# Patient Record
Sex: FEMALE | Race: WHITE | NOT HISPANIC OR LATINO | Employment: OTHER | URBAN - METROPOLITAN AREA
[De-identification: names, ages, dates, MRNs, and addresses within clinical notes are randomized per-mention and may not be internally consistent; named-entity substitution may affect disease eponyms.]

---

## 2023-07-28 ENCOUNTER — HOSPITAL ENCOUNTER (OUTPATIENT)
Dept: DATA CONVERSION | Facility: HOSPITAL | Age: 48
Discharge: LEFT BEFORE DISCHARGE | End: 2023-07-28
Attending: EMERGENCY MEDICINE

## 2023-07-28 DIAGNOSIS — Z79.899 OTHER LONG TERM (CURRENT) DRUG THERAPY: ICD-10-CM

## 2023-07-28 DIAGNOSIS — R51.9 HEADACHE, UNSPECIFIED: Primary | ICD-10-CM

## 2023-07-28 DIAGNOSIS — F17.210 NICOTINE DEPENDENCE, CIGARETTES, UNCOMPLICATED: ICD-10-CM

## 2023-07-28 DIAGNOSIS — J32.9 CHRONIC SINUSITIS, UNSPECIFIED: ICD-10-CM

## 2023-07-28 DIAGNOSIS — F19.10 OTHER PSYCHOACTIVE SUBSTANCE ABUSE, UNCOMPLICATED (MULTI): ICD-10-CM

## 2023-07-28 LAB
ALBUMIN SERPL-MCNC: 3.6 GM/DL (ref 3.5–5)
ALBUMIN/GLOB SERPL: 1 RATIO (ref 1.5–3)
ALP BLD-CCNC: 99 U/L (ref 35–125)
ALT SERPL-CCNC: 18 U/L (ref 5–40)
AMPHETAMINES UR QL SCN>1000 NG/ML: NEGATIVE
ANION GAP SERPL CALCULATED.3IONS-SCNC: 14 MMOL/L (ref 0–19)
AST SERPL-CCNC: 15 U/L (ref 5–40)
BACTERIA UR QL AUTO: POSITIVE
BARBITURATES UR QL SCN>300 NG/ML: NEGATIVE
BASOPHILS # BLD AUTO: 0.03 K/UL (ref 0–0.22)
BASOPHILS NFR BLD AUTO: 0.3 % (ref 0–1)
BENZODIAZ UR QL SCN>300 NG/ML: NEGATIVE
BILIRUB SERPL-MCNC: 0.4 MG/DL (ref 0.1–1.2)
BILIRUB UR QL STRIP.AUTO: NEGATIVE
BUN SERPL-MCNC: 5 MG/DL (ref 8–25)
BUN/CREAT SERPL: 10 RATIO (ref 8–21)
BZE UR QL SCN>300 NG/ML: NORMAL
CALCIUM SERPL-MCNC: 9.2 MG/DL (ref 8.5–10.4)
CANNABINOIDS UR QL SCN>50 NG/ML: NEGATIVE
CHLORIDE SERPL-SCNC: 99 MMOL/L (ref 97–107)
CLARITY UR: CLEAR
CO2 SERPL-SCNC: 22 MMOL/L (ref 24–31)
COLOR UR: ABNORMAL
CREAT SERPL-MCNC: 0.5 MG/DL (ref 0.4–1.6)
DEPRECATED RDW RBC AUTO: 39 FL (ref 37–54)
DIFFERENTIAL METHOD BLD: ABNORMAL
DRUG SCREEN COMMENT UR-IMP: NORMAL
EOSINOPHIL # BLD AUTO: 0.13 K/UL (ref 0–0.45)
EOSINOPHIL NFR BLD: 1.1 % (ref 0–3)
ERYTHROCYTE [DISTWIDTH] IN BLOOD BY AUTOMATED COUNT: 12.3 % (ref 11.7–15)
FENTANYL+NORFENTANYL UR QL SCN: NORMAL
GFR SERPL CREATININE-BSD FRML MDRD: 115 ML/MIN/1.73 M2
GLOBULIN SER-MCNC: 3.7 G/DL (ref 1.9–3.7)
GLUCOSE SERPL-MCNC: 353 MG/DL (ref 65–99)
GLUCOSE UR STRIP.AUTO-MCNC: >=1000 MG/DL
HCG UR QL: NEGATIVE
HCT VFR BLD AUTO: 42.4 % (ref 36–44)
HGB BLD-MCNC: 15 GM/DL (ref 12–15)
HGB UR QL STRIP.AUTO: 1 /HPF (ref 0–3)
HGB UR QL: NEGATIVE
HS TROPONIN T DELTA: 2 (ref 0–4)
HS TROPONIN T DELTA: NORMAL (ref 0–4)
HYALINE CASTS UR QL AUTO: ABNORMAL /LPF
IMM GRANULOCYTES # BLD AUTO: 0.06 K/UL (ref 0–0.1)
KETONES UR QL STRIP.AUTO: ABNORMAL
LEUKOCYTE ESTERASE UR QL STRIP.AUTO: NEGATIVE
LYMPHOCYTES # BLD AUTO: 2.38 K/UL (ref 1.2–3.2)
LYMPHOCYTES NFR BLD MANUAL: 20 % (ref 20–40)
MCH RBC QN AUTO: 30.9 PG (ref 26–34)
MCHC RBC AUTO-ENTMCNC: 35.4 % (ref 31–37)
MCV RBC AUTO: 87.4 FL (ref 80–100)
METHADONE UR QL SCN>300 NG/ML: NEGATIVE
MICRO URNS: ABNORMAL
MICROSCOPIC (UA): ABNORMAL
MONOCYTES # BLD AUTO: 0.95 K/UL (ref 0–0.8)
MONOCYTES NFR BLD MANUAL: 8 % (ref 0–8)
NEUTROPHILS # BLD AUTO: 8.37 K/UL
NEUTROPHILS # BLD AUTO: 8.37 K/UL (ref 1.8–7.7)
NEUTROPHILS.IMMATURE NFR BLD: 0.5 % (ref 0–1)
NEUTS SEG NFR BLD: 70.1 % (ref 50–70)
NITRITE UR QL STRIP.AUTO: NEGATIVE
NRBC BLD-RTO: 0 /100 WBC
OPIATES UR QL SCN>300 NG/ML: NEGATIVE
OXYCODONE UR QL: NEGATIVE
PCP UR QL SCN>25 NG/ML: NEGATIVE
PH UR STRIP.AUTO: 6.5 [PH] (ref 4.6–8)
PLATELET # BLD AUTO: 284 K/UL (ref 150–450)
PMV BLD AUTO: 10.3 CU (ref 7–12.6)
POTASSIUM SERPL-SCNC: 3.7 MMOL/L (ref 3.4–5.1)
PROT SERPL-MCNC: 7.3 G/DL (ref 5.9–7.9)
PROT UR STRIP.AUTO-MCNC: NEGATIVE MG/DL
RBC # BLD AUTO: 4.85 M/UL (ref 4–4.9)
SODIUM SERPL-SCNC: 135 MMOL/L (ref 133–145)
SP GR UR STRIP.AUTO: >1.04 (ref 1–1.03)
SQUAMOUS UR QL AUTO: ABNORMAL /HPF
TROPONIN T SERPL-MCNC: 10 NG/L
TROPONIN T SERPL-MCNC: 8 NG/L
URINE CULTURE: ABNORMAL
UROBILINOGEN UR QL STRIP.AUTO: NORMAL MG/DL (ref 0–1)
WBC # BLD AUTO: 11.9 K/UL (ref 4.5–11)
WBC #/AREA URNS AUTO: 3 /HPF (ref 0–3)

## 2023-09-15 PROBLEM — F32.9 MAJOR DEPRESSIVE DISORDER: Status: ACTIVE | Noted: 2023-09-15

## 2023-09-15 PROBLEM — E87.20 LACTIC ACIDOSIS: Status: ACTIVE | Noted: 2023-09-15

## 2023-09-15 PROBLEM — M47.816 OSTEOARTHRITIS OF LUMBAR SPINE: Status: ACTIVE | Noted: 2023-09-15

## 2023-09-15 PROBLEM — M17.11 PRIMARY OSTEOARTHRITIS OF RIGHT KNEE: Status: ACTIVE | Noted: 2023-09-15

## 2023-09-15 PROBLEM — K42.9 UMBILICAL HERNIA: Status: ACTIVE | Noted: 2023-09-15

## 2023-09-15 PROBLEM — F10.20 ALCOHOL DEPENDENCE (MULTI): Status: ACTIVE | Noted: 2023-09-15

## 2023-09-15 PROBLEM — K80.20 GALLSTONES: Status: ACTIVE | Noted: 2023-09-15

## 2023-09-15 PROBLEM — R26.81 GAIT INSTABILITY: Status: ACTIVE | Noted: 2023-09-15

## 2023-09-15 PROBLEM — K80.10 CALCULUS OF GALLBLADDER WITH CHOLECYSTITIS: Status: ACTIVE | Noted: 2023-09-15

## 2023-09-15 PROBLEM — M70.71 BURSITIS OF RIGHT HIP: Status: RESOLVED | Noted: 2023-09-15 | Resolved: 2023-09-15

## 2023-09-15 PROBLEM — E11.9 TYPE 2 DIABETES MELLITUS WITHOUT COMPLICATION, WITHOUT LONG-TERM CURRENT USE OF INSULIN (MULTI): Status: ACTIVE | Noted: 2023-09-15

## 2023-09-15 PROBLEM — N94.6 DYSMENORRHEA: Status: ACTIVE | Noted: 2023-09-15

## 2023-09-15 PROBLEM — F10.929 ALCOHOL INTOXICATION (CMS-HCC): Status: ACTIVE | Noted: 2023-09-15

## 2023-09-15 PROBLEM — F31.9 DEPRESSED BIPOLAR I DISORDER (MULTI): Status: ACTIVE | Noted: 2023-09-15

## 2023-09-15 PROBLEM — E78.2 MIXED HYPERLIPIDEMIA: Status: ACTIVE | Noted: 2023-09-15

## 2023-09-15 PROBLEM — M70.60 GREATER TROCHANTERIC BURSITIS: Status: ACTIVE | Noted: 2023-09-15

## 2023-09-15 PROBLEM — F41.9 ANXIETY: Status: ACTIVE | Noted: 2023-09-15

## 2023-09-15 RX ORDER — GLIMEPIRIDE 4 MG/1
4 TABLET ORAL
COMMUNITY

## 2023-09-15 RX ORDER — PEN NEEDLE, DIABETIC 30 GX3/16"
NEEDLE, DISPOSABLE MISCELLANEOUS
COMMUNITY
Start: 2022-07-22

## 2023-09-15 RX ORDER — INSULIN GLARGINE 100 [IU]/ML
30 INJECTION, SOLUTION SUBCUTANEOUS NIGHTLY
COMMUNITY
End: 2024-01-03

## 2023-09-15 RX ORDER — VENLAFAXINE HYDROCHLORIDE 150 MG/1
150 CAPSULE, EXTENDED RELEASE ORAL DAILY
COMMUNITY
End: 2024-02-21 | Stop reason: WASHOUT

## 2023-09-15 RX ORDER — DOXEPIN HYDROCHLORIDE 150 MG/1
150 CAPSULE ORAL NIGHTLY
COMMUNITY
End: 2024-02-21 | Stop reason: WASHOUT

## 2023-09-15 RX ORDER — SIMVASTATIN 20 MG/1
20 TABLET, FILM COATED ORAL EVERY EVENING
COMMUNITY
Start: 2022-09-02 | End: 2024-03-20

## 2023-09-15 RX ORDER — HYDROXYZINE HYDROCHLORIDE 25 MG/1
1 TABLET, FILM COATED ORAL 2 TIMES DAILY PRN
COMMUNITY

## 2023-10-31 DIAGNOSIS — E11.9 TYPE 2 DIABETES MELLITUS WITHOUT COMPLICATION, WITHOUT LONG-TERM CURRENT USE OF INSULIN (MULTI): Primary | ICD-10-CM

## 2023-10-31 RX ORDER — FLASH GLUCOSE SENSOR
KIT MISCELLANEOUS
Qty: 2 EACH | Refills: 11 | Status: SHIPPED | OUTPATIENT
Start: 2023-10-31

## 2023-11-11 PROBLEM — N95.1 PERIMENOPAUSE: Status: ACTIVE | Noted: 2023-11-11

## 2023-11-11 PROBLEM — Z12.31 ENCOUNTER FOR SCREENING MAMMOGRAM FOR MALIGNANT NEOPLASM OF BREAST: Status: ACTIVE | Noted: 2023-11-11

## 2023-11-11 PROBLEM — N92.0 MENORRHAGIA WITH REGULAR CYCLE: Status: ACTIVE | Noted: 2023-11-11

## 2023-11-11 PROBLEM — Z12.4 SCREENING FOR CERVICAL CANCER: Status: ACTIVE | Noted: 2023-11-11

## 2023-11-11 PROBLEM — Z11.51 SCREENING FOR HPV (HUMAN PAPILLOMAVIRUS): Status: ACTIVE | Noted: 2023-11-11

## 2023-11-13 ENCOUNTER — APPOINTMENT (OUTPATIENT)
Dept: OBSTETRICS AND GYNECOLOGY | Facility: CLINIC | Age: 48
End: 2023-11-13
Payer: COMMERCIAL

## 2023-11-22 ENCOUNTER — OFFICE VISIT (OUTPATIENT)
Dept: OBSTETRICS AND GYNECOLOGY | Facility: CLINIC | Age: 48
End: 2023-11-22
Payer: COMMERCIAL

## 2023-11-22 VITALS
WEIGHT: 217 LBS | DIASTOLIC BLOOD PRESSURE: 60 MMHG | SYSTOLIC BLOOD PRESSURE: 100 MMHG | BODY MASS INDEX: 34.87 KG/M2 | HEIGHT: 66 IN

## 2023-11-22 DIAGNOSIS — N92.0 MENORRHAGIA WITH REGULAR CYCLE: Primary | ICD-10-CM

## 2023-11-22 PROCEDURE — 3078F DIAST BP <80 MM HG: CPT | Performed by: OBSTETRICS & GYNECOLOGY

## 2023-11-22 PROCEDURE — 1036F TOBACCO NON-USER: CPT | Performed by: OBSTETRICS & GYNECOLOGY

## 2023-11-22 PROCEDURE — 2500000004 HC RX 250 GENERAL PHARMACY W/ HCPCS (ALT 636 FOR OP/ED): Performed by: OBSTETRICS & GYNECOLOGY

## 2023-11-22 PROCEDURE — 3074F SYST BP LT 130 MM HG: CPT | Performed by: OBSTETRICS & GYNECOLOGY

## 2023-11-22 RX ORDER — MEDROXYPROGESTERONE ACETATE 150 MG/ML
150 INJECTION, SUSPENSION INTRAMUSCULAR ONCE
Status: COMPLETED | OUTPATIENT
Start: 2023-11-22 | End: 2023-11-22

## 2023-11-22 RX ADMIN — MEDROXYPROGESTERONE ACETATE 150 MG: 150 INJECTION, SUSPENSION INTRAMUSCULAR at 15:22

## 2023-11-22 ASSESSMENT — PATIENT HEALTH QUESTIONNAIRE - PHQ9
1. LITTLE INTEREST OR PLEASURE IN DOING THINGS: NOT AT ALL
2. FEELING DOWN, DEPRESSED OR HOPELESS: NOT AT ALL
SUM OF ALL RESPONSES TO PHQ9 QUESTIONS 1 & 2: 0

## 2023-11-22 ASSESSMENT — LIFESTYLE VARIABLES
HOW MANY STANDARD DRINKS CONTAINING ALCOHOL DO YOU HAVE ON A TYPICAL DAY: 1 OR 2
HOW OFTEN DO YOU HAVE A DRINK CONTAINING ALCOHOL: MONTHLY OR LESS
HOW OFTEN DO YOU HAVE SIX OR MORE DRINKS ON ONE OCCASION: NEVER
AUDIT-C TOTAL SCORE: 1
SKIP TO QUESTIONS 9-10: 1

## 2023-12-26 DIAGNOSIS — E11.9 TYPE 2 DIABETES MELLITUS WITHOUT COMPLICATION, WITHOUT LONG-TERM CURRENT USE OF INSULIN (MULTI): Primary | ICD-10-CM

## 2023-12-26 RX ORDER — METFORMIN HYDROCHLORIDE 500 MG/1
2000 TABLET, EXTENDED RELEASE ORAL DAILY
Qty: 120 TABLET | Refills: 3 | Status: SHIPPED | OUTPATIENT
Start: 2023-12-26 | End: 2024-02-21 | Stop reason: ALTCHOICE

## 2024-01-03 DIAGNOSIS — E11.9 TYPE 2 DIABETES MELLITUS WITHOUT COMPLICATION, WITHOUT LONG-TERM CURRENT USE OF INSULIN (MULTI): Primary | ICD-10-CM

## 2024-01-03 RX ORDER — INSULIN GLARGINE 100 [IU]/ML
INJECTION, SOLUTION SUBCUTANEOUS
Qty: 80 ML | Refills: 3 | Status: ON HOLD | OUTPATIENT
Start: 2024-01-03 | End: 2024-03-14 | Stop reason: SDUPTHER

## 2024-01-25 DIAGNOSIS — E11.9 TYPE 2 DIABETES MELLITUS WITHOUT COMPLICATION, WITHOUT LONG-TERM CURRENT USE OF INSULIN (MULTI): Primary | ICD-10-CM

## 2024-01-25 RX ORDER — BLOOD SUGAR DIAGNOSTIC
STRIP MISCELLANEOUS
Qty: 100 EACH | Refills: 11 | Status: SHIPPED | OUTPATIENT
Start: 2024-01-25

## 2024-02-08 ENCOUNTER — OFFICE VISIT (OUTPATIENT)
Dept: OBSTETRICS AND GYNECOLOGY | Facility: CLINIC | Age: 49
End: 2024-02-08
Payer: MEDICARE

## 2024-02-08 VITALS
SYSTOLIC BLOOD PRESSURE: 112 MMHG | DIASTOLIC BLOOD PRESSURE: 68 MMHG | BODY MASS INDEX: 34.29 KG/M2 | HEIGHT: 65 IN | WEIGHT: 205.8 LBS

## 2024-02-08 DIAGNOSIS — M81.0 PERIMENOPAUSAL BONE LOSS: ICD-10-CM

## 2024-02-08 DIAGNOSIS — E11.9 TYPE 2 DIABETES MELLITUS WITHOUT COMPLICATION, WITHOUT LONG-TERM CURRENT USE OF INSULIN (MULTI): ICD-10-CM

## 2024-02-08 DIAGNOSIS — M85.80 OSTEOPENIA DETERMINED BY X-RAY: ICD-10-CM

## 2024-02-08 DIAGNOSIS — N92.1 MENORRHAGIA WITH IRREGULAR CYCLE: Primary | ICD-10-CM

## 2024-02-08 DIAGNOSIS — Z12.4 SCREENING FOR CERVICAL CANCER: ICD-10-CM

## 2024-02-08 DIAGNOSIS — Z12.31 SCREENING MAMMOGRAM FOR BREAST CANCER: ICD-10-CM

## 2024-02-08 DIAGNOSIS — F17.200 TOBACCO DEPENDENCE: ICD-10-CM

## 2024-02-08 DIAGNOSIS — Z98.891 HISTORY OF 3 CESAREAN SECTIONS: ICD-10-CM

## 2024-02-08 PROCEDURE — 99214 OFFICE O/P EST MOD 30 MIN: CPT | Performed by: OBSTETRICS & GYNECOLOGY

## 2024-02-08 PROCEDURE — 3008F BODY MASS INDEX DOCD: CPT | Performed by: OBSTETRICS & GYNECOLOGY

## 2024-02-08 PROCEDURE — 2500000004 HC RX 250 GENERAL PHARMACY W/ HCPCS (ALT 636 FOR OP/ED): Performed by: OBSTETRICS & GYNECOLOGY

## 2024-02-08 PROCEDURE — 3074F SYST BP LT 130 MM HG: CPT | Performed by: OBSTETRICS & GYNECOLOGY

## 2024-02-08 PROCEDURE — 88175 CYTOPATH C/V AUTO FLUID REDO: CPT | Mod: TC,GCY,WESLAB | Performed by: OBSTETRICS & GYNECOLOGY

## 2024-02-08 PROCEDURE — 3078F DIAST BP <80 MM HG: CPT | Performed by: OBSTETRICS & GYNECOLOGY

## 2024-02-08 RX ORDER — MEDROXYPROGESTERONE ACETATE 150 MG/ML
150 INJECTION, SUSPENSION INTRAMUSCULAR ONCE
Status: COMPLETED | OUTPATIENT
Start: 2024-02-08 | End: 2024-02-08

## 2024-02-08 RX ORDER — ARIPIPRAZOLE 20 MG/1
20 TABLET ORAL DAILY
COMMUNITY
End: 2024-02-21 | Stop reason: WASHOUT

## 2024-02-08 RX ORDER — MEDROXYPROGESTERONE ACETATE 150 MG/ML
150 INJECTION, SUSPENSION INTRAMUSCULAR
COMMUNITY
End: 2024-02-08 | Stop reason: SDUPTHER

## 2024-02-08 RX ORDER — MEDROXYPROGESTERONE ACETATE 150 MG/ML
150 INJECTION, SUSPENSION INTRAMUSCULAR
Qty: 1 ML | Refills: 3 | Status: SHIPPED | OUTPATIENT
Start: 2024-02-08

## 2024-02-08 RX ADMIN — MEDROXYPROGESTERONE ACETATE 150 MG: 150 INJECTION, SUSPENSION INTRAMUSCULAR at 14:30

## 2024-02-08 ASSESSMENT — PATIENT HEALTH QUESTIONNAIRE - PHQ9
2. FEELING DOWN, DEPRESSED OR HOPELESS: NOT AT ALL
1. LITTLE INTEREST OR PLEASURE IN DOING THINGS: NOT AT ALL
1. LITTLE INTEREST OR PLEASURE IN DOING THINGS: NOT AT ALL
2. FEELING DOWN, DEPRESSED OR HOPELESS: NOT AT ALL
SUM OF ALL RESPONSES TO PHQ9 QUESTIONS 1 & 2: 0
SUM OF ALL RESPONSES TO PHQ9 QUESTIONS 1 & 2: 0

## 2024-02-08 ASSESSMENT — ENCOUNTER SYMPTOMS
DYSURIA: 0
DEPRESSION: 0
DIFFICULTY URINATING: 0
ADENOPATHY: 0
SHORTNESS OF BREATH: 0
DIZZINESS: 0
CHEST TIGHTNESS: 0
HEADACHES: 0
COLOR CHANGE: 0
UNEXPECTED WEIGHT CHANGE: 0
WEAKNESS: 0
OCCASIONAL FEELINGS OF UNSTEADINESS: 0
ABDOMINAL DISTENTION: 0
LOSS OF SENSATION IN FEET: 0
ABDOMINAL PAIN: 0
JOINT SWELLING: 0
FATIGUE: 0
ACTIVITY CHANGE: 0

## 2024-02-08 ASSESSMENT — PAIN SCALES - GENERAL: PAINLEVEL: 0-NO PAIN

## 2024-02-08 ASSESSMENT — LIFESTYLE VARIABLES
AUDIT-C TOTAL SCORE: 3
HOW OFTEN DO YOU HAVE SIX OR MORE DRINKS ON ONE OCCASION: MONTHLY
HOW OFTEN DO YOU HAVE A DRINK CONTAINING ALCOHOL: MONTHLY OR LESS
HOW MANY STANDARD DRINKS CONTAINING ALCOHOL DO YOU HAVE ON A TYPICAL DAY: 1 OR 2
SKIP TO QUESTIONS 9-10: 0

## 2024-02-08 NOTE — PROGRESS NOTES
Annual  Subjective   Mary Pedroza is a 48 y.o. female who is here for a gyn exam.   Complaints:   Due for follow-up on menorrhagia; has been using Depo-Provera injections to resolve this to good effect; due to prolonged use we have monitored bone density and baseline measurements were excellent; patient will be due for follow-up evaluation; ongoing tobacco use.  PMHx: Bmi 34.       diabetes;  Type 1/has antibodies; last Hgb A1c 10.       heavy menses resolved on depot provera; depo provera tx starting 2017 ([facility]); nl BMD .       Gallstones.       anxiety/depression; (Cross[place]cedes [place]l NP), Therapist: Gregory, : [place].       Tobacco use. Tox pos cocaine        both pap/hpv neg ; endo bx  neg, [facility] 2022.       Possible isthmocele at c/s scar.  SurgHx: c section x 3 ,  &         LEEP 2006        Cholecystectomy   Father:  61 yrs, HTN, diabetes, cholesterol issues  Mother:  49 yrs,  Last pap 12/10/2020-NEG,HPV-NEG;Abnormal pap smear  LEEP .   Mamm 2023 NL   DEXA  2021: spine +0.3/ hip-0.3.   Pelvic US: 2021= 6.6cm uterus/1mm endo/small fluid collection near c/s scar/both ovaries seen and nl, 2017 7cm uterus with 8mm intramural fibroid; normal ovaries; endometrium 13mm.   Birth control Depo. Periods : depo.   Menarche 13. STDs None.  not currently sexually active; 2024   Total pregnancies  3.   Pregnancy # 1:  Primary C/S-male-.   Pregnancy # 2:  Repeat C/S-female-.   Pregnancy # 3:  Repeat C/S-male-.   Currently living in shelter/not able to afford rent  Review of Systems   Constitutional:  Negative for activity change, fatigue and unexpected weight change.   Respiratory:  Negative for chest tightness and shortness of breath.    Cardiovascular:  Negative for chest pain and leg swelling.   Gastrointestinal:  Negative for abdominal distention and abdominal pain.   Genitourinary:  Negative for  "difficulty urinating, dysuria, genital sores, pelvic pain, vaginal bleeding, vaginal discharge and vaginal pain.   Musculoskeletal:  Negative for gait problem and joint swelling.   Skin:  Negative for color change and rash.   Neurological:  Negative for dizziness, weakness and headaches.   Hematological:  Negative for adenopathy.   Objective Visit Vitals  /68   Ht 1.651 m (5' 5\")   Wt 93.4 kg (205 lb 12.8 oz)   BMI 34.25 kg/m²   OB Status Injection   Smoking Status Every Day   BSA 2.07 m²       General:   Alert and oriented, in no acute distress   Neck: Supple. No visible thyromegaly.    Breast/Axilla: Normal to palpation bilaterally without masses, skin changes, or nipple discharge.    Abdomen: Soft, non-tender, without masses or organomegaly   Vulva: Normal architecture without erythema, masses, or lesions.    Vagina: Normal mucosa without lesions, masses, or atrophy. No abnormal vaginal discharge.    Cervix: Normal without masses, lesions, or signs of cervicitis   Uterus: Normal, mobile, non-enlarged uterus; exam limited by bmi   Adnexa: - grossly normal without palpable  masses or tenderness; exam limited by bmi   Pelvic Floor normal   Psych Normal affect. Normal mood.    Assessment/Plan   Encounter Diagnoses   Name Primary?    Menorrhagia with irregular cycle; excellent resolution on Depo-Provera; due for FSH level to determine menopausal status Yes    Screening for cervical cancer; due for screening     Screening mammogram for breast cancer; due for screening     Perimenopausal bone loss; due for repeat DEXA evaluation due to duration of Depo-Provera use     Osteopenia determined by x-ray; as above     Type 2 diabetes mellitus without complication, without long-term current use of insulin (CMS/[facility]); increases risk of any surgical options to treat menorrhagia     Tobacco dependence; increases risk of surgical options to treat menorrhagia     History of 3  sections; increases risk of surgical " options to treat menorrhagia     BMI 34.0-34.9,adult; increases risk of surgical options to treat menorrhagia     7. Depo-Provera contraceptive status - Z30.42  Diandra Gandhi MD

## 2024-02-09 PROBLEM — M81.0 PERIMENOPAUSAL BONE LOSS: Status: ACTIVE | Noted: 2024-02-09

## 2024-02-09 PROBLEM — F17.200 TOBACCO DEPENDENCE: Status: ACTIVE | Noted: 2024-02-09

## 2024-02-09 PROBLEM — N92.1 MENORRHAGIA WITH IRREGULAR CYCLE: Status: ACTIVE | Noted: 2023-11-11

## 2024-02-09 PROBLEM — M85.80 OSTEOPENIA DETERMINED BY X-RAY: Status: ACTIVE | Noted: 2024-02-09

## 2024-02-09 PROBLEM — Z98.891 HISTORY OF 3 CESAREAN SECTIONS: Status: ACTIVE | Noted: 2024-02-09

## 2024-02-21 ENCOUNTER — HOSPITAL ENCOUNTER (OUTPATIENT)
Dept: RADIOLOGY | Facility: CLINIC | Age: 49
Discharge: HOME | End: 2024-02-21
Payer: MEDICARE

## 2024-02-21 ENCOUNTER — OFFICE VISIT (OUTPATIENT)
Dept: PRIMARY CARE | Facility: CLINIC | Age: 49
End: 2024-02-21
Payer: MEDICARE

## 2024-02-21 VITALS
BODY MASS INDEX: 33.99 KG/M2 | WEIGHT: 204 LBS | TEMPERATURE: 97.8 F | HEIGHT: 65 IN | DIASTOLIC BLOOD PRESSURE: 69 MMHG | HEART RATE: 101 BPM | OXYGEN SATURATION: 100 % | SYSTOLIC BLOOD PRESSURE: 103 MMHG

## 2024-02-21 DIAGNOSIS — E78.6 LOW HDL (UNDER 40): ICD-10-CM

## 2024-02-21 DIAGNOSIS — E78.2 MIXED HYPERLIPIDEMIA: ICD-10-CM

## 2024-02-21 DIAGNOSIS — M79.605 PAIN AND SWELLING OF LEFT LOWER EXTREMITY: ICD-10-CM

## 2024-02-21 DIAGNOSIS — E11.65 TYPE 2 DIABETES MELLITUS WITH HYPERGLYCEMIA, WITH LONG-TERM CURRENT USE OF INSULIN (MULTI): ICD-10-CM

## 2024-02-21 DIAGNOSIS — Z01.89 ENCOUNTER FOR ROUTINE LABORATORY TESTING: ICD-10-CM

## 2024-02-21 DIAGNOSIS — E55.9 VITAMIN D DEFICIENCY: ICD-10-CM

## 2024-02-21 DIAGNOSIS — M79.89 PAIN AND SWELLING OF LEFT LOWER EXTREMITY: ICD-10-CM

## 2024-02-21 DIAGNOSIS — Z11.59 NEED FOR HEPATITIS C SCREENING TEST: ICD-10-CM

## 2024-02-21 DIAGNOSIS — M25.50 ARTHRALGIA, UNSPECIFIED JOINT: ICD-10-CM

## 2024-02-21 DIAGNOSIS — Z79.4 TYPE 2 DIABETES MELLITUS WITH HYPERGLYCEMIA, WITH LONG-TERM CURRENT USE OF INSULIN (MULTI): ICD-10-CM

## 2024-02-21 DIAGNOSIS — F31.9 BIPOLAR I DISORDER WITH DEPRESSION (MULTI): ICD-10-CM

## 2024-02-21 DIAGNOSIS — R79.9 ABNORMAL FINDING OF BLOOD CHEMISTRY, UNSPECIFIED: ICD-10-CM

## 2024-02-21 DIAGNOSIS — Z76.89 ENCOUNTER TO ESTABLISH CARE WITH NEW DOCTOR: Primary | ICD-10-CM

## 2024-02-21 PROBLEM — N89.8 VAGINAL DISCHARGE: Status: ACTIVE | Noted: 2024-02-21

## 2024-02-21 PROBLEM — N92.0 HEAVY MENSTRUAL BLEEDING: Status: ACTIVE | Noted: 2024-02-21

## 2024-02-21 PROBLEM — M17.9 OSTEOARTHRITIS OF KNEE: Status: ACTIVE | Noted: 2023-09-15

## 2024-02-21 PROBLEM — M79.606 LEG PAIN: Status: ACTIVE | Noted: 2024-02-21

## 2024-02-21 PROBLEM — S83.90XA SPRAIN OF KNEE: Status: ACTIVE | Noted: 2024-02-21

## 2024-02-21 PROBLEM — R60.9 EDEMA: Status: ACTIVE | Noted: 2024-02-21

## 2024-02-21 PROBLEM — F32.A DEPRESSION: Status: ACTIVE | Noted: 2024-02-21

## 2024-02-21 PROBLEM — E78.5 HYPERLIPIDEMIA: Status: ACTIVE | Noted: 2023-09-15

## 2024-02-21 PROBLEM — F10.20 ALCOHOL DEPENDENCE (MULTI): Status: RESOLVED | Noted: 2023-09-15 | Resolved: 2024-02-21

## 2024-02-21 PROBLEM — E11.9 DIABETES MELLITUS (MULTI): Status: ACTIVE | Noted: 2024-02-21

## 2024-02-21 PROBLEM — F19.10 PSYCHOACTIVE SUBSTANCE ABUSE (MULTI): Status: ACTIVE | Noted: 2023-07-28

## 2024-02-21 PROBLEM — K62.5 RECTAL HEMORRHAGE: Status: ACTIVE | Noted: 2024-02-21

## 2024-02-21 PROBLEM — R05.9 COUGH: Status: ACTIVE | Noted: 2024-02-21

## 2024-02-21 PROBLEM — R12 HEARTBURN: Status: ACTIVE | Noted: 2024-02-21

## 2024-02-21 PROBLEM — R40.1 CLOUDED CONSCIOUSNESS: Status: ACTIVE | Noted: 2024-02-21

## 2024-02-21 PROBLEM — M70.70 BURSITIS OF HIP: Status: ACTIVE | Noted: 2024-02-21

## 2024-02-21 PROBLEM — E66.9 OBESITY WITH BODY MASS INDEX 30 OR GREATER: Status: ACTIVE | Noted: 2024-02-09

## 2024-02-21 PROBLEM — F10.929 ALCOHOLIC INTOXICATION (CMS-HCC): Status: RESOLVED | Noted: 2023-09-15 | Resolved: 2024-02-21

## 2024-02-21 PROBLEM — R51.9 HEADACHE: Status: ACTIVE | Noted: 2024-02-21

## 2024-02-21 PROBLEM — M25.551 PAIN OF RIGHT HIP JOINT: Status: ACTIVE | Noted: 2024-02-21

## 2024-02-21 PROBLEM — J32.9 CHRONIC SINUSITIS: Status: ACTIVE | Noted: 2023-07-28

## 2024-02-21 PROCEDURE — 99214 OFFICE O/P EST MOD 30 MIN: CPT | Performed by: INTERNAL MEDICINE

## 2024-02-21 PROCEDURE — 3078F DIAST BP <80 MM HG: CPT | Performed by: INTERNAL MEDICINE

## 2024-02-21 PROCEDURE — 3074F SYST BP LT 130 MM HG: CPT | Performed by: INTERNAL MEDICINE

## 2024-02-21 PROCEDURE — 3008F BODY MASS INDEX DOCD: CPT | Performed by: INTERNAL MEDICINE

## 2024-02-21 PROCEDURE — 93971 EXTREMITY STUDY: CPT

## 2024-02-21 RX ORDER — PROPRANOLOL HYDROCHLORIDE 20 MG/1
20 TABLET ORAL 2 TIMES DAILY
COMMUNITY
Start: 2024-02-08

## 2024-02-21 RX ORDER — CLONAZEPAM 1 MG/1
1 TABLET ORAL NIGHTLY
COMMUNITY
Start: 2014-10-20 | End: 2024-02-21 | Stop reason: ALTCHOICE

## 2024-02-21 RX ORDER — DOXEPIN HYDROCHLORIDE 100 MG/1
100 CAPSULE ORAL NIGHTLY
COMMUNITY
Start: 2024-02-08

## 2024-02-21 RX ORDER — FENOFIBRATE 145 MG/1
1 TABLET, FILM COATED ORAL DAILY
COMMUNITY
Start: 2014-10-20

## 2024-02-21 RX ORDER — VENLAFAXINE HYDROCHLORIDE 75 MG/1
75 CAPSULE, EXTENDED RELEASE ORAL DAILY
COMMUNITY
Start: 2024-02-20

## 2024-02-21 RX ORDER — SITAGLIPTIN 25 MG/1
25 TABLET, FILM COATED ORAL DAILY
COMMUNITY
Start: 2024-02-08

## 2024-02-21 RX ORDER — ARIPIPRAZOLE 5 MG/1
5 TABLET ORAL DAILY
COMMUNITY
Start: 2024-02-06

## 2024-02-21 RX ORDER — SUMATRIPTAN SUCCINATE 100 MG/1
100 TABLET ORAL ONCE AS NEEDED
COMMUNITY
Start: 2015-03-27

## 2024-02-21 ASSESSMENT — PAIN SCALES - GENERAL: PAINLEVEL: 8

## 2024-02-21 ASSESSMENT — ENCOUNTER SYMPTOMS
PAIN WHILE RESTING: 1
LOSS OF SENSATION IN FEET: 1
DEPRESSION: 0
PAIN AT NIGHT: 1
JOINT SWELLING: 1
OCCASIONAL FEELINGS OF UNSTEADINESS: 1
OVERUSE: 0
STIFFNESS: 1

## 2024-02-21 ASSESSMENT — PATIENT HEALTH QUESTIONNAIRE - PHQ9
2. FEELING DOWN, DEPRESSED OR HOPELESS: NOT AT ALL
SUM OF ALL RESPONSES TO PHQ9 QUESTIONS 1 AND 2: 0
10. IF YOU CHECKED OFF ANY PROBLEMS, HOW DIFFICULT HAVE THESE PROBLEMS MADE IT FOR YOU TO DO YOUR WORK, TAKE CARE OF THINGS AT HOME, OR GET ALONG WITH OTHER PEOPLE: SOMEWHAT DIFFICULT
1. LITTLE INTEREST OR PLEASURE IN DOING THINGS: NOT AT ALL
SUM OF ALL RESPONSES TO PHQ9 QUESTIONS 1 AND 2: 1
2. FEELING DOWN, DEPRESSED OR HOPELESS: SEVERAL DAYS
1. LITTLE INTEREST OR PLEASURE IN DOING THINGS: NOT AT ALL

## 2024-02-21 NOTE — PROGRESS NOTES
[facility]: [facility]  PROGRESS NOTE      Mary Pedroza is a 48 y.o. female that is presenting today for NP to est.    Assessment/Plan   Diagnoses and all orders for this visit:  Encounter to establish care with new doctor   - Reviewed patient's available records, discussed PMH, Current active problems Meds and allergies.   Arthralgia, unspecified joint      Bilateral feet/ severe / Lt>>Rt  -     Sedimentation Rate; Future  -     LEANNA; Future  -     Rheumatoid factor; Future  -     Uric acid; Future  -     Citrulline Antibody, IgG; Future  Pain and swelling of left lower extremity   Calf pain w + Evelyne only risk factor - on hormonal Tx  -     Lower extremity venous duplex left  Bipolar I disorder with depression (CMS/[facility])         Mild, Under control with current treatment      Under Psych care  Type 2 diabetes mellitus with hyperglycemia, with long-term current use of insulin (CMS/[facility] )      Per most recent BW ( 7/2023) uncontrolled A1C 9.8  Continue current medication for now and will place orders fro [facility] for next   Mixed hyperlipidemia       Per most recent BW ( 7/2023)  uncontrolled    Continue current med for now and will place orders fro [facility] for next visit  Low HDL (under 40)       Per most recent BW uncontrolled / Extremely low _ 23  Continue current meds for now and will place orders fro [facility] for next visit  Rx Escripted 90 days x 1     I think that NON Compliance w Meds/ Diet is a major issue  Subjective   - Patient is here today to establish her care ( ruba Loya), also been having problems with hands and feet arthralgia with stiffness x few months and Lt. Leg swelling with pain  x few days         - Patient denies any other symptoms or concerns at this time.    - patient denies any adverse reactions to or concerns with his/her meds.      Arthritis  Presents for initial visit. The disease course has been worsening. The condition has lasted for 10 months. She complains of pain,  stiffness (Half hour) and joint swelling. Affected locations include the left foot, right foot, left ankle, right ankle, right MCP, left MCP, right PIP and left PIP. Her pain is at a severity of 8/10. Associated symptoms include pain at night and pain while resting. There is no history of chronic back pain, lupus, osteoarthritis, psoriasis or rheumatoid arthritis.   Risk factors do not include overuse or scoliosis. She shows no family history of chronic back pain, family history of lupus, family history of osteoarthritis, family history of psoriasis, family history of rheumatoid arthritis or family history of unspecified arthritis. Past treatments include NSAIDs. The treatment provided moderate relief. Factors aggravating her arthritis include activity, gripping, lifting and climbing stairs. Compliance with prior treatments has been good.       Review of Systems   Musculoskeletal:  Positive for arthritis, joint swelling and stiffness (Half hour).      Objective   Vitals:    02/21/24 0823   BP: 103/69   Pulse: 101   Temp: 36.6 °C (97.8 °F)   SpO2: 100%      Body mass index is 33.95 kg/m².  Physical Exam  Vitals and nursing note reviewed.   Constitutional:       Appearance: Normal appearance.   HENT:      Head: Normocephalic and atraumatic.   Neck:      Vascular: No carotid bruit.   Cardiovascular:      Rate and Rhythm: Normal rate and regular rhythm.      Pulses: Normal pulses.      Heart sounds: Normal heart sounds.   Pulmonary:      Effort: Pulmonary effort is normal.      Breath sounds: Normal breath sounds.   Abdominal:      General: Abdomen is flat. Bowel sounds are normal.      Palpations: Abdomen is soft.   Musculoskeletal:         General: No swelling. Normal range of motion.      Cervical back: Neck supple.      Right lower leg: Normal.      Left lower leg: Swelling and tenderness (Calf) present. No deformity, lacerations or bony tenderness. 2+ Edema present.      Right ankle: Normal.      Right Achilles  Tendon: Normal.      Left ankle: Swelling present. No deformity, ecchymosis or lacerations. Tenderness present. Normal range of motion. Normal pulse.      Left Achilles Tendon: Normal.      Right foot: Normal range of motion and normal capillary refill. Tenderness and crepitus present. No swelling, deformity, bunion, foot drop, prominent metatarsal heads, laceration or bony tenderness. Normal pulse.      Left foot: Normal range of motion and normal capillary refill. Swelling, bunion (minimal), prominent metatarsal heads, tenderness, bony tenderness and crepitus present. No deformity, foot drop or laceration. Normal pulse.   Lymphadenopathy:      Cervical: No cervical adenopathy.   Skin:     General: Skin is warm and dry.   Neurological:      Mental Status: She is alert.   Psychiatric:         Mood and Affect: Mood normal.     Diagnostic Results   Lab Results   Component Value Date    GLUCOSE 353 (H) 07/28/2023    CALCIUM 9.2 07/28/2023     07/28/2023    K 3.7 07/28/2023    CO2 22 (L) 07/28/2023    CL 99 07/28/2023    BUN 5 (L) 07/28/2023    CREATININE 0.5 07/28/2023     Lab Results   Component Value Date    ALT 18 07/28/2023    AST 15 07/28/2023    ALKPHOS 99 07/28/2023    BILITOT 0.4 07/28/2023     Lab Results   Component Value Date    WBC 11.9 (H) 07/28/2023    HGB 15.0 07/28/2023    HCT 42.4 07/28/2023    MCV 87.4 07/28/2023     07/28/2023     Lab Results   Component Value Date    CHOL 314 (H) 08/04/2022    CHOL 289 (H) 05/24/2021    CHOL 529 (H) 10/24/2020     Lab Results   Component Value Date    HDL 23 (L) 08/04/2022    HDL 24 (L) 05/24/2021    HDL 17 (L) 10/24/2020     Lab Results   Component Value Date    LDLCALC  08/04/2022     Unable to report calculated LDL due to increased triglycerides. Direct    LDLCALC  05/24/2021     Unable to report calculated LDL due to increased triglycerides. Direct    LDLCALC  10/24/2020     Unable to report calculated LDL due to increased triglycerides. Direct  "    Lab Results   Component Value Date    TRIG 1,208 (H) 2022    TRIG 1,784 (H) 2021    TRIG 2,528 (H) 10/24/2020     No components found for: \"CHOLHDL\"  Lab Results   Component Value Date    HGBA1C 9.8 (H) 2022     Other labs not included in the list above were reviewed either before or during this encounter.    History    Past Medical History:   Diagnosis Date    Bursitis of right hip 09/15/2023     Past Surgical History:   Procedure Laterality Date     SECTION, CLASSIC  2014     Section    GALLBLADDER SURGERY  2014    Gallbladder Surgery     Family History   Problem Relation Name Age of Onset    No Known Problems Mother      Hypertension Father      Diabetes Father      Other (cholesterol issues) Father      No Known Problems Sister      No Known Problems Daughter      No Known Problems Son      No Known Problems Son      Breast cancer Other       Social History     Socioeconomic History    Marital status: Legally      Spouse name: Not on file    Number of children: Not on file    Years of education: Not on file    Highest education level: Not on file   Occupational History    Not on file   Tobacco Use    Smoking status: Every Day     Packs/day: 1.00     Years: 15.00     Additional pack years: 0.00     Total pack years: 15.00     Types: Cigarettes     Passive exposure: Current    Smokeless tobacco: Never   Vaping Use    Vaping Use: Never used   Substance and Sexual Activity    Alcohol use: Not Currently    Drug use: Never    Sexual activity: Not Currently     Partners: Male     Birth control/protection: Injection   Other Topics Concern    Not on file   Social History Narrative    ** Merged History Encounter **          Social Determinants of Health     Financial Resource Strain: Not on file   Food Insecurity: Not on file   Transportation Needs: Not on file   Physical Activity: Not on file   Stress: Not on file   Social Connections: Not on file   Intimate " "Partner Violence: Not on file   Housing Stability: Not on file     No Known Allergies  Current Outpatient Medications on File Prior to Visit   Medication Sig Dispense Refill    ARIPiprazole (Abilify) 5 mg tablet       blood sugar diagnostic (OneTouch Ultra Test) strip USE AS DIRECTED TO CHECK BLOOD SUGAR TWICE A  each 11    doxepin (SINEquan) 100 mg capsule       fenofibrate (Tricor) 145 mg tablet Take 1 tablet (145 mg) by mouth once daily.      FreeStyle Bridget sensor system (FreeStyle Bridget 2 Sensor) kit USE SENSOR AS DIRECTED EVERY 2 WEEKS 2 each 11    glimepiride (Amaryl) 4 mg tablet Take 1 tablet (4 mg) by mouth once daily in the morning. Take before meals.      hydrOXYzine HCL (Atarax) 25 mg tablet Take 1 tablet (25 mg) by mouth 2 times a day as needed.      insulin glargine (Lantus Solostar U-100 Insulin) 100 unit/mL (3 mL) pen INJECT UNDER THE SKIN AS DIRECTED  UP TO 80 UNITS DAILY 80 mL 3    Januvia 25 mg tablet       medroxyPROGESTERone (Depo-Provera) 150 mg/mL injection Inject 1 mL (150 mg) into the muscle every 12 weeks. 1 mL 3    pen needle, diabetic (BD Ultra-Fine Short Pen Needle) 31 gauge x 5/16\" needle as directed . as directed for 90 days      propranolol (Inderal) 20 mg tablet       simvastatin (Zocor) 20 mg tablet Take 1 tablet (20 mg) by mouth once daily in the evening.      SUMAtriptan (Imitrex) 100 mg tablet Take by mouth.      venlafaxine XR (Effexor-XR) 75 mg 24 hr capsule       [DISCONTINUED] clonazePAM (KlonoPIN) 1 mg tablet Take 1 tablet (1 mg) by mouth once daily at bedtime.      [DISCONTINUED] ARIPiprazole (Abilify) 20 mg tablet Take 1 tablet (20 mg) by mouth once daily.      [DISCONTINUED] doxepin (SINEquan) 150 mg capsule Take 1 capsule (150 mg) by mouth once daily at bedtime.      [DISCONTINUED] metFORMIN  mg 24 hr tablet TAKE 4 TABLETS BY MOUTH ONCE DAILY 120 tablet 3    [DISCONTINUED] venlafaxine XR (Effexor XR) 150 mg 24 hr capsule Take 1 capsule (150 mg) by mouth once " daily. With food       No current facility-administered medications on file prior to visit.     Immunization History   Administered Date(s) Administered    Influenza, seasonal, injectable 11/03/2012, 10/13/2014     Patient's medical history was reviewed and updated either before or during this encounter.       Luba Griffin MD  Patient was identified as a fall risk. Risk prevention instructions provided.

## 2024-02-21 NOTE — H&P (VIEW-ONLY)
[facility]: [facility]  PROGRESS NOTE      Mary Pedroza is a 48 y.o. female that is presenting today for NP to est.    Assessment/Plan   Diagnoses and all orders for this visit:  Encounter to establish care with new doctor   - Reviewed patient's available records, discussed PMH, Current active problems Meds and allergies.   Arthralgia, unspecified joint      Bilateral feet/ severe / Lt>>Rt  -     Sedimentation Rate; Future  -     LEANNA; Future  -     Rheumatoid factor; Future  -     Uric acid; Future  -     Citrulline Antibody, IgG; Future  Pain and swelling of left lower extremity   Calf pain w + Evelyne only risk factor - on hormonal Tx  -     Lower extremity venous duplex left  Bipolar I disorder with depression (CMS/Formerly [facility])         Mild, Under control with current treatment      Under Psych care  Type 2 diabetes mellitus with hyperglycemia, with long-term current use of insulin (CMS/[facility] )      Per most recent BW ( 7/2023) uncontrolled A1C 9.8  Continue current medication for now and will place orders fro [facility] for next   Mixed hyperlipidemia       Per most recent BW ( 7/2023)  uncontrolled    Continue current med for now and will place orders fro [facility] for next visit  Low HDL (under 40)       Per most recent BW uncontrolled / Extremely low _ 23  Continue current meds for now and will place orders fro [facility] for next visit  Rx Escripted 90 days x 1     I think that NON Compliance w Meds/ Diet is a major issue  Subjective   - Patient is here today to establish her care ( ruba Loya), also been having problems with hands and feet arthralgia with stiffness x few months and Lt. Leg swelling with pain  x few days         - Patient denies any other symptoms or concerns at this time.    - patient denies any adverse reactions to or concerns with his/her meds.      Arthritis  Presents for initial visit. The disease course has been worsening. The condition has lasted for 10 months. She complains of pain,  stiffness (Half hour) and joint swelling. Affected locations include the left foot, right foot, left ankle, right ankle, right MCP, left MCP, right PIP and left PIP. Her pain is at a severity of 8/10. Associated symptoms include pain at night and pain while resting. There is no history of chronic back pain, lupus, osteoarthritis, psoriasis or rheumatoid arthritis.   Risk factors do not include overuse or scoliosis. She shows no family history of chronic back pain, family history of lupus, family history of osteoarthritis, family history of psoriasis, family history of rheumatoid arthritis or family history of unspecified arthritis. Past treatments include NSAIDs. The treatment provided moderate relief. Factors aggravating her arthritis include activity, gripping, lifting and climbing stairs. Compliance with prior treatments has been good.       Review of Systems   Musculoskeletal:  Positive for arthritis, joint swelling and stiffness (Half hour).      Objective   Vitals:    02/21/24 0823   BP: 103/69   Pulse: 101   Temp: 36.6 °C (97.8 °F)   SpO2: 100%      Body mass index is 33.95 kg/m².  Physical Exam  Vitals and nursing note reviewed.   Constitutional:       Appearance: Normal appearance.   HENT:      Head: Normocephalic and atraumatic.   Neck:      Vascular: No carotid bruit.   Cardiovascular:      Rate and Rhythm: Normal rate and regular rhythm.      Pulses: Normal pulses.      Heart sounds: Normal heart sounds.   Pulmonary:      Effort: Pulmonary effort is normal.      Breath sounds: Normal breath sounds.   Abdominal:      General: Abdomen is flat. Bowel sounds are normal.      Palpations: Abdomen is soft.   Musculoskeletal:         General: No swelling. Normal range of motion.      Cervical back: Neck supple.      Right lower leg: Normal.      Left lower leg: Swelling and tenderness (Calf) present. No deformity, lacerations or bony tenderness. 2+ Edema present.      Right ankle: Normal.      Right Achilles  Tendon: Normal.      Left ankle: Swelling present. No deformity, ecchymosis or lacerations. Tenderness present. Normal range of motion. Normal pulse.      Left Achilles Tendon: Normal.      Right foot: Normal range of motion and normal capillary refill. Tenderness and crepitus present. No swelling, deformity, bunion, foot drop, prominent metatarsal heads, laceration or bony tenderness. Normal pulse.      Left foot: Normal range of motion and normal capillary refill. Swelling, bunion (minimal), prominent metatarsal heads, tenderness, bony tenderness and crepitus present. No deformity, foot drop or laceration. Normal pulse.   Lymphadenopathy:      Cervical: No cervical adenopathy.   Skin:     General: Skin is warm and dry.   Neurological:      Mental Status: She is alert.   Psychiatric:         Mood and Affect: Mood normal.     Diagnostic Results   Lab Results   Component Value Date    GLUCOSE 353 (H) 07/28/2023    CALCIUM 9.2 07/28/2023     07/28/2023    K 3.7 07/28/2023    CO2 22 (L) 07/28/2023    CL 99 07/28/2023    BUN 5 (L) 07/28/2023    CREATININE 0.5 07/28/2023     Lab Results   Component Value Date    ALT 18 07/28/2023    AST 15 07/28/2023    ALKPHOS 99 07/28/2023    BILITOT 0.4 07/28/2023     Lab Results   Component Value Date    WBC 11.9 (H) 07/28/2023    HGB 15.0 07/28/2023    HCT 42.4 07/28/2023    MCV 87.4 07/28/2023     07/28/2023     Lab Results   Component Value Date    CHOL 314 (H) 08/04/2022    CHOL 289 (H) 05/24/2021    CHOL 529 (H) 10/24/2020     Lab Results   Component Value Date    HDL 23 (L) 08/04/2022    HDL 24 (L) 05/24/2021    HDL 17 (L) 10/24/2020     Lab Results   Component Value Date    LDLCALC  08/04/2022     Unable to report calculated LDL due to increased triglycerides. Direct    LDLCALC  05/24/2021     Unable to report calculated LDL due to increased triglycerides. Direct    LDLCALC  10/24/2020     Unable to report calculated LDL due to increased triglycerides. Direct  "    Lab Results   Component Value Date    TRIG 1,208 (H) 2022    TRIG 1,784 (H) 2021    TRIG 2,528 (H) 10/24/2020     No components found for: \"CHOLHDL\"  Lab Results   Component Value Date    HGBA1C 9.8 (H) 2022     Other labs not included in the list above were reviewed either before or during this encounter.    History    Past Medical History:   Diagnosis Date    Bursitis of right hip 09/15/2023     Past Surgical History:   Procedure Laterality Date     SECTION, CLASSIC  2014     Section    GALLBLADDER SURGERY  2014    Gallbladder Surgery     Family History   Problem Relation Name Age of Onset    No Known Problems Mother      Hypertension Father      Diabetes Father      Other (cholesterol issues) Father      No Known Problems Sister      No Known Problems Daughter      No Known Problems Son      No Known Problems Son      Breast cancer Other       Social History     Socioeconomic History    Marital status: Legally      Spouse name: Not on file    Number of children: Not on file    Years of education: Not on file    Highest education level: Not on file   Occupational History    Not on file   Tobacco Use    Smoking status: Every Day     Packs/day: 1.00     Years: 15.00     Additional pack years: 0.00     Total pack years: 15.00     Types: Cigarettes     Passive exposure: Current    Smokeless tobacco: Never   Vaping Use    Vaping Use: Never used   Substance and Sexual Activity    Alcohol use: Not Currently    Drug use: Never    Sexual activity: Not Currently     Partners: Male     Birth control/protection: Injection   Other Topics Concern    Not on file   Social History Narrative    ** Merged History Encounter **          Social Determinants of Health     Financial Resource Strain: Not on file   Food Insecurity: Not on file   Transportation Needs: Not on file   Physical Activity: Not on file   Stress: Not on file   Social Connections: Not on file   Intimate " "Partner Violence: Not on file   Housing Stability: Not on file     No Known Allergies  Current Outpatient Medications on File Prior to Visit   Medication Sig Dispense Refill    ARIPiprazole (Abilify) 5 mg tablet       blood sugar diagnostic (OneTouch Ultra Test) strip USE AS DIRECTED TO CHECK BLOOD SUGAR TWICE A  each 11    doxepin (SINEquan) 100 mg capsule       fenofibrate (Tricor) 145 mg tablet Take 1 tablet (145 mg) by mouth once daily.      FreeStyle Bridget sensor system (FreeStyle Bridget 2 Sensor) kit USE SENSOR AS DIRECTED EVERY 2 WEEKS 2 each 11    glimepiride (Amaryl) 4 mg tablet Take 1 tablet (4 mg) by mouth once daily in the morning. Take before meals.      hydrOXYzine HCL (Atarax) 25 mg tablet Take 1 tablet (25 mg) by mouth 2 times a day as needed.      insulin glargine (Lantus Solostar U-100 Insulin) 100 unit/mL (3 mL) pen INJECT UNDER THE SKIN AS DIRECTED  UP TO 80 UNITS DAILY 80 mL 3    Januvia 25 mg tablet       medroxyPROGESTERone (Depo-Provera) 150 mg/mL injection Inject 1 mL (150 mg) into the muscle every 12 weeks. 1 mL 3    pen needle, diabetic (BD Ultra-Fine Short Pen Needle) 31 gauge x 5/16\" needle as directed . as directed for 90 days      propranolol (Inderal) 20 mg tablet       simvastatin (Zocor) 20 mg tablet Take 1 tablet (20 mg) by mouth once daily in the evening.      SUMAtriptan (Imitrex) 100 mg tablet Take by mouth.      venlafaxine XR (Effexor-XR) 75 mg 24 hr capsule       [DISCONTINUED] clonazePAM (KlonoPIN) 1 mg tablet Take 1 tablet (1 mg) by mouth once daily at bedtime.      [DISCONTINUED] ARIPiprazole (Abilify) 20 mg tablet Take 1 tablet (20 mg) by mouth once daily.      [DISCONTINUED] doxepin (SINEquan) 150 mg capsule Take 1 capsule (150 mg) by mouth once daily at bedtime.      [DISCONTINUED] metFORMIN  mg 24 hr tablet TAKE 4 TABLETS BY MOUTH ONCE DAILY 120 tablet 3    [DISCONTINUED] venlafaxine XR (Effexor XR) 150 mg 24 hr capsule Take 1 capsule (150 mg) by mouth once " daily. With food       No current facility-administered medications on file prior to visit.     Immunization History   Administered Date(s) Administered    Influenza, seasonal, injectable 11/03/2012, 10/13/2014     Patient's medical history was reviewed and updated either before or during this encounter.       Luba Griffin MD  Patient was identified as a fall risk. Risk prevention instructions provided.

## 2024-02-22 ENCOUNTER — TELEPHONE (OUTPATIENT)
Dept: PRIMARY CARE | Facility: CLINIC | Age: 49
End: 2024-02-22
Payer: MEDICARE

## 2024-02-22 DIAGNOSIS — R52 PAIN: ICD-10-CM

## 2024-02-22 DIAGNOSIS — M79.672 FOOT PAIN, LEFT: Primary | ICD-10-CM

## 2024-02-22 DIAGNOSIS — M25.476 SWELLING OF FOOT JOINT: ICD-10-CM

## 2024-02-22 LAB
CYTOLOGY CMNT CVX/VAG CYTO-IMP: NORMAL
LAB AP CONTRACEPTIVE HISTORY: NORMAL
LAB AP HPV GENOTYPE QUESTION: YES
LAB AP HPV HR: NORMAL
LABORATORY COMMENT REPORT: NORMAL
MENSTRUAL HX REPORTED: NORMAL
PATH REPORT.TOTAL CANCER: NORMAL

## 2024-02-22 NOTE — TELEPHONE ENCOUNTER
Pt aware, she would like an order for left foot xray, states still swollen, she will be getting labs done 2/23 also she can barley put any pressure on the left foot. Order pended.

## 2024-02-22 NOTE — TELEPHONE ENCOUNTER
Pt states her US to rule out DVT came back negative.  Asking if want to order any additional imaging as she is still experiencing foot swelling and numbness.  Please advise.  Ph: [phone]

## 2024-02-23 PROBLEM — E11.9 TYPE 2 DIABETES MELLITUS WITHOUT COMPLICATION, WITHOUT LONG-TERM CURRENT USE OF INSULIN (MULTI): Status: RESOLVED | Noted: 2023-09-15 | Resolved: 2024-02-23

## 2024-02-23 PROBLEM — Z79.4 TYPE 2 DIABETES MELLITUS WITH HYPERGLYCEMIA, WITH LONG-TERM CURRENT USE OF INSULIN (MULTI): Status: ACTIVE | Noted: 2024-02-21

## 2024-02-23 PROBLEM — E78.6 LOW HDL (UNDER 40): Status: ACTIVE | Noted: 2024-02-23

## 2024-03-07 ENCOUNTER — APPOINTMENT (OUTPATIENT)
Dept: RADIOLOGY | Facility: HOSPITAL | Age: 49
DRG: 617 | End: 2024-03-07
Payer: MEDICARE

## 2024-03-07 ENCOUNTER — ANESTHESIA (OUTPATIENT)
Dept: OPERATING ROOM | Facility: HOSPITAL | Age: 49
DRG: 617 | End: 2024-03-07
Payer: MEDICARE

## 2024-03-07 ENCOUNTER — HOSPITAL ENCOUNTER (INPATIENT)
Facility: HOSPITAL | Age: 49
LOS: 7 days | Discharge: HOME | DRG: 617 | End: 2024-03-14
Attending: EMERGENCY MEDICINE | Admitting: INTERNAL MEDICINE
Payer: MEDICARE

## 2024-03-07 ENCOUNTER — ANESTHESIA EVENT (OUTPATIENT)
Dept: OPERATING ROOM | Facility: HOSPITAL | Age: 49
DRG: 617 | End: 2024-03-07
Payer: MEDICARE

## 2024-03-07 DIAGNOSIS — L97.509 DIABETES MELLITUS DUE TO UNDERLYING CONDITION WITH FOOT ULCER, UNSPECIFIED WHETHER LONG TERM INSULIN USE (MULTI): ICD-10-CM

## 2024-03-07 DIAGNOSIS — I96 NECROTIC TOES (MULTI): ICD-10-CM

## 2024-03-07 DIAGNOSIS — L97.524: ICD-10-CM

## 2024-03-07 DIAGNOSIS — D64.9 ANEMIA, UNSPECIFIED TYPE: ICD-10-CM

## 2024-03-07 DIAGNOSIS — E08.621 DIABETES MELLITUS DUE TO UNDERLYING CONDITION WITH FOOT ULCER, UNSPECIFIED WHETHER LONG TERM INSULIN USE (MULTI): ICD-10-CM

## 2024-03-07 DIAGNOSIS — R73.9 HYPERGLYCEMIA: ICD-10-CM

## 2024-03-07 DIAGNOSIS — L03.119 CELLULITIS AND ABSCESS OF FOOT: ICD-10-CM

## 2024-03-07 DIAGNOSIS — E08.621: ICD-10-CM

## 2024-03-07 DIAGNOSIS — G89.18 OTHER ACUTE POSTPROCEDURAL PAIN: ICD-10-CM

## 2024-03-07 DIAGNOSIS — Z89.432 S/P TRANSMETATARSAL AMPUTATION OF FOOT, LEFT (MULTI): ICD-10-CM

## 2024-03-07 DIAGNOSIS — L08.9 LEFT FOOT INFECTION: ICD-10-CM

## 2024-03-07 DIAGNOSIS — D72.829 LEUKOCYTOSIS, UNSPECIFIED TYPE: ICD-10-CM

## 2024-03-07 DIAGNOSIS — L02.619 CELLULITIS AND ABSCESS OF FOOT: ICD-10-CM

## 2024-03-07 DIAGNOSIS — M86.272 SUBACUTE OSTEOMYELITIS OF LEFT FOOT (MULTI): Primary | ICD-10-CM

## 2024-03-07 DIAGNOSIS — E11.65 TYPE 2 DIABETES MELLITUS WITH HYPERGLYCEMIA, WITH LONG-TERM CURRENT USE OF INSULIN (MULTI): ICD-10-CM

## 2024-03-07 DIAGNOSIS — R09.89 OTHER SPECIFIED SYMPTOMS AND SIGNS INVOLVING THE CIRCULATORY AND RESPIRATORY SYSTEMS: ICD-10-CM

## 2024-03-07 DIAGNOSIS — Z79.4 TYPE 2 DIABETES MELLITUS WITH HYPERGLYCEMIA, WITH LONG-TERM CURRENT USE OF INSULIN (MULTI): ICD-10-CM

## 2024-03-07 DIAGNOSIS — E11.9 TYPE 2 DIABETES MELLITUS WITHOUT COMPLICATION, WITHOUT LONG-TERM CURRENT USE OF INSULIN (MULTI): ICD-10-CM

## 2024-03-07 DIAGNOSIS — L03.116 CELLULITIS OF LEFT LOWER LIMB: ICD-10-CM

## 2024-03-07 LAB
ALBUMIN SERPL-MCNC: 2.9 G/DL (ref 3.5–5)
ALP BLD-CCNC: 168 U/L (ref 35–125)
ALT SERPL-CCNC: 36 U/L (ref 5–40)
ANION GAP SERPL CALC-SCNC: 9 MMOL/L
AST SERPL-CCNC: 24 U/L (ref 5–40)
BASOPHILS # BLD AUTO: 0.05 X10*3/UL (ref 0–0.1)
BASOPHILS NFR BLD AUTO: 0.3 %
BILIRUB SERPL-MCNC: 0.2 MG/DL (ref 0.1–1.2)
BUN SERPL-MCNC: 7 MG/DL (ref 8–25)
CALCIUM SERPL-MCNC: 9.1 MG/DL (ref 8.5–10.4)
CHLORIDE SERPL-SCNC: 98 MMOL/L (ref 97–107)
CO2 SERPL-SCNC: 26 MMOL/L (ref 24–31)
CREAT SERPL-MCNC: 0.5 MG/DL (ref 0.4–1.6)
CRP SERPL-MCNC: 13.6 MG/DL (ref 0–2)
EGFRCR SERPLBLD CKD-EPI 2021: >90 ML/MIN/1.73M*2
EOSINOPHIL # BLD AUTO: 0.31 X10*3/UL (ref 0–0.7)
EOSINOPHIL NFR BLD AUTO: 1.9 %
ERYTHROCYTE [DISTWIDTH] IN BLOOD BY AUTOMATED COUNT: 13.2 % (ref 11.5–14.5)
ERYTHROCYTE [SEDIMENTATION RATE] IN BLOOD BY WESTERGREN METHOD: 76 MM/H (ref 0–20)
GLUCOSE BLD MANUAL STRIP-MCNC: 137 MG/DL (ref 74–99)
GLUCOSE BLD MANUAL STRIP-MCNC: 143 MG/DL (ref 74–99)
GLUCOSE BLD MANUAL STRIP-MCNC: 145 MG/DL (ref 74–99)
GLUCOSE BLD MANUAL STRIP-MCNC: 211 MG/DL (ref 74–99)
GLUCOSE SERPL-MCNC: 273 MG/DL (ref 65–99)
HCT VFR BLD AUTO: 33.3 % (ref 36–46)
HCT VFR BLD AUTO: 35.9 % (ref 36–46)
HGB BLD-MCNC: 10.6 G/DL (ref 12–16)
HGB BLD-MCNC: 11.8 G/DL (ref 12–16)
IMM GRANULOCYTES # BLD AUTO: 0.19 X10*3/UL (ref 0–0.7)
IMM GRANULOCYTES NFR BLD AUTO: 1.2 % (ref 0–0.9)
LACTATE BLDV-SCNC: 1.2 MMOL/L (ref 0.4–2)
LYMPHOCYTES # BLD AUTO: 2.87 X10*3/UL (ref 1.2–4.8)
LYMPHOCYTES NFR BLD AUTO: 17.6 %
MCH RBC QN AUTO: 28.6 PG (ref 26–34)
MCHC RBC AUTO-ENTMCNC: 32.9 G/DL (ref 32–36)
MCV RBC AUTO: 87 FL (ref 80–100)
MONOCYTES # BLD AUTO: 0.89 X10*3/UL (ref 0.1–1)
MONOCYTES NFR BLD AUTO: 5.5 %
NEUTROPHILS # BLD AUTO: 12.02 X10*3/UL (ref 1.2–7.7)
NEUTROPHILS NFR BLD AUTO: 73.5 %
NRBC BLD-RTO: 0 /100 WBCS (ref 0–0)
PLATELET # BLD AUTO: 475 X10*3/UL (ref 150–450)
POTASSIUM SERPL-SCNC: 3.9 MMOL/L (ref 3.4–5.1)
PROT SERPL-MCNC: 7.2 G/DL (ref 5.9–7.9)
RBC # BLD AUTO: 4.12 X10*6/UL (ref 4–5.2)
SARS-COV-2 RNA RESP QL NAA+PROBE: NOT DETECTED
SODIUM SERPL-SCNC: 133 MMOL/L (ref 133–145)
WBC # BLD AUTO: 16.3 X10*3/UL (ref 4.4–11.3)

## 2024-03-07 PROCEDURE — 0Y6N0ZD DETACHMENT AT LEFT FOOT, PARTIAL 4TH RAY, OPEN APPROACH: ICD-10-PCS | Performed by: PODIATRIST

## 2024-03-07 PROCEDURE — 2500000001 HC RX 250 WO HCPCS SELF ADMINISTERED DRUGS (ALT 637 FOR MEDICARE OP)

## 2024-03-07 PROCEDURE — 88305 TISSUE EXAM BY PATHOLOGIST: CPT | Mod: TC | Performed by: PODIATRIST

## 2024-03-07 PROCEDURE — 0Y6N0ZF DETACHMENT AT LEFT FOOT, PARTIAL 5TH RAY, OPEN APPROACH: ICD-10-PCS | Performed by: PODIATRIST

## 2024-03-07 PROCEDURE — 96361 HYDRATE IV INFUSION ADD-ON: CPT

## 2024-03-07 PROCEDURE — 82947 ASSAY GLUCOSE BLOOD QUANT: CPT

## 2024-03-07 PROCEDURE — 7100000002 HC RECOVERY ROOM TIME - EACH INCREMENTAL 1 MINUTE: Performed by: PODIATRIST

## 2024-03-07 PROCEDURE — 36415 COLL VENOUS BLD VENIPUNCTURE: CPT | Performed by: EMERGENCY MEDICINE

## 2024-03-07 PROCEDURE — 2720000007 HC OR 272 NO HCPCS: Performed by: PODIATRIST

## 2024-03-07 PROCEDURE — A28805 PR AMPUTATION FOOT,TRANSMETATARSAL

## 2024-03-07 PROCEDURE — 73630 X-RAY EXAM OF FOOT: CPT | Mod: 50

## 2024-03-07 PROCEDURE — 2500000004 HC RX 250 GENERAL PHARMACY W/ HCPCS (ALT 636 FOR OP/ED): Performed by: EMERGENCY MEDICINE

## 2024-03-07 PROCEDURE — 2500000002 HC RX 250 W HCPCS SELF ADMINISTERED DRUGS (ALT 637 FOR MEDICARE OP, ALT 636 FOR OP/ED): Performed by: INTERNAL MEDICINE

## 2024-03-07 PROCEDURE — 88305 TISSUE EXAM BY PATHOLOGIST: CPT | Performed by: PATHOLOGY

## 2024-03-07 PROCEDURE — 2500000004 HC RX 250 GENERAL PHARMACY W/ HCPCS (ALT 636 FOR OP/ED)

## 2024-03-07 PROCEDURE — 2500000004 HC RX 250 GENERAL PHARMACY W/ HCPCS (ALT 636 FOR OP/ED): Performed by: INTERNAL MEDICINE

## 2024-03-07 PROCEDURE — 73630 X-RAY EXAM OF FOOT: CPT | Mod: LT

## 2024-03-07 PROCEDURE — A4217 STERILE WATER/SALINE, 500 ML: HCPCS | Performed by: PODIATRIST

## 2024-03-07 PROCEDURE — 3700000001 HC GENERAL ANESTHESIA TIME - INITIAL BASE CHARGE: Performed by: PODIATRIST

## 2024-03-07 PROCEDURE — 73610 X-RAY EXAM OF ANKLE: CPT | Mod: LEFT SIDE | Performed by: RADIOLOGY

## 2024-03-07 PROCEDURE — 87070 CULTURE OTHR SPECIMN AEROBIC: CPT | Mod: WESLAB | Performed by: PODIATRIST

## 2024-03-07 PROCEDURE — 85652 RBC SED RATE AUTOMATED: CPT | Performed by: EMERGENCY MEDICINE

## 2024-03-07 PROCEDURE — 87040 BLOOD CULTURE FOR BACTERIA: CPT | Mod: WESLAB | Performed by: EMERGENCY MEDICINE

## 2024-03-07 PROCEDURE — 88311 DECALCIFY TISSUE: CPT | Performed by: PATHOLOGY

## 2024-03-07 PROCEDURE — 83605 ASSAY OF LACTIC ACID: CPT | Performed by: EMERGENCY MEDICINE

## 2024-03-07 PROCEDURE — 7100000001 HC RECOVERY ROOM TIME - INITIAL BASE CHARGE: Performed by: PODIATRIST

## 2024-03-07 PROCEDURE — 96375 TX/PRO/DX INJ NEW DRUG ADDON: CPT

## 2024-03-07 PROCEDURE — 0Y6N0ZB DETACHMENT AT LEFT FOOT, PARTIAL 2ND RAY, OPEN APPROACH: ICD-10-PCS | Performed by: PODIATRIST

## 2024-03-07 PROCEDURE — 86140 C-REACTIVE PROTEIN: CPT | Performed by: EMERGENCY MEDICINE

## 2024-03-07 PROCEDURE — 0Y6N0ZC DETACHMENT AT LEFT FOOT, PARTIAL 3RD RAY, OPEN APPROACH: ICD-10-PCS | Performed by: PODIATRIST

## 2024-03-07 PROCEDURE — 96365 THER/PROPH/DIAG IV INF INIT: CPT

## 2024-03-07 PROCEDURE — 1210000001 HC SEMI-PRIVATE ROOM DAILY

## 2024-03-07 PROCEDURE — 85025 COMPLETE CBC W/AUTO DIFF WBC: CPT | Performed by: EMERGENCY MEDICINE

## 2024-03-07 PROCEDURE — 2500000004 HC RX 250 GENERAL PHARMACY W/ HCPCS (ALT 636 FOR OP/ED): Performed by: PHYSICIAN ASSISTANT

## 2024-03-07 PROCEDURE — 0Y6N0Z9 DETACHMENT AT LEFT FOOT, PARTIAL 1ST RAY, OPEN APPROACH: ICD-10-PCS | Performed by: PODIATRIST

## 2024-03-07 PROCEDURE — A28805 PR AMPUTATION FOOT,TRANSMETATARSAL: Performed by: ANESTHESIOLOGY

## 2024-03-07 PROCEDURE — 99291 CRITICAL CARE FIRST HOUR: CPT | Mod: 25

## 2024-03-07 PROCEDURE — 85018 HEMOGLOBIN: CPT

## 2024-03-07 PROCEDURE — 3600000003 HC OR TIME - INITIAL BASE CHARGE - PROCEDURE LEVEL THREE: Performed by: PODIATRIST

## 2024-03-07 PROCEDURE — 2500000005 HC RX 250 GENERAL PHARMACY W/O HCPCS

## 2024-03-07 PROCEDURE — 80053 COMPREHEN METABOLIC PANEL: CPT | Performed by: EMERGENCY MEDICINE

## 2024-03-07 PROCEDURE — 2500000001 HC RX 250 WO HCPCS SELF ADMINISTERED DRUGS (ALT 637 FOR MEDICARE OP): Performed by: INTERNAL MEDICINE

## 2024-03-07 PROCEDURE — 73610 X-RAY EXAM OF ANKLE: CPT | Mod: LT

## 2024-03-07 PROCEDURE — 87635 SARS-COV-2 COVID-19 AMP PRB: CPT | Performed by: EMERGENCY MEDICINE

## 2024-03-07 PROCEDURE — 3600000008 HC OR TIME - EACH INCREMENTAL 1 MINUTE - PROCEDURE LEVEL THREE: Performed by: PODIATRIST

## 2024-03-07 PROCEDURE — 2500000004 HC RX 250 GENERAL PHARMACY W/ HCPCS (ALT 636 FOR OP/ED): Performed by: PODIATRIST

## 2024-03-07 PROCEDURE — 3700000002 HC GENERAL ANESTHESIA TIME - EACH INCREMENTAL 1 MINUTE: Performed by: PODIATRIST

## 2024-03-07 RX ORDER — POLYETHYLENE GLYCOL 3350 17 G/17G
17 POWDER, FOR SOLUTION ORAL DAILY PRN
Status: DISCONTINUED | OUTPATIENT
Start: 2024-03-07 | End: 2024-03-14 | Stop reason: HOSPADM

## 2024-03-07 RX ORDER — OXYCODONE HYDROCHLORIDE 5 MG/1
5 TABLET ORAL EVERY 4 HOURS PRN
Status: DISCONTINUED | OUTPATIENT
Start: 2024-03-07 | End: 2024-03-14 | Stop reason: HOSPADM

## 2024-03-07 RX ORDER — LIDOCAINE HYDROCHLORIDE 10 MG/ML
0.1 INJECTION INFILTRATION; PERINEURAL ONCE
Status: DISCONTINUED | OUTPATIENT
Start: 2024-03-07 | End: 2024-03-07 | Stop reason: HOSPADM

## 2024-03-07 RX ORDER — MORPHINE SULFATE 4 MG/ML
4 INJECTION, SOLUTION INTRAMUSCULAR; INTRAVENOUS ONCE
Status: COMPLETED | OUTPATIENT
Start: 2024-03-07 | End: 2024-03-07

## 2024-03-07 RX ORDER — SUMATRIPTAN SUCCINATE 25 MG/1
100 TABLET ORAL DAILY PRN
Status: DISCONTINUED | OUTPATIENT
Start: 2024-03-07 | End: 2024-03-14 | Stop reason: HOSPADM

## 2024-03-07 RX ORDER — FENTANYL CITRATE 50 UG/ML
25 INJECTION, SOLUTION INTRAMUSCULAR; INTRAVENOUS EVERY 5 MIN PRN
Status: DISCONTINUED | OUTPATIENT
Start: 2024-03-07 | End: 2024-03-07 | Stop reason: HOSPADM

## 2024-03-07 RX ORDER — HYDROXYZINE HYDROCHLORIDE 25 MG/1
25 TABLET, FILM COATED ORAL 2 TIMES DAILY PRN
Status: DISCONTINUED | OUTPATIENT
Start: 2024-03-07 | End: 2024-03-14 | Stop reason: HOSPADM

## 2024-03-07 RX ORDER — MIDAZOLAM HYDROCHLORIDE 1 MG/ML
INJECTION, SOLUTION INTRAMUSCULAR; INTRAVENOUS AS NEEDED
Status: DISCONTINUED | OUTPATIENT
Start: 2024-03-07 | End: 2024-03-07

## 2024-03-07 RX ORDER — ACETAMINOPHEN 325 MG/1
650 TABLET ORAL ONCE
Status: COMPLETED | OUTPATIENT
Start: 2024-03-07 | End: 2024-03-07

## 2024-03-07 RX ORDER — DEXTROSE MONOHYDRATE 100 MG/ML
0.3 INJECTION, SOLUTION INTRAVENOUS ONCE AS NEEDED
Status: DISCONTINUED | OUTPATIENT
Start: 2024-03-07 | End: 2024-03-14 | Stop reason: HOSPADM

## 2024-03-07 RX ORDER — VANCOMYCIN 1.5 G/300ML
1500 INJECTION, SOLUTION INTRAVENOUS ONCE
Status: COMPLETED | OUTPATIENT
Start: 2024-03-07 | End: 2024-03-07

## 2024-03-07 RX ORDER — SODIUM CHLORIDE, SODIUM LACTATE, POTASSIUM CHLORIDE, CALCIUM CHLORIDE 600; 310; 30; 20 MG/100ML; MG/100ML; MG/100ML; MG/100ML
100 INJECTION, SOLUTION INTRAVENOUS CONTINUOUS
Status: DISCONTINUED | OUTPATIENT
Start: 2024-03-07 | End: 2024-03-07 | Stop reason: HOSPADM

## 2024-03-07 RX ORDER — DEXTROSE 50 % IN WATER (D50W) INTRAVENOUS SYRINGE
25
Status: DISCONTINUED | OUTPATIENT
Start: 2024-03-07 | End: 2024-03-14 | Stop reason: HOSPADM

## 2024-03-07 RX ORDER — SIMVASTATIN 20 MG/1
20 TABLET, FILM COATED ORAL EVERY EVENING
Status: DISCONTINUED | OUTPATIENT
Start: 2024-03-07 | End: 2024-03-14 | Stop reason: HOSPADM

## 2024-03-07 RX ORDER — OXYCODONE HYDROCHLORIDE 5 MG/1
5 TABLET ORAL EVERY 4 HOURS PRN
Status: DISCONTINUED | OUTPATIENT
Start: 2024-03-07 | End: 2024-03-07 | Stop reason: HOSPADM

## 2024-03-07 RX ORDER — ONDANSETRON HYDROCHLORIDE 2 MG/ML
INJECTION, SOLUTION INTRAVENOUS AS NEEDED
Status: DISCONTINUED | OUTPATIENT
Start: 2024-03-07 | End: 2024-03-07

## 2024-03-07 RX ORDER — PHENYLEPHRINE HCL IN 0.9% NACL 0.4MG/10ML
SYRINGE (ML) INTRAVENOUS AS NEEDED
Status: DISCONTINUED | OUTPATIENT
Start: 2024-03-07 | End: 2024-03-07

## 2024-03-07 RX ORDER — KETOROLAC TROMETHAMINE 30 MG/ML
15 INJECTION, SOLUTION INTRAMUSCULAR; INTRAVENOUS ONCE
Status: COMPLETED | OUTPATIENT
Start: 2024-03-07 | End: 2024-03-07

## 2024-03-07 RX ORDER — ARIPIPRAZOLE 5 MG/1
5 TABLET ORAL DAILY
Status: DISCONTINUED | OUTPATIENT
Start: 2024-03-07 | End: 2024-03-14 | Stop reason: HOSPADM

## 2024-03-07 RX ORDER — FENTANYL CITRATE 50 UG/ML
50 INJECTION, SOLUTION INTRAMUSCULAR; INTRAVENOUS EVERY 5 MIN PRN
Status: DISCONTINUED | OUTPATIENT
Start: 2024-03-07 | End: 2024-03-07 | Stop reason: HOSPADM

## 2024-03-07 RX ORDER — VANCOMYCIN 1.75 G/350ML
1250 INJECTION, SOLUTION INTRAVENOUS EVERY 12 HOURS
Status: DISCONTINUED | OUTPATIENT
Start: 2024-03-07 | End: 2024-03-11

## 2024-03-07 RX ORDER — FENTANYL CITRATE 50 UG/ML
INJECTION, SOLUTION INTRAMUSCULAR; INTRAVENOUS AS NEEDED
Status: DISCONTINUED | OUTPATIENT
Start: 2024-03-07 | End: 2024-03-07

## 2024-03-07 RX ORDER — NORETHINDRONE AND ETHINYL ESTRADIOL 0.5-0.035
KIT ORAL AS NEEDED
Status: DISCONTINUED | OUTPATIENT
Start: 2024-03-07 | End: 2024-03-07

## 2024-03-07 RX ORDER — INSULIN LISPRO 100 [IU]/ML
0-10 INJECTION, SOLUTION INTRAVENOUS; SUBCUTANEOUS
Status: DISCONTINUED | OUTPATIENT
Start: 2024-03-07 | End: 2024-03-14 | Stop reason: HOSPADM

## 2024-03-07 RX ORDER — INSULIN LISPRO 100 [IU]/ML
0-10 INJECTION, SOLUTION INTRAVENOUS; SUBCUTANEOUS EVERY 4 HOURS
Status: DISCONTINUED | OUTPATIENT
Start: 2024-03-07 | End: 2024-03-07

## 2024-03-07 RX ORDER — LIDOCAINE HYDROCHLORIDE 10 MG/ML
INJECTION INFILTRATION; PERINEURAL AS NEEDED
Status: DISCONTINUED | OUTPATIENT
Start: 2024-03-07 | End: 2024-03-07

## 2024-03-07 RX ORDER — PROPRANOLOL HYDROCHLORIDE 20 MG/1
20 TABLET ORAL 2 TIMES DAILY
Status: DISCONTINUED | OUTPATIENT
Start: 2024-03-07 | End: 2024-03-14 | Stop reason: HOSPADM

## 2024-03-07 RX ORDER — ACETAMINOPHEN 500 MG
500 TABLET ORAL EVERY 4 HOURS PRN
Status: DISCONTINUED | OUTPATIENT
Start: 2024-03-07 | End: 2024-03-14 | Stop reason: HOSPADM

## 2024-03-07 RX ORDER — DOXEPIN HYDROCHLORIDE 50 MG/1
100 CAPSULE ORAL NIGHTLY
Status: DISCONTINUED | OUTPATIENT
Start: 2024-03-07 | End: 2024-03-14 | Stop reason: HOSPADM

## 2024-03-07 RX ORDER — VANCOMYCIN HYDROCHLORIDE 1 G/20ML
INJECTION, POWDER, LYOPHILIZED, FOR SOLUTION INTRAVENOUS DAILY PRN
Status: DISCONTINUED | OUTPATIENT
Start: 2024-03-07 | End: 2024-03-11

## 2024-03-07 RX ORDER — NAPROXEN 250 MG/1
250 TABLET ORAL
COMMUNITY
End: 2024-04-24 | Stop reason: HOSPADM

## 2024-03-07 RX ORDER — LABETALOL HYDROCHLORIDE 5 MG/ML
5 INJECTION, SOLUTION INTRAVENOUS ONCE AS NEEDED
Status: DISCONTINUED | OUTPATIENT
Start: 2024-03-07 | End: 2024-03-07 | Stop reason: HOSPADM

## 2024-03-07 RX ORDER — FENOFIBRATE 160 MG/1
160 TABLET ORAL DAILY
Status: DISCONTINUED | OUTPATIENT
Start: 2024-03-07 | End: 2024-03-14 | Stop reason: HOSPADM

## 2024-03-07 RX ORDER — INSULIN GLARGINE 100 [IU]/ML
80 INJECTION, SOLUTION SUBCUTANEOUS NIGHTLY
Status: DISCONTINUED | OUTPATIENT
Start: 2024-03-07 | End: 2024-03-12

## 2024-03-07 RX ORDER — SODIUM CHLORIDE 0.9 G/100ML
IRRIGANT IRRIGATION AS NEEDED
Status: DISCONTINUED | OUTPATIENT
Start: 2024-03-07 | End: 2024-03-07 | Stop reason: HOSPADM

## 2024-03-07 RX ORDER — PROPOFOL 10 MG/ML
INJECTION, EMULSION INTRAVENOUS AS NEEDED
Status: DISCONTINUED | OUTPATIENT
Start: 2024-03-07 | End: 2024-03-07

## 2024-03-07 RX ORDER — ONDANSETRON HYDROCHLORIDE 2 MG/ML
4 INJECTION, SOLUTION INTRAVENOUS ONCE AS NEEDED
Status: DISCONTINUED | OUTPATIENT
Start: 2024-03-07 | End: 2024-03-07 | Stop reason: HOSPADM

## 2024-03-07 RX ORDER — GLIMEPIRIDE 2 MG/1
4 TABLET ORAL
Status: DISCONTINUED | OUTPATIENT
Start: 2024-03-08 | End: 2024-03-14 | Stop reason: HOSPADM

## 2024-03-07 RX ORDER — SODIUM CHLORIDE, SODIUM LACTATE, POTASSIUM CHLORIDE, CALCIUM CHLORIDE 600; 310; 30; 20 MG/100ML; MG/100ML; MG/100ML; MG/100ML
INJECTION, SOLUTION INTRAVENOUS CONTINUOUS PRN
Status: DISCONTINUED | OUTPATIENT
Start: 2024-03-07 | End: 2024-03-07

## 2024-03-07 RX ORDER — VENLAFAXINE HYDROCHLORIDE 75 MG/1
75 CAPSULE, EXTENDED RELEASE ORAL DAILY
Status: DISCONTINUED | OUTPATIENT
Start: 2024-03-07 | End: 2024-03-14 | Stop reason: HOSPADM

## 2024-03-07 RX ADMIN — LIDOCAINE HYDROCHLORIDE 5 ML: 10 INJECTION, SOLUTION INFILTRATION; PERINEURAL at 15:13

## 2024-03-07 RX ADMIN — OXYCODONE 5 MG: 5 TABLET ORAL at 18:25

## 2024-03-07 RX ADMIN — Medication 100 MCG: at 15:18

## 2024-03-07 RX ADMIN — Medication 100 MCG: at 15:23

## 2024-03-07 RX ADMIN — Medication 200 MCG: at 15:43

## 2024-03-07 RX ADMIN — FENTANYL CITRATE 50 MCG: 50 INJECTION, SOLUTION INTRAMUSCULAR; INTRAVENOUS at 15:13

## 2024-03-07 RX ADMIN — EPHEDRINE SULFATE 10 MG: 50 INJECTION, SOLUTION INTRAVENOUS at 15:42

## 2024-03-07 RX ADMIN — ARIPIPRAZOLE 5 MG: 5 TABLET ORAL at 17:39

## 2024-03-07 RX ADMIN — MIDAZOLAM 2 MG: 1 INJECTION INTRAMUSCULAR; INTRAVENOUS at 15:06

## 2024-03-07 RX ADMIN — PIPERACILLIN SODIUM AND TAZOBACTAM SODIUM 3.38 G: 3; .375 INJECTION, SOLUTION INTRAVENOUS at 22:52

## 2024-03-07 RX ADMIN — ONDANSETRON HYDROCHLORIDE 4 MG: 2 INJECTION INTRAMUSCULAR; INTRAVENOUS at 15:42

## 2024-03-07 RX ADMIN — FENOFIBRATE 160 MG: 160 TABLET ORAL at 17:39

## 2024-03-07 RX ADMIN — VENLAFAXINE HYDROCHLORIDE 75 MG: 75 CAPSULE, EXTENDED RELEASE ORAL at 17:39

## 2024-03-07 RX ADMIN — ACETAMINOPHEN 500 MG: 500 TABLET ORAL at 18:25

## 2024-03-07 RX ADMIN — Medication 300 MCG: at 15:36

## 2024-03-07 RX ADMIN — SIMVASTATIN 20 MG: 20 TABLET, FILM COATED ORAL at 22:51

## 2024-03-07 RX ADMIN — ACETAMINOPHEN 500 MG: 500 TABLET ORAL at 22:51

## 2024-03-07 RX ADMIN — VANCOMYCIN 1250 MG: 1.75 INJECTION, SOLUTION INTRAVENOUS at 22:53

## 2024-03-07 RX ADMIN — MORPHINE SULFATE 4 MG: 4 INJECTION, SOLUTION INTRAMUSCULAR; INTRAVENOUS at 12:24

## 2024-03-07 RX ADMIN — PROPOFOL 200 MG: 10 INJECTION, EMULSION INTRAVENOUS at 15:13

## 2024-03-07 RX ADMIN — SODIUM CHLORIDE, POTASSIUM CHLORIDE, SODIUM LACTATE AND CALCIUM CHLORIDE: 600; 310; 30; 20 INJECTION, SOLUTION INTRAVENOUS at 14:56

## 2024-03-07 RX ADMIN — KETOROLAC TROMETHAMINE 15 MG: 30 INJECTION INTRAMUSCULAR; INTRAVENOUS at 09:48

## 2024-03-07 RX ADMIN — SODIUM CHLORIDE 2790 ML: 900 INJECTION, SOLUTION INTRAVENOUS at 09:56

## 2024-03-07 RX ADMIN — PIPERACILLIN SODIUM AND TAZOBACTAM SODIUM 3.38 G: 3; .375 INJECTION, SOLUTION INTRAVENOUS at 09:47

## 2024-03-07 RX ADMIN — Medication 200 MCG: at 15:28

## 2024-03-07 RX ADMIN — ACETAMINOPHEN 650 MG: 325 TABLET ORAL at 09:48

## 2024-03-07 RX ADMIN — MORPHINE SULFATE 4 MG: 4 INJECTION, SOLUTION INTRAMUSCULAR; INTRAVENOUS at 09:48

## 2024-03-07 RX ADMIN — Medication 100 MCG: at 15:31

## 2024-03-07 RX ADMIN — DOXEPIN HYDROCHLORIDE 100 MG: 50 CAPSULE ORAL at 22:51

## 2024-03-07 RX ADMIN — Medication 200 MCG: at 15:48

## 2024-03-07 RX ADMIN — INSULIN GLARGINE 80 UNITS: 100 INJECTION, SOLUTION SUBCUTANEOUS at 22:55

## 2024-03-07 RX ADMIN — PIPERACILLIN SODIUM AND TAZOBACTAM SODIUM 3.38 G: 3; .375 INJECTION, SOLUTION INTRAVENOUS at 15:17

## 2024-03-07 RX ADMIN — FENTANYL CITRATE 50 MCG: 50 INJECTION, SOLUTION INTRAMUSCULAR; INTRAVENOUS at 15:22

## 2024-03-07 RX ADMIN — PROPRANOLOL HYDROCHLORIDE 20 MG: 20 TABLET ORAL at 22:51

## 2024-03-07 RX ADMIN — OXYCODONE 5 MG: 5 TABLET ORAL at 22:50

## 2024-03-07 RX ADMIN — VANCOMYCIN 1.5 G: 1.5 INJECTION, SOLUTION INTRAVENOUS at 09:56

## 2024-03-07 RX ADMIN — EPHEDRINE SULFATE 10 MG: 50 INJECTION, SOLUTION INTRAVENOUS at 15:46

## 2024-03-07 SDOH — ECONOMIC STABILITY: TRANSPORTATION INSECURITY
IN THE PAST 12 MONTHS, HAS THE LACK OF TRANSPORTATION KEPT YOU FROM MEDICAL APPOINTMENTS OR FROM GETTING MEDICATIONS?: NO

## 2024-03-07 SDOH — SOCIAL STABILITY: SOCIAL INSECURITY: DOES ANYONE TRY TO KEEP YOU FROM HAVING/CONTACTING OTHER FRIENDS OR DOING THINGS OUTSIDE YOUR HOME?: NO

## 2024-03-07 SDOH — SOCIAL STABILITY: SOCIAL NETWORK: ARE YOU MARRIED, WIDOWED, DIVORCED, SEPARATED, NEVER MARRIED, OR LIVING WITH A PARTNER?: DIVORCED

## 2024-03-07 SDOH — SOCIAL STABILITY: SOCIAL INSECURITY
WITHIN THE LAST YEAR, HAVE TO BEEN RAPED OR FORCED TO HAVE ANY KIND OF SEXUAL ACTIVITY BY YOUR PARTNER OR EX-PARTNER?: NO

## 2024-03-07 SDOH — ECONOMIC STABILITY: INCOME INSECURITY: IN THE PAST 12 MONTHS, HAS THE ELECTRIC, GAS, OIL, OR WATER COMPANY THREATENED TO SHUT OFF SERVICE IN YOUR HOME?: YES

## 2024-03-07 SDOH — SOCIAL STABILITY: SOCIAL NETWORK: HOW OFTEN DO YOU ATTEND CHURCH OR RELIGIOUS SERVICES?: NEVER

## 2024-03-07 SDOH — HEALTH STABILITY: MENTAL HEALTH
STRESS IS WHEN SOMEONE FEELS TENSE, NERVOUS, ANXIOUS, OR CAN'T SLEEP AT NIGHT BECAUSE THEIR MIND IS TROUBLED. HOW STRESSED ARE YOU?: TO SOME EXTENT

## 2024-03-07 SDOH — SOCIAL STABILITY: SOCIAL INSECURITY: WITHIN THE LAST YEAR, HAVE YOU BEEN HUMILIATED OR EMOTIONALLY ABUSED IN OTHER WAYS BY YOUR PARTNER OR EX-PARTNER?: NO

## 2024-03-07 SDOH — SOCIAL STABILITY: SOCIAL NETWORK: HOW OFTEN DO YOU GET TOGETHER WITH FRIENDS OR RELATIVES?: ONCE A WEEK

## 2024-03-07 SDOH — HEALTH STABILITY: MENTAL HEALTH: HOW OFTEN DO YOU HAVE 6 OR MORE DRINKS ON ONE OCCASION?: NEVER

## 2024-03-07 SDOH — SOCIAL STABILITY: SOCIAL NETWORK
IN A TYPICAL WEEK, HOW MANY TIMES DO YOU TALK ON THE PHONE WITH FAMILY, FRIENDS, OR NEIGHBORS?: MORE THAN THREE TIMES A WEEK

## 2024-03-07 SDOH — ECONOMIC STABILITY: HOUSING INSECURITY: IN THE LAST 12 MONTHS, HOW MANY PLACES HAVE YOU LIVED?: 2

## 2024-03-07 SDOH — ECONOMIC STABILITY: INCOME INSECURITY: HOW HARD IS IT FOR YOU TO PAY FOR THE VERY BASICS LIKE FOOD, HOUSING, MEDICAL CARE, AND HEATING?: SOMEWHAT HARD

## 2024-03-07 SDOH — SOCIAL STABILITY: SOCIAL INSECURITY: ARE THERE ANY APPARENT SIGNS OF INJURIES/BEHAVIORS THAT COULD BE RELATED TO ABUSE/NEGLECT?: NO

## 2024-03-07 SDOH — SOCIAL STABILITY: SOCIAL INSECURITY
WITHIN THE LAST YEAR, HAVE YOU BEEN KICKED, HIT, SLAPPED, OR OTHERWISE PHYSICALLY HURT BY YOUR PARTNER OR EX-PARTNER?: NO

## 2024-03-07 SDOH — SOCIAL STABILITY: SOCIAL INSECURITY: WITHIN THE LAST YEAR, HAVE YOU BEEN AFRAID OF YOUR PARTNER OR EX-PARTNER?: NO

## 2024-03-07 SDOH — SOCIAL STABILITY: SOCIAL INSECURITY: DO YOU FEEL UNSAFE GOING BACK TO THE PLACE WHERE YOU ARE LIVING?: NO

## 2024-03-07 SDOH — SOCIAL STABILITY: SOCIAL INSECURITY: DO YOU FEEL ANYONE HAS EXPLOITED OR TAKEN ADVANTAGE OF YOU FINANCIALLY OR OF YOUR PERSONAL PROPERTY?: NO

## 2024-03-07 SDOH — ECONOMIC STABILITY: INCOME INSECURITY: IN THE LAST 12 MONTHS, WAS THERE A TIME WHEN YOU WERE NOT ABLE TO PAY THE MORTGAGE OR RENT ON TIME?: YES

## 2024-03-07 SDOH — ECONOMIC STABILITY: FOOD INSECURITY: WITHIN THE PAST 12 MONTHS, THE FOOD YOU BOUGHT JUST DIDN'T LAST AND YOU DIDN'T HAVE MONEY TO GET MORE.: SOMETIMES TRUE

## 2024-03-07 SDOH — SOCIAL STABILITY: SOCIAL NETWORK
DO YOU BELONG TO ANY CLUBS OR ORGANIZATIONS SUCH AS CHURCH GROUPS UNIONS, FRATERNAL OR ATHLETIC GROUPS, OR SCHOOL GROUPS?: NO

## 2024-03-07 SDOH — HEALTH STABILITY: MENTAL HEALTH: HOW OFTEN DO YOU HAVE A DRINK CONTAINING ALCOHOL?: NEVER

## 2024-03-07 SDOH — HEALTH STABILITY: PHYSICAL HEALTH: ON AVERAGE, HOW MANY DAYS PER WEEK DO YOU ENGAGE IN MODERATE TO STRENUOUS EXERCISE (LIKE A BRISK WALK)?: 0 DAYS

## 2024-03-07 SDOH — SOCIAL STABILITY: SOCIAL INSECURITY: HAS ANYONE EVER THREATENED TO HURT YOUR FAMILY OR YOUR PETS?: NO

## 2024-03-07 SDOH — ECONOMIC STABILITY: HOUSING INSECURITY
IN THE LAST 12 MONTHS, WAS THERE A TIME WHEN YOU DID NOT HAVE A STEADY PLACE TO SLEEP OR SLEPT IN A SHELTER (INCLUDING NOW)?: NO

## 2024-03-07 SDOH — ECONOMIC STABILITY: FOOD INSECURITY: WITHIN THE PAST 12 MONTHS, YOU WORRIED THAT YOUR FOOD WOULD RUN OUT BEFORE YOU GOT MONEY TO BUY MORE.: SOMETIMES TRUE

## 2024-03-07 SDOH — HEALTH STABILITY: MENTAL HEALTH: HOW MANY STANDARD DRINKS CONTAINING ALCOHOL DO YOU HAVE ON A TYPICAL DAY?: PATIENT DOES NOT DRINK

## 2024-03-07 SDOH — HEALTH STABILITY: PHYSICAL HEALTH: ON AVERAGE, HOW MANY MINUTES DO YOU ENGAGE IN EXERCISE AT THIS LEVEL?: 0 MIN

## 2024-03-07 SDOH — SOCIAL STABILITY: SOCIAL NETWORK: HOW OFTEN DO YOU ATTENT MEETINGS OF THE CLUB OR ORGANIZATION YOU BELONG TO?: 1 TO 4 TIMES PER YEAR

## 2024-03-07 SDOH — SOCIAL STABILITY: SOCIAL INSECURITY: HAVE YOU HAD THOUGHTS OF HARMING ANYONE ELSE?: NO

## 2024-03-07 SDOH — ECONOMIC STABILITY: TRANSPORTATION INSECURITY
IN THE PAST 12 MONTHS, HAS LACK OF TRANSPORTATION KEPT YOU FROM MEETINGS, WORK, OR FROM GETTING THINGS NEEDED FOR DAILY LIVING?: NO

## 2024-03-07 SDOH — SOCIAL STABILITY: SOCIAL INSECURITY: WERE YOU ABLE TO COMPLETE ALL THE BEHAVIORAL HEALTH SCREENINGS?: YES

## 2024-03-07 SDOH — SOCIAL STABILITY: SOCIAL INSECURITY: ARE YOU OR HAVE YOU BEEN THREATENED OR ABUSED PHYSICALLY, EMOTIONALLY, OR SEXUALLY BY ANYONE?: NO

## 2024-03-07 SDOH — SOCIAL STABILITY: SOCIAL INSECURITY: ABUSE: ADULT

## 2024-03-07 ASSESSMENT — PAIN SCALES - GENERAL
PAINLEVEL_OUTOF10: 0 - NO PAIN
PAINLEVEL_OUTOF10: 4
PAINLEVEL_OUTOF10: 7
PAINLEVEL_OUTOF10: 5 - MODERATE PAIN
PAINLEVEL_OUTOF10: 6
PAINLEVEL_OUTOF10: 0 - NO PAIN
PAINLEVEL_OUTOF10: 9
PAINLEVEL_OUTOF10: 6

## 2024-03-07 ASSESSMENT — COLUMBIA-SUICIDE SEVERITY RATING SCALE - C-SSRS
6. HAVE YOU EVER DONE ANYTHING, STARTED TO DO ANYTHING, OR PREPARED TO DO ANYTHING TO END YOUR LIFE?: NO
2. HAVE YOU ACTUALLY HAD ANY THOUGHTS OF KILLING YOURSELF?: NO
1. IN THE PAST MONTH, HAVE YOU WISHED YOU WERE DEAD OR WISHED YOU COULD GO TO SLEEP AND NOT WAKE UP?: NO

## 2024-03-07 ASSESSMENT — COGNITIVE AND FUNCTIONAL STATUS - GENERAL
MOBILITY SCORE: 24
PATIENT BASELINE BEDBOUND: NO
DAILY ACTIVITIY SCORE: 24

## 2024-03-07 ASSESSMENT — ACTIVITIES OF DAILY LIVING (ADL)
HEARING - LEFT EAR: FUNCTIONAL
JUDGMENT_ADEQUATE_SAFELY_COMPLETE_DAILY_ACTIVITIES: YES
ADEQUATE_TO_COMPLETE_ADL: YES
PATIENT'S MEMORY ADEQUATE TO SAFELY COMPLETE DAILY ACTIVITIES?: YES
FEEDING YOURSELF: INDEPENDENT
WALKS IN HOME: INDEPENDENT
DRESSING YOURSELF: INDEPENDENT
GROOMING: INDEPENDENT
HEARING - RIGHT EAR: FUNCTIONAL
BATHING: INDEPENDENT
LACK_OF_TRANSPORTATION: NO
TOILETING: INDEPENDENT

## 2024-03-07 ASSESSMENT — PATIENT HEALTH QUESTIONNAIRE - PHQ9
2. FEELING DOWN, DEPRESSED OR HOPELESS: NOT AT ALL
1. LITTLE INTEREST OR PLEASURE IN DOING THINGS: NOT AT ALL
SUM OF ALL RESPONSES TO PHQ9 QUESTIONS 1 & 2: 0

## 2024-03-07 ASSESSMENT — LIFESTYLE VARIABLES
HOW OFTEN DO YOU HAVE A DRINK CONTAINING ALCOHOL: NEVER
SUBSTANCE_ABUSE_PAST_12_MONTHS: NO
PRESCIPTION_ABUSE_PAST_12_MONTHS: NO
AUDIT-C TOTAL SCORE: 0
HOW MANY STANDARD DRINKS CONTAINING ALCOHOL DO YOU HAVE ON A TYPICAL DAY: PATIENT DOES NOT DRINK
HOW OFTEN DO YOU HAVE 6 OR MORE DRINKS ON ONE OCCASION: NEVER
SKIP TO QUESTIONS 9-10: 1
AUDIT-C TOTAL SCORE: 0
SKIP TO QUESTIONS 9-10: 1
AUDIT-C TOTAL SCORE: 0

## 2024-03-07 ASSESSMENT — PAIN DESCRIPTION - ORIENTATION: ORIENTATION: LEFT

## 2024-03-07 ASSESSMENT — PAIN DESCRIPTION - LOCATION
LOCATION: FOOT
LOCATION: LEG

## 2024-03-07 NOTE — CONSULTS
PODIATRY CONSULT NOTE    SERVICE DATE: 3/7/2024   SERVICE TIME:  2:11 PM     REASON FOR CONSULT: Left Foot Cellulitis/Wounds  PRIMARY CARE PHYSICIAN: Luba Griffin MD    Subjective   HPI:  Ms. Pedroza is a 48 y.o. female who presents for left foot pain/infection. She has an appointment with Dr. Aponte, a podiatrist today that she made last week because her left 3rd middle toe started to turn black. She states that the foot has continued to get worse with more redness and swelling. She was seen at another ED a few weeks ago for foot pain in which they found a fracture of the left 5th metatarsal head and proximal phalanx base and gave her a CAM boot. She states that she thinks the boot rubbed a wound into her foot on the outside. She canceled her appointment today to come to the ED because the pain was so bad.    The ED called Dr. Aponte's team who recommended surgery this afternoon.     Podiatry was consulted for surgical management of left foot wounds/infection.    Pt is a DM Type II on Insulin for the last 12 years. Doesn't check BS regularly but runs in high 200's. She is a current smoker 1/2 ppd for the last few years but smoked 1 ppd since she was 14 years old. Prior hx of chronic alcohol abuse but sober for the last year per pt. Denies any street drug, vapeing or marijuana use.    ROS: 10-point review of systems was performed and is otherwise negative except as noted in HPI.  PMH: Reviewed/documented below.  PSH:  Noncontributory except per HPI   FH: Reviewed and noncontributory   SOCIAL:  Reviewed/documented below.  ALLERGIES: Reviewed/documented below.  MEDS: Reviewed/documented below.  VS: Reviewed/documented below.    Past Medical History:   Diagnosis Date    Bursitis of right hip 09/15/2023     Past Surgical History:   Procedure Laterality Date     SECTION, CLASSIC  2014     Section    GALLBLADDER SURGERY  2014    Gallbladder Surgery     Family History   Problem Relation  Name Age of Onset    No Known Problems Mother      Hypertension Father      Diabetes Father      Other (cholesterol issues) Father      No Known Problems Sister      No Known Problems Daughter      No Known Problems Son      No Known Problems Son      Breast cancer Other       Social History     Tobacco Use    Smoking status: Every Day     Packs/day: 1.00     Years: 15.00     Additional pack years: 0.00     Total pack years: 15.00     Types: Cigarettes     Passive exposure: Current    Smokeless tobacco: Never   Vaping Use    Vaping Use: Never used   Substance Use Topics    Alcohol use: Not Currently    Drug use: Never      (Not in a hospital admission)       Medications:  Scheduled Meds:   Continuous Infusions:   PRN Meds:     Allergies as of 03/07/2024    (No Known Allergies)            Objective   PHYSICAL EXAM:  Physical Exam Performed:  Vitals:    03/07/24 1228   BP:    Pulse: 82   Resp: 18   Temp:    SpO2: 98%     Body mass index is 34.11 kg/m².    Patient is AOx3 and in no acute distress. Patient is alert and cooperative. Sitting comfortably in bed with dressing clean, dry and intact. Heel off-loading boots in place B/L.    Vascular: Faintly palpable DP/PT pulses B/L. Moderate pitting edema noted B/L L>>R. Hair growth absent B/L. CFT<5 to B/L hallux. Temperature is warm to cool from tibial tuberosity to distal digits R and warm to the touch distally L. No lymphatic streaking noted B/L.    Musculoskeletal: Gross active and passive ROM intact to age and activity level. Moves all extremities spontaneously. No pain to palpation at feet B/L.     Neurological: Absent light touch sensation B/L. Pain stimuli diminished B/L. Denies any numbness, burning or tingling.    Dermatologic: Nails 1-5 are thickened, elongated, discolored with subungual debris B/L. Skin appears diffusely xerotic B/L. Web spaces 1-4 B/L are clean, dry and intact. No rashes or nodules noted R; L show maceration with peeling skin and serous  drainage. Hyperkeratotic tissue with intradermal bleeding noted to R medial IPJ of hallux.    Wound:  Measurements: 4.8 cm x 2.6 cm x 0.4 cm to the lateral wound; 1.0 cm x 1.0 cm x 0.5 cm dorsal wound.  Mixed wound base of fibrotic and necrotic tissue.   Entire 3rd digit with wet gangrenous changes.  Moderate serosanguinous drainage to wounds and from left 1st and 2nd interspaced.  Moderate xochilt-wound maceration.   Significant xochilt-wound erythema.   Moderate evidence of ascending cellulitis without lymphangitis extending to the level of the midfoot.  Medial ankle and lateral heel  palpable fluctuance. Moderate malodor. Significant increased warmth.   Positive  probe to bone or deep structures within the wound base.   No tunneling or tracking.   No undermining.   *Please see epic pictures for further clinical assessment.      LABS:   Results for orders placed or performed during the hospital encounter of 03/07/24 (from the past 24 hour(s))   BLOOD GAS LACTIC ACID, VENOUS   Result Value Ref Range    POCT Lactate, Venous 1.2 0.4 - 2.0 mmol/L   CBC and Auto Differential   Result Value Ref Range    WBC 16.3 (H) 4.4 - 11.3 x10*3/uL    nRBC 0.0 0.0 - 0.0 /100 WBCs    RBC 4.12 4.00 - 5.20 x10*6/uL    Hemoglobin 11.8 (L) 12.0 - 16.0 g/dL    Hematocrit 35.9 (L) 36.0 - 46.0 %    MCV 87 80 - 100 fL    MCH 28.6 26.0 - 34.0 pg    MCHC 32.9 32.0 - 36.0 g/dL    RDW 13.2 11.5 - 14.5 %    Platelets 475 (H) 150 - 450 x10*3/uL    Neutrophils % 73.5 40.0 - 80.0 %    Immature Granulocytes %, Automated 1.2 (H) 0.0 - 0.9 %    Lymphocytes % 17.6 13.0 - 44.0 %    Monocytes % 5.5 2.0 - 10.0 %    Eosinophils % 1.9 0.0 - 6.0 %    Basophils % 0.3 0.0 - 2.0 %    Neutrophils Absolute 12.02 (H) 1.20 - 7.70 x10*3/uL    Immature Granulocytes Absolute, Automated 0.19 0.00 - 0.70 x10*3/uL    Lymphocytes Absolute 2.87 1.20 - 4.80 x10*3/uL    Monocytes Absolute 0.89 0.10 - 1.00 x10*3/uL    Eosinophils Absolute 0.31 0.00 - 0.70 x10*3/uL    Basophils  Absolute 0.05 0.00 - 0.10 x10*3/uL   Comprehensive metabolic panel   Result Value Ref Range    Glucose 273 (H) 65 - 99 mg/dL    Sodium 133 133 - 145 mmol/L    Potassium 3.9 3.4 - 5.1 mmol/L    Chloride 98 97 - 107 mmol/L    Bicarbonate 26 24 - 31 mmol/L    Urea Nitrogen 7 (L) 8 - 25 mg/dL    Creatinine 0.50 0.40 - 1.60 mg/dL    eGFR >90 >60 mL/min/1.73m*2    Calcium 9.1 8.5 - 10.4 mg/dL    Albumin 2.9 (L) 3.5 - 5.0 g/dL    Alkaline Phosphatase 168 (H) 35 - 125 U/L    Total Protein 7.2 5.9 - 7.9 g/dL    AST 24 5 - 40 U/L    Bilirubin, Total 0.2 0.1 - 1.2 mg/dL    ALT 36 5 - 40 U/L    Anion Gap 9 <=19 mmol/L   Sedimentation Rate   Result Value Ref Range    Sedimentation Rate 76 (H) 0 - 20 mm/h   C-Reactive Protein   Result Value Ref Range    C-Reactive Protein 13.60 (H) 0.00 - 2.00 mg/dL   Sars-CoV-2 PCR   Result Value Ref Range    Coronavirus 2019, PCR Not Detected Not Detected      Lab Results   Component Value Date    HGBA1C 9.8 (H) 12/21/2022      Lab Results   Component Value Date    CRP 13.60 (H) 03/07/2024      Lab Results   Component Value Date    SEDRATE 76 (H) 03/07/2024        Results from last 7 days   Lab Units 03/07/24  0936   WBC AUTO x10*3/uL 16.3*   RBC AUTO x10*6/uL 4.12   HEMOGLOBIN g/dL 11.8*   HEMATOCRIT % 35.9*     Results from last 7 days   Lab Units 03/07/24  0936   SODIUM mmol/L 133   POTASSIUM mmol/L 3.9   CHLORIDE mmol/L 98   CO2 mmol/L 26   BUN mg/dL 7*   CREATININE mg/dL 0.50   CALCIUM mg/dL 9.1   BILIRUBIN TOTAL mg/dL 0.2   ALT U/L 36   AST U/L 24           IMAGING REVIEW:  XR foot left 3+ views    Result Date: 3/7/2024  Interpreted By:  Cecilio Feldman, STUDY: XR FOOT LEFT 3+ VIEWS; XR ANKLE LEFT 3+ VIEWS;  3/7/2024 10:16 am   INDICATION: Signs/Symptoms:infection; Signs/Symptoms:infection/ pain.   COMPARISON: None.   ACCESSION NUMBER(S): [id number]; [id number]   ORDERING CLINICIAN: ANGEL BOWSER   TECHNIQUE: Three views each of the left foot and left ankle were obtained.   FINDINGS:  Moderate-sized plantar calcaneal spur and posterior calcaneal spur. Moderate osteophyte formation involving the medial malleolus. There is periosteal reaction along the distal medial tibia diametaphysis. Ankle mortise and talar dome are intact. There is diffuse ankle soft tissue swelling, most pronounced laterally. There is diffuse midfoot and forefoot soft tissue swelling. There is cutaneous defect in the forefoot lateral to the 5th metatarsal. There is an acute comminuted fracture at the proximal end of the 5th proximal phalanx and an acute comminuted fracture in the distal head of the 5th metatarsal. There is some decreased bone density at the fracture sites. There is also soft tissue edema. No soft tissue gas density. No opaque soft tissue foreign body. No  erosion.       Cutaneous defect consistent with ulceration in the lateral forefoot, lateral to the 5th metatarsal.   Findings of osteomyelitis with pathologic fractures involving the distal 5th metatarsal head and the proximal end of the 5th proximal phalanx.   Ankle and midfoot/forefoot soft tissue swelling as described.   Osteophytic changes as described.   Nonspecific smooth periosteal cortical reaction involving the distal medial tibia diametaphyseal cortex. This could be from chronic infection or remote trauma. Clinical correlation needed.   MACRO: None   Signed by: Cecilio Feldman 3/7/2024 11:04 AM Dictation workstation:   [id number]    XR ankle left 3+ views    Result Date: 3/7/2024  Interpreted By:  Cecilio Feldman, STUDY: XR FOOT LEFT 3+ VIEWS; XR ANKLE LEFT 3+ VIEWS;  3/7/2024 10:16 am   INDICATION: Signs/Symptoms:infection; Signs/Symptoms:infection/ pain.   COMPARISON: None.   ACCESSION NUMBER(S): [id number]; [id number]   ORDERING CLINICIAN: ANGEL BOWSER   TECHNIQUE: Three views each of the left foot and left ankle were obtained.   FINDINGS: Moderate-sized plantar calcaneal spur and posterior calcaneal spur. Moderate osteophyte formation involving  the medial malleolus. There is periosteal reaction along the distal medial tibia diametaphysis. Ankle mortise and talar dome are intact. There is diffuse ankle soft tissue swelling, most pronounced laterally. There is diffuse midfoot and forefoot soft tissue swelling. There is cutaneous defect in the forefoot lateral to the 5th metatarsal. There is an acute comminuted fracture at the proximal end of the 5th proximal phalanx and an acute comminuted fracture in the distal head of the 5th metatarsal. There is some decreased bone density at the fracture sites. There is also soft tissue edema. No soft tissue gas density. No opaque soft tissue foreign body. No  erosion.       Cutaneous defect consistent with ulceration in the lateral forefoot, lateral to the 5th metatarsal.   Findings of osteomyelitis with pathologic fractures involving the distal 5th metatarsal head and the proximal end of the 5th proximal phalanx.   Ankle and midfoot/forefoot soft tissue swelling as described.   Osteophytic changes as described.   Nonspecific smooth periosteal cortical reaction involving the distal medial tibia diametaphyseal cortex. This could be from chronic infection or remote trauma. Clinical correlation needed.   MACRO: None   Signed by: Cecilio Feldman 3/7/2024 11:04 AM Dictation workstation:   [id number]    Lower extremity venous duplex left    Result Date: 2/21/2024  Interpreted By:  Stalney Baumann, STUDY: [facility] LOWER EXTREMITY VENOUS DUPLEX LEFT  2/21/2024 10:17 am   INDICATION: Left lower extremity swelling, concern for DVT   COMPARISON: None.   ACCESSION NUMBER(S): [id number]   ORDERING CLINICIAN: TONIA HINDS   TECHNIQUE: Routine ultrasound of the left lower extremity was performed with duplex Doppler (color and spectral) evaluation.   Static images were obtained for remote interpretation.   FINDINGS: THIGH VEINS:  The left common femoral, femoral, popliteal, proximal medial saphenous, and deep femoral veins are  patent and free of thrombus.  The veins are normally compressible.  They demonstrate normal phasic flow and augmentation response.       Negative study.  No deep venous thrombosis of the left lower extremity.   MACRO: None   Signed by: Stanley Baumann 2/21/2024 10:32 AM Dictation workstation:   [id number]            Assessment/Plan   ASSESSMENT & PLAN:    #Wet Gangrene, Left Foot  #Cellulitis, Left Foot  #Chronic Non-Pressure Ulceration with Necrosis of Bone, Left Foot  #Suspected Chronic Osteomyelitis, Left Foot  #Pathologic Fracture 5th Metatarsal Head, Left Foot  #Pathologic Fracture 5th Proximal Phalanx, Left Foot  #DM Type II with Foot Ulcer - Uncontrolled  #Polyneuropathy  #PAD  #Callous, Right Hallux    - Patient was seen and evaluated; all findings were discussed and all questions were answered to patient's satisfaction.  - Charts, labs, vitals and imaging all reviewed.   - Imaging: XR L Ankle and Foot: Pathologic Fx of Left 5th Metatarsal Head and Proximal Phalanx with some lysis and erosive changes to the distal 5th ray. No gas on film. Some periosteal changes to the medial malleolus.  - Labs: WBC 16.3; Hbg 11.8; CRP13.6; ESR 76  - PVRs: Will plan to order post-operatively.  - Wound culture: Will obtain intra-operative.  - Blood culture: Obtained; results pending.    Plan:  - Abx: IV Vancomycin and Zosyn per Primary  - Likely to require surgical intervention with debridement of soft tissue and possibly bone this hospital visit including amputation of all digits. Discussed this in detail with patient as well as risks and benefits. It was discussed the high risk of this foot and that the surgery today will include a transmetatarsal amputation that will be left open packed. Pt understands. Discussed need for further surgery this hospital stay pending on intra-op findings.  - Surgery scheduled this afternoon. Pt has been NPO since early this AM.  - Plan for surgery this afternoon for transmetatarsal  amputation of left foot.   - Dressings: No dressing in place upon inspection. No placed due to surgery soon. Can cover with DSD.  - Nursing staff is able to change/reinforce dressing if & as necessary.  - Podiatry will continue to follow while in house.    DVT ppx: Per primary.  Weightbearing: NWB LLE  Discharge: Pt to follow up 1 week after discharge with Dr. Aponte/Nolan    Case to be discussed with attending, A&P above reflects a tentative plan. Please await for the final signature from the attending physician on service.    Sarah Barrera DPM PGY-2  Podiatric Medicine & Surgery  Please [place] message me with any questions or concerns.            SIGNATURE: Sarah Barrera DPM PATIENT NAME: Mary Pedroza   DATE: March 7, 2024 MRN: [id number]   TIME: 2:10 PM CONTACT: Haiku message

## 2024-03-07 NOTE — OP NOTE
PODIATRIC OPERATIVE REPORT    LOG ID: [id number]  SURGERY/PROCEDURE DATE: 3/7/2024  OR LOCATION: [facility]    SURGEON(S)/PROCEDURALIST(S) AND ASSISTANT(S):  Primary: Suraj Francisco DPM  Assisting: Sarah Barrera DPM  Assistants:   Sarah Barrera DPM PGY-2  Bharat Michelle DPM PGY-2     OTHER OR STAFF:  Circulator: Charley Clark RN  Scrub Person: Erna Chau    SURGERY/PROCEDURE(S):  Incision of Bone Cortex, Left Foot (81323)  Transmetatarsal Amputation, Left Foot (52222)    PRE-OP/PRE-PROCEDURE DIAGNOSIS:   Pre-op Diagnosis     * Cellulitis and abscess of foot [L03.119, L02.619]     * Atherosclerosis of Native Arteries with Gangrene, Left Foot [I70.262]     * Diabetes mellitus due to underlying condition with foot ulcer, unspecified whether long term insulin use (CMS/HCC) [E08.621, L97.509]    POST-OP/POST-PROCEDURE DIAGNOSIS: Same as Pre-Op    ANESTHESIA:   Anesthesia: General  ASA: III  Anesthesia Staff: Anesthesiologist: Olu Do MD  CRNA: LUISA Pandey  Intra-op Medications: * Intraprocedure medication information is unavailable because the case start and end events have not been set *    ESTIMATED BLOOD LOSS: Minimal    SPECIMENS:   Order Name Source Comment Collection Info Order Time   TISSUE/WOUND CULTURE/SMEAR DIGIT, ACCESSORY LEFT FOOT Pre-op diagnosis:  Cellulitis and abscess of foot [L03.119, L02.619]  Diabetes mellitus due to underlying condition with foot ulcer, unspecified whether long term insulin use (CMS/HCC) [E08.621, L97.509] Collected By: Suraj Francisco DPM 3/7/2024  3:44 PM     Release result to [facility]   Immediate        SURGICAL PATHOLOGY EXAM DIGIT FIFTH, LEFT FOOT Pre-op diagnosis:  Cellulitis and abscess of foot [L03.119, L02.619]  Diabetes mellitus due to underlying condition with foot ulcer, unspecified whether long term insulin use (CMS/HCC) [E08.621, L97.509] Collected By: Suraj Francisco DPM 3/7/2024  3:30 PM       IMPLANTABLE DEVICES:  Nothing was implanted  during the procedure    FINDINGS: Intraoperative findings consistent with clinical and radiographic findings.    DRAINS: None    TOURNIQUET TIMES: * Missing tourniquet times found for documented tourniquets in lo *     COMPLICATIONS: None; patient tolerated the procedure well.       SURGERY/PROCEDURE DETAILS:  INDICATIONS FOR PROCEDURE:   The patient with diagnosis as outlined above presents for podiatric surgical intervention today. The patient has attempted and failed conservative treatment as outlined in preoperative clinic notes and wishes to proceed with surgical intervention at this time. The nature of the deformity, problems anticipated procedures, recovery/convalescence, risks/complications including but not limited to numbness, CRPS, over/under correction, problems healing of soft tissue or bone, postoperative wound infection, wound dehiscence, DVT and/or persistent pain/disability have been explained to the patient in detail. An updated H&P and consent have been completed prior to today’s surgical intervention. The patient states that they have been NPO since midnight. No guarantees were given or implied, but it is expected that the patient will have a favorable outcome.  It is with this understanding that we proceed.     PRE-PROCEDURE INFORMATION:  In pre-op holding area, the left extremity to be operated on was clearly marked and the patient verified correct laterality of the marking.  The patient was brought to the operating room and left on the hospital cart in the supine position.  A timeout was performed in which identification of the correct patient, procedure, location, and materials was done. Following general LMA sedation, the left foot was then scrubbed, prepped and draped in the usual aseptic manner.     DESCRIPTION OF PROCEDURE:   Attention was directed to the left forefoot where an incision was made at a level of the metatarsals such that this incision allowed adequate coverage of  the remaining metatarsal bones for future closure while still allowing for resection of the infected bone and compromised soft tissue. This incision was deepened to the level of the bone and utilizing sharp dissection. The digits 1-5 were all disarticulated at the level of the MPJs. It was clear that the metatarsal  heads 1-5 all showed some signs of necrosis with discoloration. Utilizing a rongeur and other instrumentation, all devitalized soft tissue was removed from the area. The infected toes that had been amputated was sent for pathology. The wound was pulse lavaged with 3L saline and left open for infection purposes, to be closed via delayed primary closure at a future date. A post-lavage culture was obtained and sent for microbiologic analysis. Dressing was placed on the surgical extremity consisting of betadine soaked packing, betadine soaked 4x4 gauze, 4x4 gauze, ABDs Kerlix, ACE.     POSTOPERATIVE INFORMATION: The patient tolerated the above noted procedure and anesthesia well and was transferred to the PACU with vital signs stable, and vascular status intact with capillary refill intact to the most distal aspect of the operative extremity. Postoperative instructions reviewed in detail with the patient and family with written instructions provided. Patient will return to floor. Should any problems, questions, or concerns arise, primary team to [facility].    This is Sarah Barrera DPM dictating the operative note for Dr. Nolan DPM.      Attending Attestation: I was present for the entire procedure.      SIGNATURE: Sarah Barrera DPM PATIENT NAME: Mary Pedroza   DATE: March 7, 2024 MRN: [id number]   TIME: 4:17 PM

## 2024-03-07 NOTE — ED PROVIDER NOTES
"HPI   Chief Complaint   Patient presents with    Foot Infection     Foot infection x2 weeks. \"Took antibiotics for 10 days, foot getting worse\" per pt.        HPI  Patient 48-year-old female here for left foot issues.  Patient has had worsening of foot wounds, her left middle toe is necrotic and it has been that way for about a week.  She has open ulcerations with purulent pus drainage in multiple areas.  No fevers reported    *I have attached photos in the exam portion to further illustrate*                  [place] Coma Scale Score: 15                     Patient History   Past Medical History:   Diagnosis Date    Bursitis of right hip 09/15/2023     Past Surgical History:   Procedure Laterality Date     SECTION, CLASSIC  2014     Section    GALLBLADDER SURGERY  2014    Gallbladder Surgery     Family History   Problem Relation Name Age of Onset    No Known Problems Mother      Hypertension Father      Diabetes Father      Other (cholesterol issues) Father      No Known Problems Sister      No Known Problems Daughter      No Known Problems Son      No Known Problems Son      Breast cancer Other       Social History     Tobacco Use    Smoking status: Every Day     Packs/day: 1.00     Years: 15.00     Additional pack years: 0.00     Total pack years: 15.00     Types: Cigarettes     Passive exposure: Current    Smokeless tobacco: Never   Vaping Use    Vaping Use: Never used   Substance Use Topics    Alcohol use: Not Currently    Drug use: Never       Physical Exam   ED Triage Vitals [24 0922]   Temperature Heart Rate Respirations BP   36.8 °C (98.2 °F) 95 18 121/72      Pulse Ox Temp Source Heart Rate Source Patient Position   100 % Oral Monitor Sitting      BP Location FiO2 (%)     Left arm --       Physical Exam  PHYSICAL EXAMINATION    GENERAL APPEARANCE: Awake and alert.     VITAL SIGNS: As per the nurses' triage record.     HEENT: Normocephalic, atraumatic. Extraocular muscles " are intact. Pupils equal round and reactive to light. Conjunctiva are pink. Negative scleral icterus. Mucous membranes are moist. Tongue in the midline. Pharynx was without erythema or exudates, uvula midline    NECK: Soft Nontender and supple, full gross ROM, no meningeal signs.    CHEST: Nontender to palpation. Clear to auscultation bilaterally. No rales, rhonchi, or wheezing.     HEART: S1, S2. Regular rate and rhythm. No murmurs, gallops or rubs.  Strong and equal pulses in the extremities.     ABDOMEN: Soft, obesity, nontender, nondistended, positive bowel sounds, no palpable masses.    MUSCULOSKELETAL: Patient has swelling to the left foot with diffuse erythema multiple areas of open ulcerations and various staging, the left third toe is necrotic, there is purulent drainage over multiple areas of the foot.  Please see images documented for further detail             NEUROLOGICAL: Awake, alert and oriented x 3. Power intact in the upper and lower extremities. Sensation is intact to light touch in the upper and lower extremities.     IMMUNOLOGICAL: No lymphatic streaking noted     DERM: No petechiae, rashes, or ecchymoses.  ED Course & MDM   ED Course as of 03/07/24 1048   u Mar 07, 2024   0945 Spoke to dr prieto -he states that he will be on consult, we discussed the case [AP]   0955 Foot and ankle specialist inquired for last food intake, patient indicated that her last ingestion was last night has not eaten or drink anything today.  Tentative plan for operating room later this afternoon [AP]      ED Course User Index  [AP] Alejandro Juares PA-C         Diagnoses as of 03/07/24 1048   Left foot infection   Anemia, unspecified type   Leukocytosis, unspecified type       Medical Decision Making  Parts of this chart have been completed using voice recognition software. Please excuse any errors of transcription.  My thought process and reason for plan has been formulated from the time that I saw the patient  until the time of disposition and is not specific to one specific moment during their visit and furthermore my MDM encompasses this entire chart and not only this text box.      HPI: Detailed above.    Exam: A medically appropriate exam performed, outlined above, given the known history and presentation.    History Limited by: Nothing    History obtained from: The patient    External/internal records reviewed: I reviewed office visit from primary care provider on February 21, 2024    Social Determinants of Health considered during this visit: Lives at home    Chronic conditions impacting care: Bipolar, diabetes, dyslipidemia    Medications given during visit:  Medications   vancomycin 1.5 g in 300 mL (Xellia) IVPB 1.5 g (1.5 g intravenous New Bag 3/7/24 0956)   sodium chloride 0.9 % bolus 2,790 mL (2,790 mL intravenous New Bag 3/7/24 0956)   piperacillin-tazobactam-dextrose (Zosyn) IV 3.375 g (3.375 g intravenous New Bag 3/7/24 0947)   acetaminophen (Tylenol) tablet 650 mg (650 mg oral Given 3/7/24 0948)   ketorolac (Toradol) injection 15 mg (15 mg intravenous Given 3/7/24 0948)   morphine injection 4 mg (4 mg intravenous Given 3/7/24 0948)        Diagnostic/tests  Labs Reviewed   CBC WITH AUTO DIFFERENTIAL - Abnormal       Result Value    WBC 16.3 (*)     nRBC 0.0      RBC 4.12      Hemoglobin 11.8 (*)     Hematocrit 35.9 (*)     MCV 87      MCH 28.6      MCHC 32.9      RDW 13.2      Platelets 475 (*)     Neutrophils % 73.5      Immature Granulocytes %, Automated 1.2 (*)     Lymphocytes % 17.6      Monocytes % 5.5      Eosinophils % 1.9      Basophils % 0.3      Neutrophils Absolute 12.02 (*)     Immature Granulocytes Absolute, Automated 0.19      Lymphocytes Absolute 2.87      Monocytes Absolute 0.89      Eosinophils Absolute 0.31      Basophils Absolute 0.05     COMPREHENSIVE METABOLIC PANEL - Abnormal    Glucose 273 (*)     Sodium 133      Potassium 3.9      Chloride 98      Bicarbonate 26      Urea Nitrogen 7  (*)     Creatinine 0.50      eGFR >90      Calcium 9.1      Albumin 2.9 (*)     Alkaline Phosphatase 168 (*)     Total Protein 7.2      AST 24      Bilirubin, Total 0.2      ALT 36      Anion Gap 9     SEDIMENTATION RATE, AUTOMATED - Abnormal    Sedimentation Rate 76 (*)    C-REACTIVE PROTEIN - Abnormal    C-Reactive Protein 13.60 (*)    BLOOD GAS LACTIC ACID, VENOUS - Normal    POCT Lactate, Venous 1.2     BLOOD CULTURE   BLOOD CULTURE   URINE CULTURE   URINALYSIS WITH REFLEX CULTURE AND MICROSCOPIC    Narrative:     The following orders were created for panel order Urinalysis with Reflex Culture and Microscopic.  Procedure                               Abnormality         Status                     ---------                               -----------         ------                     Urinalysis with Reflex C...[[id number]]                                                 Extra Urine Gray Tube[[id number]]                                                         Please view results for these tests on the individual orders.   HCG, URINE, QUALITATIVE   SARS-COV-2 PCR   URINALYSIS WITH REFLEX CULTURE AND MICROSCOPIC   EXTRA URINE GRAY TUBE      XR foot left 3+ views    (Results Pending)   XR ankle left 3+ views    (Results Pending)          Case discussed with: foot and ankle doc and hospitalist     Considerations/further MDM:  I have considered for the following diagnoses:  Deep space infection, DVT, Delphine's Gangrene, Sepsis and  also considered Foreign body and injury.  Considered osteomyelitis, local cellulitis, patient has obvious vascular compromise with a necrotic toe and copious months of purulent swelling and drainage from the foot.  Patient will tentatively go to the operating room later this afternoon per the foot and ankle specialist, hospitalized under medicine for further management as well.  Will remain n.p.o. status during ER visit.    Upon my evaluation, this patient had a high probability of imminent or  life or limb-threatening deterioration which required my direct attention, intervention, and personal management  I personally saw the patient and independently provided 30 minutes of non-concurrent critical care time were provided which excludes all other billable procedures.  This was for management of severe infectious process taking place in the left foot with a necrotic extremity with obvious sign of infectious process with diagnostic abnormalities requiring weight-based calculated fluid bolus, multiple broad-spectrum antibiotics, consultation to surgical specialty, consultation to hospitalist, arrangements for hospitalization for time sensitive surgical intervention,, screening and management of initial sepsis screening and sepsis order set for 3-hour sepsis bundle.  Serial bedside evaluations for close monitoring.          Procedure  Procedures     Alejandro Juares PA-C  03/07/24 1057

## 2024-03-07 NOTE — CONSULTS
"Vancomycin Dosing by Pharmacy- INITIAL    Mary Pedroza is a 48 y.o. year old female who Pharmacy has been consulted for vancomycin dosing for cellulitis, skin and soft tissue. Based on the patient's indication and renal status this patient will be dosed based on a goal AUC of 400-600.     Renal function is currently stable.    Visit Vitals  /76   Pulse 83   Temp 36 °C (96.8 °F) (Temporal)   Resp 12        Lab Results   Component Value Date    CREATININE 0.50 03/07/2024    CREATININE 0.5 07/28/2023    CREATININE 0.6 01/14/2023    CREATININE 0.7 08/04/2022    CREATININE 0.6 05/24/2021        Patient weight is No results found for: \"PTWEIGHT\"    No results found for: \"CULTURE\"     No intake/output data recorded.  [unfilled]    No results found for: \"PATIENTTEMP\"       Assessment/Plan     Patient has already been given a loading dose of 1500 mg.  Will initiate vancomycin maintenance,  1250 mg every 12 hours.    This dosing regimen is predicted by [employer] to result in the following pharmacokinetic parameters:    Loading dose: N/A  Regimen: 1250 mg IV every 12 hours.  Start time: 21:56 on 03/07/2024  Exposure target: AUC24 (range)400-600 mg/L.hr   AUC24,ss: 461 mg/L.hr  Probability of AUC24 > 400: 65 %  Ctrough,ss: 13.7 mg/L  Probability of Ctrough,ss > 20: 22 %  Probability of nephrotoxicity (Lodise REGINO 2009): 9 %    Follow-up level will be ordered on 3/8 at 0500 unless clinically indicated sooner.  Will continue to monitor renal function daily while on vancomycin and order serum creatinine at least every 48 hours if not already ordered.  Follow for continued vancomycin needs, clinical response, and signs/symptoms of toxicity.       Alana Oliver, PharmD   "

## 2024-03-07 NOTE — PROGRESS NOTES
Attestation/Supervisory note for VIVI Juares      The patient is a 48-year-old female presenting to the emergency department by EMS for evaluation with a left foot infection.  The patient states that she had an appointment with her podiatrist, Dr. Aponte, at 1000 this morning but did not want to wait for the appointment so she came to the emergency room instead.  She states that several weeks ago she broke her foot.  She was in a cast and had some irritation of her foot because of the cast.  She subsequently has had an infection.  She states that she was started on an antibiotic last week but does not recall the name of the antibiotic.  She denies any other injury or trauma.  She is diabetic and she does smoke daily.  No headache or visual changes.  No chest pain or shortness of breath.  No abdominal pain.  No nausea or vomiting.  No diarrhea or constipation but no urinary complaints.  All pertinent positives and negatives are recorded above.  All other systems reviewed and otherwise negative.  Vital signs within normal limits.  Physical exam with a well-nourished well-developed female in no acute distress.  HEENT exam within normal limits.  She has no evidence of airway compromise or respiratory distress.  Abdominal exam is benign.  She has no gross motor, neurologic or vascular deficits on exam other than she does have some pain with range of motion of the left foot.  She does have multiple open sores with areas of necrosis, erythema and edema of the left foot.  She also has some erythema and warmth to the left ankle.      Cultures were obtained and IV fluids and IV antibiotics were ordered.      Diagnostic labs with leukocytosis, diabetic hyperglycemia, thrombocytosis, anemia, elevated ESR, elevated sed rate      XR foot left 3+ views   Final Result   Cutaneous defect consistent with ulceration in the lateral forefoot,   lateral to the 5th metatarsal.        Findings of osteomyelitis with pathologic fractures  involving the   distal 5th metatarsal head and the proximal end of the 5th proximal   phalanx.        Ankle and midfoot/forefoot soft tissue swelling as described.        Osteophytic changes as described.        Nonspecific smooth periosteal cortical reaction involving the distal   medial tibia diametaphyseal cortex. This could be from chronic   infection or remote trauma. Clinical correlation needed.        MACRO:   None        Signed by: Cecilio Feldman 3/7/2024 11:04 AM   Dictation workstation:   [id number]      XR ankle left 3+ views   Final Result   Cutaneous defect consistent with ulceration in the lateral forefoot,   lateral to the 5th metatarsal.        Findings of osteomyelitis with pathologic fractures involving the   distal 5th metatarsal head and the proximal end of the 5th proximal   phalanx.        Ankle and midfoot/forefoot soft tissue swelling as described.        Osteophytic changes as described.        Nonspecific smooth periosteal cortical reaction involving the distal   medial tibia diametaphyseal cortex. This could be from chronic   infection or remote trauma. Clinical correlation needed.        MACRO:   None        Signed by: Cecilio Feldman 3/7/2024 11:04 AM   Dictation workstation:   [id number]           Diagnostic labs with leukocytosis, elevated CRP and elevated ESR.  X-rays concerning for possible osteomyelitis.  Dr. Aponte, podiatry, was contacted and will arrange for operative treatment.      The patient was admitted for further management by the hospitalist.      Impression/diagnosis  Left foot infection/wounds  Osteomyelitis of the left foot  Leukocytosis, unspecified  Anemia, unspecified  Thrombocytosis      Critical care time of 23 and is billed for management of osteomyelitis of the left foot with initiation of IV antibiotics, consultation with podiatry, consultation with the patient regarding her results, consultation with accepting provider and arrangement for admission..  This time  excludes time for billable procedures.      critical care time billed for by me is non concurrent with time billed for by VIVI Juares      I personally saw the patient and made/approve the management plan and take responsibility for the patient management.      I independently interpreted the following study (S): Diagnostic labs      I personally discussed the patient's management with the patient      I reviewed the results of the diagnostic labs and diagnostic imaging.  Formal radiology read was completed by the radiologist.      Danuta Javier MD

## 2024-03-07 NOTE — ANESTHESIA PROCEDURE NOTES
Airway  Date/Time: 3/7/2024 3:15 PM  Airway not difficult    Staffing  Performed: CRNA   Authorized by: Olu Do MD    Performed by: SUDHIR Pandey-MAYE  Patient location during procedure: OR    Indications and Patient Condition  Indications for airway management: anesthesia  Spontaneous Ventilation: absent  Sedation level: deep  Preoxygenated: yes  Patient position: sniffing  MILS maintained throughout  Mask difficulty assessment: 0 - not attempted    Final Airway Details  Final airway type: supraglottic airway      Successful airway: classic  Size 3     Number of attempts at approach: 1  Number of other approaches attempted: 0

## 2024-03-07 NOTE — PROGRESS NOTES
"Pharmacy Medication History Review    Mary Pedroza is a 48 y.o. female admitted for No Principal Problem: There is no principal problem currently on the Problem List. Please update the Problem List and refresh.. Pharmacy reviewed the patient's [id number] medications and allergies for accuracy.    Medications ADDED:  Aleve   Medications CHANGED:  N/A  Medications REMOVED:   Sumatriptan 100mg     The list below reflects the updated PTA list. Comments regarding how patient may be taking medications differently can be found in the Admit Orders Activity  Prior to Admission Medications   Prescriptions Last Dose Informant   ARIPiprazole (Abilify) 5 mg tablet 3/6/2024 self   Si tablet (5 mg) once daily.   FreeStyle Bridget sensor system (FreeStyle Bridget 2 Sensor) kit  self   Sig: USE SENSOR AS DIRECTED EVERY 2 WEEKS   Januvia 25 mg tablet 3/6/2024 Self   Si tablet (25 mg) once daily.   blood sugar diagnostic (OneTouch Ultra Test) strip  Self   Sig: USE AS DIRECTED TO CHECK BLOOD SUGAR TWICE A DAY   doxepin (SINEquan) 100 mg capsule 3/6/2024 self   Si capsule (100 mg) once daily at bedtime.   fenofibrate (Tricor) 145 mg tablet 3/6/2024 self   Sig: Take 1 tablet (145 mg) by mouth once daily.   glimepiride (Amaryl) 4 mg tablet 3/6/2024 self   Sig: Take 1 tablet (4 mg) by mouth once daily in the morning. Take before meals.   hydrOXYzine HCL (Atarax) 25 mg tablet 3/6/2024 Self   Sig: Take 1 tablet (25 mg) by mouth 2 times a day as needed.   insulin glargine (Lantus Solostar U-100 Insulin) 100 unit/mL (3 mL) pen 3/6/2024 Self   Sig: INJECT UNDER THE SKIN AS DIRECTED  UP TO 80 UNITS DAILY   medroxyPROGESTERone (Depo-Provera) 150 mg/mL injection  self   Sig: Inject 1 mL (150 mg) into the muscle every 12 weeks.   naproxen (Naprosyn) 250 mg tablet  self   Sig: Take 1 tablet (250 mg) by mouth 2 times a day with meals.   pen needle, diabetic (BD Ultra-Fine Short Pen Needle) 31 gauge x 5/16\" needle  self   Sig: as " directed . as directed for 90 days   propranolol (Inderal) 20 mg tablet 3/6/2024 self   Si tablet (20 mg) 2 times a day.   simvastatin (Zocor) 20 mg tablet 3/6/2024 self   Sig: Take 1 tablet (20 mg) by mouth once daily in the evening.   venlafaxine XR (Effexor-XR) 75 mg 24 hr capsule 3/6/2024 self   Si capsule (75 mg) once daily.      Facility-Administered Medications: None        The list below reflects the updated allergy list. Please review each documented allergy for additional clarification and justification.  Allergies  Reviewed by Diandra Garcia CPhT on 3/7/2024   No Known Allergies         Pharmacy has been updated to [facility].    Sources used to complete the med history include PTA medication list, dispense history, after visit summaries, patient interview. Patient is a good historian.    Below are additional concerns with the patient's PTA list.  After visit summary on 24 shows a stop to Januvia. Patient reports continued use. Patient reports stopping Metformin 500 due to diarrhea.     Diandra Garcia CPhT  Please reach out via [employer] Secure Chat for questions

## 2024-03-07 NOTE — H&P
History Of Present Illness  Mary Pedroza is a 48 y.o. female presenting with pain in the left foot.  She has a history of type 2 diabetes, on insulin.  She had been seen at an urgent care center 2 weeks ago because of pain in her left foot and she had been provided with a boot.  She had an appointment to see Dr. James Aponte today.  However she has been experiencing worsening pain and redness and ulceration and skin discoloration.  She noticed discoloration of the skin of the third toe 3 days ago.  There is also been ulceration on the bottom of the foot and loss of sensation in the left foot.  Reports no numbness in the right foot.  She has had no fever, chills, shortness of breath or chest pain.  She has been taking an antibiotic, cephalexin and completed a 10-day course.  In the emergency department, she had a white blood cell count of 16.3, C-reactive protein was 13.6, blood glucose was 273.  X-ray of the left foot and ankle showed cutaneous defect consistent with ulceration in the lateral forefoot lateral to the fifth metatarsal and findings of osteomyelitis with pathologic fractures involving the distal fifth metatarsal head at the proximal end of the fifth proximal phalanx.  Her blood glucose has been uncontrolled the past few days. She is being seen by the podiatry service and surgery is planned for this afternoon.     Past Medical History  Past Medical History:   Diagnosis Date    Bipolar 1 disorder (CMS/[facility])     Bursitis of right hip 09/15/2023    Diabetes mellitus, type 2 (CMS/[facility])     Hyperlipidemia        Surgical History  Past Surgical History:   Procedure Laterality Date     SECTION, CLASSIC  2014     Section     SECTION, LOW TRANSVERSE       SECTION, LOW TRANSVERSE       SECTION, LOW TRANSVERSE  2005    CHOLECYSTECTOMY  2012    GALLBLADDER SURGERY  2014    Gallbladder Surgery        Social History  She reports that she has been  smoking cigarettes. She has a 15.00 pack-year smoking history. She has been exposed to tobacco smoke. She has never used smokeless tobacco. She reports that she does not currently use alcohol. She reports that she does not use drugs.    Family History  Family History   Problem Relation Name Age of Onset    No Known Problems Mother      Hypertension Father      Diabetes Father      Other (cholesterol issues) Father      No Known Problems Sister      No Known Problems Daughter      No Known Problems Son      No Known Problems Son      Breast cancer Other          Allergies  Patient has no known allergies.    Review of Systems   General: Negative for fever,  chills or fatigue.    HEENT: Negative for headache, blurring of vision or double vision.    Cardiovascular: Negative for chest pain, palpitations or orthopnea.    Respiratory: Negative for cough, shortness of breath or wheezing.    Gastrointestinal: Negative for nausea, vomiting, hematemesis, abdominal pain or diarrhea.   Genitourinary: Negative for dysuria, hematuria, frequency or nocturia.   Musculoskeletal: As in the HPI   Skin: Negative for rash, itching, or jaundice.   Hematologic: Negative for bleeding or bruising.   Neurologic: Negative for headache, loss of consciousness. syncope or seizures      Physical Exam   General.: Awake alert well-developed, responsive   HEENT: Normocephalic, not icteric, not pale, no facial asymmetry, no pharyngeal erythema.   Neck: Supple, no carotid bruit, no thyroid enlargement.   Cardiovascular: Regular heart rate and rhythm normal S1 and S2.   Respiratory: Equal breath sounds bilaterally clear to auscultation.   Abdomen: Soft, nontender to palpation, bowel sounds present and normoactive.   Extremities: There was desquamation of the proximal left foot on both dorsal and plantar surfaces. There was an irregular shaped area of ulceration on the distal plantar left foot and an area of ulceration on the distal lateral edge of the  left foot.  There was decreased sensation in the toes.  There was dark discoloration and blistering of the left third toe.  The left tibial and posterior tibial pulses were palpable.   Neurologic: Alert and oriented to self, place and date, muscle strength 5/5 in all extremities.   Dermatologic: No rash, ecchymosis, or jaundice.   Psychological: Appropriate affect and behavior.       Last Recorded Vitals  Blood pressure 123/76, pulse 83, temperature 36 °C (96.8 °F), temperature source Temporal, resp. rate 12, weight 93 kg (205 lb), SpO2 98 %.    Relevant Results  Results for orders placed or performed during the hospital encounter of 03/07/24 (from the past 24 hour(s))   BLOOD GAS LACTIC ACID, VENOUS   Result Value Ref Range    POCT Lactate, Venous 1.2 0.4 - 2.0 mmol/L   CBC and Auto Differential   Result Value Ref Range    WBC 16.3 (H) 4.4 - 11.3 x10*3/uL    nRBC 0.0 0.0 - 0.0 /100 WBCs    RBC 4.12 4.00 - 5.20 x10*6/uL    Hemoglobin 11.8 (L) 12.0 - 16.0 g/dL    Hematocrit 35.9 (L) 36.0 - 46.0 %    MCV 87 80 - 100 fL    MCH 28.6 26.0 - 34.0 pg    MCHC 32.9 32.0 - 36.0 g/dL    RDW 13.2 11.5 - 14.5 %    Platelets 475 (H) 150 - 450 x10*3/uL    Neutrophils % 73.5 40.0 - 80.0 %    Immature Granulocytes %, Automated 1.2 (H) 0.0 - 0.9 %    Lymphocytes % 17.6 13.0 - 44.0 %    Monocytes % 5.5 2.0 - 10.0 %    Eosinophils % 1.9 0.0 - 6.0 %    Basophils % 0.3 0.0 - 2.0 %    Neutrophils Absolute 12.02 (H) 1.20 - 7.70 x10*3/uL    Immature Granulocytes Absolute, Automated 0.19 0.00 - 0.70 x10*3/uL    Lymphocytes Absolute 2.87 1.20 - 4.80 x10*3/uL    Monocytes Absolute 0.89 0.10 - 1.00 x10*3/uL    Eosinophils Absolute 0.31 0.00 - 0.70 x10*3/uL    Basophils Absolute 0.05 0.00 - 0.10 x10*3/uL   Comprehensive metabolic panel   Result Value Ref Range    Glucose 273 (H) 65 - 99 mg/dL    Sodium 133 133 - 145 mmol/L    Potassium 3.9 3.4 - 5.1 mmol/L    Chloride 98 97 - 107 mmol/L    Bicarbonate 26 24 - 31 mmol/L    Urea Nitrogen 7 (L) 8 -  25 mg/dL    Creatinine 0.50 0.40 - 1.60 mg/dL    eGFR >90 >60 mL/min/1.73m*2    Calcium 9.1 8.5 - 10.4 mg/dL    Albumin 2.9 (L) 3.5 - 5.0 g/dL    Alkaline Phosphatase 168 (H) 35 - 125 U/L    Total Protein 7.2 5.9 - 7.9 g/dL    AST 24 5 - 40 U/L    Bilirubin, Total 0.2 0.1 - 1.2 mg/dL    ALT 36 5 - 40 U/L    Anion Gap 9 <=19 mmol/L   Sedimentation Rate   Result Value Ref Range    Sedimentation Rate 76 (H) 0 - 20 mm/h   C-Reactive Protein   Result Value Ref Range    C-Reactive Protein 13.60 (H) 0.00 - 2.00 mg/dL   Sars-CoV-2 PCR   Result Value Ref Range    Coronavirus 2019, PCR Not Detected Not Detected   POCT GLUCOSE   Result Value Ref Range    POCT Glucose 145 (H) 74 - 99 mg/dL     XR foot left 3+ views    Result Date: 3/7/2024  Interpreted By:  Cecilio Feldman, STUDY: XR FOOT LEFT 3+ VIEWS; XR ANKLE LEFT 3+ VIEWS;  3/7/2024 10:16 am   INDICATION: Signs/Symptoms:infection; Signs/Symptoms:infection/ pain.   COMPARISON: None.   ACCESSION NUMBER(S): [id number]; [id number]   ORDERING CLINICIAN: ANGEL BOWSER   TECHNIQUE: Three views each of the left foot and left ankle were obtained.   FINDINGS: Moderate-sized plantar calcaneal spur and posterior calcaneal spur. Moderate osteophyte formation involving the medial malleolus. There is periosteal reaction along the distal medial tibia diametaphysis. Ankle mortise and talar dome are intact. There is diffuse ankle soft tissue swelling, most pronounced laterally. There is diffuse midfoot and forefoot soft tissue swelling. There is cutaneous defect in the forefoot lateral to the 5th metatarsal. There is an acute comminuted fracture at the proximal end of the 5th proximal phalanx and an acute comminuted fracture in the distal head of the 5th metatarsal. There is some decreased bone density at the fracture sites. There is also soft tissue edema. No soft tissue gas density. No opaque soft tissue foreign body. No  erosion.       Cutaneous defect consistent with ulceration in the  lateral forefoot, lateral to the 5th metatarsal.   Findings of osteomyelitis with pathologic fractures involving the distal 5th metatarsal head and the proximal end of the 5th proximal phalanx.   Ankle and midfoot/forefoot soft tissue swelling as described.   Osteophytic changes as described.   Nonspecific smooth periosteal cortical reaction involving the distal medial tibia diametaphyseal cortex. This could be from chronic infection or remote trauma. Clinical correlation needed.   MACRO: None   Signed by: Cecilio Feldman 3/7/2024 11:04 AM Dictation workstation:   [id number]    XR ankle left 3+ views    Result Date: 3/7/2024  Interpreted By:  Cecilio Feldman, STUDY: XR FOOT LEFT 3+ VIEWS; XR ANKLE LEFT 3+ VIEWS;  3/7/2024 10:16 am   INDICATION: Signs/Symptoms:infection; Signs/Symptoms:infection/ pain.   COMPARISON: None.   ACCESSION NUMBER(S): [id number]; [id number]   ORDERING CLINICIAN: ANGEL BOWSER   TECHNIQUE: Three views each of the left foot and left ankle were obtained.   FINDINGS: Moderate-sized plantar calcaneal spur and posterior calcaneal spur. Moderate osteophyte formation involving the medial malleolus. There is periosteal reaction along the distal medial tibia diametaphysis. Ankle mortise and talar dome are intact. There is diffuse ankle soft tissue swelling, most pronounced laterally. There is diffuse midfoot and forefoot soft tissue swelling. There is cutaneous defect in the forefoot lateral to the 5th metatarsal. There is an acute comminuted fracture at the proximal end of the 5th proximal phalanx and an acute comminuted fracture in the distal head of the 5th metatarsal. There is some decreased bone density at the fracture sites. There is also soft tissue edema. No soft tissue gas density. No opaque soft tissue foreign body. No  erosion.       Cutaneous defect consistent with ulceration in the lateral forefoot, lateral to the 5th metatarsal.   Findings of osteomyelitis with pathologic fractures  involving the distal 5th metatarsal head and the proximal end of the 5th proximal phalanx.   Ankle and midfoot/forefoot soft tissue swelling as described.   Osteophytic changes as described.   Nonspecific smooth periosteal cortical reaction involving the distal medial tibia diametaphyseal cortex. This could be from chronic infection or remote trauma. Clinical correlation needed.   MACRO: None   Signed by: Cecilio Feldman 3/7/2024 11:04 AM Dictation workstation:   [id number]    Lower extremity venous duplex left    Result Date: 2/21/2024  Interpreted By:  Stanley Baumann, STUDY: [facility] LOWER EXTREMITY VENOUS DUPLEX LEFT  2/21/2024 10:17 am   INDICATION: Left lower extremity swelling, concern for DVT   COMPARISON: None.   ACCESSION NUMBER(S): [id number]   ORDERING CLINICIAN: TONIA HINDS   TECHNIQUE: Routine ultrasound of the left lower extremity was performed with duplex Doppler (color and spectral) evaluation.   Static images were obtained for remote interpretation.   FINDINGS: THIGH VEINS:  The left common femoral, femoral, popliteal, proximal medial saphenous, and deep femoral veins are patent and free of thrombus.  The veins are normally compressible.  They demonstrate normal phasic flow and augmentation response.       Negative study.  No deep venous thrombosis of the left lower extremity.   MACRO: None   Signed by: Stanley Baumann 2/21/2024 10:32 AM Dictation workstation:   [id number]        Assessment/Plan   Principal Problem:    Cellulitis and abscess of foot  Active Problems:    Diabetes mellitus (CMS/HCC)    Left foot infection    Left foot infection: Cellulitis with ulceration and gangrene  Continue broad-spectrum IV antibiotic coverage  Podiatry consult: For surgery today  Infectious disease consult    Diabetes type 2 with hyperglycemia and gangrene of the left foot  Humalog sliding scale.  Resume Lantus  Adjust insulin doses as needed  Hemoglobin A1c   Hypoglycemia protocol    Hyperlipidemia  On  fenofibrate, simvastatin    Bipolar disorder  On Abilify    Plan  N.p.o. for surgery.  Hold Januvia and glimepiride while NPO.    Krystal Joel MD

## 2024-03-07 NOTE — ANESTHESIA POSTPROCEDURE EVALUATION
Patient: Mary Pedroza    Procedure Summary       Date: 03/07/24 Room / Location: [facility] OR 10 / [facility]    Anesthesia Start: 1509 Anesthesia Stop: 1607    Procedure: Foot Transmetatarsal Amputation (Left: Foot) Diagnosis:       Cellulitis and abscess of foot      Diabetes mellitus due to underlying condition with foot ulcer, unspecified whether long term insulin use (CMS/[facility])      (Cellulitis and abscess of foot [L03.119, L02.619])      (Diabetes mellitus due to underlying condition with foot ulcer, unspecified whether long term insulin use (CMS/[facility]) [E08.621, L97.509])    Surgeons: Suraj Francisco DPM Responsible Provider: Olu Do MD    Anesthesia Type: general ASA Status: 3            Anesthesia Type: general    Vitals Value Taken Time   /57 03/07/24 1611   Temp 36.1 °C (97 °F) 03/07/24 1606   Pulse 93 03/07/24 1611   Resp 22 03/07/24 1612   SpO2 96 % 03/07/24 1612   Vitals shown include unvalidated device data.    Anesthesia Post Evaluation    Patient location during evaluation: PACU  Patient participation: complete - patient participated  Level of consciousness: awake  Pain management: adequate  Airway patency: patent  Cardiovascular status: acceptable  Respiratory status: acceptable  Hydration status: acceptable  Postoperative Nausea and Vomiting: none        There were no known notable events for this encounter.

## 2024-03-07 NOTE — PERIOPERATIVE NURSING NOTE
Patient received to [facility] [place] #8 from OR. Anesthesia at bedside. Report received. Initial assessment complete. VSS on Cardiac Monitor. Continue to monitor.

## 2024-03-07 NOTE — ANESTHESIA PREPROCEDURE EVALUATION
Patient: Mary Pedroza    Procedure Information       Date: 03/07/24    Procedure: Foot Transmetatarsal Amputation (Left)    Location: [facility]    Surgeons: Suraj Francisco DPM            Relevant Problems   Cardiovascular   (+) Mixed hyperlipidemia      Endocrine   (+) Type 2 diabetes mellitus with hyperglycemia, with long-term current use of insulin (CMS/HCC)      GI   (+) Rectal hemorrhage      Neuro/Psych   (+) Anxiety   (+) Bipolar I disorder with depression (CMS/HCC)   (+) Depression   (+) Major depressive disorder      GI/Hepatic   (+) Calculus of gallbladder with cholecystitis   (+) Gallstones      Musculoskeletal   (+) Osteoarthritis of knee   (+) Spondylosis of lumbar spine      Infectious Disease   (+) Left foot infection       Clinical information reviewed:   Tobacco  Allergies  Meds   Med Hx  Surg Hx   Fam Hx  Soc Hx        NPO Detail:  No data recorded     Physical Exam    Airway  Mallampati: II  TM distance: >3 FB  Neck ROM: full     Cardiovascular    Dental    Pulmonary    Abdominal            Anesthesia Plan    History of general anesthesia?: yes  History of complications of general anesthesia?: no    ASA 3     general     intravenous induction   Anesthetic plan and risks discussed with patient.    Plan discussed with CRNA.

## 2024-03-08 ENCOUNTER — APPOINTMENT (OUTPATIENT)
Dept: CARDIOLOGY | Facility: HOSPITAL | Age: 49
DRG: 617 | End: 2024-03-08
Payer: MEDICARE

## 2024-03-08 PROBLEM — M86.272 SUBACUTE OSTEOMYELITIS OF LEFT FOOT (MULTI): Status: ACTIVE | Noted: 2024-03-07

## 2024-03-08 LAB
ANION GAP SERPL CALC-SCNC: 10 MMOL/L
APPEARANCE UR: CLEAR
BILIRUB UR STRIP.AUTO-MCNC: NEGATIVE MG/DL
BUN SERPL-MCNC: 7 MG/DL (ref 8–25)
CALCIUM SERPL-MCNC: 7.9 MG/DL (ref 8.5–10.4)
CHLORIDE SERPL-SCNC: 102 MMOL/L (ref 97–107)
CO2 SERPL-SCNC: 23 MMOL/L (ref 24–31)
COLOR UR: NORMAL
CREAT SERPL-MCNC: 0.6 MG/DL (ref 0.4–1.6)
EGFRCR SERPLBLD CKD-EPI 2021: >90 ML/MIN/1.73M*2
ERYTHROCYTE [DISTWIDTH] IN BLOOD BY AUTOMATED COUNT: 13.3 % (ref 11.5–14.5)
EST. AVERAGE GLUCOSE BLD GHB EST-MCNC: 223 MG/DL
FERRITIN SERPL-MCNC: 496 NG/ML (ref 13–150)
GLUCOSE BLD MANUAL STRIP-MCNC: 110 MG/DL (ref 74–99)
GLUCOSE BLD MANUAL STRIP-MCNC: 135 MG/DL (ref 74–99)
GLUCOSE BLD MANUAL STRIP-MCNC: 152 MG/DL (ref 74–99)
GLUCOSE BLD MANUAL STRIP-MCNC: 178 MG/DL (ref 74–99)
GLUCOSE SERPL-MCNC: 147 MG/DL (ref 65–99)
GLUCOSE UR STRIP.AUTO-MCNC: NORMAL MG/DL
HBA1C MFR BLD: 9.4 %
HCG UR QL IA.RAPID: NEGATIVE
HCT VFR BLD AUTO: 27.9 % (ref 36–46)
HGB BLD-MCNC: 9 G/DL (ref 12–16)
HOLD SPECIMEN: NORMAL
IRON SATN MFR SERPL: ABNORMAL %
IRON SERPL-MCNC: <20 UG/DL (ref 30–160)
KETONES UR STRIP.AUTO-MCNC: NEGATIVE MG/DL
LEUKOCYTE ESTERASE UR QL STRIP.AUTO: NEGATIVE
MCH RBC QN AUTO: 28.6 PG (ref 26–34)
MCHC RBC AUTO-ENTMCNC: 32.3 G/DL (ref 32–36)
MCV RBC AUTO: 89 FL (ref 80–100)
NITRITE UR QL STRIP.AUTO: NEGATIVE
NRBC BLD-RTO: 0 /100 WBCS (ref 0–0)
PH UR STRIP.AUTO: 6.5 [PH]
PLATELET # BLD AUTO: 376 X10*3/UL (ref 150–450)
POTASSIUM SERPL-SCNC: 4 MMOL/L (ref 3.4–5.1)
PROT UR STRIP.AUTO-MCNC: NEGATIVE MG/DL
RBC # BLD AUTO: 3.15 X10*6/UL (ref 4–5.2)
RBC # UR STRIP.AUTO: NEGATIVE /UL
SODIUM SERPL-SCNC: 135 MMOL/L (ref 133–145)
SP GR UR STRIP.AUTO: 1.01
TIBC SERPL-MCNC: ABNORMAL UG/DL
UIBC SERPL-MCNC: 127 UG/DL (ref 110–370)
UROBILINOGEN UR STRIP.AUTO-MCNC: NORMAL MG/DL
VANCOMYCIN SERPL-MCNC: 12 UG/ML (ref 10–20)
WBC # BLD AUTO: 12.9 X10*3/UL (ref 4.4–11.3)

## 2024-03-08 PROCEDURE — 36415 COLL VENOUS BLD VENIPUNCTURE: CPT

## 2024-03-08 PROCEDURE — 99222 1ST HOSP IP/OBS MODERATE 55: CPT | Performed by: NURSE PRACTITIONER

## 2024-03-08 PROCEDURE — 93923 UPR/LXTR ART STDY 3+ LVLS: CPT | Performed by: INTERNAL MEDICINE

## 2024-03-08 PROCEDURE — 82947 ASSAY GLUCOSE BLOOD QUANT: CPT

## 2024-03-08 PROCEDURE — 83540 ASSAY OF IRON: CPT | Performed by: NURSE PRACTITIONER

## 2024-03-08 PROCEDURE — 80202 ASSAY OF VANCOMYCIN: CPT

## 2024-03-08 PROCEDURE — 81025 URINE PREGNANCY TEST: CPT

## 2024-03-08 PROCEDURE — 81003 URINALYSIS AUTO W/O SCOPE: CPT | Performed by: EMERGENCY MEDICINE

## 2024-03-08 PROCEDURE — 1210000001 HC SEMI-PRIVATE ROOM DAILY

## 2024-03-08 PROCEDURE — 87086 URINE CULTURE/COLONY COUNT: CPT | Mod: WESLAB

## 2024-03-08 PROCEDURE — 85027 COMPLETE CBC AUTOMATED: CPT

## 2024-03-08 PROCEDURE — 93923 UPR/LXTR ART STDY 3+ LVLS: CPT

## 2024-03-08 PROCEDURE — 2500000001 HC RX 250 WO HCPCS SELF ADMINISTERED DRUGS (ALT 637 FOR MEDICARE OP): Performed by: INTERNAL MEDICINE

## 2024-03-08 PROCEDURE — 83036 HEMOGLOBIN GLYCOSYLATED A1C: CPT | Performed by: INTERNAL MEDICINE

## 2024-03-08 PROCEDURE — 2500000004 HC RX 250 GENERAL PHARMACY W/ HCPCS (ALT 636 FOR OP/ED): Performed by: NURSE PRACTITIONER

## 2024-03-08 PROCEDURE — 2500000002 HC RX 250 W HCPCS SELF ADMINISTERED DRUGS (ALT 637 FOR MEDICARE OP, ALT 636 FOR OP/ED): Performed by: INTERNAL MEDICINE

## 2024-03-08 PROCEDURE — 2500000004 HC RX 250 GENERAL PHARMACY W/ HCPCS (ALT 636 FOR OP/ED): Performed by: INTERNAL MEDICINE

## 2024-03-08 PROCEDURE — 82374 ASSAY BLOOD CARBON DIOXIDE: CPT

## 2024-03-08 PROCEDURE — 82728 ASSAY OF FERRITIN: CPT | Performed by: NURSE PRACTITIONER

## 2024-03-08 PROCEDURE — 2500000001 HC RX 250 WO HCPCS SELF ADMINISTERED DRUGS (ALT 637 FOR MEDICARE OP)

## 2024-03-08 PROCEDURE — 2500000004 HC RX 250 GENERAL PHARMACY W/ HCPCS (ALT 636 FOR OP/ED): Mod: JZ

## 2024-03-08 RX ORDER — IBUPROFEN 200 MG
1 TABLET ORAL DAILY
Status: DISCONTINUED | OUTPATIENT
Start: 2024-03-08 | End: 2024-03-14 | Stop reason: HOSPADM

## 2024-03-08 RX ADMIN — ACETAMINOPHEN 500 MG: 500 TABLET ORAL at 12:35

## 2024-03-08 RX ADMIN — PIPERACILLIN SODIUM AND TAZOBACTAM SODIUM 3.38 G: 3; .375 INJECTION, SOLUTION INTRAVENOUS at 08:37

## 2024-03-08 RX ADMIN — VANCOMYCIN 1250 MG: 1.75 INJECTION, SOLUTION INTRAVENOUS at 09:56

## 2024-03-08 RX ADMIN — VENLAFAXINE HYDROCHLORIDE 75 MG: 75 CAPSULE, EXTENDED RELEASE ORAL at 08:37

## 2024-03-08 RX ADMIN — DOXEPIN HYDROCHLORIDE 100 MG: 50 CAPSULE ORAL at 20:17

## 2024-03-08 RX ADMIN — SIMVASTATIN 20 MG: 20 TABLET, FILM COATED ORAL at 20:17

## 2024-03-08 RX ADMIN — OXYCODONE 5 MG: 5 TABLET ORAL at 08:37

## 2024-03-08 RX ADMIN — ACETAMINOPHEN 500 MG: 500 TABLET ORAL at 19:09

## 2024-03-08 RX ADMIN — ACETAMINOPHEN 500 MG: 500 TABLET ORAL at 23:15

## 2024-03-08 RX ADMIN — IRON SUCROSE 200 MG: 20 INJECTION, SOLUTION INTRAVENOUS at 09:56

## 2024-03-08 RX ADMIN — HYDROXYZINE HYDROCHLORIDE 25 MG: 25 TABLET, FILM COATED ORAL at 20:17

## 2024-03-08 RX ADMIN — ARIPIPRAZOLE 5 MG: 5 TABLET ORAL at 08:37

## 2024-03-08 RX ADMIN — INSULIN GLARGINE 80 UNITS: 100 INJECTION, SOLUTION SUBCUTANEOUS at 20:20

## 2024-03-08 RX ADMIN — OXYCODONE 5 MG: 5 TABLET ORAL at 19:09

## 2024-03-08 RX ADMIN — OXYCODONE 5 MG: 5 TABLET ORAL at 12:35

## 2024-03-08 RX ADMIN — OXYCODONE 5 MG: 5 TABLET ORAL at 03:55

## 2024-03-08 RX ADMIN — VANCOMYCIN 1250 MG: 1.75 INJECTION, SOLUTION INTRAVENOUS at 21:19

## 2024-03-08 RX ADMIN — ACETAMINOPHEN 500 MG: 500 TABLET ORAL at 03:55

## 2024-03-08 RX ADMIN — PIPERACILLIN SODIUM AND TAZOBACTAM SODIUM 3.38 G: 3; .375 INJECTION, SOLUTION INTRAVENOUS at 03:56

## 2024-03-08 RX ADMIN — PROPRANOLOL HYDROCHLORIDE 20 MG: 20 TABLET ORAL at 20:17

## 2024-03-08 RX ADMIN — FENOFIBRATE 160 MG: 160 TABLET ORAL at 08:37

## 2024-03-08 RX ADMIN — OXYCODONE 5 MG: 5 TABLET ORAL at 23:15

## 2024-03-08 RX ADMIN — CEFEPIME 2 G: 2 INJECTION, POWDER, FOR SOLUTION INTRAVENOUS at 20:17

## 2024-03-08 SDOH — HEALTH STABILITY: MENTAL HEALTH: HOW OFTEN DO YOU HAVE 6 OR MORE DRINKS ON ONE OCCASION?: NEVER

## 2024-03-08 SDOH — ECONOMIC STABILITY: INCOME INSECURITY: HOW HARD IS IT FOR YOU TO PAY FOR THE VERY BASICS LIKE FOOD, HOUSING, MEDICAL CARE, AND HEATING?: VERY HARD

## 2024-03-08 SDOH — ECONOMIC STABILITY: FOOD INSECURITY: WITHIN THE PAST 12 MONTHS, THE FOOD YOU BOUGHT JUST DIDN'T LAST AND YOU DIDN'T HAVE MONEY TO GET MORE.: OFTEN TRUE

## 2024-03-08 SDOH — SOCIAL STABILITY: SOCIAL NETWORK: HOW OFTEN DO YOU GET TOGETHER WITH FRIENDS OR RELATIVES?: MORE THAN THREE TIMES A WEEK

## 2024-03-08 SDOH — SOCIAL STABILITY: SOCIAL INSECURITY: WITHIN THE LAST YEAR, HAVE YOU BEEN HUMILIATED OR EMOTIONALLY ABUSED IN OTHER WAYS BY YOUR PARTNER OR EX-PARTNER?: NO

## 2024-03-08 SDOH — ECONOMIC STABILITY: FOOD INSECURITY: WITHIN THE PAST 12 MONTHS, YOU WORRIED THAT YOUR FOOD WOULD RUN OUT BEFORE YOU GOT MONEY TO BUY MORE.: OFTEN TRUE

## 2024-03-08 SDOH — HEALTH STABILITY: PHYSICAL HEALTH: ON AVERAGE, HOW MANY MINUTES DO YOU ENGAGE IN EXERCISE AT THIS LEVEL?: 0 MIN

## 2024-03-08 SDOH — SOCIAL STABILITY: SOCIAL NETWORK: ARE YOU MARRIED, WIDOWED, DIVORCED, SEPARATED, NEVER MARRIED, OR LIVING WITH A PARTNER?: DIVORCED

## 2024-03-08 SDOH — SOCIAL STABILITY: SOCIAL INSECURITY: WITHIN THE LAST YEAR, HAVE YOU BEEN AFRAID OF YOUR PARTNER OR EX-PARTNER?: NO

## 2024-03-08 SDOH — SOCIAL STABILITY: SOCIAL NETWORK: HOW OFTEN DO YOU ATTEND CHURCH OR RELIGIOUS SERVICES?: NEVER

## 2024-03-08 SDOH — ECONOMIC STABILITY: INCOME INSECURITY: IN THE PAST 12 MONTHS, HAS THE ELECTRIC, GAS, OIL, OR WATER COMPANY THREATENED TO SHUT OFF SERVICE IN YOUR HOME?: NO

## 2024-03-08 SDOH — HEALTH STABILITY: MENTAL HEALTH: HOW MANY STANDARD DRINKS CONTAINING ALCOHOL DO YOU HAVE ON A TYPICAL DAY?: PATIENT DOES NOT DRINK

## 2024-03-08 SDOH — ECONOMIC STABILITY: INCOME INSECURITY: IN THE LAST 12 MONTHS, WAS THERE A TIME WHEN YOU WERE NOT ABLE TO PAY THE MORTGAGE OR RENT ON TIME?: YES

## 2024-03-08 SDOH — SOCIAL STABILITY: SOCIAL NETWORK: HOW OFTEN DO YOU ATTENT MEETINGS OF THE CLUB OR ORGANIZATION YOU BELONG TO?: NEVER

## 2024-03-08 SDOH — HEALTH STABILITY: MENTAL HEALTH: HOW OFTEN DO YOU HAVE A DRINK CONTAINING ALCOHOL?: NEVER

## 2024-03-08 SDOH — SOCIAL STABILITY: SOCIAL NETWORK
DO YOU BELONG TO ANY CLUBS OR ORGANIZATIONS SUCH AS CHURCH GROUPS UNIONS, FRATERNAL OR ATHLETIC GROUPS, OR SCHOOL GROUPS?: YES

## 2024-03-08 SDOH — HEALTH STABILITY: PHYSICAL HEALTH: ON AVERAGE, HOW MANY DAYS PER WEEK DO YOU ENGAGE IN MODERATE TO STRENUOUS EXERCISE (LIKE A BRISK WALK)?: 0 DAYS

## 2024-03-08 SDOH — ECONOMIC STABILITY: INCOME INSECURITY: HOW HARD IS IT FOR YOU TO PAY FOR THE VERY BASICS LIKE FOOD, HOUSING, MEDICAL CARE, AND HEATING?: HARD

## 2024-03-08 SDOH — ECONOMIC STABILITY: HOUSING INSECURITY: IN THE LAST 12 MONTHS, HOW MANY PLACES HAVE YOU LIVED?: 2

## 2024-03-08 SDOH — SOCIAL STABILITY: SOCIAL NETWORK: IN A TYPICAL WEEK, HOW MANY TIMES DO YOU TALK ON THE PHONE WITH FAMILY, FRIENDS, OR NEIGHBORS?: THREE TIMES A WEEK

## 2024-03-08 ASSESSMENT — ENCOUNTER SYMPTOMS
CHILLS: 0
ARTHRALGIAS: 1
CONFUSION: 0
DIZZINESS: 0
ABDOMINAL DISTENTION: 0
WEAKNESS: 0
DIAPHORESIS: 0
SHORTNESS OF BREATH: 0
WHEEZING: 0
DYSURIA: 0
BRUISES/BLEEDS EASILY: 0
COUGH: 0
ANAL BLEEDING: 0
ABDOMINAL PAIN: 0
CONSTIPATION: 0
WOUND: 1
BLOOD IN STOOL: 0
FATIGUE: 0
AGITATION: 0
NUMBNESS: 1
VOMITING: 0
PALPITATIONS: 0
FEVER: 0
APPETITE CHANGE: 0
HEMATURIA: 0
NAUSEA: 0
HEADACHES: 0
COLOR CHANGE: 1
ACTIVITY CHANGE: 1
DIARRHEA: 0

## 2024-03-08 ASSESSMENT — COGNITIVE AND FUNCTIONAL STATUS - GENERAL
STANDING UP FROM CHAIR USING ARMS: A LITTLE
DAILY ACTIVITIY SCORE: 23
CLIMB 3 TO 5 STEPS WITH RAILING: A LOT
TOILETING: A LITTLE
MOBILITY SCORE: 20
WALKING IN HOSPITAL ROOM: A LITTLE

## 2024-03-08 ASSESSMENT — PAIN DESCRIPTION - LOCATION
LOCATION: FOOT

## 2024-03-08 ASSESSMENT — PAIN SCALES - GENERAL
PAINLEVEL_OUTOF10: 9
PAINLEVEL_OUTOF10: 6
PAINLEVEL_OUTOF10: 5 - MODERATE PAIN
PAINLEVEL_OUTOF10: 9
PAINLEVEL_OUTOF10: 9
PAINLEVEL_OUTOF10: 7
PAINLEVEL_OUTOF10: 0 - NO PAIN
PAINLEVEL_OUTOF10: 4
PAINLEVEL_OUTOF10: 2
PAINLEVEL_OUTOF10: 4

## 2024-03-08 ASSESSMENT — PAIN DESCRIPTION - ORIENTATION
ORIENTATION: LEFT
ORIENTATION: RIGHT
ORIENTATION: LEFT
ORIENTATION: LEFT

## 2024-03-08 ASSESSMENT — PAIN - FUNCTIONAL ASSESSMENT
PAIN_FUNCTIONAL_ASSESSMENT: 0-10

## 2024-03-08 ASSESSMENT — ACTIVITIES OF DAILY LIVING (ADL): LACK_OF_TRANSPORTATION: NO

## 2024-03-08 ASSESSMENT — LIFESTYLE VARIABLES
SKIP TO QUESTIONS 9-10: 1
AUDIT-C TOTAL SCORE: 0

## 2024-03-08 NOTE — PROGRESS NOTES
03/08/24 1155   Discharge Planning   Living Arrangements Friends   Support Systems Friends/neighbors   Type of Residence Private residence   Number of Stairs to Enter Residence 0   Number of Stairs Within Residence 0   Do you have animals or pets at home? No   Who is requesting discharge planning? Provider   Home or Post Acute Services Post acute facilities (Rehab/SNF/etc)   Type of Post Acute Facility Services Skilled nursing   Patient expects to be discharged to: SNF-- expected 6 weeks of IV ABX therapy; pt had toes amputated yesterday; possibility of further amputation of foot   Does the patient need discharge transport arranged? Yes   RoundTrip coordination needed? Yes   Has discharge transport been arranged? No   Financial Resource Strain   How hard is it for you to pay for the very basics like food, housing, medical care, and heating? Very Hard  (Pt is homeless at this time; staying on her friends couch)   Housing Stability   In the last 12 months, was there a time when you were not able to pay the mortgage or rent on time? Y   In the last 12 months, how many places have you lived? 2   In the last 12 months, was there a time when you did not have a steady place to sleep or slept in a shelter (including now)? N   Transportation Needs   In the past 12 months, has lack of transportation kept you from medical appointments or from getting medications? no   In the past 12 months, has lack of transportation kept you from meetings, work, or from getting things needed for daily living? No   Patient Choice   Provider Choice list and CMS website (https://medicare.gov/care-compare#search) for post-acute Quality and Resource Measure Data were provided and reviewed with: Patient   Patient / Family choosing to utilize agency / facility established prior to hospitalization Yes

## 2024-03-08 NOTE — PROGRESS NOTES
"Mary Pedroza is a 48 y.o. female on day 1 of admission presenting with Cellulitis and abscess of foot.    Subjective   She underwent incision and drainage and transmetatarsal amputation of the left foot on 3/7.      Objective     Physical Exam  General: awake, alert, oriented, responsive  Cardiovascular: regular, normal S1 and S2  Lungs: good air entry bilaterally, clear to auscultation  Abdomen: soft, nontender, bowel sounds present, normoactive  Extremities: The left foot and ankle were wrapped in a dressing which was not removed. There was no peripheral cyanosis, no pedal edema  Neuro: alert, oriented x 3, no focal weakness      Last Recorded Vitals  Blood pressure 101/63, pulse 79, temperature 36.7 °C (98.1 °F), temperature source Oral, resp. rate 16, height 1.626 m (5' 4\"), weight 93 kg (205 lb), SpO2 98 %.  Intake/Output last 3 Shifts:  I/O last 3 completed shifts:  In: 1550 (16.7 mL/kg) [P.O.:500; I.V.:700 (7.5 mL/kg); IV Piggyback:350]  Out: 1650 (17.7 mL/kg) [Urine:1550 (0.5 mL/kg/hr); Blood:100]  Weight: 93 kg     Relevant Results  Results for orders placed or performed during the hospital encounter of 03/07/24 (from the past 24 hour(s))   POCT GLUCOSE   Result Value Ref Range    POCT Glucose 145 (H) 74 - 99 mg/dL   POCT GLUCOSE   Result Value Ref Range    POCT Glucose 137 (H) 74 - 99 mg/dL   POCT GLUCOSE   Result Value Ref Range    POCT Glucose 143 (H) 74 - 99 mg/dL   Hemoglobin and hematocrit, blood   Result Value Ref Range    Hemoglobin 10.6 (L) 12.0 - 16.0 g/dL    Hematocrit 33.3 (L) 36.0 - 46.0 %   POCT GLUCOSE   Result Value Ref Range    POCT Glucose 211 (H) 74 - 99 mg/dL   Basic Metabolic Panel   Result Value Ref Range    Glucose 147 (H) 65 - 99 mg/dL    Sodium 135 133 - 145 mmol/L    Potassium 4.0 3.4 - 5.1 mmol/L    Chloride 102 97 - 107 mmol/L    Bicarbonate 23 (L) 24 - 31 mmol/L    Urea Nitrogen 7 (L) 8 - 25 mg/dL    Creatinine 0.60 0.40 - 1.60 mg/dL    eGFR >90 >60 mL/min/1.73m*2    " Calcium 7.9 (L) 8.5 - 10.4 mg/dL    Anion Gap 10 <=19 mmol/L   CBC   Result Value Ref Range    WBC 12.9 (H) 4.4 - 11.3 x10*3/uL    nRBC 0.0 0.0 - 0.0 /100 WBCs    RBC 3.15 (L) 4.00 - 5.20 x10*6/uL    Hemoglobin 9.0 (L) 12.0 - 16.0 g/dL    Hematocrit 27.9 (L) 36.0 - 46.0 %    MCV 89 80 - 100 fL    MCH 28.6 26.0 - 34.0 pg    MCHC 32.3 32.0 - 36.0 g/dL    RDW 13.3 11.5 - 14.5 %    Platelets 376 150 - 450 x10*3/uL   Hemoglobin A1C   Result Value Ref Range    Hemoglobin A1C 9.4 (H) See below %    Estimated Average Glucose 223 Not Established mg/dL   Vancomycin   Result Value Ref Range    Vancomycin 12.0 10.0 - 20.0 ug/mL   Iron and TIBC   Result Value Ref Range    Iron <20 (L) 30 - 160 ug/dL    UIBC 127 110 - 370 ug/dL    TIBC      % Saturation     Ferritin   Result Value Ref Range    Ferritin 496 (H) 13 - 150 ng/mL   POCT GLUCOSE   Result Value Ref Range    POCT Glucose 110 (H) 74 - 99 mg/dL     Scheduled medications  ARIPiprazole, 5 mg, oral, Daily  doxepin, 100 mg, oral, Nightly  fenofibrate, 160 mg, oral, Daily  [Held by provider] glimepiride, 4 mg, oral, Daily before breakfast  insulin glargine, 80 Units, subcutaneous, Nightly  insulin lispro, 0-10 Units, subcutaneous, TID with meals  iron sucrose, 200 mg, intravenous, Daily  piperacillin-tazobactam, 3.375 g, intravenous, q6h  propranolol, 20 mg, oral, BID  simvastatin, 20 mg, oral, q PM  SITagliptin phosphate, 25 mg, oral, Daily  vancomycin-diluent combo no.1, 1,250 mg, intravenous, q12h  venlafaxine XR, 75 mg, oral, Daily      Continuous medications     PRN medications  PRN medications: acetaminophen, dextrose 10 % in water (D10W), dextrose, glucagon, hydrOXYzine HCL, oxyCODONE, polyethylene glycol, SUMAtriptan, vancomycin        Assessment/Plan   Principal Problem:    Cellulitis and abscess of foot  Active Problems:    Diabetes mellitus (CMS/HCC)    Left foot infection      Left foot infection: Cellulitis with ulceration and gangrene  S/p left foot incision and  drainage drainage and transmetatarsal amputation on 3/7  Blood cultures in progress  On IV antibiotic coverage with Zosyn and vancomycin     Diabetes type 2 with hyperglycemia and gangrene of the left foot  Lantus scheduled and Humalog sliding scale  Humalog sliding scale.  Resume Lantus  Hemoglobin A1c was 9.4% on 3/8/2024     Hyperlipidemia  On fenofibrate, simvastatin     Bipolar disorder  On Abilify     Plan  Continue IV antibiotic coverage.  Postop plan per podiatry  Restart Januvia  Diabetes education  PT/OT    Krystal Joel MD

## 2024-03-08 NOTE — CONSULTS
"Inpatient Diabetes Education Consult    Reason for Visit:  Mary Pedroza is a 48 y.o. female.  Pt present to ED 3/7/24 for evaluation of left foot pain and infection. Pt reports she went to urgent care few weeks ago with foot pain and swelling LLE, they did ultrasound left leg and discharged her with a boot was to follow up with Dr Aponte, Instead present to ED for eval D/T worsening pain, redness, ulcerations bottom of foot, and left foot 3rd digit turned black. pt reports last 3weeks having pus drainage  and bleeding from foot wound. In ED found to have osteomyelitis with pathologic fractures involving the distal 5th metatarsal head and the proximal end of the 5th proximal phalanx. She underwent transmetatarsal amputation left foot 3/7/24.    Blood glucose in ED was 273 mg/dl.     H/o: [id number] insulin, bipolar 1 disorder, bursitis of right hip, HPL, current smoker 15 PPY history.      Consulting Service/Provider: ANNIA Kenney    Called patient in her room at extension [phone], patient answered the phone and states, \"surgeon is in the room and asked me to call back later\".   Will call back as schedule allows.   POCT glucose today 3/8 at 06:32 was 147 mg/dl and at 07:53 was 110 mg/d    PTA Medications:    Current Outpatient Medications   Medication Instructions    ARIPiprazole (Abilify) 5 mg tablet 1 tablet (5 mg) once daily.    blood sugar diagnostic (OneTouch Ultra Test) strip USE AS DIRECTED TO CHECK BLOOD SUGAR TWICE A DAY    doxepin (SINEquan) 100 mg capsule 1 capsule (100 mg) once daily at bedtime.    fenofibrate (Tricor) 145 mg tablet 1 tablet, oral, Daily    FreeStyle Bridget sensor system (FreeStyle Bridget 2 Sensor) kit USE SENSOR AS DIRECTED EVERY 2 WEEKS    glimepiride (AMARYL) 4 mg, oral, Daily before breakfast    hydrOXYzine HCL (ATARAX) 25 mg, oral, 2 times daily PRN    insulin glargine (Lantus Solostar U-100 Insulin) 100 unit/mL (3 mL) pen INJECT UNDER THE SKIN AS DIRECTED  UP TO 80 UNITS DAILY    Ashleyia " "25 mg tablet 1 tablet (25 mg) once daily.    medroxyPROGESTERone (DEPO-PROVERA) 150 mg, intramuscular, every 12 weeks    naproxen (NAPROSYN) 250 mg, oral, 2 times daily with meals    pen needle, diabetic (BD Ultra-Fine Short Pen Needle) 31 gauge x 5/16\" needle  as directed . as directed for 90 days<BR>    propranolol (Inderal) 20 mg tablet 1 tablet (20 mg) 2 times a day.    simvastatin (ZOCOR) 20 mg, oral, Every evening    SUMAtriptan (Imitrex) 100 mg tablet oral    venlafaxine XR (Effexor-XR) 75 mg 24 hr capsule 1 capsule (75 mg) once daily.       Glucose   Date/Time Value Ref Range Status   03/08/2024 06:32  (H) 65 - 99 mg/dL Final   03/07/2024 09:36  (H) 65 - 99 mg/dL Final   07/28/2023 04:49  (H) 65 - 99 MG/DL Final   01/14/2023 09:16  (H) 65 - 99 MG/DL Final   08/04/2022 09:33  (H) 65 - 99 MG/DL Final   05/24/2021 12:12  (H) 65 - 99 MG/DL Final     Comment:     RESULT CHECKED  CALLED TO DR AMOS OFFICE/RANDY RODRIGUEZ [facility]     10/24/2020 09:36  (H) 65 - 99 MG/DL Final   10/10/2018 06:26  (H) 65 - 99 MG/DL Final   07/12/2018 06:43  (H) 65 - 99 MG/DL Final   04/10/2018 11:02  (H) 65 - 99 MG/DL Final     No results found for: \"CPEPTIDE\"  Hemoglobin A1C   Date Value Ref Range Status   03/08/2024 9.4 (H) See below % Final   12/21/2022 9.8 (H) 4.0 - 6.0 % Final     Comment:     Hemoglobin A1C levels are related to mean blood glucose during the   preceding 2-3 months. The relationship table below may be used as a   general guide. Each 1% increase in HGB A1C is a reflection of an   increase in mean glucose of approximately 30 mg/dl.   Reference: Diabetes Care, volume 29, supplement 1 Jan. 2006                        HGB A1C ................. Approx. Mean Glucose   _______________________________________________   6%   ...............................  120 mg/dl   7%   ...............................  150 mg/dl   8%   ...............................  180 " mg/dl   9%   ...............................  210 mg/dl   10%  ...............................  240 mg/dl  Performed at [address] [address]     08/04/2022 10.7 (H) 4.0 - 6.0 % Final     Comment:     Hemoglobin A1C levels are related to mean blood glucose during the   preceding 2-3 months. The relationship table below may be used as a   general guide. Each 1% increase in HGB A1C is a reflection of an   increase in mean glucose of approximately 30 mg/dl.   Reference: Diabetes Care, volume 29, supplement 1 Jan. 2006                        HGB A1C ................. Approx. Mean Glucose   _______________________________________________   6%   ...............................  120 mg/dl   7%   ...............................  150 mg/dl   8%   ...............................  180 mg/dl   9%   ...............................  210 mg/dl   10%  ...............................  240 mg/dl  Performed at [address] [address]     10/21/2021 11.1 (H) 4.0 - 6.0 % Final     Comment:     Hemoglobin A1C levels are related to mean blood glucose during the   preceding 2-3 months. The relationship table below may be used as a   general guide. Each 1% increase in HGB A1C is a reflection of an   increase in mean glucose of approximately 30 mg/dl.   Reference: Diabetes Care, volume 29, supplement 1 Jan. 2006                        HGB A1C ................. Approx. Mean Glucose   _______________________________________________   6%   ...............................  120 mg/dl   7%   ...............................  150 mg/dl   8%   ...............................  180 mg/dl   9%   ...............................  210 mg/dl   10%  ...............................  240 mg/dl  Performed at [address] [address]     05/24/2021 11.8 (H) 4.0 - 6.0 % Final     Comment:     Hemoglobin A1C levels are related to mean blood glucose during the   preceding 2-3 months. The  relationship table below may be used as a   general guide. Each 1% increase in HGB A1C is a reflection of an   increase in mean glucose of approximately 30 mg/dl.   Reference: Diabetes Care, volume 29, supplement 1 Jan. 2006                        HGB A1C ................. Approx. Mean Glucose   _______________________________________________   6%   ...............................  120 mg/dl   7%   ...............................  150 mg/dl   8%   ...............................  180 mg/dl   9%   ...............................  210 mg/dl   10%  ...............................  240 mg/dl  Performed at [address] [address]

## 2024-03-08 NOTE — CARE PLAN
Pt does not have a POA or Living Will    ADOD: 4 days    Pt is homeless at this time; she is sleeping on a friends couch  She could no longer pay the rent  [facility] working with her to obtain the necessary documents to apply for section 8 or for the voucher program.  Pt is a diabetic and had her toes removed from her left foot yesterday; no discharge this weekend; she will return to surgery on Monday; she may need further amputation. Per Podiatry, she will most likely need 6 wks of IV ABX therapy.   Waiting for cultures.  Pt would like to go to a SNF  List of Rehab facilities given to pt along with the number for [employer] in [place] for assistance with the veronika for section 8, list of low income apts, list of pantries.  Pts friend is at bedside and they will also try to find an apartment together.      DISCHARGE PLAN: SNF--DO NOT DISCHARGE PATIENT BEFORE SPEAKING WITH CARE COORDINATION; NEED SNF CHOICES, NEED ACCEPTING FACILITY, NEED PRECERT  MUST HAVE THE 7000 COMPLETED PRIOR TO DISCHARGE; MUST HAVE THE GOLDENROD COMPLETED AND SIGNED PRIOR TO DISCHARGE.

## 2024-03-08 NOTE — PROGRESS NOTES
03/08/24 1158   Current Planned Discharge Disposition   Current Planned Discharge Disposition SNF

## 2024-03-08 NOTE — PROGRESS NOTES
Physical Therapy                 Therapy Communication Note    Patient Name: Mary Pedroza  MRN: [id number]  Today's Date: 3/8/2024     Discipline: Physical Therapy    Missed Visit Reason: Missed Visit Reason: Patient in a medical procedure (pt is off the floor for vascular testing.)    Missed Time: Cancel

## 2024-03-08 NOTE — SIGNIFICANT EVENT
"S/P Transmetatarsal Amputation, Left Foot    - Please restart all medications including antibiotics, anticoagulants, and/or pain medication as per Medicine/ID.  - Restart prior appropriate diet.  - NWB to surgical extremity.  - May elevate surgical extremity.  - Post-operative XR order placed.  - Please keep dressing clean, dry and intact with post-op shoegear.  - May reinforce dressings with 4x4s, ABDs, Kerlix, and light ACE wrap for strikethrough bleeding.  - Stat H&H was ordered post-op. Hbg <7.  - Podiatry to change dressing tomorrow AM.  - Plan to return to OR Monday for further debridement.     - Upon further discussion with friend \"Norma\" after surgery, pt has been homeless for some time and living with this friend on her sofa. We will recommend SNF placement for discharge planning.      Sarah Barrera DPM PGY-2  Podiatric Medicine & Surgery  [facility]    "

## 2024-03-08 NOTE — CONSULTS
INFECTIOUS DISEASES CONSULTATION NOTE      Referred by Jaspreet Joel MD    Reason For Consult  Left foot osteomyelitis and gangrene    History Of Present Illness  Mary Pedroza is a 48 y.o. female presenting with angriness lesions in the left foot.  Patient has poorly controlled diabetes and developed gangrene of the left third toe.  She was apparently seen in an emergency room and was found to have 1/5 metatarsal fracture and was placed in a boot.  She had further ulcerations and gangrene and presented to the emergency room on 3/7.  There was obvious gangrene of multiple digits and she was taken to surgery in the afternoon of 3/7 and underwent a transmetatarsal amputation when the tissues were left open.  The operative note describes the collection of cultures from the gangrenous tissue, vigorous pulse lavage, and then cultures collected from the surgical margin.  At this point, the medical record does not show the presence of any cultures incubating in the laboratory.  The patient asks whether she should just go ahead and have a below the knee amputation     Past Medical History  Diabetes  Bipolar disorder  Hip bursitis  Dyslipidemia     Social History  Tobacco use: Smokes a pack of cigarettes a day  Alcohol use: Not abuse alcohol    Family History  Positive for diabetes    Allergies  Patient has no known allergies.     Review of Systems  Detailed review of systems completed.  No significant additional positives beyond what is mentioned above    Physical Exam  Vital signs:  Visit Vitals  /63 (BP Location: Left arm, Patient Position: Lying)   Pulse 79   Temp 36.7 °C (98.1 °F) (Oral)   Resp 16      General: Sitting up in bed awake, alert, not toxic  HEENT:  No scleral icterus or conjunctival suffusion, oral mucosa moist  Lungs:  Clear to auscultation  Breasts:  Not examined  Heart:  S1, S2 normal  Abdomen:  Soft, nontender. No palpable organs or masses  Extremities: Bulky postoperative dressing not  removed from the left lower extremity.  No proximal erythema    Relevant Results  Results from last 72 hours   Lab Units 03/08/24  0632 03/07/24  1746 03/07/24  0936   WBC AUTO x10*3/uL 12.9*  --  16.3*   HEMOGLOBIN g/dL 9.0*   < > 11.8*   HEMATOCRIT % 27.9*   < > 35.9*   PLATELETS AUTO x10*3/uL 376  --  475*   NEUTROS PCT AUTO %  --   --  73.5   LYMPHS PCT AUTO %  --   --  17.6   MONOS PCT AUTO %  --   --  5.5   EOS PCT AUTO %  --   --  1.9    < > = values in this interval not displayed.     Results from last 72 hours   Lab Units 03/08/24  0632   CREATININE mg/dL 0.60   ANION GAP mmol/L 10   EGFR mL/min/1.73m*2 >90     Results from last 72 hours   Lab Units 03/07/24  0936   AST U/L 24   ALT U/L 36   ALK PHOS U/L 168*   BILIRUBIN TOTAL mg/dL 0.2     Microbiology:  Blood (3/7): Negative X2  At this time, no issue or wound cultures listed as pending  Imaging:  Left foot and ankle:   Cutaneous defect consistent with ulceration in the lateral forefoot,  lateral to the 5th metatarsal.      Findings of osteomyelitis with pathologic fractures involving the  distal 5th metatarsal head and the proximal end of the 5th proximal  phalanx.      Ankle and midfoot/forefoot soft tissue swelling as described.      Osteophytic changes as described.      Nonspecific smooth periosteal cortical reaction involving the distal  medial tibia diametaphyseal cortex. This could be from chronic  infection or remote trauma. Clinical correlation needed.          ASSESSMENT:  Left foot gangrene/osteomyelitis  Patient has undergone transmetatarsal amputation, she does not have systemic signs of sepsis, and it remains to be seen whether she has had adequate resection of all of the infected tissue.  If there is any doubt, she will require more prolonged antibiotic therapy for limb salvage.  We have had a discussion at the bedside today where I have emphasized the importance of trying to salvage as much tissue as possible rather than immediately going  to a below the knee amputation.    She needs ongoing broad-spectrum antibiotic therapy.  It is my preference in this situation to use cefepime rather than Zosyn to decrease the chance of potential nephrotoxicity (in this patient with obesity and diabetes mellitus) from the combination of Zosyn and vancomycin    Diabetes mellitus  Primary service      PLANS:  -   Continue empiric vancomycin (pharmacy dosing)  -   Change empiric broad-spectrum therapy from Zosyn to cefepime pending further observation and incubation of cultures  -   Attempt to locate specimens which were to have been submitted for culture from the OR on 3/7, I have been in communication with Dr Barrera  -   Await further assessment at the time of repeat surgery in the next several days, with further plans and recommendations to follow     THANK YOU FOR ASKING ME TO ASSIST YOU IN THE CARE OF YOUR PATIENT    My colleagues will cover in my absence 3/9-3/17, but will see prn your call 3/9-3/10    Gonzalo Raygoza MD  [facility]  Office:  [phone]

## 2024-03-08 NOTE — PROGRESS NOTES
"Vancomycin Dosing by Pharmacy- FOLLOW UP    Mary Pedroza is a 48 y.o. year old female who Pharmacy has been consulted for vancomycin dosing for cellulitis, skin and soft tissue. Based on the patient's indication and renal status this patient is being dosed based on a goal AUC of 400-600.     Renal function is currently stable.    Current vancomycin dose: 1250 mg given every 12 hours    Most recent random level: 12 mcg/mL    Visit Vitals  /63 (BP Location: Left arm, Patient Position: Lying)   Pulse 79   Temp 36.7 °C (98.1 °F) (Oral)   Resp 16        Lab Results   Component Value Date    CREATININE 0.60 03/08/2024    CREATININE 0.50 03/07/2024    CREATININE 0.5 07/28/2023    CREATININE 0.6 01/14/2023    CREATININE 0.7 08/04/2022    CREATININE 0.6 05/24/2021        Patient weight is   Wt Readings from Last 1 Encounters:   03/07/24 93 kg (205 lb)           No results found for: \"CULTURE\"     I/O last 3 completed shifts:  In: 1550 (16.7 mL/kg) [P.O.:500; I.V.:700 (7.5 mL/kg); IV Piggyback:350]  Out: 1650 (17.7 mL/kg) [Urine:1550 (0.5 mL/kg/hr); Blood:100]  Weight: 93 kg   [unfilled]    Temp Readings from Last 1 Encounters:   03/08/24 36.7 °C (98.1 °F) (Oral)         Assessment/Plan    Within goal random/trough level    This dosing regimen is predicted by [employer] to result in the following pharmacokinetic parameters:  Loading dose: N/A  Regimen: 1250 mg IV every 12 hours.  Start time: 10:53 on 03/08/2024  Exposure target: AUC24 (range)400-600 mg/L.hr   AUC24,ss: 450 mg/L.hr  Probability of AUC24 > 400: 73 %  Ctrough,ss: 13.4 mg/L  Probability of Ctrough,ss > 20: 11 %  Probability of nephrotoxicity (Lodise REGINO 2009): 9 %      The next level will be obtained on 03-15-24 with am labs. May be obtained sooner if clinically indicated.   Will continue to monitor renal function daily while on vancomycin and order serum creatinine at least every 48 hours if not already ordered.  Follow for continued vancomycin " needs, clinical response, and signs/symptoms of toxicity.       SUZIE TAYLOR

## 2024-03-08 NOTE — PROGRESS NOTES
03/08/24 1152   Physical Activity   On average, how many days per week do you engage in moderate to strenuous exercise (like a brisk walk)? 0 days   On average, how many minutes do you engage in exercise at this level? 0 min   Financial Resource Strain   How hard is it for you to pay for the very basics like food, housing, medical care, and heating? Very Hard  (Pt lost her apt due to cost; staying on the couch of a friend)   Housing Stability   In the last 12 months, was there a time when you were not able to pay the mortgage or rent on time? Y   In the last 12 months, how many places have you lived? 2   In the last 12 months, was there a time when you did not have a steady place to sleep or slept in a shelter (including now)? N   Transportation Needs   In the past 12 months, has lack of transportation kept you from medical appointments or from getting medications? no   In the past 12 months, has lack of transportation kept you from meetings, work, or from getting things needed for daily living? No   Food Insecurity   Within the past 12 months, you worried that your food would run out before you got the money to buy more. Often true  (Pt and her friend use food pantrys)   Within the past 12 months, the food you bought just didn't last and you didn't have money to get more. Often true   Stress   Do you feel stress - tense, restless, nervous, or anxious, or unable to sleep at night because your mind is troubled all the time - these days? To some exte  (Pt sts she feels restless about 2 x per week)   Social Connections   In a typical week, how many times do you talk on the phone with family, friends, or neighbors? Three   How often do you get together with friends or relatives? More than 3   How often do you attend Anglican or Sabianist services? Never   Do you belong to any clubs or organizations such as Anglican groups, unions, fraternal or athletic groups, or school groups? Yes   How often do you attend meetings of  the clubs or organizations you belong to? Never   Are you , , , , never , or living with a partner?    Intimate Partner Violence   Within the last year, have you been afraid of your partner or ex-partner? No   Within the last year, have you been humiliated or emotionally abused in other ways by your partner or ex-partner? No   Within the last year, have you been kicked, hit, slapped, or otherwise physically hurt by your partner or ex-partner? No   Within the last year, have you been raped or forced to have any kind of sexual activity by your partner or ex-partner? No   Alcohol Use   Q1: How often do you have a drink containing alcohol? Never   Q2: How many drinks containing alcohol do you have on a typical day when you are drinking? None   Q3: How often do you have six or more drinks on one occasion? Never   Utilities   In the past 12 months has the electric, gas, oil, or water company threatened to shut off services in your home? No

## 2024-03-08 NOTE — PROGRESS NOTES
PODIATRY SERVICE CONSULT PROGRESS NOTE    SERVICE DATE: 3/8/2024   SERVICE TIME:  10:00 AM    Subjective   INTERVAL HPI:   Pt was seen at bedside.  Pain well controlled. However some pain with dressing change.  Patient denies any constitutional symptoms.     Pt has friend, Norma, at bedside. They admitted that the wounds have been present for a few weeks and that they have been alternating care with warm water soaks and hydrogen peroxide.  Pt has expressed concerns of the foot including if she should proceed with BKA at this time. It was discussed in detail that the limb is still at high risk at this time.      Medications:  Scheduled Meds: ARIPiprazole, 5 mg, oral, Daily  cefepime, 2 g, intravenous, q8h  doxepin, 100 mg, oral, Nightly  fenofibrate, 160 mg, oral, Daily  [Held by provider] glimepiride, 4 mg, oral, Daily before breakfast  insulin glargine, 80 Units, subcutaneous, Nightly  insulin lispro, 0-10 Units, subcutaneous, TID with meals  iron sucrose, 200 mg, intravenous, Daily  nicotine, 1 patch, transdermal, Daily  propranolol, 20 mg, oral, BID  simvastatin, 20 mg, oral, q PM  SITagliptin phosphate, 25 mg, oral, Daily  vancomycin-diluent combo no.1, 1,250 mg, intravenous, q12h  venlafaxine XR, 75 mg, oral, Daily      Continuous Infusions:    PRN Meds: PRN medications: acetaminophen, dextrose 10 % in water (D10W), dextrose, glucagon, hydrOXYzine HCL, oxyCODONE, polyethylene glycol, SUMAtriptan, vancomycin         Objective   PHYSICAL EXAM:  Physical Exam Performed:  Vitals:    03/08/24 1200   BP: 113/62   Pulse: 87   Resp: 15   Temp: 36.9 °C (98.4 °F)   SpO2: 97%     Body mass index is 35.19 kg/m².    Patient is AOx3 and in no acute distress. Patient is alert and cooperative. Sitting comfortably in bed with dressing clean, dry and intact. Heel off-loading boots in place B/L.     Vascular: Faintly palpable DP/PT pulses B/L. Moderate pitting edema noted B/L L>>R. Hair growth absent B/L. CFT<5 to B/L hallux.  Temperature is warm to cool from tibial tuberosity to distal digits R and warm to the touch distally L. No lymphatic streaking noted B/L.     Musculoskeletal: Gross active and passive ROM intact to age and activity level. Moves all extremities spontaneously. No pain to palpation at feet B/L.      Neurological: Absent light touch sensation B/L. Pain stimuli diminished B/L. Denies any numbness, burning or tingling.     Dermatologic: Nails 1-5 are thickened, elongated, discolored with subungual debris right. Skin appears diffusely xerotic B/L. Web spaces 1-4 right are clean, dry and intact. No rashes or nodules noted R; L show maceration with peeling skin and serous drainage. Hyperkeratotic tissue with intradermal bleeding noted to R medial IPJ of hallux.     Wound:  Post-surgical wound open to distal LLE forefoot. Measuring approximately 15 cm x 8.5 cm with exposed metatarsal heads in the wound base. Mixed necrotic and fibrotic wound base with necrosis of all five metatarsal heads, tendons and muscles. No purulence noted. No areas of fluctuance. Some erythema and edema distally to foot xochilt-wound however improved from yesterday. Dorsal fibrotic based wound measuring 1.5 x 1.5 x 1.0 cm that tracks to the open amputation site.       LABS:   Results for orders placed or performed during the hospital encounter of 03/07/24 (from the past 24 hour(s))   POCT GLUCOSE   Result Value Ref Range    POCT Glucose 145 (H) 74 - 99 mg/dL   POCT GLUCOSE   Result Value Ref Range    POCT Glucose 137 (H) 74 - 99 mg/dL   POCT GLUCOSE   Result Value Ref Range    POCT Glucose 143 (H) 74 - 99 mg/dL   Hemoglobin and hematocrit, blood   Result Value Ref Range    Hemoglobin 10.6 (L) 12.0 - 16.0 g/dL    Hematocrit 33.3 (L) 36.0 - 46.0 %   POCT GLUCOSE   Result Value Ref Range    POCT Glucose 211 (H) 74 - 99 mg/dL   Basic Metabolic Panel   Result Value Ref Range    Glucose 147 (H) 65 - 99 mg/dL    Sodium 135 133 - 145 mmol/L    Potassium 4.0 3.4 -  5.1 mmol/L    Chloride 102 97 - 107 mmol/L    Bicarbonate 23 (L) 24 - 31 mmol/L    Urea Nitrogen 7 (L) 8 - 25 mg/dL    Creatinine 0.60 0.40 - 1.60 mg/dL    eGFR >90 >60 mL/min/1.73m*2    Calcium 7.9 (L) 8.5 - 10.4 mg/dL    Anion Gap 10 <=19 mmol/L   CBC   Result Value Ref Range    WBC 12.9 (H) 4.4 - 11.3 x10*3/uL    nRBC 0.0 0.0 - 0.0 /100 WBCs    RBC 3.15 (L) 4.00 - 5.20 x10*6/uL    Hemoglobin 9.0 (L) 12.0 - 16.0 g/dL    Hematocrit 27.9 (L) 36.0 - 46.0 %    MCV 89 80 - 100 fL    MCH 28.6 26.0 - 34.0 pg    MCHC 32.3 32.0 - 36.0 g/dL    RDW 13.3 11.5 - 14.5 %    Platelets 376 150 - 450 x10*3/uL   Hemoglobin A1C   Result Value Ref Range    Hemoglobin A1C 9.4 (H) See below %    Estimated Average Glucose 223 Not Established mg/dL   Vancomycin   Result Value Ref Range    Vancomycin 12.0 10.0 - 20.0 ug/mL   Iron and TIBC   Result Value Ref Range    Iron <20 (L) 30 - 160 ug/dL    UIBC 127 110 - 370 ug/dL    TIBC      % Saturation     Ferritin   Result Value Ref Range    Ferritin 496 (H) 13 - 150 ng/mL   POCT GLUCOSE   Result Value Ref Range    POCT Glucose 110 (H) 74 - 99 mg/dL   POCT GLUCOSE   Result Value Ref Range    POCT Glucose 135 (H) 74 - 99 mg/dL   hCG, Urine, Qualitative   Result Value Ref Range    HCG, Urine NEGATIVE NEGATIVE   Urinalysis with Reflex Culture and Microscopic   Result Value Ref Range    Color, Urine Light-Yellow Light-Yellow, Yellow, Dark-Yellow    Appearance, Urine Clear Clear    Specific Gravity, Urine 1.014 1.005 - 1.035    pH, Urine 6.5 5.0, 5.5, 6.0, 6.5, 7.0, 7.5, 8.0    Protein, Urine NEGATIVE NEGATIVE, 10 (TRACE), 20 (TRACE) mg/dL    Glucose, Urine Normal Normal mg/dL    Blood, Urine NEGATIVE NEGATIVE    Ketones, Urine NEGATIVE NEGATIVE mg/dL    Bilirubin, Urine NEGATIVE NEGATIVE    Urobilinogen, Urine Normal Normal mg/dL    Nitrite, Urine NEGATIVE NEGATIVE    Leukocyte Esterase, Urine NEGATIVE NEGATIVE      Lab Results   Component Value Date    HGBA1C 9.4 (H) 03/08/2024      Lab Results    Component Value Date    CRP 13.60 (H) 03/07/2024      Lab Results   Component Value Date    SEDRATE 76 (H) 03/07/2024        Results from last 7 days   Lab Units 03/08/24  0632   WBC AUTO x10*3/uL 12.9*   RBC AUTO x10*6/uL 3.15*   HEMOGLOBIN g/dL 9.0*   HEMATOCRIT % 27.9*     Results from last 7 days   Lab Units 03/08/24  0632 03/07/24  0936   SODIUM mmol/L 135 133   POTASSIUM mmol/L 4.0 3.9   CHLORIDE mmol/L 102 98   CO2 mmol/L 23* 26   BUN mg/dL 7* 7*   CREATININE mg/dL 0.60 0.50   CALCIUM mg/dL 7.9* 9.1   BILIRUBIN TOTAL mg/dL  --  0.2   ALT U/L  --  36   AST U/L  --  24     Results from last 7 days   Lab Units 03/08/24  1239   COLOR U  Light-Yellow   APPEARANCE U  Clear   PH U  6.5   SPEC GRAV UR  1.014   PROTEIN U mg/dL NEGATIVE   BLOOD UR  NEGATIVE   NITRITE U  NEGATIVE       IMAGING REVIEW:  Vascular US PVR without exercise    Result Date: 3/8/2024  Preliminary Cardiology Report           [facility] [id number] [place] [address]            Phone [phone]  Preliminary Vascular Lab Report   VASC US PVR WITHOUT EXERCISE  Patient Name:    MONTANA Deshpande Physician: [id number] Soni Leo MD,                  CHANELLE                            RPVI Study Date:      3/8/2024         Ordering Provider: [id number] TRUPTI BURNETT MRN/PID:         [id number]         Fellow: Accession#:      [id number]     Technologist:      Jeanne Stewart RVT YOB: 1975       Technologist 2: Gender:          F                Encounter#:        [id number] Admission        Inpatient        Location           [facility] Status:                           Performed:  Diagnosis/ICD: Cellulitus of left lower limb-L03.116; Other specified symptoms                and signs involving the circulatory and respiratory                systems-R09.89 CPT Codes:     44448 Peripheral artery PVR (multi segmental pressure  Pertinent  History: Left transmetatarsal amputation on 3/07/2024.  PRELIMINARY CONCLUSIONS:  Right Lower PVR: No evidence of arterial occlusive disease in the right lower extremity at rest. Normal digital perfusion noted. Triphasic flow is noted in the right common femoral artery, right popliteal artery, right posterior tibial artery and right dorsalis pedis artery. PVR tracings may be inaccurate due to patient movement. Left Lower PVR: No evidence of arterial occlusive disease in the left lower extremity at rest. Triphasic flow is noted in the left common femoral artery, left popliteal artery, left posterior tibial artery and left dorsalis pedis artery. Unable to obtain TBI due to transmetatarsal amputation. PVR tracings may be inaccurate due to patient movement.  Imaging & Doppler Findings:  RIGHT Lower PVR                Pressures Ratios Right High Thigh               152 mmHg  1.30 Right Low Thigh                145 mmHg  1.24 Right Calf                     142 mmHg  1.21 Right Posterior Tibial (Ankle) 128 mmHg  1.09 Right Dorsalis Pedis (Ankle)   132 mmHg  1.13 Right Digit (Great Toe)        123 mmHg  1.05   LEFT Lower PVR                Pressures Ratios Left High Thigh               161 mmHg  1.38 Left Low Thigh                152 mmHg  1.30 Left Calf                     132 mmHg  1.13 Left Posterior Tibial (Ankle) 128 mmHg  1.09 Left Dorsalis Pedis (Ankle)   132 mmHg  1.13                      Right     Left Brachial Pressure 115 mmHg 117 mmHg   VASCULAR PRELIMINARY REPORT completed by Jeanne Stewrat RENUKA on 3/8/2024 at 1:53:18 PM  ** Final **     XR foot 3+ views bilateral    Result Date: 3/8/2024  Interpreted By:  Gonzalo Poon, STUDY: XR FOOT 3+ VIEWS BILATERAL; 3/7/2024 7:20 pm   INDICATION: Signs/Symptoms:post-op, wounds. Status post partial amputation left foot. Wound on 1st toe right foot.   COMPARISON: Left foot 03/07/2024 at 1012 hours.   ACCESSION NUMBER(S): [id number]   ORDERING CLINICIAN: KATRIN LAWRENCE    TECHNIQUE: Bilateral feet, three views each   FINDINGS: Right foot: *No fractures or destructive lesions are identified on the right. Hammertoe deformities of the 2nd through 5th toes are identified on the right.     Left foot: *Patient is status post amputation of all 5 toes. Extensive gauze artifact obscures the underlying detail. No definite fractures or destructive lesions are identified involving the remaining osseous structures though detail is limited due to overlying artifact.         1. Postsurgical appearance left foot. 2. No acute pathologic findings are identified on the right. Please note that the conventional radiographic findings of osteomyelitis are delayed by 10 to 14 days from the clinical onset. If there is a high suspicion for osteomyelitis then additional imaging may still be appropriate.   MACRO: none   Signed by: Gonzalo Poon 3/8/2024 8:09 AM Dictation workstation:   [id number]    XR foot left 3+ views    Result Date: 3/7/2024  Interpreted By:  Cecilio Feldman, STUDY: XR FOOT LEFT 3+ VIEWS; XR ANKLE LEFT 3+ VIEWS;  3/7/2024 10:16 am   INDICATION: Signs/Symptoms:infection; Signs/Symptoms:infection/ pain.   COMPARISON: None.   ACCESSION NUMBER(S): [id number]; [id number]   ORDERING CLINICIAN: ANGEL BOWSER   TECHNIQUE: Three views each of the left foot and left ankle were obtained.   FINDINGS: Moderate-sized plantar calcaneal spur and posterior calcaneal spur. Moderate osteophyte formation involving the medial malleolus. There is periosteal reaction along the distal medial tibia diametaphysis. Ankle mortise and talar dome are intact. There is diffuse ankle soft tissue swelling, most pronounced laterally. There is diffuse midfoot and forefoot soft tissue swelling. There is cutaneous defect in the forefoot lateral to the 5th metatarsal. There is an acute comminuted fracture at the proximal end of the 5th proximal phalanx and an acute comminuted fracture in the distal head of the 5th  metatarsal. There is some decreased bone density at the fracture sites. There is also soft tissue edema. No soft tissue gas density. No opaque soft tissue foreign body. No  erosion.       Cutaneous defect consistent with ulceration in the lateral forefoot, lateral to the 5th metatarsal.   Findings of osteomyelitis with pathologic fractures involving the distal 5th metatarsal head and the proximal end of the 5th proximal phalanx.   Ankle and midfoot/forefoot soft tissue swelling as described.   Osteophytic changes as described.   Nonspecific smooth periosteal cortical reaction involving the distal medial tibia diametaphyseal cortex. This could be from chronic infection or remote trauma. Clinical correlation needed.   MACRO: None   Signed by: Cecilio Feldman 3/7/2024 11:04 AM Dictation workstation:   [id number]    XR ankle left 3+ views    Result Date: 3/7/2024  Interpreted By:  Cecilio Feldman, STUDY: XR FOOT LEFT 3+ VIEWS; XR ANKLE LEFT 3+ VIEWS;  3/7/2024 10:16 am   INDICATION: Signs/Symptoms:infection; Signs/Symptoms:infection/ pain.   COMPARISON: None.   ACCESSION NUMBER(S): [id number]; [id number]   ORDERING CLINICIAN: ANGEL BOWSER   TECHNIQUE: Three views each of the left foot and left ankle were obtained.   FINDINGS: Moderate-sized plantar calcaneal spur and posterior calcaneal spur. Moderate osteophyte formation involving the medial malleolus. There is periosteal reaction along the distal medial tibia diametaphysis. Ankle mortise and talar dome are intact. There is diffuse ankle soft tissue swelling, most pronounced laterally. There is diffuse midfoot and forefoot soft tissue swelling. There is cutaneous defect in the forefoot lateral to the 5th metatarsal. There is an acute comminuted fracture at the proximal end of the 5th proximal phalanx and an acute comminuted fracture in the distal head of the 5th metatarsal. There is some decreased bone density at the fracture sites. There is also soft tissue edema. No  soft tissue gas density. No opaque soft tissue foreign body. No  erosion.       Cutaneous defect consistent with ulceration in the lateral forefoot, lateral to the 5th metatarsal.   Findings of osteomyelitis with pathologic fractures involving the distal 5th metatarsal head and the proximal end of the 5th proximal phalanx.   Ankle and midfoot/forefoot soft tissue swelling as described.   Osteophytic changes as described.   Nonspecific smooth periosteal cortical reaction involving the distal medial tibia diametaphyseal cortex. This could be from chronic infection or remote trauma. Clinical correlation needed.   MACRO: None   Signed by: Cecilio Feldman 3/7/2024 11:04 AM Dictation workstation:   [id number]    Lower extremity venous duplex left    Result Date: 2/21/2024  Interpreted By:  Stanley Baumann, STUDY: [facility] LOWER EXTREMITY VENOUS DUPLEX LEFT  2/21/2024 10:17 am   INDICATION: Left lower extremity swelling, concern for DVT   COMPARISON: None.   ACCESSION NUMBER(S): [id number]   ORDERING CLINICIAN: TONIA HINDS   TECHNIQUE: Routine ultrasound of the left lower extremity was performed with duplex Doppler (color and spectral) evaluation.   Static images were obtained for remote interpretation.   FINDINGS: THIGH VEINS:  The left common femoral, femoral, popliteal, proximal medial saphenous, and deep femoral veins are patent and free of thrombus.  The veins are normally compressible.  They demonstrate normal phasic flow and augmentation response.       Negative study.  No deep venous thrombosis of the left lower extremity.   MACRO: None   Signed by: Stanley Baumann 2/21/2024 10:32 AM Dictation workstation:   [id number]            Assessment/Plan   ASSESSMENT & PLAN:    #S/P Transmetatarsal Amputation, Left Foot  #Wet Gangrene, Left Foot  #Cellulitis, Left Foot - Improving  #Chronic Non-Pressure Ulceration with Necrosis of Bone, Left Foot  #Suspected Chronic Osteomyelitis, Left Foot  #Pathologic Fracture 5th  Metatarsal Head, Left Foot  #Pathologic Fracture 5th Proximal Phalanx, Left Foot  #DM Type II with Foot Ulcer - Uncontrolled  #Polyneuropathy  #PAD  #Callous, Right Hallux     - Patient was seen and evaluated; all findings were discussed and all questions were answered to patient's satisfaction.  - Charts, labs, vitals and imaging all reviewed.   - Imaging: XR L Ankle and Foot: Pathologic Fx of Left 5th Metatarsal Head and Proximal Phalanx with some lysis and erosive changes to the distal 5th ray. No gas on film. Some periosteal changes to the medial malleolus. Post-operative films shoe post-operative changes. No further gas formation. No lysis or signs of OM to RIGHT foot on plain films.  - Labs: WBC down-trending 12.9; Hbg 11.8; CRP13.6; ESR 76  - PVRs: No signs of occlusive disease bilaterally with WNL MAREK and TBI results.  - Wound culture: Intraoperative culture pending.  - Blood culture: Obtained; results pending.     Plan:  - Abx: IV Cefepime and Zosyn per Primary  - Pt is s/p amputation of digits 1-5 left foot on 3/7. Intraoperative cultures were obtained.   - It was discussed with pt in detail that she will need further surgical treatment including revision of TMA proximally with debridement of non-viable soft tissue with possible partial closure and possible graft application. She will also likely need long term IV antibiotic therapy. Pt expressed understanding of this.  - Plan to take back to OR Monday around noon pending OR availability. All risks and benefits were discussed in detail.  - LLE still at high risk at this time. It was discussed in detail with pt and friend at bedside.   - Dressings: Betadine soaked 1/4 packing, betadine soaked 4x4 gauze, ABDs, Kerlix and ACE.  - Nursing staff is able to change/reinforce dressing if & as necessary.  - Podiatry will continue to follow while in house.     DVT ppx: SCD to RLE; no SCD to LLE please.  Weightbearing: NWB LLE with surgical shoe in place. OKAY to  heel WB for transfers.  Discharge: Pt to follow up 1 week after discharge with Dr. Aponte/Nolan     Case to be discussed with attending, A&P above reflects a tentative plan. Please await for the final signature from the attending physician on service.     Sarah Barrera DPM PGY-2  Podiatric Medicine & Surgery  Please Tyreeku message me with any questions or concerns.               SIGNATURE: Sarah Barrera DPM PATIENT NAME: Mary Pedroza   DATE: March 8, 2024 MRN: [id number]   TIME: 2:04 PM CONTACT: Haiku Message

## 2024-03-08 NOTE — CONSULTS
Consults-blood management    Reason For Consult  Post op anemia    History Of Present Illness  Mary Pedroza is a 48 y.o. female with PMH DM type 2, HLD, bipolar. pt present to ED 3/7/24 for evaluation of left foot pain and infection. Pt reports she went to urgent care few weeks ago with foot pain and swelling LLE, they did ultrasound left leg and discharged her with a boot was to follow up with Dr Aponte, Instead present to ED for eval D/T worsening pain, redness, ulcerations bottom of foot, and left foot 3rd digit turned black. pt reports last 3weeks having pus drainage  and bleeding from foot wound. In ED found to have osteomyelitis with pathologic fractures involving the distal 5th metatarsal head and the proximal end of the 5th proximal phalanx. She underwent transmetatarsal amputation left foot 3/7/24. Preop H/H 11.8/35.9, post op H/H 9/27.9. post op iron panel show JAELYN FE less than 20, Ferritin 496. Ferritin elevated d/t inflammation as evidenced by CRP 13.6, and Sed rate 76.      Past Medical History  She has a past medical history of Bipolar 1 disorder (CMS/HCC), Bursitis of right hip (09/15/2023), Diabetes mellitus, type 2 (CMS/[facility]), and Hyperlipidemia.    Surgical History  She has a past surgical history that includes  section, classic (2014); Gallbladder surgery (2014); Cholecystectomy (2012);  section, low transverse ();  section, low transverse (); and  section, low transverse ().     Social History  She reports that she has been smoking cigarettes. She has a 15.00 pack-year smoking history. She has been exposed to tobacco smoke. She has never used smokeless tobacco. She reports that she does not currently use alcohol. She reports that she does not use drugs.    Family History  Family History   Problem Relation Name Age of Onset    No Known Problems Mother      Hypertension Father      Diabetes Father      Other (cholesterol issues) Father       No Known Problems Sister      No Known Problems Daughter      No Known Problems Son      No Known Problems Son      Breast cancer Other          Allergies  Patient has no known allergies.    Review of Systems   Constitutional:  Positive for activity change. Negative for appetite change, chills, diaphoresis, fatigue and fever.   HENT:  Negative for congestion and nosebleeds.    Eyes:  Negative for visual disturbance.   Respiratory:  Negative for cough, shortness of breath and wheezing.    Cardiovascular:  Positive for leg swelling. Negative for chest pain and palpitations.   Gastrointestinal:  Negative for abdominal distention, abdominal pain, anal bleeding, blood in stool, constipation, diarrhea, nausea and vomiting.   Endocrine: Negative for cold intolerance and heat intolerance.   Genitourinary:  Negative for dysuria and hematuria.   Musculoskeletal:  Positive for arthralgias.   Skin:  Positive for color change and wound. Negative for pallor and rash.   Neurological:  Positive for numbness. Negative for dizziness, weakness and headaches.   Hematological:  Does not bruise/bleed easily.   Psychiatric/Behavioral:  Negative for agitation and confusion.         Physical Exam  Constitutional:       Appearance: Normal appearance.   HENT:      Head: Normocephalic and atraumatic.      Right Ear: External ear normal.      Left Ear: External ear normal.      Nose: Nose normal.      Mouth/Throat:      Pharynx: Oropharynx is clear.   Eyes:      Conjunctiva/sclera: Conjunctivae normal.      Pupils: Pupils are equal, round, and reactive to light.   Cardiovascular:      Rate and Rhythm: Normal rate and regular rhythm.      Heart sounds: Normal heart sounds. No murmur heard.  Pulmonary:      Effort: Pulmonary effort is normal. No respiratory distress.      Breath sounds: Normal breath sounds. No wheezing, rhonchi or rales.   Chest:      Chest wall: No tenderness.   Abdominal:      General: Bowel sounds are normal. There is no  distension.      Palpations: Abdomen is soft.      Tenderness: There is no abdominal tenderness.   Musculoskeletal:         General: Tenderness present.      Cervical back: Normal range of motion and neck supple.      Right lower leg: No edema.      Left lower leg: Edema present.   Skin:     General: Skin is warm and dry.      Capillary Refill: Capillary refill takes 2 to 3 seconds.      Coloration: Skin is not pale.      Findings: No bruising, erythema, lesion or rash.      Comments: Dressing left foot dry/intact   Neurological:      General: No focal deficit present.      Mental Status: She is alert and oriented to person, place, and time. Mental status is at baseline.   Psychiatric:         Mood and Affect: Mood normal.         Behavior: Behavior normal.          Last Recorded Vitals  Blood pressure 101/63, pulse 79, temperature 36.7 °C (98.1 °F), temperature source Oral, resp. rate 16, weight 93 kg (205 lb), SpO2 98 %.    Relevant Results  Results for orders placed or performed during the hospital encounter of 03/07/24 (from the past 24 hour(s))   POCT GLUCOSE   Result Value Ref Range    POCT Glucose 145 (H) 74 - 99 mg/dL   POCT GLUCOSE   Result Value Ref Range    POCT Glucose 137 (H) 74 - 99 mg/dL   POCT GLUCOSE   Result Value Ref Range    POCT Glucose 143 (H) 74 - 99 mg/dL   Hemoglobin and hematocrit, blood   Result Value Ref Range    Hemoglobin 10.6 (L) 12.0 - 16.0 g/dL    Hematocrit 33.3 (L) 36.0 - 46.0 %   POCT GLUCOSE   Result Value Ref Range    POCT Glucose 211 (H) 74 - 99 mg/dL   Basic Metabolic Panel   Result Value Ref Range    Glucose 147 (H) 65 - 99 mg/dL    Sodium 135 133 - 145 mmol/L    Potassium 4.0 3.4 - 5.1 mmol/L    Chloride 102 97 - 107 mmol/L    Bicarbonate 23 (L) 24 - 31 mmol/L    Urea Nitrogen 7 (L) 8 - 25 mg/dL    Creatinine 0.60 0.40 - 1.60 mg/dL    eGFR >90 >60 mL/min/1.73m*2    Calcium 7.9 (L) 8.5 - 10.4 mg/dL    Anion Gap 10 <=19 mmol/L   CBC   Result Value Ref Range    WBC 12.9 (H) 4.4  - 11.3 x10*3/uL    nRBC 0.0 0.0 - 0.0 /100 WBCs    RBC 3.15 (L) 4.00 - 5.20 x10*6/uL    Hemoglobin 9.0 (L) 12.0 - 16.0 g/dL    Hematocrit 27.9 (L) 36.0 - 46.0 %    MCV 89 80 - 100 fL    MCH 28.6 26.0 - 34.0 pg    MCHC 32.3 32.0 - 36.0 g/dL    RDW 13.3 11.5 - 14.5 %    Platelets 376 150 - 450 x10*3/uL   Hemoglobin A1C   Result Value Ref Range    Hemoglobin A1C 9.4 (H) See below %    Estimated Average Glucose 223 Not Established mg/dL   Vancomycin   Result Value Ref Range    Vancomycin 12.0 10.0 - 20.0 ug/mL   Iron and TIBC   Result Value Ref Range    Iron <20 (L) 30 - 160 ug/dL    UIBC 127 110 - 370 ug/dL    TIBC      % Saturation     Ferritin   Result Value Ref Range    Ferritin 496 (H) 13 - 150 ng/mL   POCT GLUCOSE   Result Value Ref Range    POCT Glucose 110 (H) 74 - 99 mg/dL      Current Facility-Administered Medications:     acetaminophen (Tylenol) tablet 500 mg, 500 mg, oral, q4h PRN, Sarah Barrera DPM, 500 mg at 03/08/24 0355    ARIPiprazole (Abilify) tablet 5 mg, 5 mg, oral, Daily, Krystal Joel MD, 5 mg at 03/08/24 0837    dextrose 10 % in water (D10W) infusion, 0.3 g/kg/hr, intravenous, Once PRN, Krystal Joel MD    dextrose 50 % injection 25 g, 25 g, intravenous, q15 min PRN, Krystal Joel MD    doxepin (SINEquan) capsule 100 mg, 100 mg, oral, Nightly, Krystal Joel MD, 100 mg at 03/07/24 9081    fenofibrate (Triglide) tablet 160 mg, 160 mg, oral, Daily, Krystal Joel MD, 160 mg at 03/08/24 0837    [Held by provider] glimepiride (Amaryl) tablet 4 mg, 4 mg, oral, Daily before breakfast, Krystal Joel MD    glucagon (Glucagen) injection 1 mg, 1 mg, intramuscular, q15 min PRN, Krystal Joel MD    hydrOXYzine HCL (Atarax) tablet 25 mg, 25 mg, oral, BID PRN, Krystal Joel MD    insulin glargine (Lantus) injection 80 Units, 80 Units, subcutaneous, Nightly, Krystal Joel MD, 80 Units at 03/07/24 3116    insulin lispro  (HumaLOG) injection 0-10 Units, 0-10 Units, subcutaneous, TID with meals, Krystal Joel MD    iron sucrose (Venofer) injection 200 mg, 200 mg, intravenous, Daily, SUDHIR Hartman-CNP, 200 mg at 03/08/24 0956    oxyCODONE (Roxicodone) immediate release tablet 5 mg, 5 mg, oral, q4h PRN, Sarah Barrera DPM, 5 mg at 03/08/24 0837    piperacillin-tazobactam-dextrose (Zosyn) IV 3.375 g, 3.375 g, intravenous, q6h, Bharat Michelle DPM, Stopped at 03/08/24 0907    polyethylene glycol (Glycolax, Miralax) packet 17 g, 17 g, oral, Daily PRN, Krystal Joel MD    propranolol (Inderal) tablet 20 mg, 20 mg, oral, BID, Krystal Joel MD, 20 mg at 03/07/24 2251    simvastatin (Zocor) tablet 20 mg, 20 mg, oral, q PM, Krystal Joel MD, 20 mg at 03/07/24 2251    SITagliptin phosphate (Januvia) tablet 25 mg, 25 mg, oral, Daily, Krystal Joel MD    SUMAtriptan (Imitrex) tablet 100 mg, 100 mg, oral, Daily PRN, Krystal Joel MD    vancomycin (Vancocin) placeholder, , miscellaneous, Daily PRN, Bharat Michelle DPM    vancomycin-diluent combo no.1 (Xellia) IVPB 1,250 mg, 1,250 mg, intravenous, q12h, Bharat Michelle DPM, Last Rate: 200 mL/hr at 03/08/24 0956, 1,250 mg at 03/08/24 0956    venlafaxine XR (Effexor-XR) 24 hr capsule 75 mg, 75 mg, oral, Daily, Krystal Joel MD, 75 mg at 03/08/24 0837        Assessment/Plan   Cellulitis and abscess of foot  Active Problems:    Diabetes mellitus (CMS/HCC)    Left foot infection  Left foot infection: Cellulitis with ulceration and gangrene  S/p left foot incision and drainage drainage and transmetatarsal amputation on 3/7  Normocytic anemia  Anemia of iron deficient combined with anemia of blood loss and inflammation    Venofer 200mg IV X3  Monitor H/H  Transfuse 1unit as needed to maintain Hgb over 7

## 2024-03-08 NOTE — CONSULTS
"Inpatient Diabetes Education Consult follow up:    Called patient back and she answered phone & states , \"she does not want to talk to me today, so don't call back\".        RPTA Medications:    Current Outpatient Medications   Medication Instructions    ARIPiprazole (Abilify) 5 mg tablet 1 tablet (5 mg) once daily.    blood sugar diagnostic (OneTouch Ultra Test) strip USE AS DIRECTED TO CHECK BLOOD SUGAR TWICE A DAY    doxepin (SINEquan) 100 mg capsule 1 capsule (100 mg) once daily at bedtime.    fenofibrate (Tricor) 145 mg tablet 1 tablet, oral, Daily    FreeStyle Bridget sensor system (FreeStyle Bridget 2 Sensor) kit USE SENSOR AS DIRECTED EVERY 2 WEEKS    glimepiride (AMARYL) 4 mg, oral, Daily before breakfast    hydrOXYzine HCL (ATARAX) 25 mg, oral, 2 times daily PRN    insulin glargine (Lantus Solostar U-100 Insulin) 100 unit/mL (3 mL) pen INJECT UNDER THE SKIN AS DIRECTED  UP TO 80 UNITS DAILY    Januvia 25 mg tablet 1 tablet (25 mg) once daily.    medroxyPROGESTERone (DEPO-PROVERA) 150 mg, intramuscular, every 12 weeks    naproxen (NAPROSYN) 250 mg, oral, 2 times daily with meals    pen needle, diabetic (BD Ultra-Fine Short Pen Needle) 31 gauge x 5/16\" needle  as directed . as directed for 90 days<BR>    propranolol (Inderal) 20 mg tablet 1 tablet (20 mg) 2 times a day.    simvastatin (ZOCOR) 20 mg, oral, Every evening    SUMAtriptan (Imitrex) 100 mg tablet oral    venlafaxine XR (Effexor-XR) 75 mg 24 hr capsule 1 capsule (75 mg) once daily.       Glucose   Date/Time Value Ref Range Status   03/08/2024 06:32  (H) 65 - 99 mg/dL Final   03/07/2024 09:36  (H) 65 - 99 mg/dL Final   07/28/2023 04:49  (H) 65 - 99 MG/DL Final   01/14/2023 09:16  (H) 65 - 99 MG/DL Final   08/04/2022 09:33  (H) 65 - 99 MG/DL Final   05/24/2021 12:12  (H) 65 - 99 MG/DL Final     Comment:     RESULT CHECKED  CALLED TO DR AMOS OFFICE/RANDY RODRIGUEZ [address] [facility]     10/24/2020 09:36  (H) 65 - 99 MG/DL " "Final   10/10/2018 06:26  (H) 65 - 99 MG/DL Final   07/12/2018 06:43  (H) 65 - 99 MG/DL Final   04/10/2018 11:02  (H) 65 - 99 MG/DL Final     No results found for: \"CPEPTIDE\"  Hemoglobin A1C   Date Value Ref Range Status   03/08/2024 9.4 (H) See below % Final   12/21/2022 9.8 (H) 4.0 - 6.0 % Final     Comment:     Hemoglobin A1C levels are related to mean blood glucose during the   preceding 2-3 months. The relationship table below may be used as a   general guide. Each 1% increase in HGB A1C is a reflection of an   increase in mean glucose of approximately 30 mg/dl.   Reference: Diabetes Care, volume 29, supplement 1 Jan. 2006                        HGB A1C ................. Approx. Mean Glucose   _______________________________________________   6%   ...............................  120 mg/dl   7%   ...............................  150 mg/dl   8%   ...............................  180 mg/dl   9%   ...............................  210 mg/dl   10%  ...............................  240 mg/dl  Performed at [address] [address]     08/04/2022 10.7 (H) 4.0 - 6.0 % Final     Comment:     Hemoglobin A1C levels are related to mean blood glucose during the   preceding 2-3 months. The relationship table below may be used as a   general guide. Each 1% increase in HGB A1C is a reflection of an   increase in mean glucose of approximately 30 mg/dl.   Reference: Diabetes Care, volume 29, supplement 1 Jan. 2006                        HGB A1C ................. Approx. Mean Glucose   _______________________________________________   6%   ...............................  120 mg/dl   7%   ...............................  150 mg/dl   8%   ...............................  180 mg/dl   9%   ...............................  210 mg/dl   10%  ...............................  240 mg/dl  Performed at [address] [address]     10/21/2021 11.1 (H) 4.0 - 6.0 % Final     Comment: "     Hemoglobin A1C levels are related to mean blood glucose during the   preceding 2-3 months. The relationship table below may be used as a   general guide. Each 1% increase in HGB A1C is a reflection of an   increase in mean glucose of approximately 30 mg/dl.   Reference: Diabetes Care, volume 29, supplement 1 Jan. 2006                        HGB A1C ................. Approx. Mean Glucose   _______________________________________________   6%   ...............................  120 mg/dl   7%   ...............................  150 mg/dl   8%   ...............................  180 mg/dl   9%   ...............................  210 mg/dl   10%  ...............................  240 mg/dl  Performed at [address] [address]     05/24/2021 11.8 (H) 4.0 - 6.0 % Final     Comment:     Hemoglobin A1C levels are related to mean blood glucose during the   preceding 2-3 months. The relationship table below may be used as a   general guide. Each 1% increase in HGB A1C is a reflection of an   increase in mean glucose of approximately 30 mg/dl.   Reference: Diabetes Care, volume 29, supplement 1 Jan. 2006                        HGB A1C ................. Approx. Mean Glucose   _______________________________________________   6%   ...............................  120 mg/dl   7%   ...............................  150 mg/dl   8%   ...............................  180 mg/dl   9%   ...............................  210 mg/dl   10%  ...............................  240 mg/dl  Performed at [address] [address]

## 2024-03-08 NOTE — PROGRESS NOTES
03/08/24 1158   [facility] Disability Status   Are you deaf or do you have serious difficulty hearing? N   Are you blind or do you have serious difficulty seeing, even when wearing glasses? N   Because of a physical, mental, or emotional condition, do you have serious difficulty concentrating, remembering, or making decisions? (5 years old or older) Y   Do you have serious difficulty walking or climbing stairs? Y   Do you have serious difficulty dressing or bathing? Y  (Pt will now need assistance with ADL's)   Because of a physical, mental, or emotional condition, do you have serious difficulty doing errands alone such as visiting the doctor? Y

## 2024-03-08 NOTE — CARE PLAN
The patient's goals for the shift include pain control    The clinical goals for the shift include PAIN FREE    Over the shift, the patient did not make progress toward the following goals. Barriers to progression include OR MONDAY. Recommendations to address these barriers include OR MONDAY.

## 2024-03-09 LAB
ANION GAP SERPL CALC-SCNC: 10 MMOL/L
BACTERIA UR CULT: NO GROWTH
BUN SERPL-MCNC: 7 MG/DL (ref 8–25)
CALCIUM SERPL-MCNC: 8.9 MG/DL (ref 8.5–10.4)
CHLORIDE SERPL-SCNC: 102 MMOL/L (ref 97–107)
CO2 SERPL-SCNC: 25 MMOL/L (ref 24–31)
CREAT SERPL-MCNC: 0.7 MG/DL (ref 0.4–1.6)
EGFRCR SERPLBLD CKD-EPI 2021: >90 ML/MIN/1.73M*2
ERYTHROCYTE [DISTWIDTH] IN BLOOD BY AUTOMATED COUNT: 13.2 % (ref 11.5–14.5)
GLUCOSE BLD MANUAL STRIP-MCNC: 170 MG/DL (ref 74–99)
GLUCOSE BLD MANUAL STRIP-MCNC: 78 MG/DL (ref 74–99)
GLUCOSE BLD MANUAL STRIP-MCNC: 82 MG/DL (ref 74–99)
GLUCOSE BLD MANUAL STRIP-MCNC: 90 MG/DL (ref 74–99)
GLUCOSE SERPL-MCNC: 82 MG/DL (ref 65–99)
HCT VFR BLD AUTO: 28.1 % (ref 36–46)
HGB BLD-MCNC: 9.3 G/DL (ref 12–16)
MCH RBC QN AUTO: 27.8 PG (ref 26–34)
MCHC RBC AUTO-ENTMCNC: 33.1 G/DL (ref 32–36)
MCV RBC AUTO: 84 FL (ref 80–100)
NRBC BLD-RTO: 0 /100 WBCS (ref 0–0)
PLATELET # BLD AUTO: 429 X10*3/UL (ref 150–450)
POTASSIUM SERPL-SCNC: 3.9 MMOL/L (ref 3.4–5.1)
RBC # BLD AUTO: 3.34 X10*6/UL (ref 4–5.2)
SODIUM SERPL-SCNC: 137 MMOL/L (ref 133–145)
WBC # BLD AUTO: 14.7 X10*3/UL (ref 4.4–11.3)

## 2024-03-09 PROCEDURE — 2500000004 HC RX 250 GENERAL PHARMACY W/ HCPCS (ALT 636 FOR OP/ED): Mod: JZ

## 2024-03-09 PROCEDURE — 36415 COLL VENOUS BLD VENIPUNCTURE: CPT | Performed by: INTERNAL MEDICINE

## 2024-03-09 PROCEDURE — 2500000001 HC RX 250 WO HCPCS SELF ADMINISTERED DRUGS (ALT 637 FOR MEDICARE OP)

## 2024-03-09 PROCEDURE — 2500000004 HC RX 250 GENERAL PHARMACY W/ HCPCS (ALT 636 FOR OP/ED): Performed by: INTERNAL MEDICINE

## 2024-03-09 PROCEDURE — 1210000001 HC SEMI-PRIVATE ROOM DAILY

## 2024-03-09 PROCEDURE — 80048 BASIC METABOLIC PNL TOTAL CA: CPT | Performed by: INTERNAL MEDICINE

## 2024-03-09 PROCEDURE — 85027 COMPLETE CBC AUTOMATED: CPT | Performed by: INTERNAL MEDICINE

## 2024-03-09 PROCEDURE — 82947 ASSAY GLUCOSE BLOOD QUANT: CPT

## 2024-03-09 PROCEDURE — 2500000002 HC RX 250 W HCPCS SELF ADMINISTERED DRUGS (ALT 637 FOR MEDICARE OP, ALT 636 FOR OP/ED): Performed by: INTERNAL MEDICINE

## 2024-03-09 PROCEDURE — 2500000004 HC RX 250 GENERAL PHARMACY W/ HCPCS (ALT 636 FOR OP/ED): Performed by: NURSE PRACTITIONER

## 2024-03-09 PROCEDURE — 2500000001 HC RX 250 WO HCPCS SELF ADMINISTERED DRUGS (ALT 637 FOR MEDICARE OP): Performed by: INTERNAL MEDICINE

## 2024-03-09 RX ADMIN — ARIPIPRAZOLE 5 MG: 5 TABLET ORAL at 08:58

## 2024-03-09 RX ADMIN — CEFEPIME 2 G: 2 INJECTION, POWDER, FOR SOLUTION INTRAVENOUS at 05:21

## 2024-03-09 RX ADMIN — OXYCODONE 5 MG: 5 TABLET ORAL at 05:21

## 2024-03-09 RX ADMIN — CEFEPIME 2 G: 2 INJECTION, POWDER, FOR SOLUTION INTRAVENOUS at 19:32

## 2024-03-09 RX ADMIN — PROPRANOLOL HYDROCHLORIDE 20 MG: 20 TABLET ORAL at 21:14

## 2024-03-09 RX ADMIN — SITAGLIPTIN 25 MG: 25 TABLET, FILM COATED ORAL at 08:58

## 2024-03-09 RX ADMIN — OXYCODONE 5 MG: 5 TABLET ORAL at 09:51

## 2024-03-09 RX ADMIN — VENLAFAXINE HYDROCHLORIDE 75 MG: 75 CAPSULE, EXTENDED RELEASE ORAL at 08:58

## 2024-03-09 RX ADMIN — OXYCODONE 5 MG: 5 TABLET ORAL at 19:26

## 2024-03-09 RX ADMIN — ACETAMINOPHEN 500 MG: 500 TABLET ORAL at 19:30

## 2024-03-09 RX ADMIN — SIMVASTATIN 20 MG: 20 TABLET, FILM COATED ORAL at 21:14

## 2024-03-09 RX ADMIN — ACETAMINOPHEN 500 MG: 500 TABLET ORAL at 09:51

## 2024-03-09 RX ADMIN — ACETAMINOPHEN 500 MG: 500 TABLET ORAL at 14:58

## 2024-03-09 RX ADMIN — OXYCODONE 5 MG: 5 TABLET ORAL at 14:58

## 2024-03-09 RX ADMIN — IRON SUCROSE 200 MG: 20 INJECTION, SOLUTION INTRAVENOUS at 05:21

## 2024-03-09 RX ADMIN — VANCOMYCIN 1250 MG: 1.75 INJECTION, SOLUTION INTRAVENOUS at 21:15

## 2024-03-09 RX ADMIN — VANCOMYCIN 1250 MG: 1.75 INJECTION, SOLUTION INTRAVENOUS at 09:00

## 2024-03-09 RX ADMIN — FENOFIBRATE 160 MG: 160 TABLET ORAL at 08:58

## 2024-03-09 RX ADMIN — DOXEPIN HYDROCHLORIDE 100 MG: 50 CAPSULE ORAL at 21:14

## 2024-03-09 RX ADMIN — INSULIN GLARGINE 80 UNITS: 100 INJECTION, SOLUTION SUBCUTANEOUS at 21:11

## 2024-03-09 RX ADMIN — CEFEPIME 2 G: 2 INJECTION, POWDER, FOR SOLUTION INTRAVENOUS at 12:00

## 2024-03-09 RX ADMIN — ACETAMINOPHEN 500 MG: 500 TABLET ORAL at 05:21

## 2024-03-09 RX ADMIN — PROPRANOLOL HYDROCHLORIDE 20 MG: 20 TABLET ORAL at 08:58

## 2024-03-09 ASSESSMENT — PAIN SCALES - GENERAL
PAINLEVEL_OUTOF10: 4
PAINLEVEL_OUTOF10: 9
PAINLEVEL_OUTOF10: 9
PAINLEVEL_OUTOF10: 4
PAINLEVEL_OUTOF10: 6
PAINLEVEL_OUTOF10: 9
PAINLEVEL_OUTOF10: 4
PAINLEVEL_OUTOF10: 5 - MODERATE PAIN

## 2024-03-09 ASSESSMENT — COGNITIVE AND FUNCTIONAL STATUS - GENERAL
CLIMB 3 TO 5 STEPS WITH RAILING: A LOT
WALKING IN HOSPITAL ROOM: A LOT
MOBILITY SCORE: 20
DAILY ACTIVITIY SCORE: 24

## 2024-03-09 ASSESSMENT — PAIN DESCRIPTION - ORIENTATION
ORIENTATION: LEFT

## 2024-03-09 ASSESSMENT — PAIN - FUNCTIONAL ASSESSMENT
PAIN_FUNCTIONAL_ASSESSMENT: 0-10

## 2024-03-09 ASSESSMENT — PAIN DESCRIPTION - LOCATION
LOCATION: FOOT

## 2024-03-09 NOTE — NURSING NOTE
AM meds given  vanco initiated pt tolerated well voices no concerns at this time call light in reach

## 2024-03-09 NOTE — PROGRESS NOTES
Occupational Therapy                 Therapy Communication Note    Patient Name: Mary Pedroza  MRN: [id number]  Today's Date: 3/9/2024     Discipline: Occupational Therapy    Missed Visit Reason: Missed Visit Reason: Other (Comment) (Pt to go back to OR monday. Will defer therapy evaluation until post surgical procedure.)    Missed Time: Cancel    Comment:   Pt received first HPV vaccine on 2/7/23 by Dr Adalgisa Kuhn. Pt was told to come to her pcp to get the next HPV vaccine. There is no order. Would like to make sure this is possible to be able to schedule.

## 2024-03-09 NOTE — PROGRESS NOTES
"Mary Pedroza is a 48 y.o. female on day 2 of admission presenting with Cellulitis and abscess of foot.    Subjective   She underwent incision and drainage and transmetatarsal amputation of the left foot on 3/7.      Objective     Physical Exam  General: awake, alert, oriented, responsive  Cardiovascular: regular, normal S1 and S2  Lungs: good air entry bilaterally, clear to auscultation  Abdomen: soft, nontender, bowel sounds present, normoactive  Extremities: The left foot and ankle were wrapped in a dressing which was not removed. There was no peripheral cyanosis, no pedal edema  Neuro: alert, oriented x 3, no focal weakness      Last Recorded Vitals  Blood pressure 99/63, pulse 68, temperature 36.4 °C (97.5 °F), temperature source Oral, resp. rate 16, height 1.626 m (5' 4\"), weight 93 kg (205 lb), SpO2 95 %.  Intake/Output last 3 Shifts:  I/O last 3 completed shifts:  In: 2100 (22.6 mL/kg) [P.O.:1440; IV Piggyback:660]  Out: 4100 (44.1 mL/kg) [Urine:4100 (1.2 mL/kg/hr)]  Weight: 93 kg     Relevant Results  Results for orders placed or performed during the hospital encounter of 03/07/24 (from the past 24 hour(s))   POCT GLUCOSE   Result Value Ref Range    POCT Glucose 135 (H) 74 - 99 mg/dL   hCG, Urine, Qualitative   Result Value Ref Range    HCG, Urine NEGATIVE NEGATIVE   Urinalysis with Reflex Culture and Microscopic   Result Value Ref Range    Color, Urine Light-Yellow Light-Yellow, Yellow, Dark-Yellow    Appearance, Urine Clear Clear    Specific Gravity, Urine 1.014 1.005 - 1.035    pH, Urine 6.5 5.0, 5.5, 6.0, 6.5, 7.0, 7.5, 8.0    Protein, Urine NEGATIVE NEGATIVE, 10 (TRACE), 20 (TRACE) mg/dL    Glucose, Urine Normal Normal mg/dL    Blood, Urine NEGATIVE NEGATIVE    Ketones, Urine NEGATIVE NEGATIVE mg/dL    Bilirubin, Urine NEGATIVE NEGATIVE    Urobilinogen, Urine Normal Normal mg/dL    Nitrite, Urine NEGATIVE NEGATIVE    Leukocyte Esterase, Urine NEGATIVE NEGATIVE   Extra Urine Gray Tube   Result Value " Ref Range    Extra Tube Hold for add-ons.    POCT GLUCOSE   Result Value Ref Range    POCT Glucose 152 (H) 74 - 99 mg/dL   POCT GLUCOSE   Result Value Ref Range    POCT Glucose 178 (H) 74 - 99 mg/dL   Basic Metabolic Panel   Result Value Ref Range    Glucose 82 65 - 99 mg/dL    Sodium 137 133 - 145 mmol/L    Potassium 3.9 3.4 - 5.1 mmol/L    Chloride 102 97 - 107 mmol/L    Bicarbonate 25 24 - 31 mmol/L    Urea Nitrogen 7 (L) 8 - 25 mg/dL    Creatinine 0.70 0.40 - 1.60 mg/dL    eGFR >90 >60 mL/min/1.73m*2    Calcium 8.9 8.5 - 10.4 mg/dL    Anion Gap 10 <=19 mmol/L   CBC   Result Value Ref Range    WBC 14.7 (H) 4.4 - 11.3 x10*3/uL    nRBC 0.0 0.0 - 0.0 /100 WBCs    RBC 3.34 (L) 4.00 - 5.20 x10*6/uL    Hemoglobin 9.3 (L) 12.0 - 16.0 g/dL    Hematocrit 28.1 (L) 36.0 - 46.0 %    MCV 84 80 - 100 fL    MCH 27.8 26.0 - 34.0 pg    MCHC 33.1 32.0 - 36.0 g/dL    RDW 13.2 11.5 - 14.5 %    Platelets 429 150 - 450 x10*3/uL   POCT GLUCOSE   Result Value Ref Range    POCT Glucose 78 74 - 99 mg/dL     Scheduled medications  ARIPiprazole, 5 mg, oral, Daily  cefepime, 2 g, intravenous, q8h  doxepin, 100 mg, oral, Nightly  fenofibrate, 160 mg, oral, Daily  [Held by provider] glimepiride, 4 mg, oral, Daily before breakfast  insulin glargine, 80 Units, subcutaneous, Nightly  insulin lispro, 0-10 Units, subcutaneous, TID with meals  iron sucrose, 200 mg, intravenous, Daily  nicotine, 1 patch, transdermal, Daily  propranolol, 20 mg, oral, BID  simvastatin, 20 mg, oral, q PM  SITagliptin phosphate, 25 mg, oral, Daily  vancomycin-diluent combo no.1, 1,250 mg, intravenous, q12h  venlafaxine XR, 75 mg, oral, Daily      Continuous medications     PRN medications  PRN medications: acetaminophen, dextrose 10 % in water (D10W), dextrose, glucagon, hydrOXYzine HCL, oxyCODONE, polyethylene glycol, SUMAtriptan, vancomycin        Assessment/Plan   Principal Problem:    Cellulitis and abscess of foot  Active Problems:    Diabetes mellitus (CMS/[facility])     Left foot infection    Subacute osteomyelitis of left foot (CMS/HCC)      Left foot infection: Cellulitis with ulceration and gangrene  S/p left foot incision and drainage and transmetatarsal amputation on 3/7  Blood cultures in progress  On IV antibiotic coverage with cefepime and vancomycin     Diabetes type 2 with hyperglycemia and gangrene of the left foot  Lantus scheduled and Humalog sliding scale  Humalog sliding scale.  Lantus  Continue januvia  Hemoglobin A1c was 9.4% on 3/8/2024     Hyperlipidemia  On fenofibrate, simvastatin     Bipolar disorder  On Abilify     Plan  Continue IV antibiotic coverage.  Postop plan per podiatry  Continue to hold glimepiride because of low normal blood glucose.   Diabetic diet: carbs increased from 60 grams at the patient's request. Increased to 75 grams.   Diabetes education  PT/OT    Krystal Joel MD

## 2024-03-09 NOTE — PROGRESS NOTES
PODIATRY SERVICE CONSULT PROGRESS NOTE    SERVICE DATE: 3/9/2024   SERVICE TIME:  03:55 PM    Subjective   INTERVAL HPI:   Patient was seen at bedside.  Pain well controlled. However some pain with dressing change.  Patient denies any constitutional symptoms.   It was again discussed in detail that the limb is still at high risk at this time.  Patient expressed understanding.   She understands she will have surgery again Monday and will be NPO after midnight on Sunday night.      Medications:  Scheduled Meds: ARIPiprazole, 5 mg, oral, Daily  cefepime, 2 g, intravenous, q8h  doxepin, 100 mg, oral, Nightly  fenofibrate, 160 mg, oral, Daily  [Held by provider] glimepiride, 4 mg, oral, Daily before breakfast  insulin glargine, 80 Units, subcutaneous, Nightly  insulin lispro, 0-10 Units, subcutaneous, TID with meals  iron sucrose, 200 mg, intravenous, Daily  nicotine, 1 patch, transdermal, Daily  propranolol, 20 mg, oral, BID  simvastatin, 20 mg, oral, q PM  SITagliptin phosphate, 25 mg, oral, Daily  vancomycin-diluent combo no.1, 1,250 mg, intravenous, q12h  venlafaxine XR, 75 mg, oral, Daily      Continuous Infusions:    PRN Meds: PRN medications: acetaminophen, dextrose 10 % in water (D10W), dextrose, glucagon, hydrOXYzine HCL, oxyCODONE, polyethylene glycol, SUMAtriptan, vancomycin         Objective   PHYSICAL EXAM:  Physical Exam Performed:  Vitals:    03/09/24 1118   BP: 100/59   Pulse: 71   Resp: 16   Temp: 36.7 °C (98.1 °F)   SpO2: 92%     Body mass index is 35.19 kg/m².    Patient is AOx3 and in no acute distress. Patient is alert and cooperative. Sitting comfortably in bed with dressing clean, dry and intact.      Vascular: Faintly palpable DP/PT pulses B/L. Moderate pitting edema noted B/L L>>R. Hair growth absent B/L. CFT<5 to B/L hallux. Temperature is warm to cool from tibial tuberosity to distal digits R and warm to the touch distally L. No lymphatic streaking noted B/L.     Musculoskeletal: Gross active  and passive ROM intact to age and activity level. Moves all extremities spontaneously. No pain to palpation at feet B/L.      Neurological: Absent light touch sensation B/L. Pain stimuli diminished B/L. Denies any numbness, burning or tingling.     Dermatologic: Nails 1-5 are thickened, elongated, discolored with subungual debris right. Skin appears diffusely xerotic B/L. Web spaces 1-4 right are clean, dry and intact. No rashes or nodules noted R; L show maceration with peeling skin and serous drainage. Hyperkeratotic tissue with intradermal bleeding noted to R medial IPJ of hallux.     Wound:  Post-surgical wound open to distal LLE forefoot. Measuring approximately 15 cm x 8.5 cm with exposed metatarsal heads in the wound base. Mixed necrotic and fibrotic wound base with necrosis of all five metatarsal heads, tendons and muscles. No purulence noted. No areas of fluctuance. Some erythema and edema distally to foot xochilt-wound, continues to improve. Dorsal fibrotic based wound measuring 1.5 x 1.5 x 1.0 cm that tracks to the open amputation site, less drainage today.       LABS:   Results for orders placed or performed during the hospital encounter of 03/07/24 (from the past 24 hour(s))   POCT GLUCOSE   Result Value Ref Range    POCT Glucose 178 (H) 74 - 99 mg/dL   Basic Metabolic Panel   Result Value Ref Range    Glucose 82 65 - 99 mg/dL    Sodium 137 133 - 145 mmol/L    Potassium 3.9 3.4 - 5.1 mmol/L    Chloride 102 97 - 107 mmol/L    Bicarbonate 25 24 - 31 mmol/L    Urea Nitrogen 7 (L) 8 - 25 mg/dL    Creatinine 0.70 0.40 - 1.60 mg/dL    eGFR >90 >60 mL/min/1.73m*2    Calcium 8.9 8.5 - 10.4 mg/dL    Anion Gap 10 <=19 mmol/L   CBC   Result Value Ref Range    WBC 14.7 (H) 4.4 - 11.3 x10*3/uL    nRBC 0.0 0.0 - 0.0 /100 WBCs    RBC 3.34 (L) 4.00 - 5.20 x10*6/uL    Hemoglobin 9.3 (L) 12.0 - 16.0 g/dL    Hematocrit 28.1 (L) 36.0 - 46.0 %    MCV 84 80 - 100 fL    MCH 27.8 26.0 - 34.0 pg    MCHC 33.1 32.0 - 36.0 g/dL     RDW 13.2 11.5 - 14.5 %    Platelets 429 150 - 450 x10*3/uL   POCT GLUCOSE   Result Value Ref Range    POCT Glucose 78 74 - 99 mg/dL   POCT GLUCOSE   Result Value Ref Range    POCT Glucose 82 74 - 99 mg/dL      Lab Results   Component Value Date    HGBA1C 9.4 (H) 03/08/2024      Lab Results   Component Value Date    CRP 13.60 (H) 03/07/2024      Lab Results   Component Value Date    SEDRATE 76 (H) 03/07/2024        Results from last 7 days   Lab Units 03/09/24  0631   WBC AUTO x10*3/uL 14.7*   RBC AUTO x10*6/uL 3.34*   HEMOGLOBIN g/dL 9.3*   HEMATOCRIT % 28.1*     Results from last 7 days   Lab Units 03/09/24  0631 03/08/24  0632 03/07/24  0936   SODIUM mmol/L 137   < > 133   POTASSIUM mmol/L 3.9   < > 3.9   CHLORIDE mmol/L 102   < > 98   CO2 mmol/L 25   < > 26   BUN mg/dL 7*   < > 7*   CREATININE mg/dL 0.70   < > 0.50   CALCIUM mg/dL 8.9   < > 9.1   BILIRUBIN TOTAL mg/dL  --   --  0.2   ALT U/L  --   --  36   AST U/L  --   --  24    < > = values in this interval not displayed.     Results from last 7 days   Lab Units 03/08/24  1239   COLOR U  Light-Yellow   APPEARANCE U  Clear   PH U  6.5   SPEC GRAV UR  1.014   PROTEIN U mg/dL NEGATIVE   BLOOD UR  NEGATIVE   NITRITE U  NEGATIVE       IMAGING REVIEW:  Vascular US PVR without exercise    Result Date: 3/8/2024  Preliminary [place] [address]            Phone [phone]  Preliminary Vascular Lab Report   VASC US PVR WITHOUT EXERCISE  Patient Name:    MONTANA Deshpande Physician: [id number] Soni Leo MD,                  CHANELLE FRANK Study Date:      3/8/2024         Ordering Provider: [id number] TRUPTI BURNETT MRN/PID:         [id number]         Fellow: Accession#:      [id number]     Technologist:      Jeanne Stewart RVT YOB: 1975       Technologist 2: Gender:          F                 Encounter#:        [id number] Admission        Inpatient        Location           [facility] Status:                           Performed:  Diagnosis/ICD: Cellulitus of left lower limb-L03.116; Other specified symptoms                and signs involving the circulatory and respiratory                systems-R09.89 CPT Codes:     26871 Peripheral artery PVR (multi segmental pressure  Pertinent History: Left transmetatarsal amputation on 3/07/2024.  PRELIMINARY CONCLUSIONS:  Right Lower PVR: No evidence of arterial occlusive disease in the right lower extremity at rest. Normal digital perfusion noted. Triphasic flow is noted in the right common femoral artery, right popliteal artery, right posterior tibial artery and right dorsalis pedis artery. PVR tracings may be inaccurate due to patient movement. Left Lower PVR: No evidence of arterial occlusive disease in the left lower extremity at rest. Triphasic flow is noted in the left common femoral artery, left popliteal artery, left posterior tibial artery and left dorsalis pedis artery. Unable to obtain TBI due to transmetatarsal amputation. PVR tracings may be inaccurate due to patient movement.  Imaging & Doppler Findings:  RIGHT Lower PVR                Pressures Ratios Right High Thigh               152 mmHg  1.30 Right Low Thigh                145 mmHg  1.24 Right Calf                     142 mmHg  1.21 Right Posterior Tibial (Ankle) 128 mmHg  1.09 Right Dorsalis Pedis (Ankle)   132 mmHg  1.13 Right Digit (Great Toe)        123 mmHg  1.05   LEFT Lower PVR                Pressures Ratios Left High Thigh               161 mmHg  1.38 Left Low Thigh                152 mmHg  1.30 Left Calf                     132 mmHg  1.13 Left Posterior Tibial (Ankle) 128 mmHg  1.09 Left Dorsalis Pedis (Ankle)   132 mmHg  1.13                      Right     Left Brachial Pressure 115 mmHg 117 mmHg   VASCULAR PRELIMINARY REPORT completed by Jeanne Stewart RVT on 3/8/2024  at 1:53:18 PM  ** Final **     XR foot 3+ views bilateral    Result Date: 3/8/2024  Interpreted By:  Gonzalo Poon, STUDY: XR FOOT 3+ VIEWS BILATERAL; 3/7/2024 7:20 pm   INDICATION: Signs/Symptoms:post-op, wounds. Status post partial amputation left foot. Wound on 1st toe right foot.   COMPARISON: Left foot 03/07/2024 at 1012 hours.   ACCESSION NUMBER(S): [id number]   ORDERING CLINICIAN: KATRIN LAWRENCE   TECHNIQUE: Bilateral feet, three views each   FINDINGS: Right foot: *No fractures or destructive lesions are identified on the right. Hammertoe deformities of the 2nd through 5th toes are identified on the right.     Left foot: *Patient is status post amputation of all 5 toes. Extensive gauze artifact obscures the underlying detail. No definite fractures or destructive lesions are identified involving the remaining osseous structures though detail is limited due to overlying artifact.         1. Postsurgical appearance left foot. 2. No acute pathologic findings are identified on the right. Please note that the conventional radiographic findings of osteomyelitis are delayed by 10 to 14 days from the clinical onset. If there is a high suspicion for osteomyelitis then additional imaging may still be appropriate.   MACRO: none   Signed by: Gonzalo Poon 3/8/2024 8:09 AM Dictation workstation:   [id number]    XR foot left 3+ views    Result Date: 3/7/2024  Interpreted By:  Cecilio Feldman, STUDY: XR FOOT LEFT 3+ VIEWS; XR ANKLE LEFT 3+ VIEWS;  3/7/2024 10:16 am   INDICATION: Signs/Symptoms:infection; Signs/Symptoms:infection/ pain.   COMPARISON: None.   ACCESSION NUMBER(S): [id number]; [id number]   ORDERING CLINICIAN: ANGEL BOWSER   TECHNIQUE: Three views each of the left foot and left ankle were obtained.   FINDINGS: Moderate-sized plantar calcaneal spur and posterior calcaneal spur. Moderate osteophyte formation involving the medial malleolus. There is periosteal reaction along the distal medial tibia  diametaphysis. Ankle mortise and talar dome are intact. There is diffuse ankle soft tissue swelling, most pronounced laterally. There is diffuse midfoot and forefoot soft tissue swelling. There is cutaneous defect in the forefoot lateral to the 5th metatarsal. There is an acute comminuted fracture at the proximal end of the 5th proximal phalanx and an acute comminuted fracture in the distal head of the 5th metatarsal. There is some decreased bone density at the fracture sites. There is also soft tissue edema. No soft tissue gas density. No opaque soft tissue foreign body. No  erosion.       Cutaneous defect consistent with ulceration in the lateral forefoot, lateral to the 5th metatarsal.   Findings of osteomyelitis with pathologic fractures involving the distal 5th metatarsal head and the proximal end of the 5th proximal phalanx.   Ankle and midfoot/forefoot soft tissue swelling as described.   Osteophytic changes as described.   Nonspecific smooth periosteal cortical reaction involving the distal medial tibia diametaphyseal cortex. This could be from chronic infection or remote trauma. Clinical correlation needed.   MACRO: None   Signed by: Cecilio Feldman 3/7/2024 11:04 AM Dictation workstation:   [id number]    XR ankle left 3+ views    Result Date: 3/7/2024  Interpreted By:  Cecilio Feldman, STUDY: XR FOOT LEFT 3+ VIEWS; XR ANKLE LEFT 3+ VIEWS;  3/7/2024 10:16 am   INDICATION: Signs/Symptoms:infection; Signs/Symptoms:infection/ pain.   COMPARISON: None.   ACCESSION NUMBER(S): [id number]; [id number]   ORDERING CLINICIAN: ANGEL BOWSER   TECHNIQUE: Three views each of the left foot and left ankle were obtained.   FINDINGS: Moderate-sized plantar calcaneal spur and posterior calcaneal spur. Moderate osteophyte formation involving the medial malleolus. There is periosteal reaction along the distal medial tibia diametaphysis. Ankle mortise and talar dome are intact. There is diffuse ankle soft tissue swelling, most  pronounced laterally. There is diffuse midfoot and forefoot soft tissue swelling. There is cutaneous defect in the forefoot lateral to the 5th metatarsal. There is an acute comminuted fracture at the proximal end of the 5th proximal phalanx and an acute comminuted fracture in the distal head of the 5th metatarsal. There is some decreased bone density at the fracture sites. There is also soft tissue edema. No soft tissue gas density. No opaque soft tissue foreign body. No  erosion.       Cutaneous defect consistent with ulceration in the lateral forefoot, lateral to the 5th metatarsal.   Findings of osteomyelitis with pathologic fractures involving the distal 5th metatarsal head and the proximal end of the 5th proximal phalanx.   Ankle and midfoot/forefoot soft tissue swelling as described.   Osteophytic changes as described.   Nonspecific smooth periosteal cortical reaction involving the distal medial tibia diametaphyseal cortex. This could be from chronic infection or remote trauma. Clinical correlation needed.   MACRO: None   Signed by: Cecilio Feldman 3/7/2024 11:04 AM Dictation workstation:   [id number]    Lower extremity venous duplex left    Result Date: 2/21/2024  Interpreted By:  Stanley Baumann, STUDY: [facility] LOWER EXTREMITY VENOUS DUPLEX LEFT  2/21/2024 10:17 am   INDICATION: Left lower extremity swelling, concern for DVT   COMPARISON: None.   ACCESSION NUMBER(S): [id number]   ORDERING CLINICIAN: TONIA HINDS   TECHNIQUE: Routine ultrasound of the left lower extremity was performed with duplex Doppler (color and spectral) evaluation.   Static images were obtained for remote interpretation.   FINDINGS: THIGH VEINS:  The left common femoral, femoral, popliteal, proximal medial saphenous, and deep femoral veins are patent and free of thrombus.  The veins are normally compressible.  They demonstrate normal phasic flow and augmentation response.       Negative study.  No deep venous thrombosis of the left  lower extremity.   MACRO: None   Signed by: Stanley Baumann 2/21/2024 10:32 AM Dictation workstation:   [id number]            Assessment/Plan   ASSESSMENT & PLAN:    #S/P Transmetatarsal Amputation, Left Foot  #Wet Gangrene, Left Foot  #Cellulitis, Left Foot - Improving  #Chronic Non-Pressure Ulceration with Necrosis of Bone, Left Foot  #Suspected Chronic Osteomyelitis, Left Foot  #Pathologic Fracture 5th Metatarsal Head, Left Foot  #Pathologic Fracture 5th Proximal Phalanx, Left Foot  #DM Type II with Foot Ulcer - Uncontrolled  #Polyneuropathy  #PAD  #Callus, Right Hallux     - Patient was seen and evaluated; all findings were discussed and all questions were answered to patient's satisfaction.  - Charts, labs, vitals and imaging all reviewed.   - Imaging: XR L Ankle and Foot: Pathologic Fx of Left 5th Metatarsal Head and Proximal Phalanx with some lysis and erosive changes to the distal 5th ray. No gas on film. Some periosteal changes to the medial malleolus. Post-operative films shoe post-operative changes. No further gas formation. No lysis or signs of OM to RIGHT foot on plain films.  - Labs: WBC up again today at 14.3 from 12.9; Hbg up to 9.3 from 9.0; CRP 13.6; ESR 76  - PVRs: No signs of occlusive disease bilaterally with WNL MAREK and TBI results.  - Wound culture: Intraoperative culture growing 4+ Group B Strep  - Blood culture: Obtained; results pending.     Plan:  - Abx: IV Cefepime and Zosyn per Primary  - Pt is s/p amputation of digits 1-5 left foot on 3/7. Intraoperative cultures were obtained.   - It was discussed with pt in detail that she will need further surgical treatment including revision of TMA proximally with debridement of non-viable soft tissue with possible partial closure and possible graft application. She will also likely need long term IV antibiotic therapy. Pt expressed understanding of this.  - Plan to take back to OR Monday around noon pending OR availability. All risks and  "benefits were discussed in detail.  - LLE still at high risk at this time. It was discussed in detail with patient.  - Dressings: Betadine soaked 1/4\" packing, betadine soaked 4x4 gauze, dry gauze, ABDs, Kerlix and ACE.  - Nursing staff is able to reinforce dressing if & as necessary. Order placed.  - Podiatry will continue to follow while in house.     DVT ppx: SCD to RLE; no SCD to LLE please.  Weightbearing: NWB LLE with surgical shoe in place. OKAY to heel WB for transfers.  Discharge: Pt to follow up 1 week after discharge with Dr. Aponte/Nolan     Case to be discussed with attending, A&P above reflects a tentative plan. Please await for the final signature from the attending physician on service.    This patient will be followed by the Podiatry service. Please Epic Chat the corresponding residents below with questions or concerns.      Bharat Michelle DPM PGY-2  Podiatric Medicine and Surgery   Epic Chat          SIGNATURE: Bharat Michelle DPM PATIENT NAME: Mary Pedroza   DATE: March 9, 2024 MRN: [id number]   TIME: 3:56 PM CONTACT: Haiku Message     "

## 2024-03-09 NOTE — NURSING NOTE
BSSR complete pt awake pleasant and cooperative call light in reach voices no concerns at this time

## 2024-03-10 LAB
ANION GAP SERPL CALC-SCNC: 9 MMOL/L
BACTERIA SPEC CULT: ABNORMAL
BUN SERPL-MCNC: 7 MG/DL (ref 8–25)
CALCIUM SERPL-MCNC: 8.8 MG/DL (ref 8.5–10.4)
CHLORIDE SERPL-SCNC: 102 MMOL/L (ref 97–107)
CO2 SERPL-SCNC: 26 MMOL/L (ref 24–31)
CREAT SERPL-MCNC: 0.7 MG/DL (ref 0.4–1.6)
EGFRCR SERPLBLD CKD-EPI 2021: >90 ML/MIN/1.73M*2
ERYTHROCYTE [DISTWIDTH] IN BLOOD BY AUTOMATED COUNT: 13.5 % (ref 11.5–14.5)
GLUCOSE BLD MANUAL STRIP-MCNC: 149 MG/DL (ref 74–99)
GLUCOSE BLD MANUAL STRIP-MCNC: 157 MG/DL (ref 74–99)
GLUCOSE BLD MANUAL STRIP-MCNC: 180 MG/DL (ref 74–99)
GLUCOSE BLD MANUAL STRIP-MCNC: 78 MG/DL (ref 74–99)
GLUCOSE SERPL-MCNC: 73 MG/DL (ref 65–99)
GRAM STN SPEC: ABNORMAL
GRAM STN SPEC: ABNORMAL
HCT VFR BLD AUTO: 28.9 % (ref 36–46)
HGB BLD-MCNC: 9.1 G/DL (ref 12–16)
MCH RBC QN AUTO: 27.7 PG (ref 26–34)
MCHC RBC AUTO-ENTMCNC: 31.5 G/DL (ref 32–36)
MCV RBC AUTO: 88 FL (ref 80–100)
NRBC BLD-RTO: 0 /100 WBCS (ref 0–0)
PLATELET # BLD AUTO: 451 X10*3/UL (ref 150–450)
POTASSIUM SERPL-SCNC: 4.2 MMOL/L (ref 3.4–5.1)
RBC # BLD AUTO: 3.29 X10*6/UL (ref 4–5.2)
SODIUM SERPL-SCNC: 137 MMOL/L (ref 133–145)
WBC # BLD AUTO: 13.4 X10*3/UL (ref 4.4–11.3)

## 2024-03-10 PROCEDURE — 2500000004 HC RX 250 GENERAL PHARMACY W/ HCPCS (ALT 636 FOR OP/ED): Performed by: INTERNAL MEDICINE

## 2024-03-10 PROCEDURE — 2500000001 HC RX 250 WO HCPCS SELF ADMINISTERED DRUGS (ALT 637 FOR MEDICARE OP): Performed by: INTERNAL MEDICINE

## 2024-03-10 PROCEDURE — 2500000004 HC RX 250 GENERAL PHARMACY W/ HCPCS (ALT 636 FOR OP/ED): Performed by: NURSE PRACTITIONER

## 2024-03-10 PROCEDURE — 2500000004 HC RX 250 GENERAL PHARMACY W/ HCPCS (ALT 636 FOR OP/ED): Mod: JZ

## 2024-03-10 PROCEDURE — 85027 COMPLETE CBC AUTOMATED: CPT | Performed by: INTERNAL MEDICINE

## 2024-03-10 PROCEDURE — 2500000001 HC RX 250 WO HCPCS SELF ADMINISTERED DRUGS (ALT 637 FOR MEDICARE OP)

## 2024-03-10 PROCEDURE — 36415 COLL VENOUS BLD VENIPUNCTURE: CPT | Performed by: INTERNAL MEDICINE

## 2024-03-10 PROCEDURE — 2500000002 HC RX 250 W HCPCS SELF ADMINISTERED DRUGS (ALT 637 FOR MEDICARE OP, ALT 636 FOR OP/ED): Performed by: INTERNAL MEDICINE

## 2024-03-10 PROCEDURE — 80048 BASIC METABOLIC PNL TOTAL CA: CPT | Performed by: INTERNAL MEDICINE

## 2024-03-10 PROCEDURE — 82947 ASSAY GLUCOSE BLOOD QUANT: CPT

## 2024-03-10 PROCEDURE — 1210000001 HC SEMI-PRIVATE ROOM DAILY

## 2024-03-10 RX ADMIN — CEFEPIME 2 G: 2 INJECTION, POWDER, FOR SOLUTION INTRAVENOUS at 12:30

## 2024-03-10 RX ADMIN — VANCOMYCIN 1250 MG: 1.75 INJECTION, SOLUTION INTRAVENOUS at 10:51

## 2024-03-10 RX ADMIN — OXYCODONE 5 MG: 5 TABLET ORAL at 20:11

## 2024-03-10 RX ADMIN — SITAGLIPTIN 25 MG: 25 TABLET, FILM COATED ORAL at 09:02

## 2024-03-10 RX ADMIN — ARIPIPRAZOLE 5 MG: 5 TABLET ORAL at 09:02

## 2024-03-10 RX ADMIN — CEFEPIME 2 G: 2 INJECTION, POWDER, FOR SOLUTION INTRAVENOUS at 04:34

## 2024-03-10 RX ADMIN — SIMVASTATIN 20 MG: 20 TABLET, FILM COATED ORAL at 20:11

## 2024-03-10 RX ADMIN — INSULIN GLARGINE 80 UNITS: 100 INJECTION, SOLUTION SUBCUTANEOUS at 20:52

## 2024-03-10 RX ADMIN — VANCOMYCIN 1250 MG: 1.75 INJECTION, SOLUTION INTRAVENOUS at 21:19

## 2024-03-10 RX ADMIN — IRON SUCROSE 200 MG: 20 INJECTION, SOLUTION INTRAVENOUS at 05:45

## 2024-03-10 RX ADMIN — CEFEPIME 2 G: 2 INJECTION, POWDER, FOR SOLUTION INTRAVENOUS at 20:11

## 2024-03-10 RX ADMIN — FENOFIBRATE 160 MG: 160 TABLET ORAL at 09:02

## 2024-03-10 RX ADMIN — ACETAMINOPHEN 500 MG: 500 TABLET ORAL at 04:40

## 2024-03-10 RX ADMIN — VENLAFAXINE HYDROCHLORIDE 75 MG: 75 CAPSULE, EXTENDED RELEASE ORAL at 09:01

## 2024-03-10 RX ADMIN — OXYCODONE 5 MG: 5 TABLET ORAL at 04:40

## 2024-03-10 RX ADMIN — DOXEPIN HYDROCHLORIDE 100 MG: 50 CAPSULE ORAL at 20:11

## 2024-03-10 RX ADMIN — ACETAMINOPHEN 500 MG: 500 TABLET ORAL at 20:11

## 2024-03-10 RX ADMIN — INSULIN LISPRO 2 UNITS: 100 INJECTION, SOLUTION INTRAVENOUS; SUBCUTANEOUS at 16:16

## 2024-03-10 ASSESSMENT — COGNITIVE AND FUNCTIONAL STATUS - GENERAL
STANDING UP FROM CHAIR USING ARMS: A LITTLE
WALKING IN HOSPITAL ROOM: A LOT
HELP NEEDED FOR BATHING: A LITTLE
WALKING IN HOSPITAL ROOM: A LOT
DAILY ACTIVITIY SCORE: 21
DRESSING REGULAR UPPER BODY CLOTHING: A LITTLE
WALKING IN HOSPITAL ROOM: A LITTLE
HELP NEEDED FOR BATHING: A LITTLE
MOVING TO AND FROM BED TO CHAIR: A LITTLE
DRESSING REGULAR UPPER BODY CLOTHING: A LITTLE
STANDING UP FROM CHAIR USING ARMS: A LITTLE
MOBILITY SCORE: 18
CLIMB 3 TO 5 STEPS WITH RAILING: A LOT
MOBILITY SCORE: 18
STANDING UP FROM CHAIR USING ARMS: A LITTLE
WALKING IN HOSPITAL ROOM: A LITTLE
CLIMB 3 TO 5 STEPS WITH RAILING: A LOT
MOVING TO AND FROM BED TO CHAIR: A LITTLE
MOVING TO AND FROM BED TO CHAIR: A LITTLE
TOILETING: A LITTLE
STANDING UP FROM CHAIR USING ARMS: A LOT
DAILY ACTIVITIY SCORE: 21
MOBILITY SCORE: 19
TOILETING: A LITTLE
MOBILITY SCORE: 19
DAILY ACTIVITIY SCORE: 24

## 2024-03-10 ASSESSMENT — PAIN SCALES - GENERAL
PAINLEVEL_OUTOF10: 5 - MODERATE PAIN
PAINLEVEL_OUTOF10: 0 - NO PAIN
PAINLEVEL_OUTOF10: 3
PAINLEVEL_OUTOF10: 9
PAINLEVEL_OUTOF10: 0 - NO PAIN
PAINLEVEL_OUTOF10: 9

## 2024-03-10 ASSESSMENT — PAIN - FUNCTIONAL ASSESSMENT
PAIN_FUNCTIONAL_ASSESSMENT: 0-10

## 2024-03-10 ASSESSMENT — PAIN DESCRIPTION - ORIENTATION: ORIENTATION: LEFT

## 2024-03-10 ASSESSMENT — PAIN DESCRIPTION - LOCATION: LOCATION: FOOT

## 2024-03-10 NOTE — PROGRESS NOTES
Physical Therapy    Physical Therapy Evaluation    Patient Name: Mary Pedroza  MRN: [id number]  Today's Date: 3/10/2024   Time Calculation  Start Time: 0825  Stop Time: 0856  Time Calculation (min): 31 min    Assessment/Plan   PT Assessment  PT Assessment Results: Decreased endurance, Impaired balance, Decreased mobility, Decreased skin integrity, Orthopedic restrictions, Pain  Rehab Prognosis: Good  Evaluation/Treatment Tolerance: Patient tolerated treatment well, Patient limited by fatigue  Medical Staff Made Aware: Yes  Strengths: Ability to acquire knowledge  Barriers to Participation: Housing layout, Comorbidities  End of Session Communication: Bedside nurse  Assessment Comment: Patient is a 47 y/o diabetic F 3 days s/p transmetatarsal amputation L foot. Patient demos functional moblity deficits consistent with recent surgery and subsequent NBW status. Patient will benefit from skilled PT intervention for transfer and gait training, endurance activities and  safety awareness and  instruction.  End of Session Patient Position: Up in chair  IP OR SWING BED PT PLAN  Inpatient or Swing Bed: Inpatient  PT Plan  Treatment/Interventions: Bed mobility, Transfer training, Gait training, Stair training, Balance training, Neuromuscular re-education, Strengthening, Endurance training, Therapeutic exercise, Therapeutic activity, Home exercise program  PT Plan: Skilled PT  PT Frequency: Daily  PT Discharge Recommendations: High intensity level of continued care  Equipment Recommended upon Discharge: Standard walker, Crutches  PT Recommended Transfer Status: Assist x1  PT - OK to Discharge: Yes      Subjective   General Visit Information:  General  Reason for Referral: PT Eval - Impaired mobility  Prior to Session Communication: Bedside nurse  Patient Position Received: Bed, 1 rail up  Home Living:  Home Living  Type of Home: Apartment  Lives With: Friends  Home Adaptive Equipment: None  Home Layout: One level  Home  Access: Stairs to enter with rails  Entrance Stairs-Rails: Right  Entrance Stairs-Number of Steps: 3 + 6  Bathroom Shower/Tub: Tub only  Bathroom Toilet: Standard  Bathroom Equipment: None  Home Living Comments: Patient states no accomodations in apartment  Prior Level of Function:  Prior Function Per Pt/Caregiver Report  Level of Mission: Independent with ADLs and functional transfers, Independent with homemaking with ambulation  Vocational: On disability  Precautions:   NWB L    Objective   Pain:  Pain Assessment  Pain Assessment: 0-10  Pain Score: 0 - No pain  Pain Location: Foot  Pain Orientation: Left  Cognition:  Cognition  Overall Cognitive Status: Within Functional Limits    General Assessments:  General Observation  General Observation: L foot with surgical dressing/ACE wrap in place        Static Sitting Balance  Static Sitting-Balance Support: Feet supported  Static Sitting-Level of Assistance: Independent    Functional Assessments:  Bed Mobility  Bed Mobility: Yes  Bed Mobility 1  Bed Mobility 1: Supine to sitting  Level of Assistance 1: Close supervision  Bed Mobility Comments 1: head of bed elevated and uses one bedrail    Transfers  Transfer: Yes  Transfer 1  Transfer From 1: Sit to  Transfer to 1: Stand  Technique 1: Sit to stand  Transfer Device 1: Walker  Transfer Level of Assistance 1: Contact guard  Transfers 2  Transfer From 2: Stand to  Transfer to 2: Sit  Technique 2: Stand to sit  Transfer Device 2: Walker  Transfer Level of Assistance 2: Contact guard    Ambulation/Gait Training  Ambulation/Gait Training Performed: Yes  Ambulation/Gait Training 1  Surface 1: Level tile  Device 1: Standard walker  Assistance 1: Minimum assistance  Quality of Gait 1: Narrow base of support, Decreased step length  Comments/Distance (ft) 1: 5' to bedside chair    Stairs  Stairs: No  Extremity/Trunk Assessments:  RUE   RUE : Within Functional Limits  LUE   LUE: Within Functional Limits  RLE   RLE : Within  Functional Limits  LLE   LLE : Exceptions to WFL  Strength LLE  L Knee Flexion: 4/5  L Knee Extension: 4+/5  Outcome Measures:  [facility] Basic Mobility  Turning from your back to your side while in a flat bed without using bedrails: None  Moving from lying on your back to sitting on the side of a flat bed without using bedrails: None  Moving to and from bed to chair (including a wheelchair): A little  Standing up from a chair using your arms (e.g. wheelchair or bedside chair): A little  To walk in hospital room: A lot  Climbing 3-5 steps with railing: A lot  Basic Mobility - Total Score: 18    Encounter Problems       Encounter Problems (Active)       Mobility       STG - Patient will ambulate 20 feet with standard walker or crutches with CGA       Start:  03/10/24    Expected End:  03/24/24            STG - Patient will ascend and descend  six stairs using one rail and one crutch with close S to simulate home environment       Start:  03/10/24    Expected End:  03/24/24                   PT Transfers       STG - Transfer from bed to chair with or without A device, maintaining NWB status       Start:  03/10/24    Expected End:  03/24/24               Safety       LTG - Patient will demonstrate safety requirements appropriate to situation/environment       Start:  03/10/24    Expected End:  03/24/24                   Education Documentation  Mobility Training, taught by Gi Pendleton, PT at 3/10/2024  9:19 AM.  Learner: Patient  Readiness: Acceptance  Method: Explanation, Demonstration  Response: Verbalizes Understanding, Demonstrated Understanding, Needs Reinforcement    Education Comments  No comments found.

## 2024-03-10 NOTE — PROGRESS NOTES
"Mary Pedroza is a 48 y.o. female on day 3 of admission presenting with Cellulitis and abscess of foot.    Subjective   She underwent incision and drainage and transmetatarsal amputation of the left foot on 3/7.      Objective     Physical Exam  General: awake, alert, oriented, responsive  Cardiovascular: regular, normal S1 and S2  Lungs: good air entry bilaterally, clear to auscultation  Extremities: The left foot and ankle were wrapped in a dressing which was not removed. There was no peripheral cyanosis, no pedal edema  Neuro: alert, oriented x 3, no focal weakness      Last Recorded Vitals  Blood pressure 100/56, pulse 76, temperature 36.7 °C (98.1 °F), temperature source Oral, resp. rate 16, height 1.626 m (5' 4\"), weight 93 kg (205 lb), SpO2 98 %.  Intake/Output last 3 Shifts:  I/O last 3 completed shifts:  In: 1160 (12.5 mL/kg) [P.O.:1160]  Out: 4900 (52.7 mL/kg) [Urine:4900 (1.5 mL/kg/hr)]  Weight: 93 kg     Relevant Results  Results for orders placed or performed during the hospital encounter of 03/07/24 (from the past 24 hour(s))   POCT GLUCOSE   Result Value Ref Range    POCT Glucose 82 74 - 99 mg/dL   POCT GLUCOSE   Result Value Ref Range    POCT Glucose 90 74 - 99 mg/dL   POCT GLUCOSE   Result Value Ref Range    POCT Glucose 170 (H) 74 - 99 mg/dL   Basic Metabolic Panel   Result Value Ref Range    Glucose 73 65 - 99 mg/dL    Sodium 137 133 - 145 mmol/L    Potassium 4.2 3.4 - 5.1 mmol/L    Chloride 102 97 - 107 mmol/L    Bicarbonate 26 24 - 31 mmol/L    Urea Nitrogen 7 (L) 8 - 25 mg/dL    Creatinine 0.70 0.40 - 1.60 mg/dL    eGFR >90 >60 mL/min/1.73m*2    Calcium 8.8 8.5 - 10.4 mg/dL    Anion Gap 9 <=19 mmol/L   CBC   Result Value Ref Range    WBC 13.4 (H) 4.4 - 11.3 x10*3/uL    nRBC 0.0 0.0 - 0.0 /100 WBCs    RBC 3.29 (L) 4.00 - 5.20 x10*6/uL    Hemoglobin 9.1 (L) 12.0 - 16.0 g/dL    Hematocrit 28.9 (L) 36.0 - 46.0 %    MCV 88 80 - 100 fL    MCH 27.7 26.0 - 34.0 pg    MCHC 31.5 (L) 32.0 - 36.0 g/dL "    RDW 13.5 11.5 - 14.5 %    Platelets 451 (H) 150 - 450 x10*3/uL   POCT GLUCOSE   Result Value Ref Range    POCT Glucose 78 74 - 99 mg/dL   POCT GLUCOSE   Result Value Ref Range    POCT Glucose 149 (H) 74 - 99 mg/dL     Scheduled medications  ARIPiprazole, 5 mg, oral, Daily  cefepime, 2 g, intravenous, q8h  doxepin, 100 mg, oral, Nightly  fenofibrate, 160 mg, oral, Daily  [Held by provider] glimepiride, 4 mg, oral, Daily before breakfast  insulin glargine, 80 Units, subcutaneous, Nightly  insulin lispro, 0-10 Units, subcutaneous, TID with meals  nicotine, 1 patch, transdermal, Daily  propranolol, 20 mg, oral, BID  simvastatin, 20 mg, oral, q PM  SITagliptin phosphate, 25 mg, oral, Daily  vancomycin-diluent combo no.1, 1,250 mg, intravenous, q12h  venlafaxine XR, 75 mg, oral, Daily      Continuous medications     PRN medications  PRN medications: acetaminophen, dextrose 10 % in water (D10W), dextrose, glucagon, hydrOXYzine HCL, oxyCODONE, polyethylene glycol, SUMAtriptan, vancomycin        Assessment/Plan   Left foot infection: Cellulitis with ulceration and gangrene  S/p left foot incision and drainage and transmetatarsal amputation on 3/7  Blood cultures in progress  On IV antibiotic coverage with cefepime and vancomycin     Diabetes type 2 with hyperglycemia and gangrene of the left foot  Lantus scheduled and Humalog sliding scale  Humalog sliding scale.  Lantus  Continue januvia  Hemoglobin A1c was 9.4% on 3/8/2024     Hyperlipidemia  On fenofibrate, simvastatin     Bipolar disorder  On Abilify     Plan  Continue IV antibiotic coverage.  Postop plan per podiatry  Continue to hold glimepiride because of low normal blood glucose.   Diabetes education  PT/OT    Krystal Joel MD

## 2024-03-10 NOTE — CARE PLAN
The patient's goals for the shift include pain control    The clinical goals for the shift include Pain control    Over the shift, the patient did not make progress toward the following goals. Barriers to progression include . Recommendations to address these barriers include .

## 2024-03-10 NOTE — PROGRESS NOTES
PODIATRY SERVICE CONSULT PROGRESS NOTE    SERVICE DATE: 3/10/2024   SERVICE TIME:  02:05 PM    Subjective   INTERVAL HPI:   Patient was seen at bedside.  Pain well controlled. However some pain with dressing change.  Patient denies any constitutional symptoms.   It was again discussed in detail that the limb is still at high risk at this time.  Patient expressed understanding.   She understands she will have surgery again tomorrow and will be NPO after midnight tonight.  She also expresses approval with plans for SNF after hospitalization.      Medications:  Scheduled Meds: ARIPiprazole, 5 mg, oral, Daily  cefepime, 2 g, intravenous, q8h  doxepin, 100 mg, oral, Nightly  fenofibrate, 160 mg, oral, Daily  [Held by provider] glimepiride, 4 mg, oral, Daily before breakfast  insulin glargine, 80 Units, subcutaneous, Nightly  insulin lispro, 0-10 Units, subcutaneous, TID with meals  nicotine, 1 patch, transdermal, Daily  propranolol, 20 mg, oral, BID  simvastatin, 20 mg, oral, q PM  SITagliptin phosphate, 25 mg, oral, Daily  vancomycin-diluent combo no.1, 1,250 mg, intravenous, q12h  venlafaxine XR, 75 mg, oral, Daily      Continuous Infusions:    PRN Meds: PRN medications: acetaminophen, dextrose 10 % in water (D10W), dextrose, glucagon, hydrOXYzine HCL, oxyCODONE, polyethylene glycol, SUMAtriptan, vancomycin         Objective   PHYSICAL EXAM:  Physical Exam Performed:  Vitals:    03/10/24 1129   BP: 100/56   Pulse: 76   Resp: 16   Temp: 36.7 °C (98.1 °F)   SpO2: 98%     Body mass index is 35.19 kg/m².    Patient is AOx3 and in no acute distress. Patient is alert and cooperative. Sitting comfortably in bed with dressing clean, dry and intact.      Vascular: Faintly palpable DP/PT pulses B/L. Moderate pitting edema noted B/L L>>R. Hair growth absent B/L. CFT<5 to B/L hallux. Temperature is warm to cool from tibial tuberosity to distal digits R and warm to the touch distally L. No lymphatic streaking noted B/L.      Musculoskeletal: Gross active and passive ROM intact to age and activity level. Moves all extremities spontaneously. No pain to palpation at feet B/L.      Neurological: Absent light touch sensation B/L. Pain stimuli diminished B/L. Denies any numbness, burning or tingling.     Dermatologic: Nails 1-5 are thickened, elongated, discolored with subungual debris right. Skin appears diffusely xerotic B/L. Web spaces 1-4 right are clean, dry and intact. No rashes or nodules noted R; L show maceration with peeling skin and serous drainage. Hyperkeratotic tissue with intradermal bleeding noted to R medial IPJ of hallux.     Wound:  Post-surgical wound open to distal LLE forefoot. Measuring approximately 15 cm x 8.5 cm with exposed metatarsal heads in the wound base. Mixed necrotic and fibrotic wound base with necrosis of all five metatarsal heads, tendons and muscles. No purulence noted. No areas of fluctuance. Some erythema and edema distally to foot xochilt-wound, continues to improve. Dorsal fibrotic based wound measuring 1.5 x 1.5 x 1.0 cm that tracks to the open amputation site, less drainage today.       LABS:   Results for orders placed or performed during the hospital encounter of 03/07/24 (from the past 24 hour(s))   POCT GLUCOSE   Result Value Ref Range    POCT Glucose 90 74 - 99 mg/dL   POCT GLUCOSE   Result Value Ref Range    POCT Glucose 170 (H) 74 - 99 mg/dL   Basic Metabolic Panel   Result Value Ref Range    Glucose 73 65 - 99 mg/dL    Sodium 137 133 - 145 mmol/L    Potassium 4.2 3.4 - 5.1 mmol/L    Chloride 102 97 - 107 mmol/L    Bicarbonate 26 24 - 31 mmol/L    Urea Nitrogen 7 (L) 8 - 25 mg/dL    Creatinine 0.70 0.40 - 1.60 mg/dL    eGFR >90 >60 mL/min/1.73m*2    Calcium 8.8 8.5 - 10.4 mg/dL    Anion Gap 9 <=19 mmol/L   CBC   Result Value Ref Range    WBC 13.4 (H) 4.4 - 11.3 x10*3/uL    nRBC 0.0 0.0 - 0.0 /100 WBCs    RBC 3.29 (L) 4.00 - 5.20 x10*6/uL    Hemoglobin 9.1 (L) 12.0 - 16.0 g/dL    Hematocrit  28.9 (L) 36.0 - 46.0 %    MCV 88 80 - 100 fL    MCH 27.7 26.0 - 34.0 pg    MCHC 31.5 (L) 32.0 - 36.0 g/dL    RDW 13.5 11.5 - 14.5 %    Platelets 451 (H) 150 - 450 x10*3/uL   POCT GLUCOSE   Result Value Ref Range    POCT Glucose 78 74 - 99 mg/dL   POCT GLUCOSE   Result Value Ref Range    POCT Glucose 149 (H) 74 - 99 mg/dL      Lab Results   Component Value Date    HGBA1C 9.4 (H) 03/08/2024      Lab Results   Component Value Date    CRP 13.60 (H) 03/07/2024      Lab Results   Component Value Date    SEDRATE 76 (H) 03/07/2024        Results from last 7 days   Lab Units 03/10/24  0608   WBC AUTO x10*3/uL 13.4*   RBC AUTO x10*6/uL 3.29*   HEMOGLOBIN g/dL 9.1*   HEMATOCRIT % 28.9*     Results from last 7 days   Lab Units 03/10/24  0608 03/08/24  0632 03/07/24  0936   SODIUM mmol/L 137   < > 133   POTASSIUM mmol/L 4.2   < > 3.9   CHLORIDE mmol/L 102   < > 98   CO2 mmol/L 26   < > 26   BUN mg/dL 7*   < > 7*   CREATININE mg/dL 0.70   < > 0.50   CALCIUM mg/dL 8.8   < > 9.1   BILIRUBIN TOTAL mg/dL  --   --  0.2   ALT U/L  --   --  36   AST U/L  --   --  24    < > = values in this interval not displayed.     Results from last 7 days   Lab Units 03/08/24  1239   COLOR U  Light-Yellow   APPEARANCE U  Clear   PH U  6.5   SPEC GRAV UR  1.014   PROTEIN U mg/dL NEGATIVE   BLOOD UR  NEGATIVE   NITRITE U  NEGATIVE       IMAGING REVIEW:  Vascular US PVR without exercise    Result Date: 3/8/2024  Preliminary Cardiology Report           [place] [id number]            Phone [phone]  Preliminary Vascular Lab Report   VASC US PVR WITHOUT EXERCISE  Patient Name:    MONTANA BUD Deshpande Physician: [id number] Soni Leo MD,                  CHANELLE FRANK Study Date:      3/8/2024         Ordering Provider: [id number] TRUPTI BURNETT MRN/PID:         [id number]         Fellow: Accession#:      [id number]      Technologist:      Jeanne Stewart RVT YOB: 1975       Technologist 2: Gender:          F                Encounter#:        [id number] Admission        Inpatient        Location           [facility] Status:                           Performed:  Diagnosis/ICD: Cellulitus of left lower limb-L03.116; Other specified symptoms                and signs involving the circulatory and respiratory                systems-R09.89 CPT Codes:     99002 Peripheral artery PVR (multi segmental pressure  Pertinent History: Left transmetatarsal amputation on 3/07/2024.  PRELIMINARY CONCLUSIONS:  Right Lower PVR: No evidence of arterial occlusive disease in the right lower extremity at rest. Normal digital perfusion noted. Triphasic flow is noted in the right common femoral artery, right popliteal artery, right posterior tibial artery and right dorsalis pedis artery. PVR tracings may be inaccurate due to patient movement. Left Lower PVR: No evidence of arterial occlusive disease in the left lower extremity at rest. Triphasic flow is noted in the left common femoral artery, left popliteal artery, left posterior tibial artery and left dorsalis pedis artery. Unable to obtain TBI due to transmetatarsal amputation. PVR tracings may be inaccurate due to patient movement.  Imaging & Doppler Findings:  RIGHT Lower PVR                Pressures Ratios Right High Thigh               152 mmHg  1.30 Right Low Thigh                145 mmHg  1.24 Right Calf                     142 mmHg  1.21 Right Posterior Tibial (Ankle) 128 mmHg  1.09 Right Dorsalis Pedis (Ankle)   132 mmHg  1.13 Right Digit (Great Toe)        123 mmHg  1.05   LEFT Lower PVR                Pressures Ratios Left High Thigh               161 mmHg  1.38 Left Low Thigh                152 mmHg  1.30 Left Calf                     132 mmHg  1.13 Left Posterior Tibial (Ankle) 128 mmHg  1.09 Left Dorsalis Pedis (Ankle)   132 mmHg  1.13                      Right      Left Brachial Pressure 115 mmHg 117 mmHg   VASCULAR PRELIMINARY REPORT completed by Jeanne Stewart RVT on 3/8/2024 at 1:53:18 PM  ** Final **     XR foot 3+ views bilateral    Result Date: 3/8/2024  Interpreted By:  Gonzalo Poon, STUDY: XR FOOT 3+ VIEWS BILATERAL; 3/7/2024 7:20 pm   INDICATION: Signs/Symptoms:post-op, wounds. Status post partial amputation left foot. Wound on 1st toe right foot.   COMPARISON: Left foot 03/07/2024 at 1012 hours.   ACCESSION NUMBER(S): [id number]   ORDERING CLINICIAN: KATRIN LAWRENCE   TECHNIQUE: Bilateral feet, three views each   FINDINGS: Right foot: *No fractures or destructive lesions are identified on the right. Hammertoe deformities of the 2nd through 5th toes are identified on the right.     Left foot: *Patient is status post amputation of all 5 toes. Extensive gauze artifact obscures the underlying detail. No definite fractures or destructive lesions are identified involving the remaining osseous structures though detail is limited due to overlying artifact.         1. Postsurgical appearance left foot. 2. No acute pathologic findings are identified on the right. Please note that the conventional radiographic findings of osteomyelitis are delayed by 10 to 14 days from the clinical onset. If there is a high suspicion for osteomyelitis then additional imaging may still be appropriate.   MACRO: none   Signed by: Gonzalo Poon 3/8/2024 8:09 AM Dictation workstation:   [id number]    XR foot left 3+ views    Result Date: 3/7/2024  Interpreted By:  Cecilio Feldman, STUDY: XR FOOT LEFT 3+ VIEWS; XR ANKLE LEFT 3+ VIEWS;  3/7/2024 10:16 am   INDICATION: Signs/Symptoms:infection; Signs/Symptoms:infection/ pain.   COMPARISON: None.   ACCESSION NUMBER(S): [id number]; [id number]   ORDERING CLINICIAN: ANGEL BOWSER   TECHNIQUE: Three views each of the left foot and left ankle were obtained.   FINDINGS: Moderate-sized plantar calcaneal spur and posterior calcaneal spur. Moderate  osteophyte formation involving the medial malleolus. There is periosteal reaction along the distal medial tibia diametaphysis. Ankle mortise and talar dome are intact. There is diffuse ankle soft tissue swelling, most pronounced laterally. There is diffuse midfoot and forefoot soft tissue swelling. There is cutaneous defect in the forefoot lateral to the 5th metatarsal. There is an acute comminuted fracture at the proximal end of the 5th proximal phalanx and an acute comminuted fracture in the distal head of the 5th metatarsal. There is some decreased bone density at the fracture sites. There is also soft tissue edema. No soft tissue gas density. No opaque soft tissue foreign body. No  erosion.       Cutaneous defect consistent with ulceration in the lateral forefoot, lateral to the 5th metatarsal.   Findings of osteomyelitis with pathologic fractures involving the distal 5th metatarsal head and the proximal end of the 5th proximal phalanx.   Ankle and midfoot/forefoot soft tissue swelling as described.   Osteophytic changes as described.   Nonspecific smooth periosteal cortical reaction involving the distal medial tibia diametaphyseal cortex. This could be from chronic infection or remote trauma. Clinical correlation needed.   MACRO: None   Signed by: Cecilio Feldman 3/7/2024 11:04 AM Dictation workstation:   [id number]    XR ankle left 3+ views    Result Date: 3/7/2024  Interpreted By:  Cecilio Feldman, STUDY: XR FOOT LEFT 3+ VIEWS; XR ANKLE LEFT 3+ VIEWS;  3/7/2024 10:16 am   INDICATION: Signs/Symptoms:infection; Signs/Symptoms:infection/ pain.   COMPARISON: None.   ACCESSION NUMBER(S): [id number]; [id number]   ORDERING CLINICIAN: ANGEL BOWSER   TECHNIQUE: Three views each of the left foot and left ankle were obtained.   FINDINGS: Moderate-sized plantar calcaneal spur and posterior calcaneal spur. Moderate osteophyte formation involving the medial malleolus. There is periosteal reaction along the distal medial  tibia diametaphysis. Ankle mortise and talar dome are intact. There is diffuse ankle soft tissue swelling, most pronounced laterally. There is diffuse midfoot and forefoot soft tissue swelling. There is cutaneous defect in the forefoot lateral to the 5th metatarsal. There is an acute comminuted fracture at the proximal end of the 5th proximal phalanx and an acute comminuted fracture in the distal head of the 5th metatarsal. There is some decreased bone density at the fracture sites. There is also soft tissue edema. No soft tissue gas density. No opaque soft tissue foreign body. No  erosion.       Cutaneous defect consistent with ulceration in the lateral forefoot, lateral to the 5th metatarsal.   Findings of osteomyelitis with pathologic fractures involving the distal 5th metatarsal head and the proximal end of the 5th proximal phalanx.   Ankle and midfoot/forefoot soft tissue swelling as described.   Osteophytic changes as described.   Nonspecific smooth periosteal cortical reaction involving the distal medial tibia diametaphyseal cortex. This could be from chronic infection or remote trauma. Clinical correlation needed.   MACRO: None   Signed by: Cecilio Feldman 3/7/2024 11:04 AM Dictation workstation:   [id number]    Lower extremity venous duplex left    Result Date: 2/21/2024  Interpreted By:  Stanley Baumann, STUDY: [facility] LOWER EXTREMITY VENOUS DUPLEX LEFT  2/21/2024 10:17 am   INDICATION: Left lower extremity swelling, concern for DVT   COMPARISON: None.   ACCESSION NUMBER(S): [id number]   ORDERING CLINICIAN: TONIA HINDS   TECHNIQUE: Routine ultrasound of the left lower extremity was performed with duplex Doppler (color and spectral) evaluation.   Static images were obtained for remote interpretation.   FINDINGS: THIGH VEINS:  The left common femoral, femoral, popliteal, proximal medial saphenous, and deep femoral veins are patent and free of thrombus.  The veins are normally compressible.  They  demonstrate normal phasic flow and augmentation response.       Negative study.  No deep venous thrombosis of the left lower extremity.   MACRO: None   Signed by: Stanley Baumann 2/21/2024 10:32 AM Dictation workstation:   [id number]            Assessment/Plan   ASSESSMENT & PLAN:    #S/P Transmetatarsal Amputation, Left Foot  #Wet Gangrene, Left Foot  #Cellulitis, Left Foot - Improving  #Chronic Non-Pressure Ulceration with Necrosis of Bone, Left Foot  #Suspected Chronic Osteomyelitis, Left Foot  #Pathologic Fracture 5th Metatarsal Head, Left Foot  #Pathologic Fracture 5th Proximal Phalanx, Left Foot  #DM Type II with Foot Ulcer - Uncontrolled  #Polyneuropathy  #PAD  #Callus, Right Hallux     - Patient was seen and evaluated; all findings were discussed and all questions were answered to patient's satisfaction.  - Charts, labs, vitals and imaging all reviewed.   - Imaging: XR L Ankle and Foot: Pathologic Fx of Left 5th Metatarsal Head and Proximal Phalanx with some lysis and erosive changes to the distal 5th ray. No gas on film. Some periosteal changes to the medial malleolus. Post-operative films shoe post-operative changes. No further gas formation. No lysis or signs of OM to RIGHT foot on plain films.  - Labs: WBC downtrending to 13.4; Hbg 9.1; CRP 13.6; ESR 76; HbA1c 9.4%  - PVRs: No signs of occlusive disease bilaterally with WNL MAREK and TBI results.  - Wound culture: Intraoperative culture growing 4+ Group B Strep  - Blood culture: Obtained; NGTD at 2 days     Plan:  - Abx: IV Cefepime and Zosyn per Primary  - Pt is s/p amputation of digits 1-5 left foot on 3/7. Intraoperative cultures were obtained.   - It was discussed with pt in detail that she will need further surgical treatment including revision of TMA proximally with debridement of non-viable soft tissue with possible partial closure and possible graft application. She will also likely need long term IV antibiotic therapy. Pt expressed  "understanding of this.  - Surgery scheduled for tomorrow, Monday 3/11/24 around noon pending OR availability. NPO after midnight. All risks and benefits were discussed in detail.  - Please optimize patient for upcoming procedure. Appreciate documentation of clearance by primary team.   - LLE still at high risk at this time. It was discussed in detail with patient.  - Dressings: Betadine soaked 1/4\" packing, betadine soaked 4x4 gauze, dry gauze, ABDs, Kerlix and ACE.  - Nursing staff is able to reinforce dressing if & as necessary. Order placed.  - Podiatry will continue to follow while in house.     DVT ppx: SCD to RLE; no SCD to LLE please.  Weightbearing: NWB LLE with surgical shoe in place.  Discharge: Pt to follow up 1 week after discharge with Dr. Aponte/Nolan     Case to be discussed with attending, A&P above reflects a tentative plan. Please await for the final signature from the attending physician on service.    This patient will be followed by the Podiatry service. Please Epic Chat the corresponding residents below with questions or concerns.      Bharat Michelle DPM PGY-2  Podiatric Medicine and Surgery   Epic Chat          SIGNATURE: Bharat Michelle DPM PATIENT NAME: Mary Pedroza   DATE: March 10, 2024 MRN: [id number]   TIME: 2:14 PM CONTACT: Haiku Message     "

## 2024-03-10 NOTE — NURSING NOTE
No change from previous assessment. Pt resting comfortably in bed with call light in reach. Dressing intact to left foot.

## 2024-03-11 ENCOUNTER — ANESTHESIA (OUTPATIENT)
Dept: OPERATING ROOM | Facility: HOSPITAL | Age: 49
DRG: 617 | End: 2024-03-11
Payer: MEDICARE

## 2024-03-11 ENCOUNTER — APPOINTMENT (OUTPATIENT)
Dept: RADIOLOGY | Facility: HOSPITAL | Age: 49
DRG: 617 | End: 2024-03-11
Payer: MEDICARE

## 2024-03-11 ENCOUNTER — ANESTHESIA EVENT (OUTPATIENT)
Dept: OPERATING ROOM | Facility: HOSPITAL | Age: 49
DRG: 617 | End: 2024-03-11
Payer: MEDICARE

## 2024-03-11 LAB
ANION GAP SERPL CALC-SCNC: 9 MMOL/L
BACTERIA BLD CULT: NORMAL
BACTERIA BLD CULT: NORMAL
BUN SERPL-MCNC: 7 MG/DL (ref 8–25)
CALCIUM SERPL-MCNC: 9.1 MG/DL (ref 8.5–10.4)
CHLORIDE SERPL-SCNC: 104 MMOL/L (ref 97–107)
CO2 SERPL-SCNC: 27 MMOL/L (ref 24–31)
CREAT SERPL-MCNC: 0.7 MG/DL (ref 0.4–1.6)
EGFRCR SERPLBLD CKD-EPI 2021: >90 ML/MIN/1.73M*2
ERYTHROCYTE [DISTWIDTH] IN BLOOD BY AUTOMATED COUNT: 13.5 % (ref 11.5–14.5)
GLUCOSE BLD MANUAL STRIP-MCNC: 132 MG/DL (ref 74–99)
GLUCOSE BLD MANUAL STRIP-MCNC: 162 MG/DL (ref 74–99)
GLUCOSE BLD MANUAL STRIP-MCNC: 234 MG/DL (ref 74–99)
GLUCOSE BLD MANUAL STRIP-MCNC: 66 MG/DL (ref 74–99)
GLUCOSE BLD MANUAL STRIP-MCNC: 78 MG/DL (ref 74–99)
GLUCOSE SERPL-MCNC: 71 MG/DL (ref 65–99)
HCT VFR BLD AUTO: 32.2 % (ref 36–46)
HGB BLD-MCNC: 10.1 G/DL (ref 12–16)
LABORATORY COMMENT REPORT: NORMAL
MCH RBC QN AUTO: 27.7 PG (ref 26–34)
MCHC RBC AUTO-ENTMCNC: 31.4 G/DL (ref 32–36)
MCV RBC AUTO: 88 FL (ref 80–100)
NRBC BLD-RTO: 0 /100 WBCS (ref 0–0)
PATH REPORT.FINAL DX SPEC: NORMAL
PATH REPORT.GROSS SPEC: NORMAL
PATH REPORT.RELEVANT HX SPEC: NORMAL
PATH REPORT.TOTAL CANCER: NORMAL
PLATELET # BLD AUTO: 465 X10*3/UL (ref 150–450)
POTASSIUM SERPL-SCNC: 4.1 MMOL/L (ref 3.4–5.1)
RBC # BLD AUTO: 3.65 X10*6/UL (ref 4–5.2)
SODIUM SERPL-SCNC: 140 MMOL/L (ref 133–145)
WBC # BLD AUTO: 11.8 X10*3/UL (ref 4.4–11.3)

## 2024-03-11 PROCEDURE — 3700000002 HC GENERAL ANESTHESIA TIME - EACH INCREMENTAL 1 MINUTE: Performed by: PODIATRIST

## 2024-03-11 PROCEDURE — 0HRNXJZ REPLACEMENT OF LEFT FOOT SKIN WITH SYNTHETIC SUBSTITUTE, EXTERNAL APPROACH: ICD-10-PCS | Performed by: PODIATRIST

## 2024-03-11 PROCEDURE — 2500000004 HC RX 250 GENERAL PHARMACY W/ HCPCS (ALT 636 FOR OP/ED): Performed by: NURSE ANESTHETIST, CERTIFIED REGISTERED

## 2024-03-11 PROCEDURE — 36415 COLL VENOUS BLD VENIPUNCTURE: CPT | Performed by: INTERNAL MEDICINE

## 2024-03-11 PROCEDURE — 2500000001 HC RX 250 WO HCPCS SELF ADMINISTERED DRUGS (ALT 637 FOR MEDICARE OP)

## 2024-03-11 PROCEDURE — 3700000001 HC GENERAL ANESTHESIA TIME - INITIAL BASE CHARGE: Performed by: PODIATRIST

## 2024-03-11 PROCEDURE — 2500000005 HC RX 250 GENERAL PHARMACY W/O HCPCS: Performed by: NURSE ANESTHETIST, CERTIFIED REGISTERED

## 2024-03-11 PROCEDURE — 2500000005 HC RX 250 GENERAL PHARMACY W/O HCPCS: Performed by: INTERNAL MEDICINE

## 2024-03-11 PROCEDURE — 2500000004 HC RX 250 GENERAL PHARMACY W/ HCPCS (ALT 636 FOR OP/ED)

## 2024-03-11 PROCEDURE — 3600000008 HC OR TIME - EACH INCREMENTAL 1 MINUTE - PROCEDURE LEVEL THREE: Performed by: PODIATRIST

## 2024-03-11 PROCEDURE — 2500000005 HC RX 250 GENERAL PHARMACY W/O HCPCS: Performed by: PODIATRIST

## 2024-03-11 PROCEDURE — 82947 ASSAY GLUCOSE BLOOD QUANT: CPT

## 2024-03-11 PROCEDURE — 7100000002 HC RECOVERY ROOM TIME - EACH INCREMENTAL 1 MINUTE: Performed by: PODIATRIST

## 2024-03-11 PROCEDURE — 2500000004 HC RX 250 GENERAL PHARMACY W/ HCPCS (ALT 636 FOR OP/ED): Performed by: NURSE PRACTITIONER

## 2024-03-11 PROCEDURE — 73630 X-RAY EXAM OF FOOT: CPT | Mod: LT

## 2024-03-11 PROCEDURE — 99231 SBSQ HOSP IP/OBS SF/LOW 25: CPT | Performed by: NURSE PRACTITIONER

## 2024-03-11 PROCEDURE — A28122 PR PART REMV OTHR TARSAL/METATARSAL: Performed by: NURSE ANESTHETIST, CERTIFIED REGISTERED

## 2024-03-11 PROCEDURE — 85027 COMPLETE CBC AUTOMATED: CPT | Performed by: INTERNAL MEDICINE

## 2024-03-11 PROCEDURE — 3600000003 HC OR TIME - INITIAL BASE CHARGE - PROCEDURE LEVEL THREE: Performed by: PODIATRIST

## 2024-03-11 PROCEDURE — 7100000001 HC RECOVERY ROOM TIME - INITIAL BASE CHARGE: Performed by: PODIATRIST

## 2024-03-11 PROCEDURE — 88311 DECALCIFY TISSUE: CPT | Performed by: PATHOLOGY

## 2024-03-11 PROCEDURE — 0Y6N0Z0 DETACHMENT AT LEFT FOOT, COMPLETE, OPEN APPROACH: ICD-10-PCS | Performed by: PODIATRIST

## 2024-03-11 PROCEDURE — 88304 TISSUE EXAM BY PATHOLOGIST: CPT | Mod: TC | Performed by: INTERNAL MEDICINE

## 2024-03-11 PROCEDURE — 2500000004 HC RX 250 GENERAL PHARMACY W/ HCPCS (ALT 636 FOR OP/ED): Performed by: INTERNAL MEDICINE

## 2024-03-11 PROCEDURE — A28122 PR PART REMV OTHR TARSAL/METATARSAL: Performed by: STUDENT IN AN ORGANIZED HEALTH CARE EDUCATION/TRAINING PROGRAM

## 2024-03-11 PROCEDURE — 88304 TISSUE EXAM BY PATHOLOGIST: CPT | Performed by: PATHOLOGY

## 2024-03-11 PROCEDURE — 82374 ASSAY BLOOD CARBON DIOXIDE: CPT | Performed by: INTERNAL MEDICINE

## 2024-03-11 PROCEDURE — 2500000004 HC RX 250 GENERAL PHARMACY W/ HCPCS (ALT 636 FOR OP/ED): Performed by: PODIATRIST

## 2024-03-11 PROCEDURE — 2500000002 HC RX 250 W HCPCS SELF ADMINISTERED DRUGS (ALT 637 FOR MEDICARE OP, ALT 636 FOR OP/ED)

## 2024-03-11 PROCEDURE — 1210000001 HC SEMI-PRIVATE ROOM DAILY

## 2024-03-11 RX ORDER — VANCOMYCIN HYDROCHLORIDE 1 G/20ML
INJECTION, POWDER, LYOPHILIZED, FOR SOLUTION INTRAVENOUS AS NEEDED
Status: DISCONTINUED | OUTPATIENT
Start: 2024-03-11 | End: 2024-03-11 | Stop reason: HOSPADM

## 2024-03-11 RX ORDER — ONDANSETRON HYDROCHLORIDE 2 MG/ML
4 INJECTION, SOLUTION INTRAVENOUS ONCE AS NEEDED
Status: DISCONTINUED | OUTPATIENT
Start: 2024-03-11 | End: 2024-03-11 | Stop reason: HOSPADM

## 2024-03-11 RX ORDER — ONDANSETRON HYDROCHLORIDE 2 MG/ML
INJECTION, SOLUTION INTRAVENOUS AS NEEDED
Status: DISCONTINUED | OUTPATIENT
Start: 2024-03-11 | End: 2024-03-11

## 2024-03-11 RX ORDER — OXYCODONE HYDROCHLORIDE 5 MG/1
10 TABLET ORAL EVERY 4 HOURS PRN
Status: DISCONTINUED | OUTPATIENT
Start: 2024-03-11 | End: 2024-03-11 | Stop reason: HOSPADM

## 2024-03-11 RX ORDER — ACETAMINOPHEN 325 MG/1
650 TABLET ORAL EVERY 4 HOURS PRN
Status: DISCONTINUED | OUTPATIENT
Start: 2024-03-11 | End: 2024-03-11 | Stop reason: HOSPADM

## 2024-03-11 RX ORDER — MIDAZOLAM HYDROCHLORIDE 1 MG/ML
INJECTION, SOLUTION INTRAMUSCULAR; INTRAVENOUS AS NEEDED
Status: DISCONTINUED | OUTPATIENT
Start: 2024-03-11 | End: 2024-03-11

## 2024-03-11 RX ORDER — OXYCODONE HYDROCHLORIDE 5 MG/1
5 TABLET ORAL EVERY 4 HOURS PRN
Status: DISCONTINUED | OUTPATIENT
Start: 2024-03-11 | End: 2024-03-11 | Stop reason: HOSPADM

## 2024-03-11 RX ORDER — ALBUTEROL SULFATE 0.83 MG/ML
2.5 SOLUTION RESPIRATORY (INHALATION) ONCE AS NEEDED
Status: DISCONTINUED | OUTPATIENT
Start: 2024-03-11 | End: 2024-03-11 | Stop reason: HOSPADM

## 2024-03-11 RX ORDER — SODIUM CHLORIDE, SODIUM LACTATE, POTASSIUM CHLORIDE, CALCIUM CHLORIDE 600; 310; 30; 20 MG/100ML; MG/100ML; MG/100ML; MG/100ML
100 INJECTION, SOLUTION INTRAVENOUS CONTINUOUS
Status: DISCONTINUED | OUTPATIENT
Start: 2024-03-11 | End: 2024-03-11 | Stop reason: HOSPADM

## 2024-03-11 RX ORDER — SODIUM CHLORIDE, SODIUM LACTATE, POTASSIUM CHLORIDE, CALCIUM CHLORIDE 600; 310; 30; 20 MG/100ML; MG/100ML; MG/100ML; MG/100ML
INJECTION, SOLUTION INTRAVENOUS CONTINUOUS PRN
Status: DISCONTINUED | OUTPATIENT
Start: 2024-03-11 | End: 2024-03-11

## 2024-03-11 RX ORDER — LIDOCAINE HYDROCHLORIDE 10 MG/ML
INJECTION INFILTRATION; PERINEURAL AS NEEDED
Status: DISCONTINUED | OUTPATIENT
Start: 2024-03-11 | End: 2024-03-11

## 2024-03-11 RX ORDER — FENTANYL CITRATE 50 UG/ML
INJECTION, SOLUTION INTRAMUSCULAR; INTRAVENOUS AS NEEDED
Status: DISCONTINUED | OUTPATIENT
Start: 2024-03-11 | End: 2024-03-11

## 2024-03-11 RX ORDER — BUPIVACAINE HYDROCHLORIDE 5 MG/ML
INJECTION, SOLUTION PERINEURAL AS NEEDED
Status: DISCONTINUED | OUTPATIENT
Start: 2024-03-11 | End: 2024-03-11 | Stop reason: HOSPADM

## 2024-03-11 RX ORDER — PROPOFOL 10 MG/ML
INJECTION, EMULSION INTRAVENOUS AS NEEDED
Status: DISCONTINUED | OUTPATIENT
Start: 2024-03-11 | End: 2024-03-11

## 2024-03-11 RX ADMIN — FENTANYL CITRATE 50 MCG: 50 INJECTION, SOLUTION INTRAMUSCULAR; INTRAVENOUS at 13:27

## 2024-03-11 RX ADMIN — OXYCODONE 5 MG: 5 TABLET ORAL at 18:26

## 2024-03-11 RX ADMIN — CEFEPIME 2 G: 2 INJECTION, POWDER, FOR SOLUTION INTRAVENOUS at 20:35

## 2024-03-11 RX ADMIN — PROPRANOLOL HYDROCHLORIDE 20 MG: 20 TABLET ORAL at 20:35

## 2024-03-11 RX ADMIN — PROPOFOL 200 MG: 10 INJECTION, EMULSION INTRAVENOUS at 12:16

## 2024-03-11 RX ADMIN — MIDAZOLAM 2 MG: 1 INJECTION INTRAMUSCULAR; INTRAVENOUS at 12:16

## 2024-03-11 RX ADMIN — DEXTROSE MONOHYDRATE 25 G: 25 INJECTION, SOLUTION INTRAVENOUS at 08:00

## 2024-03-11 RX ADMIN — IRON SUCROSE 200 MG: 20 INJECTION, SOLUTION INTRAVENOUS at 11:04

## 2024-03-11 RX ADMIN — FENTANYL CITRATE 25 MCG: 50 INJECTION, SOLUTION INTRAMUSCULAR; INTRAVENOUS at 12:57

## 2024-03-11 RX ADMIN — SIMVASTATIN 20 MG: 20 TABLET, FILM COATED ORAL at 20:35

## 2024-03-11 RX ADMIN — CEFEPIME 2 G: 2 INJECTION, POWDER, FOR SOLUTION INTRAVENOUS at 12:00

## 2024-03-11 RX ADMIN — FENTANYL CITRATE 50 MCG: 50 INJECTION, SOLUTION INTRAMUSCULAR; INTRAVENOUS at 13:04

## 2024-03-11 RX ADMIN — INSULIN GLARGINE 80 UNITS: 100 INJECTION, SOLUTION SUBCUTANEOUS at 20:36

## 2024-03-11 RX ADMIN — LIDOCAINE HYDROCHLORIDE 5 ML: 10 INJECTION, SOLUTION INFILTRATION; PERINEURAL at 12:16

## 2024-03-11 RX ADMIN — CEFEPIME 2 G: 2 INJECTION, POWDER, FOR SOLUTION INTRAVENOUS at 03:24

## 2024-03-11 RX ADMIN — FENTANYL CITRATE 50 MCG: 50 INJECTION, SOLUTION INTRAMUSCULAR; INTRAVENOUS at 12:16

## 2024-03-11 RX ADMIN — DOXEPIN HYDROCHLORIDE 100 MG: 50 CAPSULE ORAL at 20:35

## 2024-03-11 RX ADMIN — ONDANSETRON HYDROCHLORIDE 4 MG: 2 INJECTION INTRAMUSCULAR; INTRAVENOUS at 12:45

## 2024-03-11 RX ADMIN — SODIUM CHLORIDE, POTASSIUM CHLORIDE, SODIUM LACTATE AND CALCIUM CHLORIDE: 600; 310; 30; 20 INJECTION, SOLUTION INTRAVENOUS at 12:05

## 2024-03-11 RX ADMIN — FENTANYL CITRATE 25 MCG: 50 INJECTION, SOLUTION INTRAMUSCULAR; INTRAVENOUS at 12:39

## 2024-03-11 ASSESSMENT — PAIN SCALES - GENERAL
PAINLEVEL_OUTOF10: 0 - NO PAIN
PAINLEVEL_OUTOF10: 3
PAINLEVEL_OUTOF10: 10 - WORST POSSIBLE PAIN
PAINLEVEL_OUTOF10: 0 - NO PAIN

## 2024-03-11 ASSESSMENT — PAIN - FUNCTIONAL ASSESSMENT
PAIN_FUNCTIONAL_ASSESSMENT: CPOT (CRITICAL CARE PAIN OBSERVATION TOOL)
PAIN_FUNCTIONAL_ASSESSMENT: 0-10
PAIN_FUNCTIONAL_ASSESSMENT: FLACC (FACE, LEGS, ACTIVITY, CRY, CONSOLABILITY)
PAIN_FUNCTIONAL_ASSESSMENT: 0-10

## 2024-03-11 ASSESSMENT — COGNITIVE AND FUNCTIONAL STATUS - GENERAL
TOILETING: A LITTLE
CLIMB 3 TO 5 STEPS WITH RAILING: A LOT
DRESSING REGULAR LOWER BODY CLOTHING: A LITTLE
WALKING IN HOSPITAL ROOM: A LITTLE
DAILY ACTIVITIY SCORE: 21
DAILY ACTIVITIY SCORE: 20
HELP NEEDED FOR BATHING: A LITTLE
MOVING TO AND FROM BED TO CHAIR: A LITTLE
CLIMB 3 TO 5 STEPS WITH RAILING: TOTAL
STANDING UP FROM CHAIR USING ARMS: A LITTLE
HELP NEEDED FOR BATHING: A LITTLE
DRESSING REGULAR LOWER BODY CLOTHING: A LITTLE
WALKING IN HOSPITAL ROOM: A LOT
MOBILITY SCORE: 19
MOBILITY SCORE: 19
TOILETING: A LITTLE
DRESSING REGULAR UPPER BODY CLOTHING: A LITTLE

## 2024-03-11 NOTE — PROGRESS NOTES
Blood management follow up of this JAELYN pt on day 4 of admission presenting with infected left foot/osteomyelitis. She is 4day s/p left transmetatarsal amputation. Scheduled for return to surgery today for debridement with revision to TMA. H/H stable 10.1/32.2.      Subjective   Pt reports anxious about surgery, pain left foot tips of amputated toes. Dressing/ace wrap left foot intact. +chronic paresthesia, Denies headache, vertigo, fever, chills, chest pain, sob, terrazas, +occasional cough, current smoker, encourage smoking cessation and use of incentive spirometry 10xhourly. Denies abd pain, tenderness, bloating, nausea, NPO for surgery, had soft bm yesterday, denies hematochezia, melena, hematuria, dysuria, rashes or lesions.          Objective     Physical Exam  Constitutional:       Appearance: Normal appearance. She is obese.   HENT:      Head: Normocephalic and atraumatic.      Right Ear: External ear normal.      Left Ear: External ear normal.      Nose: Nose normal.      Mouth/Throat:      Pharynx: Oropharynx is clear.   Eyes:      Conjunctiva/sclera: Conjunctivae normal.      Pupils: Pupils are equal, round, and reactive to light.   Cardiovascular:      Rate and Rhythm: Normal rate and regular rhythm.      Heart sounds: Normal heart sounds. No murmur heard.  Pulmonary:      Effort: Pulmonary effort is normal. No respiratory distress.      Breath sounds: Normal breath sounds. No wheezing, rhonchi or rales.   Chest:      Chest wall: No tenderness.   Abdominal:      General: Bowel sounds are normal. There is no distension.      Palpations: Abdomen is soft.      Tenderness: There is no abdominal tenderness.   Musculoskeletal:      Cervical back: Normal range of motion and neck supple.      Left lower leg: Edema present.      Comments: Trace LLE swelling and tenderness   Skin:     General: Skin is warm and dry.      Capillary Refill: Capillary refill takes 2 to 3 seconds.      Coloration: Skin is not pale.       "Findings: No bruising, erythema, lesion or rash.      Comments: Ace wrap left foot dry/intact   Neurological:      General: No focal deficit present.      Mental Status: She is alert and oriented to person, place, and time. Mental status is at baseline.   Psychiatric:         Mood and Affect: Mood normal.         Behavior: Behavior normal.         Last Recorded Vitals  Blood pressure 114/61, pulse 75, temperature 36.6 °C (97.9 °F), temperature source Oral, resp. rate 15, height 1.626 m (5' 4\"), weight 93 kg (205 lb), SpO2 96 %.  Intake/Output last 3 Shifts:  I/O last 3 completed shifts:  In: 1450 (15.6 mL/kg) [P.O.:1100; IV Piggyback:350]  Out: 3300 (35.5 mL/kg) [Urine:3300 (1 mL/kg/hr)]  Weight: 93 kg     Relevant Results  Results for orders placed or performed during the hospital encounter of 03/07/24 (from the past 24 hour(s))   POCT GLUCOSE   Result Value Ref Range    POCT Glucose 180 (H) 74 - 99 mg/dL   POCT GLUCOSE   Result Value Ref Range    POCT Glucose 157 (H) 74 - 99 mg/dL   Basic Metabolic Panel   Result Value Ref Range    Glucose 71 65 - 99 mg/dL    Sodium 140 133 - 145 mmol/L    Potassium 4.1 3.4 - 5.1 mmol/L    Chloride 104 97 - 107 mmol/L    Bicarbonate 27 24 - 31 mmol/L    Urea Nitrogen 7 (L) 8 - 25 mg/dL    Creatinine 0.70 0.40 - 1.60 mg/dL    eGFR >90 >60 mL/min/1.73m*2    Calcium 9.1 8.5 - 10.4 mg/dL    Anion Gap 9 <=19 mmol/L   CBC   Result Value Ref Range    WBC 11.8 (H) 4.4 - 11.3 x10*3/uL    nRBC 0.0 0.0 - 0.0 /100 WBCs    RBC 3.65 (L) 4.00 - 5.20 x10*6/uL    Hemoglobin 10.1 (L) 12.0 - 16.0 g/dL    Hematocrit 32.2 (L) 36.0 - 46.0 %    MCV 88 80 - 100 fL    MCH 27.7 26.0 - 34.0 pg    MCHC 31.4 (L) 32.0 - 36.0 g/dL    RDW 13.5 11.5 - 14.5 %    Platelets 465 (H) 150 - 450 x10*3/uL   POCT GLUCOSE   Result Value Ref Range    POCT Glucose 66 (L) 74 - 99 mg/dL   POCT GLUCOSE   Result Value Ref Range    POCT Glucose 162 (H) 74 - 99 mg/dL      Current Facility-Administered Medications:     [MAR Hold] " acetaminophen (Tylenol) tablet 500 mg, 500 mg, oral, q4h PRN, Sarah Barrera, DPM, 500 mg at 03/10/24 2011    [MAR Hold] ARIPiprazole (Abilify) tablet 5 mg, 5 mg, oral, Daily, Krystal Joel MD, 5 mg at 03/10/24 0902    [MAR Hold] cefepime (Maxipime) 2 g in dextrose 5 % 100 mL IV, 2 g, intravenous, q8h, Gonzalo Raygoza MD, Stopped at 03/11/24 1230    [MAR Hold] dextrose 10 % in water (D10W) infusion, 0.3 g/kg/hr, intravenous, Once PRN, Krystal Joel MD    [MAR Hold] dextrose 50 % injection 25 g, 25 g, intravenous, q15 min PRN, Krystal Joel MD, 25 g at 03/11/24 0800    [MAR Hold] doxepin (SINEquan) capsule 100 mg, 100 mg, oral, Nightly, Krystal Joel MD, 100 mg at 03/10/24 2011    [MAR Hold] fenofibrate (Triglide) tablet 160 mg, 160 mg, oral, Daily, Krystal Joel MD, 160 mg at 03/10/24 0902    [Held by provider] glimepiride (Amaryl) tablet 4 mg, 4 mg, oral, Daily before breakfast, Krystal Joel MD    [MAR Hold] glucagon (Glucagen) injection 1 mg, 1 mg, intramuscular, q15 min PRN, Krystal Joel MD    [MAR Hold] hydrOXYzine HCL (Atarax) tablet 25 mg, 25 mg, oral, BID PRN, Krystal Joel MD, 25 mg at 03/08/24 2017    [MAR Hold] insulin glargine (Lantus) injection 80 Units, 80 Units, subcutaneous, Nightly, Krystal Joel MD, 80 Units at 03/10/24 2052    [MAR Hold] insulin lispro (HumaLOG) injection 0-10 Units, 0-10 Units, subcutaneous, TID with meals, Krystal Joel MD, 2 Units at 03/10/24 1616    [MAR Hold] nicotine (Nicoderm CQ) 21 mg/24 hr patch 1 patch, 1 patch, transdermal, Daily, Krystal Joel MD    [MAR Hold] oxyCODONE (Roxicodone) immediate release tablet 5 mg, 5 mg, oral, q4h PRN, Sarah Barrera, DPM, 5 mg at 03/10/24 2011    [MAR Hold] polyethylene glycol (Glycolax, Miralax) packet 17 g, 17 g, oral, Daily PRN, Krystal Joel MD    [MAR Hold] propranolol (Inderal) tablet 20 mg, 20 mg, oral,  BID, Krystal Joel MD, 20 mg at 03/09/24 2114    [MAR Hold] simvastatin (Zocor) tablet 20 mg, 20 mg, oral, q PM, Krystal Joel MD, 20 mg at 03/10/24 2011    [MAR Hold] SITagliptin phosphate (Januvia) tablet 25 mg, 25 mg, oral, Daily, Krystal Joel MD, 25 mg at 03/10/24 0902    [MAR Hold] SUMAtriptan (Imitrex) tablet 100 mg, 100 mg, oral, Daily PRN, Krystal Joel MD    vancomycin (Vancocin) vial for injection, , , PRN, Suraj Francisco DPM, 1 g at 03/11/24 1231    [MAR Hold] venlafaxine XR (Effexor-XR) 24 hr capsule 75 mg, 75 mg, oral, Daily, Krystal Joel MD, 75 mg at 03/10/24 0901    Facility-Administered Medications Ordered in Other Encounters:     fentaNYL PF (Sublimaze) injection, , intravenous, PRN, Gi Beatty APRN-CRNA, 50 mcg at 03/11/24 1304    lactated Ringer's infusion, , intravenous, Continuous PRN, Gi Loveortdevin APRN-CRNA, New Bag at 03/11/24 1205    lidocaine (Xylocaine) 10 mg/mL (1 %) injection, , injection, PRN, Gi Beatty APRN-CRNA, 5 mL at 03/11/24 1216    midazolam (Versed) injection, , intravenous, PRN, Gi Beatty APRN-CRNA, 2 mg at 03/11/24 1216    ondansetron (Zofran) injection, , intravenous, PRN, Gi Beatty APRN-CRNA, 4 mg at 03/11/24 1245    propofol (Diprivan) injection, , intravenous, PRN, Gi Clayaborty, APRN-CRNA, 200 mg at 03/11/24 1216                              Assessment/Plan   Principal Problem:    Cellulitis and abscess of foot  Active Problems:    Diabetes mellitus (CMS/HCC)    Left foot infection    Subacute osteomyelitis of left foot (CMS/HCC)  S/p left foot incision and drainage drainage and transmetatarsal amputation on 3/7  Normocytic anemia  Anemia of iron deficient combined with anemia of blood loss and inflammation     Venofer 200mg IV X3  Give additional dose IV Venofer 200mg.   Monitor H/H  Transfuse 1unit as needed to maintain Hgb over 7        Gwen Bhagat,  APRN-CNP

## 2024-03-11 NOTE — PROGRESS NOTES
03/11/24 1407   Current Planned Discharge Disposition   Current Planned Discharge Disposition SNF

## 2024-03-11 NOTE — BRIEF OP NOTE
PODIATRIC BRIEF OP REPORT    LOG ID: [id number]  SURGERY/PROCEDURE DATE: 03/11/24      SURGEON(S)/PROCEDURALIST(S) AND ASSISTANT(S):     * Suraj Francisco - Primary  Surgeon(s) and Role:  Bharat Michelle DPM PGY-2    SURGERY/PROCEDURE(S):  Foot Transmetatarsal Amputation  08588 - AR AMPUTATION FOOT TRANSMETARSAL    Debridement of All Nonviable Soft Tissue and Bone  52244 - AR DEBRIDEMENT BONE 1ST 20 SQ CM/<        ANESTHESIA:   General III  Anesthesia Staff: Anesthesiologist: Leila Felder MD  CRNA: SUDHIR Henderson-CRNA    PRE-OP/PRE-PROCEDURE DIAGNOSIS(S): Post-op Diagnosis     * Cellulitis and abscess of foot [L03.119, L02.619]     * Diabetes mellitus due to underlying condition with foot ulcer, unspecified whether long term insulin use (CMS/[facility]) [E08.621, L97.509]     * Left foot infection [L08.9]     * Subacute osteomyelitis of left foot (CMS/[facility]) [M86.272]     POST-OP/POST-PROCEDURE DIAGNOSIS(S): Same as Pre-op    FINDINGS: Intraoperative findings consistent with clinical and radiographic findings.    ESTIMATED BLOOD LOSS: 25 cc    SPECIMENS: Left foot for pathology    IMPLANTABLE DEVICES: None    Nothing was implanted during the procedure    DRAINS: None    COMPLICATIONS: None    POST-OP CONDITION: Patient returned to post operative recovery area with all vital signs stable and intact. Normal capillary perfusion noted to most distal aspect of surgical extremity.    POST-OPERATIVE INSTRUCTIONS: Strict NWB to LLE. Dressing to remain CDI unless changed by Podiatry.    Attending Attestation: I was present and scrubbed for the entire procedure.    This patient will be followed by the Podiatry service. Please Epic Chat the corresponding residents below with questions or concerns.      Bharat Michelle DPM PGY-2  Podiatric Medicine and Surgery   Epic Chat       SIGNATURE: Bharat Michelle DPM PATIENT NAME: Mary Pedroza   DATE: March 11, 2024 MRN: [id number]   TIME: 2:00 PM Contact: Haikki Sanders

## 2024-03-11 NOTE — NURSING NOTE
Pt is npo for surgery today and is requesting ice chips, message send to surgeon, will update pt when we get answer

## 2024-03-11 NOTE — OP NOTE
PODIATRIC OPERATIVE REPORT    LOG ID: [id number]  SURGERY/PROCEDURE DATE: 3/11/2024  OR LOCATION: [facility]    SURGEON(S)/PROCEDURALIST(S) AND ASSISTANT(S):  Primary: Suraj Francisco DPM  Assistant: Bharat Michelle DPM PGY-2  Assistant: Cierra Wang DPM PGY-2    OTHER OR STAFF:  Circulator: Ethel Crespo RN; Rhiannon Jimenez RN; Drea Maldonado RN  Scrub Person: Grady Farrell    SURGERY/PROCEDURE(S):  Transmetatarsal amputation, left   Flap closure, left      PRE-OP/PRE-PROCEDURE DIAGNOSIS:   Pre-op Diagnosis     * Cellulitis and abscess of foot [L03.119, L02.619]     * Diabetes mellitus due to underlying condition with foot ulcer, unspecified whether long term insulin use (CMS/HCC) [E08.621, L97.509]     * Left foot infection [L08.9]     * Subacute osteomyelitis of left foot (CMS/HCC) [M86.272]    POST-OP/POST-PROCEDURE DIAGNOSIS: Same as Pre-Op    ANESTHESIA:   Anesthesia: General  ASA: III  Anesthesia Staff: Anesthesiologist: Lelia Felder MD  CRNA: LUISA Henderson  Intra-op Medications:   Administrations occurring from 1200 to 1330 on 03/11/24:   Medication Name Total Dose   vancomycin (Vancocin) vial for injection 1 g   cefepime (Maxipime) 2 g in dextrose 5 % 100 mL  mL   insulin lispro (HumaLOG) injection 0-10 Units Cannot be calculated       ESTIMATED BLOOD LOSS: less than 50 mL    SPECIMENS:   Order Name Source Comment Collection Info Order Time   SURGICAL PATHOLOGY EXAM FOOT AMPUTATION LEFT Pre-op diagnosis:  Cellulitis and abscess of foot [L03.119, L02.619]  Diabetes mellitus due to underlying condition with foot ulcer, unspecified whether long term insulin use (CMS/HCC) [E08.621, L97.509]  Left foot infection [L08.9]  Subacute osteomyelit  is of left foot (CMS/HCC) [M86.272] Collected By: Suraj Francisco DPM 3/11/2024 12:36 PM       IMPLANTABLE DEVICES:  Nothing was implanted during the procedure    FINDINGS: The 2nd flexor tendon had undergone liquefactive necrosis with  "purulence expressed from the tendon sheath and tracking proximally to the sybil pedis. The 5th flexor tendon sheath and 5th extensor tendon sheath had undergone liquefactive necrosis as well. Intraoperative findings otherwise consistent with clinical and radiographic findings. Please see procedure description below for details.    DRAINS: None    TOURNIQUET TIMES:   Total Tourniquet Time Documented:  Leg (Left) - 84 minutes  Total: Leg (Left) - 84 minutes      COMPLICATIONS: None; patient tolerated the procedure well.       SURGERY/PROCEDURE DETAILS:  INDICATIONS FOR PROCEDURE:   The patient with diagnosis as outlined above presents for podiatric surgical intervention today. The patient has attempted and failed conservative treatment as outlined in preoperative clinic notes and wishes to proceed with surgical intervention at this time. The nature of the deformity, problems anticipated procedures, recovery/convalescence, risks/complications including but not limited to numbness, CRPS, over/under correction, problems healing of soft tissue or bone, postoperative wound infection, wound dehiscence, DVT and/or persistent pain/disability have been explained to the patient in detail. An updated H&P and consent have been completed prior to today’s surgical intervention. The patient states that they have been NPO since midnight. No guarantees were given or implied, but it is expected that the patient will have a favorable outcome.  It is with this understanding that we proceed.     PRE-PROCEDURE INFORMATION:  In pre-op holding area, the Left lower extremity to be operated on was clearly marked and the patient verified correct laterality of the marking.  The patient was brought to the operating room and placed on the operating table in the supine position.  A timeout was performed in which identification of the correct patient, procedure, location, and materials was done. A pneumatic 18\" tourniquet was placed on the patient's " calf. Following LMA sedation, the left foot was then scrubbed, prepped and draped in the usual aseptic manner. Gravity was utilized to exsanguinate the patient's left foot and the tourniquet was inflated to 250 mmHg.    DESCRIPTION OF PROCEDURES:     Procedure 1)    Attention was directed to the distal aspect of the patient's left foot.   A 15 Blade was then used to to dissect away soft tissue from the metatarsals 1 through 5.  Incision site was already present from the prior digital amputations.  Once all soft tissue was mobilized and and all neurovascular structures were retracted and protected.  Any bleeders were suture ligated and/or cauterized to prevent excessive bleeding.  Care was taken to prevent devascularization of the flap to allow for proper closure.  Next attention was then directed to the 1st metatarsal.  A sagittal saw was then used to perform a transverse osteotomy and removing the distal portion of the metatarsal.  This process was then repeated for metatarsals 234 and 5.  All metatarsal was then handed back table and sent to pathology.  The area was then flushed with copious amounts of normal saline via pulse lavage.  At this time the area was then packed with vanco powder for further antibiotic coverage.  It was noted that the patient had healthy bleeding of her proximal and distal flaps as well as healthy bleeding bone of the metatarsals.    Procedure 2:   Next the plantar and dorsal flaps were remodeled and significantly undermined and mobilized to allow for complete closure.  The plantar and  distal flaps were then advanced over the area the osteotomies.  It was noted that the plantar flap required continued remodeling to allow for full closure.  Next 0 Prolene was then used to begin closure.  The flap was held in place with tack sutures and then was closed via combination of horizontal mattress and simple interrupted sutures.  Upon completion it was noted that there was no residual  ulcerations present.  Total ulceration covered with flap measures 12.2 by 6.5 by 2.2 cm.    Dressing was placed on the surgical extremity consisting of betadine-soaked 4x4s, dry 4x4s, ABDs, kerlix, and ACE.     POSTOPERATIVE INFORMATION: The patient tolerated the above noted procedure and anesthesia well and was transferred to the PACU with vital signs stable, and vascular status intact with capillary refill intact to the most distal aspect of the operative extremity. Postoperative instructions reviewed in detail with the patient with written instructions provided. Patient will return to floor. Should any problems, questions, or concerns arise, primary team to Epic Chat podiatry team.    This is Bharat Michelle DPM PGY-2 dictating the operative note for Dr. Suraj Francisco DPM.      Attending Attestation: I was present and scrubbed for the entire procedure.      SIGNATURE: Bharat Michelle DPM PATIENT NAME: Mary Pedroza   DATE: March 11, 2024 MRN: [id number]   TIME: 7:22 PM

## 2024-03-11 NOTE — PROGRESS NOTES
Physical Therapy                 Therapy Communication Note    Patient Name: Mary Pedroza  MRN: [id number]  Today's Date: 3/11/2024     Discipline: Physical Therapy    Missed Visit Reason: Missed Visit Reason: Patient in a medical procedure    Missed Time: Attempt    Comment: Cx, Pt going to surgery for a revision of TMA and debridement of non-viable soft tissue.

## 2024-03-11 NOTE — ANESTHESIA POSTPROCEDURE EVALUATION
Patient: Mary Pedroza    Procedure Summary       Date: 03/11/24 Room / Location: [facility] OR 10 / [facility]    Anesthesia Start: 1207 Anesthesia Stop: 1407    Procedures:       Foot Transmetatarsal Amputation (Left)      Debridement of All Nonviable Soft Tissue and Bone (Left) Diagnosis:       Cellulitis and abscess of foot      Diabetes mellitus due to underlying condition with foot ulcer, unspecified whether long term insulin use (CMS/[facility])      Left foot infection      Subacute osteomyelitis of left foot (CMS/[facility])      (Cellulitis and abscess of foot [L03.119, L02.619])      (Diabetes mellitus due to underlying condition with foot ulcer, unspecified whether long term insulin use (CMS/[facility]) [E08.621, L97.509])      (Left foot infection [L08.9])      (Subacute osteomyelitis of left foot (CMS/[facility]) [M86.272])    Surgeons: Suraj Francisco DPM Responsible Provider: Leila Felder MD    Anesthesia Type: general ASA Status: 3            Anesthesia Type: general    Vitals Value Taken Time   BP 97/56 03/11/24 1406   Temp 36 °C (96.8 °F) 03/11/24 1358   Pulse 75 03/11/24 1408   Resp 18 03/11/24 1408   SpO2 94 % 03/11/24 1408   Vitals shown include unvalidated device data.    Anesthesia Post Evaluation    Patient location during evaluation: bedside  Patient participation: complete - patient participated  Level of consciousness: awake and alert  Pain management: adequate  Multimodal analgesia pain management approach  Airway patency: patent  Cardiovascular status: acceptable  Respiratory status: acceptable  Hydration status: acceptable  Postoperative Nausea and Vomiting: none        There were no known notable events for this encounter.

## 2024-03-11 NOTE — PROGRESS NOTES
"Vancomycin Dosing by Pharmacy- FOLLOW UP    Mary Pedroza is a 48 y.o. year old female who Pharmacy has been consulted for vancomycin dosing for cellulitis, skin and soft tissue. Based on the patient's indication and renal status this patient is being dosed based on a goal AUC of 400-600.     Renal function is currently stable.    Current vancomycin dose: 1250 mg given every 12 hours    Estimated vancomycin AUC on current dose: 476 mg/L.hr     Visit Vitals  /61 (BP Location: Left arm, Patient Position: Lying)   Pulse 75   Temp 36.6 °C (97.9 °F) (Oral)   Resp 15        Lab Results   Component Value Date    CREATININE 0.70 03/11/2024    CREATININE 0.70 03/10/2024    CREATININE 0.70 03/09/2024    CREATININE 0.60 03/08/2024        Patient weight is   Wt Readings from Last 1 Encounters:   03/08/24 93 kg (205 lb)         No results found for: \"CULTURE\"     I/O last 3 completed shifts:  In: 1450 (15.6 mL/kg) [P.O.:1100; IV Piggyback:350]  Out: 3300 (35.5 mL/kg) [Urine:3300 (1 mL/kg/hr)]  Weight: 93 kg   [unfilled]    Temp Readings from Last 1 Encounters:   03/11/24 36.6 °C (97.9 °F) (Oral)          Assessment/Plan    Within goal AUC range. Continue current vancomycin regimen.    This dosing regimen is predicted by [employer] to result in the following pharmacokinetic parameters:  Loading dose: N/A  Regimen: 1250 mg IV every 12 hours.  Start time: 09:19 on 03/11/2024  Exposure target: AUC24 (range)400-600 mg/L.hr   AUC24,ss: 476 mg/L.hr  Probability of AUC24 > 400: 80 %  Ctrough,ss: 14.4 mg/L  Probability of Ctrough,ss > 20: 16 %  Probability of nephrotoxicity (Lodise REGINO 2009): 10 %      The next level will be obtained on 03-15-24 with am labs. May be obtained sooner if clinically indicated.   Will continue to monitor renal function daily while on vancomycin and order serum creatinine at least every 48 hours if not already ordered.  Follow for continued vancomycin needs, clinical response, and signs/symptoms of " toxicity.       SUZIE TAYLOR

## 2024-03-11 NOTE — PROGRESS NOTES
TCC spoke to patient at the bedside. Pt to have foot amputation today. Pt gave choices for SNF. 1. [facility] 2. [facility] 3. [facility]. Sent referral via [facility]. PT/OT will need to see patient post-op for eval. TCC will follow for post-op discharge planning.     PATIENT DOES NOT HAVE A SECURE DISCHARGE PLAN.        03/11/24 1400   Discharge Planning   Type of Post Acute Facility Services Skilled nursing   Patient expects to be discharged to: SNF, sent referrals

## 2024-03-11 NOTE — PROGRESS NOTES
"Mary Pedroza is a 48 y.o. female on day 4 of admission presenting with Cellulitis and abscess of foot.    Subjective   She underwent incision and drainage and transmetatarsal amputation of the left foot on 3/7.    She is awaiting additional surgery today  She is NPO and had a blood glucose of 66 this morning. Hypoglycemia was treated    Objective     Physical Exam  General: awake, alert, oriented, responsive  Cardiovascular: regular, normal S1 and S2  Lungs: good air entry bilaterally, clear to auscultation  Extremities: The left foot and ankle were wrapped in a dressing which was not removed. There was no peripheral cyanosis, no pedal edema  Neuro: alert, oriented x 3, no focal weakness      Last Recorded Vitals  Blood pressure 114/61, pulse 75, temperature 36.6 °C (97.9 °F), temperature source Oral, resp. rate 15, height 1.626 m (5' 4\"), weight 93 kg (205 lb), SpO2 96 %.  Intake/Output last 3 Shifts:  I/O last 3 completed shifts:  In: 1450 (15.6 mL/kg) [P.O.:1100; IV Piggyback:350]  Out: 3300 (35.5 mL/kg) [Urine:3300 (1 mL/kg/hr)]  Weight: 93 kg     Relevant Results  Results for orders placed or performed during the hospital encounter of 03/07/24 (from the past 24 hour(s))   POCT GLUCOSE   Result Value Ref Range    POCT Glucose 149 (H) 74 - 99 mg/dL   POCT GLUCOSE   Result Value Ref Range    POCT Glucose 180 (H) 74 - 99 mg/dL   POCT GLUCOSE   Result Value Ref Range    POCT Glucose 157 (H) 74 - 99 mg/dL   Basic Metabolic Panel   Result Value Ref Range    Glucose 71 65 - 99 mg/dL    Sodium 140 133 - 145 mmol/L    Potassium 4.1 3.4 - 5.1 mmol/L    Chloride 104 97 - 107 mmol/L    Bicarbonate 27 24 - 31 mmol/L    Urea Nitrogen 7 (L) 8 - 25 mg/dL    Creatinine 0.70 0.40 - 1.60 mg/dL    eGFR >90 >60 mL/min/1.73m*2    Calcium 9.1 8.5 - 10.4 mg/dL    Anion Gap 9 <=19 mmol/L   CBC   Result Value Ref Range    WBC 11.8 (H) 4.4 - 11.3 x10*3/uL    nRBC 0.0 0.0 - 0.0 /100 WBCs    RBC 3.65 (L) 4.00 - 5.20 x10*6/uL    " Hemoglobin 10.1 (L) 12.0 - 16.0 g/dL    Hematocrit 32.2 (L) 36.0 - 46.0 %    MCV 88 80 - 100 fL    MCH 27.7 26.0 - 34.0 pg    MCHC 31.4 (L) 32.0 - 36.0 g/dL    RDW 13.5 11.5 - 14.5 %    Platelets 465 (H) 150 - 450 x10*3/uL   POCT GLUCOSE   Result Value Ref Range    POCT Glucose 66 (L) 74 - 99 mg/dL   POCT GLUCOSE   Result Value Ref Range    POCT Glucose 162 (H) 74 - 99 mg/dL     Scheduled medications  ARIPiprazole, 5 mg, oral, Daily  cefepime, 2 g, intravenous, q8h  doxepin, 100 mg, oral, Nightly  fenofibrate, 160 mg, oral, Daily  [Held by provider] glimepiride, 4 mg, oral, Daily before breakfast  insulin glargine, 80 Units, subcutaneous, Nightly  insulin lispro, 0-10 Units, subcutaneous, TID with meals  nicotine, 1 patch, transdermal, Daily  propranolol, 20 mg, oral, BID  simvastatin, 20 mg, oral, q PM  SITagliptin phosphate, 25 mg, oral, Daily  venlafaxine XR, 75 mg, oral, Daily      Continuous medications     PRN medications  PRN medications: acetaminophen, dextrose 10 % in water (D10W), dextrose, glucagon, hydrOXYzine HCL, oxyCODONE, polyethylene glycol, SUMAtriptan        Assessment/Plan   Left foot infection: Cellulitis with ulceration and gangrene  S/p left foot incision and drainage and transmetatarsal amputation on 3/7  Blood cultures in progress  On IV antibiotic coverage with cefepime     Diabetes type 2 with hyperglycemia and gangrene of the left foot  Lantus scheduled and Humalog sliding scale  Humalog sliding scale.  Lantus  Continue januvia  Hemoglobin A1c was 9.4% on 3/8/2024     Hyperlipidemia  On fenofibrate, simvastatin     Bipolar disorder  On Abilify     Plan  Continue IV antibiotic coverage.  Postop plan per podiatry  Continue to hold glimepiride because of low normal blood glucose.   Diabetes education  PT/OT    Krystal Joel MD

## 2024-03-11 NOTE — SIGNIFICANT EVENT
Podiatry Post-Operative Recommendations:    S/P Left transmetatarsal amputation with primary closure    Plan:  Weightbearing status: STRICT NWB to surgical extremity  Precautions: Fall Risk  Pain: per primary  Periop Abx: Scheduled floor abx   DVT Ppx: mechanical, okay for chemoppx per primary  Dressing: betadine-soaked gauze, 4x4s, kerlix, ABDs, ACE. To remain clean, dry, and intact unless changed by Podiatry. May reinforce for strikethrough, order is placed  Diet: Return to diabetic diet  Recommend SNF placement upon discharge    This patient will be followed by the Podiatry service. Please Epic Chat or page the corresponding resident below with questions or concerns.      Bharat Michelle DPM PGY-2  Podiatric Medicine and Surgery   Epic Chat preferred

## 2024-03-11 NOTE — PROGRESS NOTES
Seeing patient for left diabetic foot infection with underlying osteomyelitis.  Patient underwent TMA on 3/7/2024.  She is resting comfortably today.  Plans for second look in the OR later today are noted.  No issues over the weekend.    Cefepime D3  Vanco D3    36.6   36.6  Extremity: Bulky dressing left foot is clean/dry/intact.    WBC: 11.8  Creatinine: 0.7    Blood culture (3/7): X 2 sets-NGTD  Urine culture (3/8): Negative  Operative culture left foot (3/7): 4+ group B strep    Impression:  1.  Left diabetic foot infection with gangrene and osteomyelitis  --Status post left TMA 3/7/2024.  Operative cultures are growing group be strep.    Plan:  1.  Will stop vancomycin.  2.  Continue cefepime.  Monitor for adverse antibiotic events.  3.  Await findings of return to OR on 3/11/2024.  4.  Will follow.    Dr. Street to assume care on 3/12/2024.    Jarret Mcclellan MD  [facility]  [phone]

## 2024-03-11 NOTE — PROGRESS NOTES
Occupational Therapy                 Therapy Communication Note    Patient Name: Mary Pedroza  MRN: [id number]  Today's Date: 3/11/2024     Discipline: Occupational Therapy    Missed Visit Reason: Missed Visit Reason: Patient in a medical procedure (Pt going to surgery for a revision of TMA and debridement of non-viable soft tissue.)    Missed Time: Cancel    Comment:

## 2024-03-11 NOTE — ANESTHESIA PROCEDURE NOTES
Airway  Date/Time: 3/11/2024 12:16 PM  Urgency: elective    Airway not difficult    Staffing  Performed: CRNA   Authorized by: Leila Felder MD    Performed by: SUDHIR Henderson-CRNA  Patient location during procedure: OR    Indications and Patient Condition  Indications for airway management: anesthesia  Spontaneous ventilation: present  Preoxygenated: yes  Patient position: sniffing  Mask difficulty assessment: 1 - vent by mask  Planned trial extubation    Final Airway Details  Final airway type: supraglottic airway      Successful airway: classic  Size 4     Number of attempts at approach: 1

## 2024-03-11 NOTE — PROGRESS NOTES
Vancomycin Dosing by Pharmacy- Cessation of Therapy    Consult to pharmacy for vancomycin dosing has been discontinued by the prescriber, pharmacy will sign off at this time.    Please call pharmacy if there are further questions or re-enter a consult if vancomycin is resumed.     Mariajose Floyd, JajaD

## 2024-03-11 NOTE — CONSULTS
"Inpatient Diabetes Education Consult     Reason for Visit:  Mary Pedroza is a 48 y.o. female who presents to Ed on 3/7/24 at 09:17 with c/o \"pain, redness left food\".  Cellulitis and abscess of foot.  Found to have osteomyelitis.  3/7/24 incision & drainage and transmetatarsal amputation of the left foot     Consulting Service/Provider: KIRA Kenney    Visit Type: Follow up visit    Visit Modality: In-person    Discharge Equipment/Supply Needs:  Patient states, \"she is going to rehab after hospital stay\".     Met with patient in room [address] for 30 minutes to discuss self care management of T2DM.  Patient states, \"she stopped taking care of her diabetes because she was tired of it but now is going to start taking this seriously\".   States, \"she had a CGM FreeHolidogyle Bridget but \"not sure where it is\".   Requesting a prescription.  States, \"used to see Dr. Escalante but hasn't been there in years\".   Encouraged out patient diabetes education.  Pt is agreeable via telephone.   While inpatient Glimepiride 4 mg qAM is on hold. Getting Lantus 8 units at bedtime, Humalog 0-10 units TID, Januvia 25 mg q day.  Discussed long term complications of hyperglycemia on the body.  Discussed the importance of taking medication as prescribed.  Verbalizes understanding.  Discussed HbA1c of 9.4% dated 3/8/24. Pt states, \"it was 10.3%\".      Previous diagnosis: Type 2  Patient known to Diabetes Education department: No  Treatment prior to hospital admission: Oral medications Glimiperide, Januvia  Insulin: Rapid acting, Intermediate acting  Complications: peripheral vascular disease  PTA Medications:    Current Outpatient Medications   Medication Instructions    ARIPiprazole (Abilify) 5 mg tablet 1 tablet (5 mg) once daily.    blood sugar diagnostic (OneTouch Ultra Test) strip USE AS DIRECTED TO CHECK BLOOD SUGAR TWICE A DAY    doxepin (SINEquan) 100 mg capsule 1 capsule (100 mg) once daily at bedtime.    fenofibrate (Tricor) 145 mg tablet 1 " "tablet, oral, Daily    FreeStyle Bridget sensor system (FreeStyle Bridget 2 Sensor) kit USE SENSOR AS DIRECTED EVERY 2 WEEKS    glimepiride (AMARYL) 4 mg, oral, Daily before breakfast    hydrOXYzine HCL (ATARAX) 25 mg, oral, 2 times daily PRN    insulin glargine (Lantus Solostar U-100 Insulin) 100 unit/mL (3 mL) pen INJECT UNDER THE SKIN AS DIRECTED  UP TO 80 UNITS DAILY    Januvia 25 mg tablet 1 tablet (25 mg) once daily.    medroxyPROGESTERone (DEPO-PROVERA) 150 mg, intramuscular, every 12 weeks    naproxen (NAPROSYN) 250 mg, oral, 2 times daily with meals    pen needle, diabetic (BD Ultra-Fine Short Pen Needle) 31 gauge x 5/16\" needle  as directed . as directed for 90 days<BR>    propranolol (Inderal) 20 mg tablet 1 tablet (20 mg) 2 times a day.    simvastatin (ZOCOR) 20 mg, oral, Every evening    SUMAtriptan (Imitrex) 100 mg tablet oral    venlafaxine XR (Effexor-XR) 75 mg 24 hr capsule 1 capsule (75 mg) once daily.       Glucose   Date/Time Value Ref Range Status   03/11/2024 05:39 AM 71 65 - 99 mg/dL Final   03/10/2024 06:08 AM 73 65 - 99 mg/dL Final   03/09/2024 06:31 AM 82 65 - 99 mg/dL Final   03/08/2024 06:32  (H) 65 - 99 mg/dL Final   03/07/2024 09:36  (H) 65 - 99 mg/dL Final   07/28/2023 04:49  (H) 65 - 99 MG/DL Final   01/14/2023 09:16  (H) 65 - 99 MG/DL Final   08/04/2022 09:33  (H) 65 - 99 MG/DL Final   05/24/2021 12:12  (H) 65 - 99 MG/DL Final     Comment:     RESULT CHECKED  CALLED TO DR AMOS OFFICE/RANDY RODRIGUEZ [address] [facility]     10/24/2020 09:36  (H) 65 - 99 MG/DL Final   10/10/2018 06:26  (H) 65 - 99 MG/DL Final   07/12/2018 06:43  (H) 65 - 99 MG/DL Final   04/10/2018 11:02  (H) 65 - 99 MG/DL Final     No results found for: \"CPEPTIDE\"  Hemoglobin A1C   Date Value Ref Range Status   03/08/2024 9.4 (H) See below % Final   12/21/2022 9.8 (H) 4.0 - 6.0 % Final     Comment:     Hemoglobin A1C levels are related to mean blood glucose during the   " preceding 2-3 months. The relationship table below may be used as a   general guide. Each 1% increase in HGB A1C is a reflection of an   increase in mean glucose of approximately 30 mg/dl.   Reference: Diabetes Care, volume 29, supplement 1 Jan. 2006                        HGB A1C ................. Approx. Mean Glucose   _______________________________________________   6%   ...............................  120 mg/dl   7%   ...............................  150 mg/dl   8%   ...............................  180 mg/dl   9%   ...............................  210 mg/dl   10%  ...............................  240 mg/dl  Performed at [address] [address]     08/04/2022 10.7 (H) 4.0 - 6.0 % Final     Comment:     Hemoglobin A1C levels are related to mean blood glucose during the   preceding 2-3 months. The relationship table below may be used as a   general guide. Each 1% increase in HGB A1C is a reflection of an   increase in mean glucose of approximately 30 mg/dl.   Reference: Diabetes Care, volume 29, supplement 1 Jan. 2006                        HGB A1C ................. Approx. Mean Glucose   _______________________________________________   6%   ...............................  120 mg/dl   7%   ...............................  150 mg/dl   8%   ...............................  180 mg/dl   9%   ...............................  210 mg/dl   10%  ...............................  240 mg/dl  Performed at [address] [address]     10/21/2021 11.1 (H) 4.0 - 6.0 % Final     Comment:     Hemoglobin A1C levels are related to mean blood glucose during the   preceding 2-3 months. The relationship table below may be used as a   general guide. Each 1% increase in HGB A1C is a reflection of an   increase in mean glucose of approximately 30 mg/dl.   Reference: Diabetes Care, volume 29, supplement 1 Jan. 2006                        HGB A1C ................. Approx. Mean Glucose    _______________________________________________   6%   ...............................  120 mg/dl   7%   ...............................  150 mg/dl   8%   ...............................  180 mg/dl   9%   ...............................  210 mg/dl   10%  ...............................  240 mg/dl  Performed at [address] [address]     05/24/2021 11.8 (H) 4.0 - 6.0 % Final     Comment:     Hemoglobin A1C levels are related to mean blood glucose during the   preceding 2-3 months. The relationship table below may be used as a   general guide. Each 1% increase in HGB A1C is a reflection of an   increase in mean glucose of approximately 30 mg/dl.   Reference: Diabetes Care, volume 29, supplement 1 Jan. 2006                        HGB A1C ................. Approx. Mean Glucose   _______________________________________________   6%   ...............................  120 mg/dl   7%   ...............................  150 mg/dl   8%   ...............................  180 mg/dl   9%   ...............................  210 mg/dl   10%  ...............................  240 mg/dl  Performed at [address] [address]         Patient Learning/Readiness Assessment:  Patient has handouts and seems to want to take her diabetes more seriously now.        Recommendations for Providers:  Agreeable to meds to beds.  Would like a script for Freestyle Bridget 3     Additional Comments: Will refer to outpatient diabetes education.

## 2024-03-12 ENCOUNTER — APPOINTMENT (OUTPATIENT)
Dept: CARDIOLOGY | Facility: HOSPITAL | Age: 49
DRG: 617 | End: 2024-03-12
Payer: MEDICARE

## 2024-03-12 LAB
ANION GAP SERPL CALC-SCNC: 12 MMOL/L
BUN SERPL-MCNC: 7 MG/DL (ref 8–25)
CALCIUM SERPL-MCNC: 9.2 MG/DL (ref 8.5–10.4)
CHLORIDE SERPL-SCNC: 100 MMOL/L (ref 97–107)
CO2 SERPL-SCNC: 24 MMOL/L (ref 24–31)
CREAT SERPL-MCNC: 0.7 MG/DL (ref 0.4–1.6)
EGFRCR SERPLBLD CKD-EPI 2021: >90 ML/MIN/1.73M*2
ERYTHROCYTE [DISTWIDTH] IN BLOOD BY AUTOMATED COUNT: 13.9 % (ref 11.5–14.5)
GLUCOSE BLD MANUAL STRIP-MCNC: 149 MG/DL (ref 74–99)
GLUCOSE BLD MANUAL STRIP-MCNC: 149 MG/DL (ref 74–99)
GLUCOSE BLD MANUAL STRIP-MCNC: 269 MG/DL (ref 74–99)
GLUCOSE BLD MANUAL STRIP-MCNC: 76 MG/DL (ref 74–99)
GLUCOSE SERPL-MCNC: 84 MG/DL (ref 65–99)
HCT VFR BLD AUTO: 35.1 % (ref 36–46)
HGB BLD-MCNC: 10.9 G/DL (ref 12–16)
MCH RBC QN AUTO: 27.5 PG (ref 26–34)
MCHC RBC AUTO-ENTMCNC: 31.1 G/DL (ref 32–36)
MCV RBC AUTO: 88 FL (ref 80–100)
NRBC BLD-RTO: 0 /100 WBCS (ref 0–0)
PLATELET # BLD AUTO: 493 X10*3/UL (ref 150–450)
POTASSIUM SERPL-SCNC: 4 MMOL/L (ref 3.4–5.1)
RBC # BLD AUTO: 3.97 X10*6/UL (ref 4–5.2)
SODIUM SERPL-SCNC: 136 MMOL/L (ref 133–145)
WBC # BLD AUTO: 15.7 X10*3/UL (ref 4.4–11.3)

## 2024-03-12 PROCEDURE — 36569 INSJ PICC 5 YR+ W/O IMAGING: CPT

## 2024-03-12 PROCEDURE — 99231 SBSQ HOSP IP/OBS SF/LOW 25: CPT | Performed by: NURSE PRACTITIONER

## 2024-03-12 PROCEDURE — 1210000001 HC SEMI-PRIVATE ROOM DAILY

## 2024-03-12 PROCEDURE — 2500000002 HC RX 250 W HCPCS SELF ADMINISTERED DRUGS (ALT 637 FOR MEDICARE OP, ALT 636 FOR OP/ED)

## 2024-03-12 PROCEDURE — 82947 ASSAY GLUCOSE BLOOD QUANT: CPT

## 2024-03-12 PROCEDURE — 36415 COLL VENOUS BLD VENIPUNCTURE: CPT

## 2024-03-12 PROCEDURE — 87081 CULTURE SCREEN ONLY: CPT | Mod: WESLAB | Performed by: INTERNAL MEDICINE

## 2024-03-12 PROCEDURE — 97530 THERAPEUTIC ACTIVITIES: CPT | Mod: GP

## 2024-03-12 PROCEDURE — 2500000004 HC RX 250 GENERAL PHARMACY W/ HCPCS (ALT 636 FOR OP/ED)

## 2024-03-12 PROCEDURE — 2500000001 HC RX 250 WO HCPCS SELF ADMINISTERED DRUGS (ALT 637 FOR MEDICARE OP)

## 2024-03-12 PROCEDURE — 2500000004 HC RX 250 GENERAL PHARMACY W/ HCPCS (ALT 636 FOR OP/ED): Performed by: INTERNAL MEDICINE

## 2024-03-12 PROCEDURE — C1751 CATH, INF, PER/CENT/MIDLINE: HCPCS

## 2024-03-12 PROCEDURE — 97166 OT EVAL MOD COMPLEX 45 MIN: CPT | Mod: GO

## 2024-03-12 PROCEDURE — 2780000003 HC OR 278 NO HCPCS

## 2024-03-12 PROCEDURE — 80048 BASIC METABOLIC PNL TOTAL CA: CPT

## 2024-03-12 PROCEDURE — 2500000002 HC RX 250 W HCPCS SELF ADMINISTERED DRUGS (ALT 637 FOR MEDICARE OP, ALT 636 FOR OP/ED): Performed by: INTERNAL MEDICINE

## 2024-03-12 PROCEDURE — 85027 COMPLETE CBC AUTOMATED: CPT

## 2024-03-12 RX ORDER — CEFTRIAXONE 2 G/50ML
2 INJECTION, SOLUTION INTRAVENOUS EVERY 24 HOURS
Status: DISCONTINUED | OUTPATIENT
Start: 2024-03-12 | End: 2024-03-14 | Stop reason: HOSPADM

## 2024-03-12 RX ORDER — LIDOCAINE HYDROCHLORIDE 10 MG/ML
5 INJECTION INFILTRATION; PERINEURAL ONCE
Status: DISCONTINUED | OUTPATIENT
Start: 2024-03-12 | End: 2024-03-14 | Stop reason: HOSPADM

## 2024-03-12 RX ORDER — INSULIN GLARGINE 100 [IU]/ML
70 INJECTION, SOLUTION SUBCUTANEOUS NIGHTLY
Status: DISCONTINUED | OUTPATIENT
Start: 2024-03-12 | End: 2024-03-14 | Stop reason: HOSPADM

## 2024-03-12 RX ORDER — CEFTRIAXONE 2 G/50ML
2 INJECTION, SOLUTION INTRAVENOUS EVERY 24 HOURS
Qty: 1800 ML | Refills: 0 | Status: SHIPPED | OUTPATIENT
Start: 2024-03-12 | End: 2024-04-17

## 2024-03-12 RX ADMIN — ARIPIPRAZOLE 5 MG: 5 TABLET ORAL at 09:05

## 2024-03-12 RX ADMIN — PROPRANOLOL HYDROCHLORIDE 20 MG: 20 TABLET ORAL at 09:00

## 2024-03-12 RX ADMIN — OXYCODONE 5 MG: 5 TABLET ORAL at 14:23

## 2024-03-12 RX ADMIN — ACETAMINOPHEN 500 MG: 500 TABLET ORAL at 09:14

## 2024-03-12 RX ADMIN — SIMVASTATIN 20 MG: 20 TABLET, FILM COATED ORAL at 20:39

## 2024-03-12 RX ADMIN — OXYCODONE 5 MG: 5 TABLET ORAL at 09:13

## 2024-03-12 RX ADMIN — HYDROXYZINE HYDROCHLORIDE 25 MG: 25 TABLET, FILM COATED ORAL at 14:23

## 2024-03-12 RX ADMIN — CEFEPIME 2 G: 2 INJECTION, POWDER, FOR SOLUTION INTRAVENOUS at 05:09

## 2024-03-12 RX ADMIN — OXYCODONE 5 MG: 5 TABLET ORAL at 20:42

## 2024-03-12 RX ADMIN — CEFTRIAXONE SODIUM 2 G: 2 INJECTION, SOLUTION INTRAVENOUS at 13:51

## 2024-03-12 RX ADMIN — PROPRANOLOL HYDROCHLORIDE 20 MG: 20 TABLET ORAL at 20:39

## 2024-03-12 RX ADMIN — SITAGLIPTIN 25 MG: 25 TABLET, FILM COATED ORAL at 09:04

## 2024-03-12 RX ADMIN — OXYCODONE 5 MG: 5 TABLET ORAL at 00:41

## 2024-03-12 RX ADMIN — FENOFIBRATE 160 MG: 160 TABLET ORAL at 09:04

## 2024-03-12 RX ADMIN — DOXEPIN HYDROCHLORIDE 100 MG: 50 CAPSULE ORAL at 20:39

## 2024-03-12 RX ADMIN — INSULIN GLARGINE 70 UNITS: 100 INJECTION, SOLUTION SUBCUTANEOUS at 20:40

## 2024-03-12 RX ADMIN — ACETAMINOPHEN 500 MG: 500 TABLET ORAL at 20:43

## 2024-03-12 RX ADMIN — OXYCODONE 5 MG: 5 TABLET ORAL at 05:18

## 2024-03-12 RX ADMIN — VENLAFAXINE HYDROCHLORIDE 75 MG: 75 CAPSULE, EXTENDED RELEASE ORAL at 09:04

## 2024-03-12 ASSESSMENT — COGNITIVE AND FUNCTIONAL STATUS - GENERAL
DAILY ACTIVITIY SCORE: 17
CLIMB 3 TO 5 STEPS WITH RAILING: A LOT
TURNING FROM BACK TO SIDE WHILE IN FLAT BAD: A LITTLE
MOVING FROM LYING ON BACK TO SITTING ON SIDE OF FLAT BED WITH BEDRAILS: A LITTLE
HELP NEEDED FOR BATHING: A LITTLE
WALKING IN HOSPITAL ROOM: A LOT
DAILY ACTIVITIY SCORE: 22
HELP NEEDED FOR BATHING: A LOT
MOBILITY SCORE: 16
TOILETING: A LOT
DRESSING REGULAR LOWER BODY CLOTHING: A LITTLE
DRESSING REGULAR LOWER BODY CLOTHING: A LOT
MOBILITY SCORE: 19
MOVING TO AND FROM BED TO CHAIR: A LITTLE
PERSONAL GROOMING: A LITTLE
WALKING IN HOSPITAL ROOM: A LOT
STANDING UP FROM CHAIR USING ARMS: A LITTLE
CLIMB 3 TO 5 STEPS WITH RAILING: TOTAL

## 2024-03-12 ASSESSMENT — ACTIVITIES OF DAILY LIVING (ADL)
ADL_ASSISTANCE: INDEPENDENT
BATHING_ASSISTANCE: MODERATE

## 2024-03-12 ASSESSMENT — PAIN DESCRIPTION - ORIENTATION
ORIENTATION: LEFT

## 2024-03-12 ASSESSMENT — PAIN SCALES - GENERAL
PAINLEVEL_OUTOF10: 8
PAINLEVEL_OUTOF10: 7
PAINLEVEL_OUTOF10: 7
PAINLEVEL_OUTOF10: 2
PAINLEVEL_OUTOF10: 6
PAINLEVEL_OUTOF10: 1
PAINLEVEL_OUTOF10: 7
PAINLEVEL_OUTOF10: 8
PAINLEVEL_OUTOF10: 1
PAINLEVEL_OUTOF10: 6
PAINLEVEL_OUTOF10: 6

## 2024-03-12 ASSESSMENT — PAIN DESCRIPTION - LOCATION
LOCATION: FOOT

## 2024-03-12 ASSESSMENT — PAIN SCALES - WONG BAKER: WONGBAKER_NUMERICALRESPONSE: NO HURT

## 2024-03-12 ASSESSMENT — PAIN - FUNCTIONAL ASSESSMENT
PAIN_FUNCTIONAL_ASSESSMENT: 0-10
PAIN_FUNCTIONAL_ASSESSMENT: 0-10
PAIN_FUNCTIONAL_ASSESSMENT: WONG-BAKER FACES
PAIN_FUNCTIONAL_ASSESSMENT: 0-10

## 2024-03-12 ASSESSMENT — PAIN DESCRIPTION - DESCRIPTORS: DESCRIPTORS: SHARP

## 2024-03-12 NOTE — PROGRESS NOTES
"Mary Pedroza is a 48 y.o. female on day 5 of admission presenting with Cellulitis and abscess of foot.    Subjective   She underwent incision and drainage and transmetatarsal amputation of the left foot on 3/7.    She underwent debridement of nonviable tissue the left foot on 3/11      Objective     Physical Exam  General: awake, alert, oriented, responsive  Cardiovascular: regular, normal S1 and S2  Lungs: good air entry bilaterally, clear to auscultation  Extremities: The left foot and ankle were wrapped in a dressing which was not removed. There was no peripheral cyanosis, no pedal edema  Neuro: alert, oriented x 3, no focal weakness      Last Recorded Vitals  Blood pressure 91/55, pulse 80, temperature 37 °C (98.6 °F), temperature source Oral, resp. rate 15, height 1.626 m (5' 4\"), weight 93 kg (205 lb), SpO2 93 %.  Intake/Output last 3 Shifts:  I/O last 3 completed shifts:  In: 2995 (32.2 mL/kg) [P.O.:2150; I.V.:745 (8 mL/kg); IV Piggyback:100]  Out: 5350 (57.5 mL/kg) [Urine:5350 (1.6 mL/kg/hr)]  Weight: 93 kg     Relevant Results  Results for orders placed or performed during the hospital encounter of 03/07/24 (from the past 24 hour(s))   POCT GLUCOSE   Result Value Ref Range    POCT Glucose 78 74 - 99 mg/dL   POCT GLUCOSE   Result Value Ref Range    POCT Glucose 132 (H) 74 - 99 mg/dL   POCT GLUCOSE   Result Value Ref Range    POCT Glucose 234 (H) 74 - 99 mg/dL   Basic Metabolic Panel   Result Value Ref Range    Glucose 84 65 - 99 mg/dL    Sodium 136 133 - 145 mmol/L    Potassium 4.0 3.4 - 5.1 mmol/L    Chloride 100 97 - 107 mmol/L    Bicarbonate 24 24 - 31 mmol/L    Urea Nitrogen 7 (L) 8 - 25 mg/dL    Creatinine 0.70 0.40 - 1.60 mg/dL    eGFR >90 >60 mL/min/1.73m*2    Calcium 9.2 8.5 - 10.4 mg/dL    Anion Gap 12 <=19 mmol/L   CBC   Result Value Ref Range    WBC 15.7 (H) 4.4 - 11.3 x10*3/uL    nRBC 0.0 0.0 - 0.0 /100 WBCs    RBC 3.97 (L) 4.00 - 5.20 x10*6/uL    Hemoglobin 10.9 (L) 12.0 - 16.0 g/dL    " Hematocrit 35.1 (L) 36.0 - 46.0 %    MCV 88 80 - 100 fL    MCH 27.5 26.0 - 34.0 pg    MCHC 31.1 (L) 32.0 - 36.0 g/dL    RDW 13.9 11.5 - 14.5 %    Platelets 493 (H) 150 - 450 x10*3/uL   POCT GLUCOSE   Result Value Ref Range    POCT Glucose 76 74 - 99 mg/dL   POCT GLUCOSE   Result Value Ref Range    POCT Glucose 149 (H) 74 - 99 mg/dL     Scheduled medications  ARIPiprazole, 5 mg, oral, Daily  cefTRIAXone, 2 g, intravenous, q24h  doxepin, 100 mg, oral, Nightly  fenofibrate, 160 mg, oral, Daily  [Held by provider] glimepiride, 4 mg, oral, Daily before breakfast  insulin glargine, 80 Units, subcutaneous, Nightly  insulin lispro, 0-10 Units, subcutaneous, TID with meals  lidocaine, 5 mL, infiltration, Once  nicotine, 1 patch, transdermal, Daily  propranolol, 20 mg, oral, BID  simvastatin, 20 mg, oral, q PM  SITagliptin phosphate, 25 mg, oral, Daily  venlafaxine XR, 75 mg, oral, Daily      Continuous medications     PRN medications  PRN medications: acetaminophen, alteplase, dextrose 10 % in water (D10W), dextrose, glucagon, hydrOXYzine HCL, oxyCODONE, polyethylene glycol, SUMAtriptan        Assessment/Plan   Left foot infection: Cellulitis with ulceration and gangrene  S/p left foot incision and drainage and transmetatarsal amputation on 3/7  S/p debridement of nonviable tissue of the left foot on 3/11  Blood cultures in progress  She is on IV antibiotic coverage with ceftriaxone     Diabetes type 2 with hyperglycemia and gangrene of the left foot  Lantus scheduled and Humalog sliding scale  Humalog sliding scale.  Lantus  Continue januvia  Hemoglobin A1c was 9.4% on 3/8/2024     Hyperlipidemia  On fenofibrate, simvastatin     Bipolar disorder  On Abilify     Plan  Continue IV antibiotic coverage.  Postop plan per podiatry  Decrease Lantus to 70 units at bedtime because of low normal blood glucose levels, to avoid hypoglycemia  Diabetes education  PT/OT    Krysatl Joel MD

## 2024-03-12 NOTE — PROGRESS NOTES
Occupational Therapy    Evaluation    Patient Name: Mary Pedroza  MRN: [id number]  Today's Date: 3/12/2024  Time Calculation  Start Time: 1140  Stop Time: 1200  Time Calculation (min): 20 min        Assessment:  OT Assessment: OT order received, chart reviewed, evaluation completed. Pt demonstrated significant deficits in ADLs and functional mobility now NWB L LE, pt would benefit from acute OT services to facilitate return to PLOF  Evaluation/Treatment Tolerance: Patient limited by pain  Medical Staff Made Aware: Yes  End of Session Communication: Bedside nurse  End of Session Patient Position: Bed, 2 rail up, Alarm off, not on at start of session (Pt A&O x4, verbalized need to call for assist.)  OT Assessment Results: Decreased ADL status, Decreased upper extremity strength, Decreased endurance, Decreased functional mobility, Decreased IADLs, Decreased sensation  Evaluation/Treatment Tolerance: Patient limited by pain  Medical Staff Made Aware: Yes  Plan:  Treatment Interventions: ADL retraining, Functional transfer training, UE strengthening/ROM, Endurance training, Patient/family training, Equipment evaluation/education  OT Frequency: 5 times per week  OT Discharge Recommendations: High intensity level of continued care  OT - OK to Discharge: Yes  Treatment Interventions: ADL retraining, Functional transfer training, UE strengthening/ROM, Endurance training, Patient/family training, Equipment evaluation/education    Subjective     General:  General  Reason for Referral: activities of daily living, L foot TMA revision  Past Medical History Relevant to Rehab: Bipolar, depression, anxiety,  DM, OA, tobacco, substance abuse  Missed Visit: Yes  Missed Visit Reason: Patient in a medical procedure (Pt going to surgery for a revision of TMA and debridement of non-viable soft tissue.)  Co-Treatment: PT  Co-Treatment Reason: Partial co-tx due to limited tolerance to activity  Prior to Session Communication: Bedside  nurse  Patient Position Received: Bed, 2 rail up  Preferred Learning Style: verbal  General Comment: Pt is a 47 yo woman admit with L foot cellulitis and abcess, found to have osteomyelitis and 5th metatarsal fracture, s/p TMA and revision.  Precautions:  LE Weight Bearing Status: Left Non-Weight Bearing  Medical Precautions: Fall precautions  Precautions Comment: 3/11/2024 surgery for debridement of left foot and partial excision left 1,2,3,4,5 metatarsal;  2nd surgery for left foot this hospital admit    Pain:  Pain Assessment  Pain Assessment: 0-10  Pain Score: 7  Pain Type: Surgical pain  Pain Location: Foot  Pain Orientation: Left    Objective   Cognition:  Overall Cognitive Status: Within Functional Limits  Orientation Level: Oriented X4     Home Living:  Type of Home: Apartment  Lives With: Friends  Home Adaptive Equipment: None  Home Layout: One level  Home Access: Stairs to enter with rails  Entrance Stairs-Rails: Right  Bathroom Shower/Tub: Tub only  Bathroom Toilet: Standard  Bathroom Equipment: None  Home Living Comments: Patient states no accomodations in apartment  Prior Function:  Level of [place]: Independent with ADLs and functional transfers, Independent with homemaking with ambulation  ADL Assistance: Independent  Homemaking Assistance: Independent  Ambulatory Assistance: Independent  Vocational: On disability    ADL:  Eating Assistance: Independent  Grooming Deficit: Setup  Bathing Assistance: Moderate  UE Dressing Deficit: Setup  LE Dressing Assistance: Maximal  Toileting Assistance with Device: Moderate  Functional Assistance: Moderate  ADL Comments: Pt limited by NWB L LE, declined ADLs, assist levels per clinincal judgement  Activity Tolerance:  Endurance: Decreased tolerance for upright activites  Rate of Perceived Exertion (RPE): 4/10  Bed Mobility/Transfers: Bed Mobility  Bed Mobility: Yes  Bed Mobility 1  Bed Mobility 1: Supine to sitting  Level of Assistance 1: Close  supervision  Bed Mobility Comments 1: HOB elevated and use of bed rail  Bed Mobility 2  Bed Mobility  2: Sitting to supine  Level of Assistance 2: Close supervision  Bed Mobility Comments 2: Pt able to reposition self in flat bed, needs in reach    Transfers  Transfer: Yes  Transfer 1  Transfer From 1: Sit to  Transfer to 1: Stand  Technique 1: Sit to stand  Transfer Device 1: Walker  Transfer Level of Assistance 1: Moderate assistance  Trials/Comments 1: Pt able to stand from bed with cues for safe hand placement and SW use. Pt able to take several steps to bedside chair with good compliance to NWB  Transfers 2  Transfer From 2: Stand to  Transfer to 2: Sit  Technique 2: Stand to sit  Transfer Device 2: Walker  Transfer Level of Assistance 2: Minimum assistance  Trials/Comments 2: Cues for safe hand placement and min A for controlled descent into chair     Sensation:  Light Touch: Partial deficits in the RLE, Partial deficits in the LLE  Strength:  Strength Comments: 4/5 B UES    Coordination:  Movements are Fluid and Coordinated: Yes (for upper body)   Hand Function:  Gross Grasp: Functional  Coordination: Functional  Extremities: RUE   RUE : Within Functional Limits and LUE   LUE: Within Functional Limits      Outcome Measures:[facility] Daily Activity  Putting on and taking off regular lower body clothing: A lot  Bathing (including washing, rinsing, drying): A lot  Putting on and taking off regular upper body clothing: None  Toileting, which includes using toilet, bedpan or urinal: A lot  Taking care of personal grooming such as brushing teeth: A little  Eating Meals: None  Daily Activity - Total Score: 17      Education Documentation  Precautions, taught by Karli Feldman OT at 3/12/2024  1:22 PM.  Learner: Patient  Readiness: Acceptance  Method: Explanation  Response: Verbalizes Understanding  Comment: Pt educated on OT POC    Home Exercise Program, taught by Karli Feldman OT at 3/12/2024  1:22 PM.  Learner:  Patient  Readiness: Acceptance  Method: Explanation  Response: Verbalizes Understanding  Comment: Pt educated on OT POC    ADL Training, taught by Karli Feldman OT at 3/12/2024  1:22 PM.  Learner: Patient  Readiness: Acceptance  Method: Explanation  Response: Verbalizes Understanding  Comment: Pt educated on OT POC    Education Comments  No comments found.        OP EDUCATION:       Goals:  Encounter Problems       Encounter Problems (Active)       OT Goals       ADLs       Start:  03/12/24    Expected End:  04/05/24       Pt will complete ADL tasks with Mod I, using AE as needed, in order to complete self-care tasks.           Functional mobility       Start:  03/12/24    Expected End:  04/05/24       Pt will perform functional mobility short household distance at mod ind level with SW maintaining NWB L LE status           Functional transfers       Start:  03/12/24    Expected End:  04/05/24       Pt will perform functional transfers at mod ind level SW maintaining NWB L LE           Therapeutic exercise       Start:  03/12/24    Expected End:  04/05/24       Pt will perform upper body therapeutic exercises all joints/planes of motion independently to increase upper body strength for improved functional transfers.

## 2024-03-12 NOTE — CARE PLAN
The patient's goals for the shift include pain control    The clinical goals for the shift include no pain

## 2024-03-12 NOTE — NURSING NOTE
Bedside shift report completed. Pt awake in bed. Dressing to L foot CDI. Pt aware of POC. Call light within reach.

## 2024-03-12 NOTE — CARE PLAN
The patient's goals for the shift include pain control    The clinical goals for the shift include Pain control

## 2024-03-12 NOTE — PROCEDURES
Vascular Access Team Procedure Note     Visit Date: 3/12/2024      Patient Name: Mary Pedroza         MRN: [id number]             Procedure:  Picc line placed without difficulty, old tubing removed from room, RN aware of placement and ok to use.                          Ernestine Gonzales RN  3/12/2024  3:04 PM

## 2024-03-12 NOTE — PROGRESS NOTES
Blood management follow up of this JAELYN pt on day 5 of admission presenting with Cellulitis and abscess of foot. She is 5day s/p left transmetatarsal amputation. She underwent debridement of all nonviable soft tissue and bone on 3/11/24. H/H stable 10.9/35.1    Subjective   Reports post op pain controlled, +paresthesia bilat feet, denies drainage to left foot dressing. Current smoker, encouraged use of incentive spirometry 10x hourly. Denies headache, vertigo, fever, chills, chest pain, sob, terrazas, cough, abd pain, nausea, states had soft bm this morning, denies hematochezia, melena, hematuria, dysuria, peripheral edema, rashes or lesions.        Objective     Physical Exam  Constitutional:       Appearance: Normal appearance.   HENT:      Head: Normocephalic and atraumatic.      Right Ear: External ear normal.      Left Ear: External ear normal.      Nose: Nose normal.      Mouth/Throat:      Pharynx: Oropharynx is clear.   Eyes:      Conjunctiva/sclera: Conjunctivae normal.      Pupils: Pupils are equal, round, and reactive to light.   Cardiovascular:      Rate and Rhythm: Normal rate and regular rhythm.      Heart sounds: Normal heart sounds. No murmur heard.  Pulmonary:      Effort: Pulmonary effort is normal. No respiratory distress.      Breath sounds: Normal breath sounds. No wheezing, rhonchi or rales.   Chest:      Chest wall: No tenderness.   Abdominal:      General: Bowel sounds are normal. There is no distension.      Palpations: Abdomen is soft.      Tenderness: There is no abdominal tenderness.   Musculoskeletal:         General: Tenderness present.      Cervical back: Neck supple.      Right lower leg: No edema.      Left lower leg: No edema.   Skin:     General: Skin is warm and dry.      Capillary Refill: Capillary refill takes 2 to 3 seconds.      Coloration: Skin is not pale.      Findings: No bruising, erythema, lesion or rash.      Comments: Ace wrap left foot dry/intact    Neurological:       "General: No focal deficit present.      Mental Status: She is alert and oriented to person, place, and time. Mental status is at baseline.   Psychiatric:         Mood and Affect: Mood normal.         Behavior: Behavior normal.         Last Recorded Vitals  Blood pressure 91/55, pulse 80, temperature 37 °C (98.6 °F), temperature source Oral, resp. rate 15, height 1.626 m (5' 4\"), weight 93 kg (205 lb), SpO2 93 %.  Intake/Output last 3 Shifts:  I/O last 3 completed shifts:  In: 2995 (32.2 mL/kg) [P.O.:2150; I.V.:745 (8 mL/kg); IV Piggyback:100]  Out: 5350 (57.5 mL/kg) [Urine:5350 (1.6 mL/kg/hr)]  Weight: 93 kg     Relevant Results  Results for orders placed or performed during the hospital encounter of 03/07/24 (from the past 24 hour(s))   POCT GLUCOSE   Result Value Ref Range    POCT Glucose 78 74 - 99 mg/dL   POCT GLUCOSE   Result Value Ref Range    POCT Glucose 132 (H) 74 - 99 mg/dL   POCT GLUCOSE   Result Value Ref Range    POCT Glucose 234 (H) 74 - 99 mg/dL   Basic Metabolic Panel   Result Value Ref Range    Glucose 84 65 - 99 mg/dL    Sodium 136 133 - 145 mmol/L    Potassium 4.0 3.4 - 5.1 mmol/L    Chloride 100 97 - 107 mmol/L    Bicarbonate 24 24 - 31 mmol/L    Urea Nitrogen 7 (L) 8 - 25 mg/dL    Creatinine 0.70 0.40 - 1.60 mg/dL    eGFR >90 >60 mL/min/1.73m*2    Calcium 9.2 8.5 - 10.4 mg/dL    Anion Gap 12 <=19 mmol/L   CBC   Result Value Ref Range    WBC 15.7 (H) 4.4 - 11.3 x10*3/uL    nRBC 0.0 0.0 - 0.0 /100 WBCs    RBC 3.97 (L) 4.00 - 5.20 x10*6/uL    Hemoglobin 10.9 (L) 12.0 - 16.0 g/dL    Hematocrit 35.1 (L) 36.0 - 46.0 %    MCV 88 80 - 100 fL    MCH 27.5 26.0 - 34.0 pg    MCHC 31.1 (L) 32.0 - 36.0 g/dL    RDW 13.9 11.5 - 14.5 %    Platelets 493 (H) 150 - 450 x10*3/uL   POCT GLUCOSE   Result Value Ref Range    POCT Glucose 76 74 - 99 mg/dL   POCT GLUCOSE   Result Value Ref Range    POCT Glucose 149 (H) 74 - 99 mg/dL      Current Facility-Administered Medications:     acetaminophen (Tylenol) tablet 500 mg, " 500 mg, oral, q4h PRN, Bharat W Berberich, DPM, 500 mg at 03/12/24 0914    alteplase (Cathflo Activase) injection 2 mg, 2 mg, intra-catheter, PRN, Betzy Street MD [facility]    ARIPiprazole (Abilify) tablet 5 mg, 5 mg, oral, Daily, Bharat W Berberich, DPM, 5 mg at 03/12/24 0905    cefTRIAXone (Rocephin) 2 g IV in dextrose 5% 50 mL, 2 g, intravenous, q24h, Betzy Street MD [facility]    dextrose 10 % in water (D10W) infusion, 0.3 g/kg/hr, intravenous, Once PRN, Bharat Michelle, DPM    dextrose 50 % injection 25 g, 25 g, intravenous, q15 min PRN, Bharat W Berberich, DPM, 25 g at 03/11/24 0800    doxepin (SINEquan) capsule 100 mg, 100 mg, oral, Nightly, Bharat W Berberich, DPM, 100 mg at 03/11/24 2035    fenofibrate (Triglide) tablet 160 mg, 160 mg, oral, Daily, Bharat W Berberich, DPM, 160 mg at 03/12/24 0904    [Held by provider] glimepiride (Amaryl) tablet 4 mg, 4 mg, oral, Daily before breakfast, Bharat W Annamarieich, DPM    glucagon (Glucagen) injection 1 mg, 1 mg, intramuscular, q15 min PRN, Bharat Michelle, DPM    hydrOXYzine HCL (Atarax) tablet 25 mg, 25 mg, oral, BID PRN, Bharat W Berberich, DPM, 25 mg at 03/08/24 2017    insulin glargine (Lantus) injection 80 Units, 80 Units, subcutaneous, Nightly, Bharat W Berberich, DPM, 80 Units at 03/11/24 2036    insulin lispro (HumaLOG) injection 0-10 Units, 0-10 Units, subcutaneous, TID with meals, Bharat W Annamarieich, DPM, 2 Units at 03/10/24 1616    lidocaine (Xylocaine) 10 mg/mL (1 %) injection 50 mg, 5 mL, infiltration, Once, Betzy Street MD [facility]    nicotine (Nicoderm CQ) 21 mg/24 hr patch 1 patch, 1 patch, transdermal, Daily, Bharat Michelle DPM    oxyCODONE (Roxicodone) immediate release tablet 5 mg, 5 mg, oral, q4h PRN, Bharat Michelle DPM, 5 mg at 03/12/24 0913    polyethylene glycol (Glycolax, Miralax) packet 17 g, 17 g, oral, Daily PRN, Bharat Michelle DPM    propranolol (Inderal) tablet 20 mg, 20 mg, oral, BID, Bharat Michelle DPM, 20 mg at 03/12/24 0900     simvastatin (Zocor) tablet 20 mg, 20 mg, oral, q PM, Bharat W Berberich, DPM, 20 mg at 03/11/24 2035    SITagliptin phosphate (Januvia) tablet 25 mg, 25 mg, oral, Daily, Bharat W Berberich, DPM, 25 mg at 03/12/24 0904    SUMAtriptan (Imitrex) tablet 100 mg, 100 mg, oral, Daily PRN, Bharat W Berberich, DPM    venlafaxine XR (Effexor-XR) 24 hr capsule 75 mg, 75 mg, oral, Daily, Bharat W Berberich, DPM, 75 mg at 03/12/24 0904                              Assessment/Plan   Principal Problem:    Cellulitis and abscess of foot  Active Problems:    Diabetes mellitus (CMS/HCC)    Left foot infection    Subacute osteomyelitis of left foot (CMS/HCC)  S/p left foot incision and drainage drainage and transmetatarsal amputation on 3/7/24  S/P debridement of all nonviable soft tissue and bone on 3/11/24  Normocytic anemia  Anemia of iron deficient combined with anemia of blood loss and inflammation     Venofer 200mg IV X4  Monitor H/H  Transfuse 1unit as needed to maintain Hgb over 7           Gwen Bhagat, APRN-CNP

## 2024-03-12 NOTE — PROGRESS NOTES
PODIATRY SERVICE CONSULT PROGRESS NOTE    SERVICE DATE: 3/12/2024   SERVICE TIME:  6:54 PM     Subjective   INTERVAL HPI:   Patient was seen at bedside.  Pain well controlled. However some pain with dressing change.  Patient denies any constitutional symptoms.   PICC was placed earlier today in [facility].  Anticipates discharge to SNF for rehab.    Medications:  Scheduled Meds: ARIPiprazole, 5 mg, oral, Daily  cefTRIAXone, 2 g, intravenous, q24h  doxepin, 100 mg, oral, Nightly  fenofibrate, 160 mg, oral, Daily  [Held by provider] glimepiride, 4 mg, oral, Daily before breakfast  insulin glargine, 70 Units, subcutaneous, Nightly  insulin lispro, 0-10 Units, subcutaneous, TID with meals  lidocaine, 5 mL, infiltration, Once  nicotine, 1 patch, transdermal, Daily  propranolol, 20 mg, oral, BID  simvastatin, 20 mg, oral, q PM  SITagliptin phosphate, 25 mg, oral, Daily  venlafaxine XR, 75 mg, oral, Daily      Continuous Infusions:    PRN Meds: PRN medications: acetaminophen, alteplase, dextrose 10 % in water (D10W), dextrose, glucagon, hydrOXYzine HCL, oxyCODONE, polyethylene glycol, SUMAtriptan         Objective   PHYSICAL EXAM:  Physical Exam Performed:  Vitals:    03/12/24 1554   BP: 93/53   Pulse: 79   Resp: 15   Temp: 36.7 °C (98.1 °F)   SpO2: 97%     Body mass index is 35.19 kg/m².    Patient is AOx3 and in no acute distress. Patient is alert and cooperative. Sitting comfortably in bed with dressing clean, dry and intact. [facility] PICC in place.     Vascular: Faintly palpable DP/PT pulses B/L. Moderate pitting edema noted B/L L>>R. Hair growth absent B/L. CFT<5 to R hallux. Temperature is warm to cool from tibial tuberosity to distal digits R and warm to the touch distally L. No lymphatic streaking noted B/L.     Musculoskeletal: Gross active and passive ROM intact to age and activity level. Moves all extremities spontaneously. No pain to palpation at feet B/L.      Neurological: Absent light touch sensation B/L. Pain stimuli  diminished B/L. Denies any numbness, burning or tingling.     Dermatologic: Nails 1-5 are thickened, elongated, discolored with subungual debris right. Skin appears diffusely xerotic B/L. Web spaces 1-4 right are clean, dry and intact. No rashes or nodules noted R; L show maceration with peeling skin and serous drainage. Hyperkeratotic tissue with intradermal bleeding noted to R medial IPJ of hallux.     Surgical incision left distal foot s/p left TMA formalization  Incision remains well-coapted plantarly, medially, and laterally with some central serosaguinous drainage resulting in some maceration centrally as well. Medial portion has some necrosis. All sutures remain intact. No purulence noted. No areas of fluctuance. Some erythema and edema distally to foot xochilt-incision, consistent with post-op state. No malodor.    Wound #1: Left dorsal foot about 1.5 cm proximal to incision  Measures 1.0 x 1.0 x 0.2 cm with mixed fibrogranular base and mild serosanguinous drainage. No signs of infection. No maceration. No necrosis. No purulence. No malodor.    Wound #2: Left plantar lateral hindfoot  Surgical incision measures 2.0 x 0.1 x 0.3 cm with mild serosanguinous drainage and mixed fibrogranular base. Some maceration of skin borders. No necrosis. No purulence. No fluctuance. No malodor.          LABS:   Results for orders placed or performed during the hospital encounter of 03/07/24 (from the past 24 hour(s))   POCT GLUCOSE   Result Value Ref Range    POCT Glucose 234 (H) 74 - 99 mg/dL   Basic Metabolic Panel   Result Value Ref Range    Glucose 84 65 - 99 mg/dL    Sodium 136 133 - 145 mmol/L    Potassium 4.0 3.4 - 5.1 mmol/L    Chloride 100 97 - 107 mmol/L    Bicarbonate 24 24 - 31 mmol/L    Urea Nitrogen 7 (L) 8 - 25 mg/dL    Creatinine 0.70 0.40 - 1.60 mg/dL    eGFR >90 >60 mL/min/1.73m*2    Calcium 9.2 8.5 - 10.4 mg/dL    Anion Gap 12 <=19 mmol/L   CBC   Result Value Ref Range    WBC 15.7 (H) 4.4 - 11.3 x10*3/uL     nRBC 0.0 0.0 - 0.0 /100 WBCs    RBC 3.97 (L) 4.00 - 5.20 x10*6/uL    Hemoglobin 10.9 (L) 12.0 - 16.0 g/dL    Hematocrit 35.1 (L) 36.0 - 46.0 %    MCV 88 80 - 100 fL    MCH 27.5 26.0 - 34.0 pg    MCHC 31.1 (L) 32.0 - 36.0 g/dL    RDW 13.9 11.5 - 14.5 %    Platelets 493 (H) 150 - 450 x10*3/uL   POCT GLUCOSE   Result Value Ref Range    POCT Glucose 76 74 - 99 mg/dL   POCT GLUCOSE   Result Value Ref Range    POCT Glucose 149 (H) 74 - 99 mg/dL   POCT GLUCOSE   Result Value Ref Range    POCT Glucose 149 (H) 74 - 99 mg/dL      Lab Results   Component Value Date    HGBA1C 9.4 (H) 03/08/2024      Lab Results   Component Value Date    CRP 13.60 (H) 03/07/2024      Lab Results   Component Value Date    SEDRATE 76 (H) 03/07/2024        Results from last 7 days   Lab Units 03/12/24  0554   WBC AUTO x10*3/uL 15.7*   RBC AUTO x10*6/uL 3.97*   HEMOGLOBIN g/dL 10.9*   HEMATOCRIT % 35.1*     Results from last 7 days   Lab Units 03/12/24  0554 03/08/24  0632 03/07/24  0936   SODIUM mmol/L 136   < > 133   POTASSIUM mmol/L 4.0   < > 3.9   CHLORIDE mmol/L 100   < > 98   CO2 mmol/L 24   < > 26   BUN mg/dL 7*   < > 7*   CREATININE mg/dL 0.70   < > 0.50   CALCIUM mg/dL 9.2   < > 9.1   BILIRUBIN TOTAL mg/dL  --   --  0.2   ALT U/L  --   --  36   AST U/L  --   --  24    < > = values in this interval not displayed.     Results from last 7 days   Lab Units 03/08/24  1239   COLOR U  Light-Yellow   APPEARANCE U  Clear   PH U  6.5   SPEC GRAV UR  1.014   PROTEIN U mg/dL NEGATIVE   BLOOD UR  NEGATIVE   NITRITE U  NEGATIVE       IMAGING REVIEW:  Vascular US PVR without exercise    Result Date: 3/8/2024  Preliminary Cardiology Report           [place] [id number]            Phone [phone]  Preliminary Vascular Lab Report   VASC US PVR WITHOUT EXERCISE  Patient Name:    MONTANA Deshpande Physician: [id number] Soni Leo MD,                  CHANELLE FRANK Study Date:       3/8/2024         Ordering Provider: [id number] TRUPTI BURNETT MRN/PID:         [id number]         Fellow: Accession#:      [id number]     Technologist:      Jeanne Stewart RVRENUKA YOB: 1975       Technologist 2: Gender:          F                Encounter#:        [id number] Admission        Inpatient        Location           [facility] Status:                           Performed:  Diagnosis/ICD: Cellulitus of left lower limb-L03.116; Other specified symptoms                and signs involving the circulatory and respiratory                systems-R09.89 CPT Codes:     97481 Peripheral artery PVR (multi segmental pressure  Pertinent History: Left transmetatarsal amputation on 3/07/2024.  PRELIMINARY CONCLUSIONS:  Right Lower PVR: No evidence of arterial occlusive disease in the right lower extremity at rest. Normal digital perfusion noted. Triphasic flow is noted in the right common femoral artery, right popliteal artery, right posterior tibial artery and right dorsalis pedis artery. PVR tracings may be inaccurate due to patient movement. Left Lower PVR: No evidence of arterial occlusive disease in the left lower extremity at rest. Triphasic flow is noted in the left common femoral artery, left popliteal artery, left posterior tibial artery and left dorsalis pedis artery. Unable to obtain TBI due to transmetatarsal amputation. PVR tracings may be inaccurate due to patient movement.  Imaging & Doppler Findings:  RIGHT Lower PVR                Pressures Ratios Right High Thigh               152 mmHg  1.30 Right Low Thigh                145 mmHg  1.24 Right Calf                     142 mmHg  1.21 Right Posterior Tibial (Ankle) 128 mmHg  1.09 Right Dorsalis Pedis (Ankle)   132 mmHg  1.13 Right Digit (Great Toe)        123 mmHg  1.05   LEFT Lower PVR                Pressures Ratios Left High Thigh               161 mmHg  1.38 Left Low  Thigh                152 mmHg  1.30 Left Calf                     132 mmHg  1.13 Left Posterior Tibial (Ankle) 128 mmHg  1.09 Left Dorsalis Pedis (Ankle)   132 mmHg  1.13                      Right     Left Brachial Pressure 115 mmHg 117 mmHg   VASCULAR PRELIMINARY REPORT completed by Jeanne Stewart RENUKA on 3/8/2024 at 1:53:18 PM  ** Final **     XR foot 3+ views bilateral    Result Date: 3/8/2024  Interpreted By:  Gonzalo Poon, STUDY: XR FOOT 3+ VIEWS BILATERAL; 3/7/2024 7:20 pm   INDICATION: Signs/Symptoms:post-op, wounds. Status post partial amputation left foot. Wound on 1st toe right foot.   COMPARISON: Left foot 03/07/2024 at 1012 hours.   ACCESSION NUMBER(S): [id number]   ORDERING CLINICIAN: KATRIN LAWRENCE   TECHNIQUE: Bilateral feet, three views each   FINDINGS: Right foot: *No fractures or destructive lesions are identified on the right. Hammertoe deformities of the 2nd through 5th toes are identified on the right.     Left foot: *Patient is status post amputation of all 5 toes. Extensive gauze artifact obscures the underlying detail. No definite fractures or destructive lesions are identified involving the remaining osseous structures though detail is limited due to overlying artifact.         1. Postsurgical appearance left foot. 2. No acute pathologic findings are identified on the right. Please note that the conventional radiographic findings of osteomyelitis are delayed by 10 to 14 days from the clinical onset. If there is a high suspicion for osteomyelitis then additional imaging may still be appropriate.   MACRO: none   Signed by: Gonzalo Poon 3/8/2024 8:09 AM Dictation workstation:   [id number]    XR foot left 3+ views    Result Date: 3/7/2024  Interpreted By:  Cecilio Feldman, STUDY: XR FOOT LEFT 3+ VIEWS; XR ANKLE LEFT 3+ VIEWS;  3/7/2024 10:16 am   INDICATION: Signs/Symptoms:infection; Signs/Symptoms:infection/ pain.   COMPARISON: None.   ACCESSION NUMBER(S): [id number]; [id number]    ORDERING CLINICIAN: ANGEL BOWSER   TECHNIQUE: Three views each of the left foot and left ankle were obtained.   FINDINGS: Moderate-sized plantar calcaneal spur and posterior calcaneal spur. Moderate osteophyte formation involving the medial malleolus. There is periosteal reaction along the distal medial tibia diametaphysis. Ankle mortise and talar dome are intact. There is diffuse ankle soft tissue swelling, most pronounced laterally. There is diffuse midfoot and forefoot soft tissue swelling. There is cutaneous defect in the forefoot lateral to the 5th metatarsal. There is an acute comminuted fracture at the proximal end of the 5th proximal phalanx and an acute comminuted fracture in the distal head of the 5th metatarsal. There is some decreased bone density at the fracture sites. There is also soft tissue edema. No soft tissue gas density. No opaque soft tissue foreign body. No  erosion.       Cutaneous defect consistent with ulceration in the lateral forefoot, lateral to the 5th metatarsal.   Findings of osteomyelitis with pathologic fractures involving the distal 5th metatarsal head and the proximal end of the 5th proximal phalanx.   Ankle and midfoot/forefoot soft tissue swelling as described.   Osteophytic changes as described.   Nonspecific smooth periosteal cortical reaction involving the distal medial tibia diametaphyseal cortex. This could be from chronic infection or remote trauma. Clinical correlation needed.   MACRO: None   Signed by: Cecilio Feldman 3/7/2024 11:04 AM Dictation workstation:   [id number]    XR ankle left 3+ views    Result Date: 3/7/2024  Interpreted By:  Cecilio Feldman, STUDY: XR FOOT LEFT 3+ VIEWS; XR ANKLE LEFT 3+ VIEWS;  3/7/2024 10:16 am   INDICATION: Signs/Symptoms:infection; Signs/Symptoms:infection/ pain.   COMPARISON: None.   ACCESSION NUMBER(S): [id number]; [id number]   ORDERING CLINICIAN: ANGEL BOWSER   TECHNIQUE: Three views each of the left foot and left ankle were obtained.    FINDINGS: Moderate-sized plantar calcaneal spur and posterior calcaneal spur. Moderate osteophyte formation involving the medial malleolus. There is periosteal reaction along the distal medial tibia diametaphysis. Ankle mortise and talar dome are intact. There is diffuse ankle soft tissue swelling, most pronounced laterally. There is diffuse midfoot and forefoot soft tissue swelling. There is cutaneous defect in the forefoot lateral to the 5th metatarsal. There is an acute comminuted fracture at the proximal end of the 5th proximal phalanx and an acute comminuted fracture in the distal head of the 5th metatarsal. There is some decreased bone density at the fracture sites. There is also soft tissue edema. No soft tissue gas density. No opaque soft tissue foreign body. No  erosion.       Cutaneous defect consistent with ulceration in the lateral forefoot, lateral to the 5th metatarsal.   Findings of osteomyelitis with pathologic fractures involving the distal 5th metatarsal head and the proximal end of the 5th proximal phalanx.   Ankle and midfoot/forefoot soft tissue swelling as described.   Osteophytic changes as described.   Nonspecific smooth periosteal cortical reaction involving the distal medial tibia diametaphyseal cortex. This could be from chronic infection or remote trauma. Clinical correlation needed.   MACRO: None   Signed by: Cecilio Feldman 3/7/2024 11:04 AM Dictation workstation:   [id number]    Lower extremity venous duplex left    Result Date: 2/21/2024  Interpreted By:  Stanley Baumann, STUDY: [facility] LOWER EXTREMITY VENOUS DUPLEX LEFT  2/21/2024 10:17 am   INDICATION: Left lower extremity swelling, concern for DVT   COMPARISON: None.   ACCESSION NUMBER(S): [id number]   ORDERING CLINICIAN: TONIA HINDS   TECHNIQUE: Routine ultrasound of the left lower extremity was performed with duplex Doppler (color and spectral) evaluation.   Static images were obtained for remote interpretation.    FINDINGS: THIGH VEINS:  The left common femoral, femoral, popliteal, proximal medial saphenous, and deep femoral veins are patent and free of thrombus.  The veins are normally compressible.  They demonstrate normal phasic flow and augmentation response.       Negative study.  No deep venous thrombosis of the left lower extremity.   MACRO: None   Signed by: Baptismmayank Baumann 2/21/2024 10:32 AM Dictation workstation:   [id number]            Assessment/Plan   ASSESSMENT & PLAN:    #S/P Open Transmetatarsal Amputation, Left Foot (DOS: 03/07/24) with formalization and primary closure (DOS: 03/11/24) with Dr. Francisco  #Cellulitis, Left Foot - Resolved  #Chronic Non-Pressure Ulceration with Fat layer exposed, left foot  #Suspected Chronic Osteomyelitis, Left Foot, improved  #Pathologic Fracture 5th Metatarsal Head, Left Foot, resolved  #Pathologic Fracture 5th Proximal Phalanx, Left Foot, resolved  #DM Type II with Foot Ulcer - Uncontrolled  #Polyneuropathy  #PAD  #Callus, Right Hallux     - Patient was seen and evaluated; all findings were discussed and all questions were answered to patient's satisfaction.  - Charts, labs, vitals and imaging all reviewed.   - Imaging: XR L Ankle and Foot: Pathologic Fx of Left 5th Metatarsal Head and Proximal Phalanx with some lysis and erosive changes to the distal 5th ray. No gas on film. Some periosteal changes to the medial malleolus. Post-operative films shoe post-operative changes. No further gas formation. No lysis or signs of OM to RIGHT foot on plain films.  - Labs: WBC 15.7; Hbg 10.9; CRP 13.6; ESR 76; HbA1c 9.4%  - PVRs: No signs of occlusive disease bilaterally with WNL MAREK and TBI results.  - Wound culture: Intraoperative culture (3/07) grew 4+ Group B Strep  - Blood culture: NGTD at 4 days and final     Plan:  - Abx: ceftriaxone 2g every 24 hours through 4/17/24. PICC in [facility].  - Dressing changed. Will proceed with daily betadine dressing changes.  - Dressings: Betadine  soaked adaptic, betadine soaked 4x4 gauze, dry gauze, ABDs, Kerlix and ACEs.  - Nursing staff is able to reinforce dressing if & as necessary. Order placed.  - Podiatry will continue to follow while in house.  - Patient is clear for discharge with outpatient follow-up from Podiatry standpoint.     DVT ppx: SCD to RLE; no SCD to LLE please.  Weightbearing: NWB LLE with surgical shoe in place.  Discharge: Pt to follow up 1 week after discharge with Dr. Aponte/Nolan Toussaint for discharge from Podiatry's standpoint     Case to be discussed with attending, A&P above reflects a tentative plan. Please await for the final signature from the attending physician on service.    This patient will be followed by the Podiatry service. Please Epic Chat the corresponding residents below with questions or concerns.      Bharat Michelle DPM PGY-2  Podiatric Medicine and Surgery   Epic Chat          SIGNATURE: Bharat Michelle DPM PATIENT NAME: Mary Pedroza   DATE: March 12, 2024 MRN: [id number]   TIME: 6:54 PM CONTACT: Haik Tommy

## 2024-03-12 NOTE — PROGRESS NOTES
Physical Therapy    Physical Therapy Treatment    Patient Name: Mary Pedroza  MRN: [id number]  Today's Date: 3/12/2024  Time Calculation  Start Time: 1133  Stop Time: 1145  Time Calculation (min): 12 min       Assessment/Plan   PT Assessment  Rehab Prognosis: Good  Evaluation/Treatment Tolerance: Patient limited by fatigue  End of Session Communication: Bedside nurse  Assessment Comment: Improving slowly with functional mobility;  tolerance to activity progressing slowly;  fall risk  End of Session Patient Position: Bed, 2 rail up  PT Plan  Inpatient/Swing Bed or Outpatient: Inpatient  PT Plan  Treatment/Interventions: Bed mobility, Transfer training, Gait training, Stair training, Balance training, Neuromuscular re-education, Strengthening, Endurance training, Therapeutic exercise, Therapeutic activity, Home exercise program  PT Plan: Skilled PT  PT Frequency: Daily  PT Discharge Recommendations: High intensity level of continued care  Equipment Recommended upon Discharge: Standard walker, Crutches  PT Recommended Transfer Status: Assist x1  PT - OK to Discharge: Yes (with skilled physical therapy services at next level of care)      General Visit Information:   PT  Visit  PT Received On: 03/12/24  General  Co-Treatment: OT  Co-Treatment Reason: Partial co-tx due to limited tolerance to activity  Prior to Session Communication: Bedside nurse  Patient Position Received: Bed, 2 rail up  General Comment: RN cleared patient for treatment.  Patient reports agreeable to treatment.    Subjective   Precautions:  Precautions  LE Weight Bearing Status: Left Non-Weight Bearing  Medical Precautions: Fall precautions  Precautions Comment: 3/11/2024 surgery for debridement of left foot and partial excision left 1,2,3,4,5 metatarsal;  2nd surgery for left foot this hospital admit  Vital Signs:       Objective   Pain:  Pain Assessment  Pain Assessment: 0-10  Pain Score: 7  Pain Type: Surgical pain  Pain Location: Foot  Pain  Orientation: Left  Cognition:  Cognition  Overall Cognitive Status: Within Functional Limits  Postural Control:  Static Sitting Balance  Static Sitting-Balance Support: No upper extremity supported  Static Sitting-Level of Assistance: Close supervision  Static Standing Balance  Static Standing-Balance Support: Bilateral upper extremity supported  Static Standing-Level of Assistance: Moderate assistance  Static Standing-Comment/Number of Minutes: Assist with balance during static standing with walker  Extremity/Trunk Assessments:        Activity Tolerance:  Activity Tolerance  Endurance: Decreased tolerance for upright activites  Activity Tolerance Comments: Fatigue  Treatments:                 Bed Mobility  Bed Mobility: Yes  Bed Mobility 1  Bed Mobility 1: Supine to sitting  Level of Assistance 1: Close supervision  Bed Mobility 2  Bed Mobility  2: Sitting to supine  Level of Assistance 2: Close supervision    Ambulation/Gait Training  Ambulation/Gait Training Performed: No  Transfer 1  Transfer From 1: Sit to  Transfer to 1: Stand  Technique 1: Sit to stand  Transfer Device 1: Walker  Transfer Level of Assistance 1: Moderate assistance  Trials/Comments 1: x 2 reps;  assist with trunk support;  verbal cues for hand placement and left LE position  Transfers 2  Transfer From 2: Stand to  Transfer to 2: Sit  Technique 2: Stand to sit  Transfer Device 2: Walker  Transfer Level of Assistance 2: Minimum assistance  Trials/Comments 2: x 2 reps;  assist with trunk support and balance;  verbal cues for hand placement and left LE position  Transfers 3  Transfer From 3: Bed to  Transfer to 3: Chair with arms  Technique 3: Stand pivot  Transfer Device 3: Walker  Transfer Level of Assistance 3: Moderate assistance  Trials/Comments 3: Assist with trunk support, balance, and walker positioning;  verbal cues for sequencing and weightbearing restriction  Transfers 4  Transfer From 4: Chair with arms to  Transfer to 4:  Bed  Technique 4: Stand pivot  Transfer Device 4: Walker  Transfer Level of Assistance 4: Moderate assistance  Trials/Comments 4: Assist with trunk support, balance, and walker positioning;  verbal cues for sequencing and weightbearing restriction    Stairs  Stairs: No         Outcome Measures:  [facility] Basic Mobility  Turning from your back to your side while in a flat bed without using bedrails: A little  Moving from lying on your back to sitting on the side of a flat bed without using bedrails: A little  Moving to and from bed to chair (including a wheelchair): A little  Standing up from a chair using your arms (e.g. wheelchair or bedside chair): A little  To walk in hospital room: A lot  Climbing 3-5 steps with railing: A lot  Basic Mobility - Total Score: 16    Education Documentation  No documentation found.  Education Comments  No comments found.        OP EDUCATION:       Encounter Problems       Encounter Problems (Active)       Mobility       STG - Patient will ambulate 20 feet with standard walker or crutches with CGA       Start:  03/10/24    Expected End:  03/24/24            STG - Patient will ascend and descend  six stairs using one rail and one crutch with close S to simulate home environment       Start:  03/10/24    Expected End:  03/24/24                   PT Transfers       STG - Transfer from bed to chair with or without A device, maintaining NWB status (Progressing)       Start:  03/10/24    Expected End:  03/24/24               Safety       LTG - Patient will demonstrate safety requirements appropriate to situation/environment (Progressing)       Start:  03/10/24    Expected End:  03/24/24

## 2024-03-12 NOTE — DISCHARGE INSTRUCTIONS
PODIATRY DISCHARGE INSTRUCTIONS  Please call [phone] to schedule appointment with Dr. Aponte/Nolan for post-operative follow-up on Monday morning 3/18/24. The office is located at [address] [place] [address]. [place] [address]  Please remain non-weight bearing to the surgical extremity with assistance of crutches, walker, or knee scooter.  Your dressing should be changed daily with betadine-soaked adaptic, betadine-soaked 4x4s, dry 4x4s, ABDs, kerlix, and ACE.  Do not shower unless using a cast protector to protect dressing.  If dressing gets wet, change or call office immediately as this can lead to increased risk of infection.  Elevate surgical extremity as much as possible to help with pain and swelling.  If you have any problems or notice any unusual symptoms such as bleeding, excessive pain, fever, redness, swelling and/or discharge please call your Dr. Aponte's office at [phone].     WHEN TO CALL YOUR DOCTOR:  Fever (>100.4°F or 38.0°C) or chills.  Incision problems such as redness, warmth, swelling, or foul smelling drainage.  Severe nausea or persistent vomiting.  Pain and swelling in your legs, especially if it is only on one side and not the other.  Pain with urination, cloudy urine, or foul smelling urine.  Or if you have any other problems or questions.  CALL 911 or go to the ED if you have any shortness of breath, difficulty breathing, or chest pain.

## 2024-03-12 NOTE — CARE PLAN
Problem: Safety  Goal: LTG - Patient will demonstrate safety requirements appropriate to situation/environment  Outcome: Progressing     Problem: PT Transfers  Goal: STG - Transfer from bed to chair with or without A device, maintaining NWB status  Outcome: Progressing

## 2024-03-13 LAB
ANION GAP SERPL CALC-SCNC: 11 MMOL/L
BUN SERPL-MCNC: 7 MG/DL (ref 8–25)
CALCIUM SERPL-MCNC: 9 MG/DL (ref 8.5–10.4)
CHLORIDE SERPL-SCNC: 102 MMOL/L (ref 97–107)
CO2 SERPL-SCNC: 25 MMOL/L (ref 24–31)
CREAT SERPL-MCNC: 0.7 MG/DL (ref 0.4–1.6)
EGFRCR SERPLBLD CKD-EPI 2021: >90 ML/MIN/1.73M*2
ERYTHROCYTE [DISTWIDTH] IN BLOOD BY AUTOMATED COUNT: 14.2 % (ref 11.5–14.5)
GLUCOSE BLD MANUAL STRIP-MCNC: 144 MG/DL (ref 74–99)
GLUCOSE BLD MANUAL STRIP-MCNC: 152 MG/DL (ref 74–99)
GLUCOSE BLD MANUAL STRIP-MCNC: 203 MG/DL (ref 74–99)
GLUCOSE BLD MANUAL STRIP-MCNC: 246 MG/DL (ref 74–99)
GLUCOSE SERPL-MCNC: 91 MG/DL (ref 65–99)
HCT VFR BLD AUTO: 32.6 % (ref 36–46)
HGB BLD-MCNC: 10.3 G/DL (ref 12–16)
LABORATORY COMMENT REPORT: NORMAL
MCH RBC QN AUTO: 27.8 PG (ref 26–34)
MCHC RBC AUTO-ENTMCNC: 31.6 G/DL (ref 32–36)
MCV RBC AUTO: 88 FL (ref 80–100)
NRBC BLD-RTO: 0 /100 WBCS (ref 0–0)
PATH REPORT.FINAL DX SPEC: NORMAL
PATH REPORT.GROSS SPEC: NORMAL
PATH REPORT.RELEVANT HX SPEC: NORMAL
PATH REPORT.TOTAL CANCER: NORMAL
PLATELET # BLD AUTO: 462 X10*3/UL (ref 150–450)
PMV BLD AUTO: 9.5 FL (ref 7.5–11.5)
POTASSIUM SERPL-SCNC: 4.3 MMOL/L (ref 3.4–5.1)
RBC # BLD AUTO: 3.7 X10*6/UL (ref 4–5.2)
SODIUM SERPL-SCNC: 138 MMOL/L (ref 133–145)
WBC # BLD AUTO: 13.1 X10*3/UL (ref 4.4–11.3)

## 2024-03-13 PROCEDURE — 2500000002 HC RX 250 W HCPCS SELF ADMINISTERED DRUGS (ALT 637 FOR MEDICARE OP, ALT 636 FOR OP/ED)

## 2024-03-13 PROCEDURE — 2500000004 HC RX 250 GENERAL PHARMACY W/ HCPCS (ALT 636 FOR OP/ED): Performed by: INTERNAL MEDICINE

## 2024-03-13 PROCEDURE — 82947 ASSAY GLUCOSE BLOOD QUANT: CPT

## 2024-03-13 PROCEDURE — 2500000002 HC RX 250 W HCPCS SELF ADMINISTERED DRUGS (ALT 637 FOR MEDICARE OP, ALT 636 FOR OP/ED): Performed by: INTERNAL MEDICINE

## 2024-03-13 PROCEDURE — 97530 THERAPEUTIC ACTIVITIES: CPT | Mod: GO,CO

## 2024-03-13 PROCEDURE — 2500000001 HC RX 250 WO HCPCS SELF ADMINISTERED DRUGS (ALT 637 FOR MEDICARE OP)

## 2024-03-13 PROCEDURE — 85027 COMPLETE CBC AUTOMATED: CPT | Performed by: INTERNAL MEDICINE

## 2024-03-13 PROCEDURE — 80048 BASIC METABOLIC PNL TOTAL CA: CPT | Performed by: INTERNAL MEDICINE

## 2024-03-13 PROCEDURE — 97535 SELF CARE MNGMENT TRAINING: CPT | Mod: GO,CO

## 2024-03-13 PROCEDURE — 1210000001 HC SEMI-PRIVATE ROOM DAILY

## 2024-03-13 RX ADMIN — ACETAMINOPHEN 500 MG: 500 TABLET ORAL at 05:05

## 2024-03-13 RX ADMIN — OXYCODONE 5 MG: 5 TABLET ORAL at 05:06

## 2024-03-13 RX ADMIN — ACETAMINOPHEN 500 MG: 500 TABLET ORAL at 22:02

## 2024-03-13 RX ADMIN — VENLAFAXINE HYDROCHLORIDE 75 MG: 75 CAPSULE, EXTENDED RELEASE ORAL at 08:42

## 2024-03-13 RX ADMIN — INSULIN GLARGINE 70 UNITS: 100 INJECTION, SOLUTION SUBCUTANEOUS at 22:13

## 2024-03-13 RX ADMIN — PROPRANOLOL HYDROCHLORIDE 20 MG: 20 TABLET ORAL at 22:02

## 2024-03-13 RX ADMIN — SITAGLIPTIN 25 MG: 25 TABLET, FILM COATED ORAL at 08:43

## 2024-03-13 RX ADMIN — DOXEPIN HYDROCHLORIDE 100 MG: 50 CAPSULE ORAL at 22:02

## 2024-03-13 RX ADMIN — OXYCODONE 5 MG: 5 TABLET ORAL at 22:02

## 2024-03-13 RX ADMIN — CEFTRIAXONE SODIUM 2 G: 2 INJECTION, SOLUTION INTRAVENOUS at 12:26

## 2024-03-13 RX ADMIN — ARIPIPRAZOLE 5 MG: 5 TABLET ORAL at 08:42

## 2024-03-13 RX ADMIN — PROPRANOLOL HYDROCHLORIDE 20 MG: 20 TABLET ORAL at 08:42

## 2024-03-13 RX ADMIN — SIMVASTATIN 20 MG: 20 TABLET, FILM COATED ORAL at 22:01

## 2024-03-13 RX ADMIN — OXYCODONE 5 MG: 5 TABLET ORAL at 13:26

## 2024-03-13 RX ADMIN — INSULIN LISPRO 4 UNITS: 100 INJECTION, SOLUTION INTRAVENOUS; SUBCUTANEOUS at 12:27

## 2024-03-13 RX ADMIN — FENOFIBRATE 160 MG: 160 TABLET ORAL at 08:42

## 2024-03-13 RX ADMIN — INSULIN LISPRO 2 UNITS: 100 INJECTION, SOLUTION INTRAVENOUS; SUBCUTANEOUS at 18:34

## 2024-03-13 ASSESSMENT — COGNITIVE AND FUNCTIONAL STATUS - GENERAL
HELP NEEDED FOR BATHING: A LITTLE
MOBILITY SCORE: 17
DRESSING REGULAR UPPER BODY CLOTHING: A LITTLE
HELP NEEDED FOR BATHING: A LITTLE
MOVING TO AND FROM BED TO CHAIR: A LITTLE
DRESSING REGULAR LOWER BODY CLOTHING: A LITTLE
DAILY ACTIVITIY SCORE: 19
DRESSING REGULAR LOWER BODY CLOTHING: A LITTLE
PERSONAL GROOMING: A LITTLE
WALKING IN HOSPITAL ROOM: A LOT
STANDING UP FROM CHAIR USING ARMS: A LITTLE
TOILETING: A LITTLE
CLIMB 3 TO 5 STEPS WITH RAILING: TOTAL
DAILY ACTIVITIY SCORE: 20
DRESSING REGULAR UPPER BODY CLOTHING: A LITTLE
TOILETING: A LITTLE

## 2024-03-13 ASSESSMENT — PAIN SCALES - GENERAL
PAINLEVEL_OUTOF10: 7
PAINLEVEL_OUTOF10: 1
PAINLEVEL_OUTOF10: 0 - NO PAIN
PAINLEVEL_OUTOF10: 6
PAINLEVEL_OUTOF10: 8
PAINLEVEL_OUTOF10: 3

## 2024-03-13 ASSESSMENT — PAIN DESCRIPTION - ORIENTATION
ORIENTATION: LEFT

## 2024-03-13 ASSESSMENT — PAIN - FUNCTIONAL ASSESSMENT
PAIN_FUNCTIONAL_ASSESSMENT: WONG-BAKER FACES
PAIN_FUNCTIONAL_ASSESSMENT: 0-10

## 2024-03-13 ASSESSMENT — ACTIVITIES OF DAILY LIVING (ADL)
BATHING_LEVEL_OF_ASSISTANCE: DISTANT SUPERVISION
BATHING_WHERE_ASSESSED: SITTING SINKSIDE
HOME_MANAGEMENT_TIME_ENTRY: 16

## 2024-03-13 ASSESSMENT — PAIN DESCRIPTION - LOCATION
LOCATION: FOOT

## 2024-03-13 ASSESSMENT — PAIN SCALES - WONG BAKER: WONGBAKER_NUMERICALRESPONSE: NO HURT

## 2024-03-13 NOTE — PROGRESS NOTES
"Mary Pedroza is a 48 y.o. female on day 6 of admission presenting with Cellulitis and abscess of foot.    Infectious Diseases service following patient for osteomyelitis left foot     Subjective   Interval History:   No acute events overnight  Had additional debridement bone and tendon in OR yesterday    Objective   Range of Vitals (last 24 hours)  /62 (BP Location: Left arm, Patient Position: Lying)   Pulse 83   Temp 36.6 °C (97.9 °F) (Oral)   Resp 16   Ht 1.626 m (5' 4\")   Wt 93 kg (205 lb)   SpO2 95%   BMI 35.19 kg/m²   Daily Weight  03/08/24 : 93 kg (205 lb)    Body mass index is 35.19 kg/m².  General: awake, alert, no distress  Cardio: RRR  Respiratory: Lungs clear to auscultation bilaterally, normal respiratory effort  Abd: soft, nontender, nondistended  Ext: left foot wrapped, not removed      Current Facility-Administered Medications:     acetaminophen (Tylenol) tablet 500 mg, 500 mg, oral, q4h PRN, Bharat W Berberich, DPM, 500 mg at 03/13/24 0505    alteplase (Cathflo Activase) injection 2 mg, 2 mg, intra-catheter, PRN, Betzy Street MD MPH    ARIPiprazole (Abilify) tablet 5 mg, 5 mg, oral, Daily, Bharat W Berberich, DPM, 5 mg at 03/13/24 0842    cefTRIAXone (Rocephin) 2 g IV in dextrose 5% 50 mL, 2 g, intravenous, q24h, Betzy Street MD MPH, Stopped at 03/13/24 1256    dextrose 10 % in water (D10W) infusion, 0.3 g/kg/hr, intravenous, Once PRN, Bharat W Annamarieich, DPM    dextrose 50 % injection 25 g, 25 g, intravenous, q15 min PRN, Bharat W Berberich, DPM, 25 g at 03/11/24 0800    doxepin (SINEquan) capsule 100 mg, 100 mg, oral, Nightly, Bharat W Berberich, DPM, 100 mg at 03/12/24 2039    fenofibrate (Triglide) tablet 160 mg, 160 mg, oral, Daily, Bharat W Berberich, DPM, 160 mg at 03/13/24 0842    [Held by provider] glimepiride (Amaryl) tablet 4 mg, 4 mg, oral, Daily before breakfast, Bharat Michelle DPM    glucagon (Glucagen) injection 1 mg, 1 mg, intramuscular, q15 min PRN, Bharat HOUSE " Annamarieich, DPM    hydrOXYzine HCL (Atarax) tablet 25 mg, 25 mg, oral, BID PRN, Bharat W Berberich, DPM, 25 mg at 03/12/24 1423    insulin glargine (Lantus) injection 70 Units, 70 Units, subcutaneous, Nightly, Krystal Joel MD, 70 Units at 03/12/24 2040    insulin lispro (HumaLOG) injection 0-10 Units, 0-10 Units, subcutaneous, TID with meals, Bharat W Berbertuan, DPM, 4 Units at 03/13/24 1227    lidocaine (Xylocaine) 10 mg/mL (1 %) injection 50 mg, 5 mL, infiltration, Once, Betzy Street MD MPH    nicotine (Nicoderm CQ) 21 mg/24 hr patch 1 patch, 1 patch, transdermal, Daily, Bharat W Viviana, DPM    oxyCODONE (Roxicodone) immediate release tablet 5 mg, 5 mg, oral, q4h PRN, Bharat W Berberich, DPM, 5 mg at 03/13/24 1326    polyethylene glycol (Glycolax, Miralax) packet 17 g, 17 g, oral, Daily PRN, Bharat W Berberich, DPM    propranolol (Inderal) tablet 20 mg, 20 mg, oral, BID, Bharat W Berberich, DPM, 20 mg at 03/13/24 0842    simvastatin (Zocor) tablet 20 mg, 20 mg, oral, q PM, Bharat W Berberich, DPM, 20 mg at 03/12/24 2039    SITagliptin phosphate (Januvia) tablet 25 mg, 25 mg, oral, Daily, Bharat W Berberich, DPM, 25 mg at 03/13/24 0843    SUMAtriptan (Imitrex) tablet 100 mg, 100 mg, oral, Daily PRN, Bharat W Viviana, DPM    venlafaxine XR (Effexor-XR) 24 hr capsule 75 mg, 75 mg, oral, Daily, Bharat W Berberich, DPM, 75 mg at 03/13/24 0842    Relevant Results  Labs:  Lab Results   Component Value Date    WBC 13.1 (H) 03/13/2024    HGB 10.3 (L) 03/13/2024    HCT 32.6 (L) 03/13/2024    MCV 88 03/13/2024     (H) 03/13/2024     Lab Results   Component Value Date    GLUCOSE 91 03/13/2024    CALCIUM 9.0 03/13/2024     03/13/2024    K 4.3 03/13/2024    CO2 25 03/13/2024     03/13/2024    BUN 7 (L) 03/13/2024    CREATININE 0.70 03/13/2024           Micro:  Susceptibility data from last 7 days.  Collected Specimen Info Organism   03/07/24 Swab from DIGIT, ACCESSORY LEFT FOOT Streptococcus  agalactiae (Group B Streptococcus)     Assessment/Plan   Assessment:  Diabetic foot infection (left) with osteomyelitis s/p left TMA 3/7 and additional debridement bone/tendon on 3/11/24    Plan/Recommendations:  Continue ceftriaxone for 6 weeks from initial surgery 3/7 through 4/17/24  Needs CBC w/ diff, CMP, ESR, CRP weekly faxed to Betzy Street [phone]  ID follow-up scheduled 4/17    Will sign off    Betzy Street MD  [facility]  Phone: [phone]  Fax: [phone]

## 2024-03-13 NOTE — PROGRESS NOTES
Physical Therapy                 Therapy Communication Note    Patient Name: Mary Pedroza  MRN: [id number]  Today's Date: 3/13/2024     Discipline: Physical Therapy    Missed Visit Reason: Patient refused. Pt stated she worked with someone monae this AM and ambulated to/from her restroom. She declined tx at this time despite education and encouragement.    Missed Time: Attempted at 11:29

## 2024-03-13 NOTE — PROGRESS NOTES
PODIATRY SERVICE CONSULT PROGRESS NOTE    SERVICE DATE: 3/13/2024   SERVICE TIME:  6:35 AM     Subjective   INTERVAL HPI:   Patient was seen at bedside.  Pain well controlled. However some pain with dressing change.  Patient denies any constitutional symptoms.   PICC was placed yesterday in [facility].  Anticipates discharge to SNF for rehab.    Medications:  Scheduled Meds: ARIPiprazole, 5 mg, oral, Daily  cefTRIAXone, 2 g, intravenous, q24h  doxepin, 100 mg, oral, Nightly  fenofibrate, 160 mg, oral, Daily  [Held by provider] glimepiride, 4 mg, oral, Daily before breakfast  insulin glargine, 70 Units, subcutaneous, Nightly  insulin lispro, 0-10 Units, subcutaneous, TID with meals  lidocaine, 5 mL, infiltration, Once  nicotine, 1 patch, transdermal, Daily  propranolol, 20 mg, oral, BID  simvastatin, 20 mg, oral, q PM  SITagliptin phosphate, 25 mg, oral, Daily  venlafaxine XR, 75 mg, oral, Daily      Continuous Infusions:    PRN Meds: PRN medications: acetaminophen, alteplase, dextrose 10 % in water (D10W), dextrose, glucagon, hydrOXYzine HCL, oxyCODONE, polyethylene glycol, SUMAtriptan         Objective   PHYSICAL EXAM:  Physical Exam Performed:  Vitals:    03/13/24 0500   BP: 111/64   Pulse: 87   Resp: 16   Temp: 36.9 °C (98.4 °F)   SpO2: 95%     Body mass index is 35.19 kg/m².    Patient is AOx3 and in no acute distress. Patient is alert and cooperative. Sitting comfortably in bed with dressing clean, dry and intact. [facility] PICC in place.     Vascular: Faintly palpable DP/PT pulses B/L. Moderate pitting edema noted B/L L>>R. Hair growth absent B/L. CFT<5 to R hallux. Temperature is warm to cool from tibial tuberosity to distal digits R and warm to the touch distally L. No lymphatic streaking noted B/L.     Musculoskeletal: Gross active and passive ROM intact to age and activity level. Moves all extremities spontaneously. No pain to palpation at feet B/L.      Neurological: Absent light touch sensation B/L. Pain stimuli  diminished B/L. Denies any numbness, burning or tingling.     Dermatologic: Nails 1-5 are thickened, elongated, discolored with subungual debris right. Skin appears diffusely xerotic B/L. Web spaces 1-4 right are clean, dry and intact. No rashes or nodules noted R; L show maceration with peeling skin and serous drainage. Hyperkeratotic tissue with intradermal bleeding noted to R medial IPJ of hallux.     Surgical incision left distal foot s/p left TMA formalization  Incision remains well-coapted plantarly, medially, and laterally with some central serosaguinous drainage resulting in some maceration centrally as well. Medial portion has some necrosis. All sutures remain intact. No purulence noted. No areas of fluctuance. Some erythema and edema distally to foot xochilt-incision, consistent with post-op state. No malodor.        Wound #1: Left dorsal foot about 1.5 cm proximal to incision  Measures 1.0 x 1.0 x 0.2 cm with mixed fibrogranular base and mild serosanguinous drainage. No signs of infection. No maceration. No necrosis. No purulence. No malodor.        Wound #2: Left plantar lateral hindfoot  Surgical incision measures 2.0 x 0.1 x 0.3 cm with mild serosanguinous drainage and mixed fibrogranular base. Some maceration of skin borders. No necrosis. No purulence. No fluctuance. No malodor.            LABS:   Results for orders placed or performed during the hospital encounter of 03/07/24 (from the past 24 hour(s))   POCT GLUCOSE   Result Value Ref Range    POCT Glucose 76 74 - 99 mg/dL   POCT GLUCOSE   Result Value Ref Range    POCT Glucose 149 (H) 74 - 99 mg/dL   POCT GLUCOSE   Result Value Ref Range    POCT Glucose 149 (H) 74 - 99 mg/dL   POCT GLUCOSE   Result Value Ref Range    POCT Glucose 269 (H) 74 - 99 mg/dL      Lab Results   Component Value Date    HGBA1C 9.4 (H) 03/08/2024      Lab Results   Component Value Date    CRP 13.60 (H) 03/07/2024      Lab Results   Component Value Date    SEDRATE 76 (H)  03/07/2024        Results from last 7 days   Lab Units 03/12/24  0554   WBC AUTO x10*3/uL 15.7*   RBC AUTO x10*6/uL 3.97*   HEMOGLOBIN g/dL 10.9*   HEMATOCRIT % 35.1*     Results from last 7 days   Lab Units 03/12/24  0554 03/08/24  0632 03/07/24  0936   SODIUM mmol/L 136   < > 133   POTASSIUM mmol/L 4.0   < > 3.9   CHLORIDE mmol/L 100   < > 98   CO2 mmol/L 24   < > 26   BUN mg/dL 7*   < > 7*   CREATININE mg/dL 0.70   < > 0.50   CALCIUM mg/dL 9.2   < > 9.1   BILIRUBIN TOTAL mg/dL  --   --  0.2   ALT U/L  --   --  36   AST U/L  --   --  24    < > = values in this interval not displayed.     Results from last 7 days   Lab Units 03/08/24  1239   COLOR U  Light-Yellow   APPEARANCE U  Clear   PH U  6.5   SPEC GRAV UR  1.014   PROTEIN U mg/dL NEGATIVE   BLOOD UR  NEGATIVE   NITRITE U  NEGATIVE       IMAGING REVIEW:  Vascular US PVR without exercise    Result Date: 3/8/2024  Preliminary [place] [address]            Phone [phone]  Preliminary Vascular Lab Report   VASC US PVR WITHOUT EXERCISE  Patient Name:    MONTANA Deshpande Physician: [id number] Soni Leo MD,                  CHANELLE                            RPVI Study Date:      3/8/2024         Ordering Provider: [id number] TRUPTI BURNETT MRN/PID:         [id number]         Fellow: Accession#:      [id number]     Technologist:      Jeanne Stewart RVT YOB: 1975       Technologist 2: Gender:          F                Encounter#:        [id number] Admission        Inpatient        Location           [facility] Status:                           Performed:  Diagnosis/ICD: Cellulitus of left lower limb-L03.116; Other specified symptoms                and signs involving the circulatory and respiratory                systems-R09.89 CPT Codes:     72132 Peripheral artery PVR (multi segmental pressure   Pertinent History: Left transmetatarsal amputation on 3/07/2024.  PRELIMINARY CONCLUSIONS:  Right Lower PVR: No evidence of arterial occlusive disease in the right lower extremity at rest. Normal digital perfusion noted. Triphasic flow is noted in the right common femoral artery, right popliteal artery, right posterior tibial artery and right dorsalis pedis artery. PVR tracings may be inaccurate due to patient movement. Left Lower PVR: No evidence of arterial occlusive disease in the left lower extremity at rest. Triphasic flow is noted in the left common femoral artery, left popliteal artery, left posterior tibial artery and left dorsalis pedis artery. Unable to obtain TBI due to transmetatarsal amputation. PVR tracings may be inaccurate due to patient movement.  Imaging & Doppler Findings:  RIGHT Lower PVR                Pressures Ratios Right High Thigh               152 mmHg  1.30 Right Low Thigh                145 mmHg  1.24 Right Calf                     142 mmHg  1.21 Right Posterior Tibial (Ankle) 128 mmHg  1.09 Right Dorsalis Pedis (Ankle)   132 mmHg  1.13 Right Digit (Great Toe)        123 mmHg  1.05   LEFT Lower PVR                Pressures Ratios Left High Thigh               161 mmHg  1.38 Left Low Thigh                152 mmHg  1.30 Left Calf                     132 mmHg  1.13 Left Posterior Tibial (Ankle) 128 mmHg  1.09 Left Dorsalis Pedis (Ankle)   132 mmHg  1.13                      Right     Left Brachial Pressure 115 mmHg 117 mmHg   VASCULAR PRELIMINARY REPORT completed by Jeanne Stewart RENUKA on 3/8/2024 at 1:53:18 PM  ** Final **     XR foot 3+ views bilateral    Result Date: 3/8/2024  Interpreted By:  Gonzalo Poon, STUDY: XR FOOT 3+ VIEWS BILATERAL; 3/7/2024 7:20 pm   INDICATION: Signs/Symptoms:post-op, wounds. Status post partial amputation left foot. Wound on 1st toe right foot.   COMPARISON: Left foot 03/07/2024 at 1012 hours.   ACCESSION NUMBER(S): [id number]   ORDERING CLINICIAN: KATRIN  MELINDA   TECHNIQUE: Bilateral feet, three views each   FINDINGS: Right foot: *No fractures or destructive lesions are identified on the right. Hammertoe deformities of the 2nd through 5th toes are identified on the right.     Left foot: *Patient is status post amputation of all 5 toes. Extensive gauze artifact obscures the underlying detail. No definite fractures or destructive lesions are identified involving the remaining osseous structures though detail is limited due to overlying artifact.         1. Postsurgical appearance left foot. 2. No acute pathologic findings are identified on the right. Please note that the conventional radiographic findings of osteomyelitis are delayed by 10 to 14 days from the clinical onset. If there is a high suspicion for osteomyelitis then additional imaging may still be appropriate.   MACRO: none   Signed by: Gonzalo Poon 3/8/2024 8:09 AM Dictation workstation:   [id number]    XR foot left 3+ views    Result Date: 3/7/2024  Interpreted By:  Cecilio Feldman, STUDY: XR FOOT LEFT 3+ VIEWS; XR ANKLE LEFT 3+ VIEWS;  3/7/2024 10:16 am   INDICATION: Signs/Symptoms:infection; Signs/Symptoms:infection/ pain.   COMPARISON: None.   ACCESSION NUMBER(S): [id number]; [id number]   ORDERING CLINICIAN: ANGEL BOWSER   TECHNIQUE: Three views each of the left foot and left ankle were obtained.   FINDINGS: Moderate-sized plantar calcaneal spur and posterior calcaneal spur. Moderate osteophyte formation involving the medial malleolus. There is periosteal reaction along the distal medial tibia diametaphysis. Ankle mortise and talar dome are intact. There is diffuse ankle soft tissue swelling, most pronounced laterally. There is diffuse midfoot and forefoot soft tissue swelling. There is cutaneous defect in the forefoot lateral to the 5th metatarsal. There is an acute comminuted fracture at the proximal end of the 5th proximal phalanx and an acute comminuted fracture in the distal head of the 5th  metatarsal. There is some decreased bone density at the fracture sites. There is also soft tissue edema. No soft tissue gas density. No opaque soft tissue foreign body. No  erosion.       Cutaneous defect consistent with ulceration in the lateral forefoot, lateral to the 5th metatarsal.   Findings of osteomyelitis with pathologic fractures involving the distal 5th metatarsal head and the proximal end of the 5th proximal phalanx.   Ankle and midfoot/forefoot soft tissue swelling as described.   Osteophytic changes as described.   Nonspecific smooth periosteal cortical reaction involving the distal medial tibia diametaphyseal cortex. This could be from chronic infection or remote trauma. Clinical correlation needed.   MACRO: None   Signed by: Cecilio Feldman 3/7/2024 11:04 AM Dictation workstation:   [id number]    XR ankle left 3+ views    Result Date: 3/7/2024  Interpreted By:  Cecilio Feldman, STUDY: XR FOOT LEFT 3+ VIEWS; XR ANKLE LEFT 3+ VIEWS;  3/7/2024 10:16 am   INDICATION: Signs/Symptoms:infection; Signs/Symptoms:infection/ pain.   COMPARISON: None.   ACCESSION NUMBER(S): [id number]; [id number]   ORDERING CLINICIAN: ANGEL BOWSER   TECHNIQUE: Three views each of the left foot and left ankle were obtained.   FINDINGS: Moderate-sized plantar calcaneal spur and posterior calcaneal spur. Moderate osteophyte formation involving the medial malleolus. There is periosteal reaction along the distal medial tibia diametaphysis. Ankle mortise and talar dome are intact. There is diffuse ankle soft tissue swelling, most pronounced laterally. There is diffuse midfoot and forefoot soft tissue swelling. There is cutaneous defect in the forefoot lateral to the 5th metatarsal. There is an acute comminuted fracture at the proximal end of the 5th proximal phalanx and an acute comminuted fracture in the distal head of the 5th metatarsal. There is some decreased bone density at the fracture sites. There is also soft tissue edema. No  soft tissue gas density. No opaque soft tissue foreign body. No  erosion.       Cutaneous defect consistent with ulceration in the lateral forefoot, lateral to the 5th metatarsal.   Findings of osteomyelitis with pathologic fractures involving the distal 5th metatarsal head and the proximal end of the 5th proximal phalanx.   Ankle and midfoot/forefoot soft tissue swelling as described.   Osteophytic changes as described.   Nonspecific smooth periosteal cortical reaction involving the distal medial tibia diametaphyseal cortex. This could be from chronic infection or remote trauma. Clinical correlation needed.   MACRO: None   Signed by: Cecilio Feldman 3/7/2024 11:04 AM Dictation workstation:   [id number]    Lower extremity venous duplex left    Result Date: 2/21/2024  Interpreted By:  Stanley Baumann, STUDY: [facility] LOWER EXTREMITY VENOUS DUPLEX LEFT  2/21/2024 10:17 am   INDICATION: Left lower extremity swelling, concern for DVT   COMPARISON: None.   ACCESSION NUMBER(S): [id number]   ORDERING CLINICIAN: TONIA HINDS   TECHNIQUE: Routine ultrasound of the left lower extremity was performed with duplex Doppler (color and spectral) evaluation.   Static images were obtained for remote interpretation.   FINDINGS: THIGH VEINS:  The left common femoral, femoral, popliteal, proximal medial saphenous, and deep femoral veins are patent and free of thrombus.  The veins are normally compressible.  They demonstrate normal phasic flow and augmentation response.       Negative study.  No deep venous thrombosis of the left lower extremity.   MACRO: None   Signed by: Stanley Baumann 2/21/2024 10:32 AM Dictation workstation:   [id number]            Assessment/Plan   ASSESSMENT & PLAN:    #S/P Open Transmetatarsal Amputation, Left Foot (DOS: 03/07/24) with formalization and primary closure (DOS: 03/11/24) with Dr. Francisco  #Cellulitis, Left Foot - Resolved  #Chronic Non-Pressure Ulceration with Fat layer exposed, left  foot  #Suspected Chronic Osteomyelitis, Left Foot, improved  #Pathologic Fracture 5th Metatarsal Head, Left Foot, resolved  #Pathologic Fracture 5th Proximal Phalanx, Left Foot, resolved  #DM Type II with Foot Ulcer - Uncontrolled  #Polyneuropathy  #PAD  #Callus, Right Hallux     - Patient was seen and evaluated; all findings were discussed and all questions were answered to patient's satisfaction.  - Charts, labs, vitals and imaging all reviewed.   - Imaging: XR L Ankle and Foot: Pathologic Fx of Left 5th Metatarsal Head and Proximal Phalanx with some lysis and erosive changes to the distal 5th ray. No gas on film. Some periosteal changes to the medial malleolus. Post-operative films shoe post-operative changes. No further gas formation. No lysis or signs of OM to RIGHT foot on plain films.  - Labs: WBC 13.1; Hbg 10.3; CRP 13.6; ESR 76; HbA1c 9.4%  - PVRs: No signs of occlusive disease bilaterally with WNL MAREK and TBI results.  - Wound culture: Intraoperative culture (3/07) grew 4+ Group B Strep  - Blood culture: NGTD at 4 days and final     Plan:  - Abx: ceftriaxone 2g every 24 hours through 4/17/24. PICC in [facility].  - Dressing changed.   - Dressings: Betadine soaked adaptic, betadine soaked 4x4 gauze, dry gauze, ABDs, Kerlix and ACEs. Betadine-soaked gauze over dorsal foot and plantar heel wounds as well. To be changed daily. Order placed.  - Nursing staff is able to reinforce dressing if & as necessary. Order placed.  - Podiatry will continue to follow while in house.  - Patient is clear for discharge with outpatient follow-up from Podiatry standpoint.     DVT ppx: SCD to RLE; no SCD to LLE please.  Weightbearing: NWB LLE with surgical shoe in place.  Discharge: Pt to follow up 1 week after discharge with Dr. Aponte/Nolan Toussaint for discharge from Podiatry's standpoint     Case to be discussed with attending, A&P above reflects a tentative plan. Please await for the final signature from the attending physician on  service.    This patient will be followed by the Podiatry service. Please Epic Chat the corresponding residents below with questions or concerns.      Bharat Michelle DPM PGY-2  Podiatric Medicine and Surgery   Epic Chat          SIGNATURE: Bharat Michelle DPM PATIENT NAME: Mary Pedroza   DATE: March 13, 2024 MRN: [id number]   TIME: 6:35 AM CONTACT: Oceana Tommy

## 2024-03-13 NOTE — NURSING NOTE
Patient was explained this morning that her blood sugars have to be checked prior to eating her meals. I go in to see if she had her bloood sugars check because she already started to eat lunch. She starts to yell stating that no on told her that, and am the first one to check her blood sugar before she eats. Then she proceeds to ask me to leave room. Her sugar was over 200. I asked JENNIFER Rob to give her some insulin.

## 2024-03-13 NOTE — NURSING NOTE
Inpatient Diabetes Education:     POCT glucose today 3/13/24 at 07:26 was 144 mg/dl and at 11:30 was 246 mg/dl.   Medication change.  Lantus was decreased to 70 units (from 80 units) at bedtime start on 3/12/24.  Still getting Januvia 25 mg q day, Humalog 0-10 units tid.  Glimepiride 4 mg q AM remains on hold.   Will continue to monitor POCT glucose.

## 2024-03-13 NOTE — NURSING NOTE
Upon rounding right upper arm single lumen PICC with current CHG dressing dry and intact. Brisk blood return and flushes easily, Curos cap intact.

## 2024-03-13 NOTE — PROGRESS NOTES
03/13/24 1039   Discharge Planning   Home or Post Acute Services Post acute facilities (Rehab/SNF/etc)   Type of Post Acute Facility Services Skilled nursing;Rehab   Patient expects to be discharged to: No bed available until Sunday at [facility].  Additional choices received, referral sent to [facility] and [facility].   Does the patient need discharge transport arranged? Yes   RoundTrip coordination needed? Yes     Discussed plan of care in interdisciplinary rounds,  patient is medically ready for discharge. PICC line in place for IV ATB needs on discharge.       SNF referral reviewed, no beds available at [facility], [facility], or [facility] until Sunday.    Met with patient at bedside to discuss discharge planning, patient gave additional choices for [facility] and [facility].   Referral updated and sent, awaiting response.  Patient will need precert.     UPDATE 1115:  SNF referral reviewed, [facility] has no beds.  Received update from [facility] that they are now willing and able to accept and confirmed bed availability.  Information sent to direct submit team to initiate precert.   Patient updated on discharge planning    DC Plan NOT secure  Do NOT DC without speaking to care coordination, PRECERT PENDING FOR SNF   MD will need to sign/certify the [place] for skilled rehab prior to discharge

## 2024-03-13 NOTE — NURSING NOTE
Pt resting in bed.No complaints. Dsg to Lt foot remains C/D/I. Call light in reach. No lawson noted from initial shift assessment.

## 2024-03-13 NOTE — PROGRESS NOTES
Occupational Therapy    OT Treatment    Patient Name: Mary Pedroza  MRN: [id number]  Today's Date: 3/13/2024  Time Calculation  Start Time: 0801  Stop Time: 0828  Time Calculation (min): 27 min         Assessment:  OT Assessment: tolerated session well, demonstrating increased functional tolerance and good carryover of NWB precautions, with progression towards POC. Pt required increased cues for pacing tasks to increase safety awareness and would benefit from continued skilled OT services to address deficits.  End of Session Communication: Bedside nurse  End of Session Patient Position: Up in chair, Alarm off, not on at start of session (no chair alarm available, with pt aware to utilize call light when needed.)  OT Assessment Results: Decreased ADL status, Decreased upper extremity strength, Decreased endurance, Decreased functional mobility, Decreased IADLs, Decreased sensation  Plan:  Treatment Interventions: ADL retraining, Functional transfer training, UE strengthening/ROM, Endurance training, Patient/family training, Equipment evaluation/education  OT Frequency: 5 times per week  OT Discharge Recommendations: High intensity level of continued care  OT - OK to Discharge: Yes  Treatment Interventions: ADL retraining, Functional transfer training, UE strengthening/ROM, Endurance training, Patient/family training, Equipment evaluation/education    Subjective   Previous Visit Info:  OT Last Visit  OT Received On: 03/13/24  General:  General  Prior to Session Communication: Bedside nurse  Patient Position Received: Bed, 2 rail up, Alarm off, not on at start of session  General Comment: cleared for therapy per RN. Pt supine in bed upon arrival and agreeable to tx session  Precautions:  LE Weight Bearing Status: Left Non-Weight Bearing  Medical Precautions: Fall precautions  Precautions Comment: demonstrating good carryover of LLE NWB precautions    Pain:  Pain Assessment  Pain Assessment: 0-10  Pain Score: 0 - No  pain    Objective    Cognition:  Cognition  Overall Cognitive Status: Within Functional Limits    Activities of Daily Living:    Grooming  Grooming Level of Assistance: Distant supervision, Setup  Grooming Where Assessed: Sitting sinkside  Grooming Comments: oral hygiene, face washing, hair brushing, and donning deodorant seated sinkside with increased time required for task completion    UE Bathing  UE Bathing Level of Assistance: Distant supervision  UE Bathing Where Assessed: Sitting sinkside    LE Bathing  LE Bathing Level of Assistance: Distant supervision  LE Bathing Where Assessed: Sitting sinkside    UE Dressing  UE Dressing Level of Assistance: Setup  UE Dressing Comments: don/doff gown seated sinkside    LE Dressing  LE Dressing: Yes  Sock Level of Assistance: Independent  LE Dressing Where Assessed: Chair  LE Dressing Comments: don/doff R sock    Bed Mobility/Transfers: Bed Mobility  Bed Mobility: Yes  Bed Mobility 1  Bed Mobility 1: Supine to sitting  Level of Assistance 1: Modified independent  Bed Mobility Comments 1: use of bed rail for UE support    Transfers  Transfer: Yes  Transfer 1  Technique 1: Sit to stand  Transfer Device 1: Walker  Transfer Level of Assistance 1: Minimum assistance  Trials/Comments 1: cues for proper hand placement upon stand from EOB  Transfers 2  Technique 2: Sit to stand, Stand to sit  Transfer Device 2: Walker  Transfer Level of Assistance 2: Contact guard  Trials/Comments 2: sit<>stand x2 cues for eccentric lowering to chair    Toilet Transfers  Toilet Transfer Type: To and from  Toilet Transfer to: Standard toilet  Toilet Transfers: Supervision  Toilet Transfers Comments: cues for use of grab bar for UE support    Functional Mobility:  Functional Mobility  Functional Mobility Performed: Yes  Functional Mobility 1  Surface 1: Level tile  Device 1: Standard walker  Assistance 1: Contact guard  Comments 1: participated in functional mobility a short household distance x2  with SW and CGA. Pt requiring cues for step/walker sequencing and to increase weightbearing through BUE to decrease pressure/weight throug RLE during step, with pt demonstrating good carryover of instruction. Pt requiring seated rest break x1 d/t increased fatigue, able to quickly recover.    Standing Balance:  Static Standing Balance  Static Standing-Balance Support: Bilateral upper extremity supported  Static Standing-Level of Assistance: Contact guard  Static Standing-Comment/Number of Minutes: fair with cues to ensure walker is secured in place before taking steps    Outcome Measures:[facility] Daily Activity  Putting on and taking off regular lower body clothing: A little  Bathing (including washing, rinsing, drying): A little  Putting on and taking off regular upper body clothing: A little  Toileting, which includes using toilet, bedpan or urinal: A little  Taking care of personal grooming such as brushing teeth: None  Eating Meals: None  Daily Activity - Total Score: 20    Education Documentation  Precautions, taught by AUGUSTO Corral at 3/13/2024  9:12 AM.  Learner: Patient  Readiness: Acceptance  Method: Explanation  Response: Verbalizes Understanding, Demonstrated Understanding    Home Exercise Program, taught by AUGUSTO Corral at 3/13/2024  9:12 AM.  Learner: Patient  Readiness: Acceptance  Method: Explanation  Response: Verbalizes Understanding, Demonstrated Understanding    ADL Training, taught by AUGUSTO Corral at 3/13/2024  9:12 AM.  Learner: Patient  Readiness: Acceptance  Method: Explanation  Response: Verbalizes Understanding, Demonstrated Understanding    Education Comments  No comments found.      Problem: OT Goals  Goal: ADLs  Description: Pt will complete ADL tasks with Mod I, using AE as needed, in order to complete self-care tasks.    Outcome: Progressing  Goal: Functional mobility  Description: Pt will perform functional mobility short household distance at  mod ind level with SW maintaining NWB L LE status    Outcome: Progressing  Goal: Functional transfers  Description: Pt will perform functional transfers at mod ind level SW maintaining NWB L LE    Outcome: Progressing  Goal: Therapeutic exercise  Description: Pt will perform upper body therapeutic exercises all joints/planes of motion independently to increase upper body strength for improved functional transfers.    Outcome: Progressing

## 2024-03-13 NOTE — PROGRESS NOTES
"Mary Pedroza is a 48 y.o. female on day 6 of admission presenting with Cellulitis and abscess of foot.    Subjective   She underwent incision and drainage and transmetatarsal amputation of the left foot on 3/7.    She underwent debridement of nonviable tissue the left foot on 3/11      Objective     Physical Exam  General: awake, alert, oriented, responsive  Cardiovascular: regular, normal S1 and S2  Lungs: good air entry bilaterally, clear to auscultation  Extremities: The left foot and ankle were wrapped in a dressing which was not removed. There was no peripheral cyanosis, no pedal edema  Neuro: alert, oriented x 3, no focal weakness      Last Recorded Vitals  Blood pressure 112/62, pulse 83, temperature 36.6 °C (97.9 °F), temperature source Oral, resp. rate 16, height 1.626 m (5' 4\"), weight 93 kg (205 lb), SpO2 95 %.  Intake/Output last 3 Shifts:  I/O last 3 completed shifts:  In: 3100 (33.3 mL/kg) [P.O.:3050; IV Piggyback:50]  Out: 5500 (59.1 mL/kg) [Urine:5500 (1.6 mL/kg/hr)]  Weight: 93 kg     Relevant Results  Results for orders placed or performed during the hospital encounter of 03/07/24 (from the past 24 hour(s))   POCT GLUCOSE   Result Value Ref Range    POCT Glucose 149 (H) 74 - 99 mg/dL   POCT GLUCOSE   Result Value Ref Range    POCT Glucose 149 (H) 74 - 99 mg/dL   POCT GLUCOSE   Result Value Ref Range    POCT Glucose 269 (H) 74 - 99 mg/dL   Basic Metabolic Panel   Result Value Ref Range    Glucose 91 65 - 99 mg/dL    Sodium 138 133 - 145 mmol/L    Potassium 4.3 3.4 - 5.1 mmol/L    Chloride 102 97 - 107 mmol/L    Bicarbonate 25 24 - 31 mmol/L    Urea Nitrogen 7 (L) 8 - 25 mg/dL    Creatinine 0.70 0.40 - 1.60 mg/dL    eGFR >90 >60 mL/min/1.73m*2    Calcium 9.0 8.5 - 10.4 mg/dL    Anion Gap 11 <=19 mmol/L   CBC   Result Value Ref Range    WBC 13.1 (H) 4.4 - 11.3 x10*3/uL    nRBC 0.0 0.0 - 0.0 /100 WBCs    RBC 3.70 (L) 4.00 - 5.20 x10*6/uL    Hemoglobin 10.3 (L) 12.0 - 16.0 g/dL    Hematocrit 32.6 " (L) 36.0 - 46.0 %    MCV 88 80 - 100 fL    MCH 27.8 26.0 - 34.0 pg    MCHC 31.6 (L) 32.0 - 36.0 g/dL    RDW 14.2 11.5 - 14.5 %    Platelets 462 (H) 150 - 450 x10*3/uL    MPV 9.5 7.5 - 11.5 fL   POCT GLUCOSE   Result Value Ref Range    POCT Glucose 144 (H) 74 - 99 mg/dL   POCT GLUCOSE   Result Value Ref Range    POCT Glucose 246 (H) 74 - 99 mg/dL     Scheduled medications  ARIPiprazole, 5 mg, oral, Daily  cefTRIAXone, 2 g, intravenous, q24h  doxepin, 100 mg, oral, Nightly  fenofibrate, 160 mg, oral, Daily  [Held by provider] glimepiride, 4 mg, oral, Daily before breakfast  insulin glargine, 70 Units, subcutaneous, Nightly  insulin lispro, 0-10 Units, subcutaneous, TID with meals  lidocaine, 5 mL, infiltration, Once  nicotine, 1 patch, transdermal, Daily  propranolol, 20 mg, oral, BID  simvastatin, 20 mg, oral, q PM  SITagliptin phosphate, 25 mg, oral, Daily  venlafaxine XR, 75 mg, oral, Daily      Continuous medications     PRN medications  PRN medications: acetaminophen, alteplase, dextrose 10 % in water (D10W), dextrose, glucagon, hydrOXYzine HCL, oxyCODONE, polyethylene glycol, SUMAtriptan        Assessment/Plan   Left foot infection: Cellulitis with ulceration and gangrene  S/p left foot incision and drainage and transmetatarsal amputation on 3/7  S/p debridement of nonviable tissue of the left foot on 3/11  Per ID, she will continue IV antibiotic therapy for potential peripheral osteomyelitis of the precaution, for duration of 6 weeks.  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  Lantus  Continue januvia  Hemoglobin A1c was 9.4% on 3/8/2024     Hyperlipidemia  On fenofibrate, simvastatin     Bipolar disorder  On Abilify     Plan  Continue IV antibiotic coverage.    Postop plan per podiatry  She is awaiting pre-cert for placement in a skilled nursing  facility  Diabetes education  PT/OT    Krystal Joel MD

## 2024-03-14 ENCOUNTER — DOCUMENTATION (OUTPATIENT)
Dept: HOME HEALTH SERVICES | Facility: HOME HEALTH | Age: 49
End: 2024-03-14
Payer: MEDICARE

## 2024-03-14 ENCOUNTER — HOME HEALTH ADMISSION (OUTPATIENT)
Dept: HOME HEALTH SERVICES | Facility: HOME HEALTH | Age: 49
End: 2024-03-14
Payer: MEDICARE

## 2024-03-14 ENCOUNTER — HOME INFUSION (OUTPATIENT)
Dept: INFUSION THERAPY | Age: 49
End: 2024-03-14
Payer: MEDICARE

## 2024-03-14 VITALS
RESPIRATION RATE: 19 BRPM | WEIGHT: 205 LBS | TEMPERATURE: 98.6 F | HEIGHT: 64 IN | SYSTOLIC BLOOD PRESSURE: 106 MMHG | HEART RATE: 86 BPM | OXYGEN SATURATION: 96 % | BODY MASS INDEX: 35 KG/M2 | DIASTOLIC BLOOD PRESSURE: 58 MMHG

## 2024-03-14 LAB
GLUCOSE BLD MANUAL STRIP-MCNC: 119 MG/DL (ref 74–99)
GLUCOSE BLD MANUAL STRIP-MCNC: 165 MG/DL (ref 74–99)
STAPHYLOCOCCUS SPEC CULT: NORMAL

## 2024-03-14 PROCEDURE — 2500000004 HC RX 250 GENERAL PHARMACY W/ HCPCS (ALT 636 FOR OP/ED): Performed by: INTERNAL MEDICINE

## 2024-03-14 PROCEDURE — 2500000001 HC RX 250 WO HCPCS SELF ADMINISTERED DRUGS (ALT 637 FOR MEDICARE OP)

## 2024-03-14 PROCEDURE — 82947 ASSAY GLUCOSE BLOOD QUANT: CPT

## 2024-03-14 PROCEDURE — 2500000002 HC RX 250 W HCPCS SELF ADMINISTERED DRUGS (ALT 637 FOR MEDICARE OP, ALT 636 FOR OP/ED)

## 2024-03-14 RX ORDER — INSULIN GLARGINE 100 [IU]/ML
75 INJECTION, SOLUTION SUBCUTANEOUS NIGHTLY
Qty: 80 ML | Refills: 3
Start: 2024-03-14

## 2024-03-14 RX ORDER — OXYCODONE HYDROCHLORIDE 5 MG/1
5 TABLET ORAL EVERY 4 HOURS PRN
Qty: 25 TABLET | Refills: 0 | Status: SHIPPED | OUTPATIENT
Start: 2024-03-14 | End: 2024-05-01 | Stop reason: HOSPADM

## 2024-03-14 RX ORDER — INSULIN GLARGINE 100 [IU]/ML
75 INJECTION, SOLUTION SUBCUTANEOUS NIGHTLY
Qty: 80 ML | Refills: 3 | Status: CANCELLED
Start: 2024-03-14

## 2024-03-14 RX ADMIN — FENOFIBRATE 160 MG: 160 TABLET ORAL at 09:03

## 2024-03-14 RX ADMIN — HYDROXYZINE HYDROCHLORIDE 25 MG: 25 TABLET, FILM COATED ORAL at 06:02

## 2024-03-14 RX ADMIN — OXYCODONE 5 MG: 5 TABLET ORAL at 06:02

## 2024-03-14 RX ADMIN — CEFTRIAXONE SODIUM 2 G: 2 INJECTION, SOLUTION INTRAVENOUS at 09:03

## 2024-03-14 RX ADMIN — ARIPIPRAZOLE 5 MG: 5 TABLET ORAL at 09:06

## 2024-03-14 RX ADMIN — VENLAFAXINE HYDROCHLORIDE 75 MG: 75 CAPSULE, EXTENDED RELEASE ORAL at 09:04

## 2024-03-14 RX ADMIN — SITAGLIPTIN 25 MG: 25 TABLET, FILM COATED ORAL at 09:28

## 2024-03-14 RX ADMIN — ACETAMINOPHEN 500 MG: 500 TABLET ORAL at 06:02

## 2024-03-14 RX ADMIN — INSULIN LISPRO 2 UNITS: 100 INJECTION, SOLUTION INTRAVENOUS; SUBCUTANEOUS at 12:16

## 2024-03-14 ASSESSMENT — COGNITIVE AND FUNCTIONAL STATUS - GENERAL
DAILY ACTIVITIY SCORE: 20
TOILETING: A LITTLE
HELP NEEDED FOR BATHING: A LITTLE
DRESSING REGULAR UPPER BODY CLOTHING: A LITTLE
DRESSING REGULAR UPPER BODY CLOTHING: A LITTLE
WALKING IN HOSPITAL ROOM: A LITTLE
HELP NEEDED FOR BATHING: A LITTLE
STANDING UP FROM CHAIR USING ARMS: A LITTLE
CLIMB 3 TO 5 STEPS WITH RAILING: A LOT
MOVING TO AND FROM BED TO CHAIR: A LITTLE
MOBILITY SCORE: 21
DAILY ACTIVITIY SCORE: 21
WALKING IN HOSPITAL ROOM: A LITTLE
CLIMB 3 TO 5 STEPS WITH RAILING: A LOT
DRESSING REGULAR LOWER BODY CLOTHING: A LITTLE
DRESSING REGULAR LOWER BODY CLOTHING: A LITTLE
MOBILITY SCORE: 19

## 2024-03-14 ASSESSMENT — PAIN SCALES - GENERAL
PAINLEVEL_OUTOF10: 0 - NO PAIN
PAINLEVEL_OUTOF10: 7

## 2024-03-14 ASSESSMENT — PAIN - FUNCTIONAL ASSESSMENT
PAIN_FUNCTIONAL_ASSESSMENT: FLACC (FACE, LEGS, ACTIVITY, CRY, CONSOLABILITY)
PAIN_FUNCTIONAL_ASSESSMENT: 0-10

## 2024-03-14 ASSESSMENT — PAIN DESCRIPTION - ORIENTATION: ORIENTATION: LEFT

## 2024-03-14 ASSESSMENT — PAIN DESCRIPTION - LOCATION: LOCATION: FOOT

## 2024-03-14 NOTE — DISCHARGE SUMMARY
"Discharge Diagnosis  Diabetic left foot infection with cellulitis with ulceration, and gangrene  Diabetes mellitus type 2 with hyperglycemia  Hyperlipidemia    Issues Requiring Follow-Up      Discharge Meds     Your medication list        START taking these medications        Instructions Last Dose Given Next Dose Due   cefTRIAXone 2 gram/50 mL IV  Commonly known as: Rocephin      Infuse 50 mL (2 g) at 100 mL/hr over 30 minutes into a venous catheter once every 24 hours.       oxyCODONE 5 mg immediate release tablet  Commonly known as: Roxicodone      Take 1 tablet (5 mg) by mouth every 4 hours if needed for severe pain (7 - 10).              CHANGE how you take these medications        Instructions Last Dose Given Next Dose Due   Lantus Solostar U-100 Insulin 100 unit/mL (3 mL) pen  Generic drug: insulin glargine  What changed: See the new instructions.      Inject 75 Units under the skin once daily at bedtime. Take as directed per insulin instructions.              CONTINUE taking these medications        Instructions Last Dose Given Next Dose Due   ARIPiprazole 5 mg tablet  Commonly known as: Abilify           BD Ultra-Fine Short Pen Needle 31 gauge x 5/16\" needle  Generic drug: pen needle, diabetic           doxepin 100 mg capsule  Commonly known as: SINEquan           fenofibrate 145 mg tablet  Commonly known as: Tricor           FreeStyle Bridget 2 Sensor kit  Generic drug: flash glucose sensor kit      USE SENSOR AS DIRECTED EVERY 2 WEEKS       glimepiride 4 mg tablet  Commonly known as: Amaryl           hydrOXYzine HCL 25 mg tablet  Commonly known as: Atarax           Januvia 25 mg tablet  Generic drug: SITagliptin phosphate           medroxyPROGESTERone 150 mg/mL injection  Commonly known as: Depo-Provera      Inject 1 mL (150 mg) into the muscle every 12 weeks.       naproxen 250 mg tablet  Commonly known as: Naprosyn           OneTouch Ultra Test strip  Generic drug: blood sugar diagnostic      USE AS " DIRECTED TO CHECK BLOOD SUGAR TWICE A DAY       propranolol 20 mg tablet  Commonly known as: Inderal           simvastatin 20 mg tablet  Commonly known as: Zocor           SUMAtriptan 100 mg tablet  Commonly known as: Imitrex           venlafaxine XR 75 mg 24 hr capsule  Commonly known as: Effexor-XR                     Where to Get Your Medications        These medications were sent to [facility] - [id number] - [place], [place] - [address]  [address], [facility] [address]      Phone: [phone]   oxyCODONE 5 mg immediate release tablet       You can get these medications from any pharmacy    Bring a paper prescription for each of these medications  cefTRIAXone 2 gram/50 mL IV       Information about where to get these medications is not yet available    Ask your nurse or doctor about these medications  Lantus Solostar U-100 Insulin 100 unit/mL (3 mL) pen         Test Results Pending At Discharge  Pending Labs       No current pending labs.            Hospital Course   48-year-old female patient with a history of type 2 diabetes on insulin, presented to the hospital because of pain, redness, skin discoloration and ulceration in the left foot.  On examination, there was ulceration on the distal plantar left foot and on the distal lateral edge of the left foot.  There was gangrene and blistering of the left third toe.  There was loss of sensation in the toes  She was admitted to the hospital and seen by the podiatry service and also by the infectious disease service.  She underwent transmetatarsal amputation on 3/7 and subsequently underwent debridement of nonviable soft tissue on 3/11.  It was recommended by the ID service that she continue antibiotic therapy for osteomyelitis of the left foot for 6 weeks from the initial surgery date.  She will be on IV ceftriaxone 2 g daily through 4/17/2024.  Arrangements were initiated for placement to a skilled nursing facility.  On  her last hospital day, the patient stated that she not wanted to go home.  Arrangements were made for IV antibiotic therapy at home and she was discharged.  She will follow-up with the podiatrist in the office.  She states that she is establishing care with a new primary physician.  The time arranging and completing discharge was 35 minutes      Outpatient Follow-Up  Future Appointments   Date Time Provider Department Center   3/15/2024 12:00 PM Laura Daugherty RN [facility]   5/1/2024  1:15 PM MD Biju Snyder   2/13/2025  1:30 PM MD Biju Snyder MD

## 2024-03-14 NOTE — NURSING NOTE
Spoke with dr. Joel , he is aware of pts request to leave [facility], he will try to be here in 20-25 mins,

## 2024-03-14 NOTE — PROGRESS NOTES
03/14/24 1500   Discharge Planning   Home or Post Acute Services In home services   Type of Home Care Services Home nursing visits   Patient expects to be discharged to: Home with [facility] infusions   Does the patient need discharge transport arranged? No   RoundTrip coordination needed? No   Patient Choice   Provider Choice list and CMS website (https://medicare.gov/care-compare#search) for post-acute Quality and Resource Measure Data were provided and reviewed with: Patient   Patient / Family choosing to utilize agency / facility established prior to hospitalization No     Met with patient at bedside to discuss discharge planning.  Patient anxious regarding pending discharge, discussed pending precert for SNF.  Patient states she called [facility] who told her precert could take up to 14 days, attempted to assure patient that precert usually takes on average 48 hours for hospital discharge.  Patient states she no longer wants to wait and wants to discharge home with home health care.  Discussed that home infusion referrals also take time to arrange and this will unlikely all be arranged today.  Patient maintains plans for home care, stating her friend Karli Quiñones ([phone]) is teachable caregiver to assist with learning and administering home IV infusions.  Patient has no current PCP, discussed with admitting provider, ID, and podiatry.  Dr. Street has agreed to follow home care orders, referral order received.      Message sent to CHAUNCEY Waters with [facility] to review and process referral.  Awaiting insurance verification, pricing, medication deliver, and confirmed SOC through [facility].      UPDATE 1400:  Received update from [facility] that insurance benefits have been received and patient is agreeable to out of pocket costs.  Awaiting confirmation from nursing regarding available SOC and from pharmacy regarding antibiotic delivery.      UPDATE 1500:  Received update from pharmacy that medication delivery will be arranged to  patients house by 9pm tonight, address confirmed with patient.  [facility] also confirmed SOC is available for tomorrow.  Update sent to the team requesting discharge orders to complete and process the referral.      UPDATE 1620:  Received the following response from [facility], Referral processed for SOC 03-15-24.  Bedside nurse updated.    DC PLAN SECURE       kidney stone on right. Compare with CT scans Reason for Exam: kidney stone on right FINDINGS: Nonobstructive bowel gas pattern. No definite renal or ureteral stones are seen. No definite urinary tract stones. Please note however that the small 4 mm stone seen on the comparison study may be obscured by overlying bowel. No results found. PROCEDURES   Unless otherwise noted below, none     Procedures    CRITICAL CARE TIME (.cctime)       PAST MEDICAL HISTORY      has a past medical history of Drug abuse ([facility] [address] Hepatitis B, Hepatitis C, and Kidney stones. EMERGENCY DEPARTMENT COURSE and DIFFERENTIAL DIAGNOSIS/MDM:   Vitals:    Vitals:    05/22/23 0845 05/22/23 1024 05/22/23 1038 05/22/23 1053   BP: (!) 163/101 135/77 137/85 130/82   Pulse: 65      Resp: 19      Temp: 98.8 °F (37.1 °C)      TempSrc: Oral      SpO2: 98% 99% 92% 93%   Weight: 190 lb (86.2 kg)      Height: 5' 9\" (1.753 m)          Patient was given the following medications:  Medications   ondansetron (ZOFRAN) injection 4 mg (4 mg IntraVENous Given 5/22/23 1022)   ketorolac (TORADOL) injection 15 mg (15 mg IntraVENous Given 5/22/23 1022)             Is this patient to be included in the SEP-1 Core Measure due to severe sepsis or septic shock? No   Exclusion criteria - the patient is NOT to be included for SEP-1 Core Measure due to: Infection is not suspected    Chronic Conditions affecting care: Kidney stones   has a past medical history of Drug abuse ([facility] [address] Hepatitis B, Hepatitis C, and Kidney stones. CONSULTS: (Who and What was discussed)  IP CONSULT TO UROLOGY    Social Determinants Significantly Affecting Health : None    Records Reviewed (External and Source) none    CC/HPI Summary, DDx, ED Course, and Reassessment:     At 11:35 AM I spoke with [facility] urology. Case reviewed discussed. She will establish appointment for the patient be seen tomorrow in office.     This patient has an established appointment tomorrow at

## 2024-03-14 NOTE — PROGRESS NOTES
"Mary Pedroza is a 48 y.o. female on day 7 of admission presenting with Cellulitis and abscess of foot.    Subjective   She underwent incision and drainage and transmetatarsal amputation of the left foot on 3/7.    She underwent debridement of nonviable tissue the left foot on 3/11      Objective     Physical Exam  General: awake, alert, oriented, responsive  Cardiovascular: regular, normal S1 and S2  Lungs: good air entry bilaterally, clear to auscultation  Extremities: The left foot and ankle were wrapped in a dressing which was not removed. There was no peripheral cyanosis, no pedal edema  Neuro: alert, oriented x 3, no focal weakness      Last Recorded Vitals  Blood pressure 85/57, pulse 73, temperature 37.2 °C (99 °F), temperature source Oral, resp. rate 16, height 1.626 m (5' 4\"), weight 93 kg (205 lb), SpO2 93 %.  Intake/Output last 3 Shifts:  I/O last 3 completed shifts:  In: 300 (3.2 mL/kg) [P.O.:300]  Out: 3300 (35.5 mL/kg) [Urine:3300 (1 mL/kg/hr)]  Weight: 93 kg     Relevant Results  Results for orders placed or performed during the hospital encounter of 03/07/24 (from the past 24 hour(s))   POCT GLUCOSE   Result Value Ref Range    POCT Glucose 246 (H) 74 - 99 mg/dL   POCT GLUCOSE   Result Value Ref Range    POCT Glucose 152 (H) 74 - 99 mg/dL   POCT GLUCOSE   Result Value Ref Range    POCT Glucose 203 (H) 74 - 99 mg/dL   POCT GLUCOSE   Result Value Ref Range    POCT Glucose 119 (H) 74 - 99 mg/dL     Scheduled medications  ARIPiprazole, 5 mg, oral, Daily  cefTRIAXone, 2 g, intravenous, q24h  doxepin, 100 mg, oral, Nightly  fenofibrate, 160 mg, oral, Daily  [Held by provider] glimepiride, 4 mg, oral, Daily before breakfast  insulin glargine, 70 Units, subcutaneous, Nightly  insulin lispro, 0-10 Units, subcutaneous, TID with meals  lidocaine, 5 mL, infiltration, Once  nicotine, 1 patch, transdermal, Daily  propranolol, 20 mg, oral, BID  simvastatin, 20 mg, oral, q PM  SITagliptin phosphate, 25 mg, oral, " Daily  venlafaxine XR, 75 mg, oral, Daily      Continuous medications     PRN medications  PRN medications: acetaminophen, alteplase, dextrose 10 % in water (D10W), dextrose, glucagon, hydrOXYzine HCL, oxyCODONE, polyethylene glycol, SUMAtriptan        Assessment/Plan   Left foot infection: Cellulitis with ulceration and gangrene  S/p left foot incision and drainage and transmetatarsal amputation on 3/7  S/p debridement of nonviable tissue of the left foot on 3/11  Per ID, she will continue IV antibiotic therapy for potential peripheral osteomyelitis of the precaution, for duration of 6 weeks.  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  Lantus  Continue januvia  Hemoglobin A1c was 9.4% on 3/8/2024     Hyperlipidemia  On fenofibrate, simvastatin     Bipolar disorder  On Abilify     Plan  Continue IV antibiotic coverage.    Arrangements are in progress o go to a skilled nursing facility.  She now wants to go home and arrangements are now being made for IV antibiotics at home.  Diabetes education  PT/OT    Krystal Joel MD

## 2024-03-14 NOTE — HH CARE COORDINATION
Home Care received a Referral for Infusion and Nursing. We have processed the referral for a Start of Care on 03-15-24.     If you have any questions or concerns, please feel free to contact us at [phone]. Follow the prompts, enter your five digit zip code, and you will be directed to your care team on [facility].

## 2024-03-14 NOTE — PROGRESS NOTES
Occupational Therapy                 Therapy Communication Note    Patient Name: Mary Pedroza  MRN: [id number]  Today's Date: 3/14/2024     Discipline: Occupational Therapy    Missed Visit Reason: Missed Visit Reason: Other (Comment) (Per RN pt agitated and adamant of leaving AMA, not appropriate to participate in OT session at this time. RN and supervisior to speak with pt.)    Missed Time: Attempt    Comment: Attempt 1252

## 2024-03-14 NOTE — NURSING NOTE
Pt is insisting that she is going home today regardless if home care is set up or not. Call placed to dr. langley

## 2024-03-14 NOTE — PROGRESS NOTES
Physical Therapy                 Therapy Communication Note    Patient Name: Mary Pedroza  MRN: [id number]  Today's Date: 3/14/2024     Discipline: Physical Therapy    Missed Visit Reason: Missed Visit Reason: Patient refused    Missed Time: Attempt    Comment: Pt very agitated and requesting to leave AMA immediately. RN and nurse supervisor in to speak with patient.

## 2024-03-14 NOTE — NURSING NOTE
Inpatient diabetes education follow up:    POCT glucose today 3/14/24 at 07:46 was 119 mg/dl.    No change in diabetes medications.  Possible discharge today.

## 2024-03-14 NOTE — PROGRESS NOTES
Reviewed pt info as correct  Dx - L foot OM  Allergies - NKDA  No medication interactions  Labs are - CBC w diff, CMP, ESR, and CRP weekly to Dr Street: [phone]  Pt has SL PICC placed 3/12 to flush per [facility] protocol  Med and med system - ceftriaxone 2 gm q24 MB+  Care plan done today    Mary Pedroza is a 48 y.o. patient discharged from hospital to [facility] for skilled nursing and pharmacy services.  Pt ordered ceftriaxone 2gm q24 SOC 3/15. Med ordered verified as accurate and appropriate for therapy  Dr Street following.    Pharmacy to mix 3/14 and deliver straight with supplies to match.  7x ceftriaxone 2 gm MB+  DOS - 3/15 - 3/21    Follow up - 3/21, check labs, check progress, deliver straight

## 2024-03-14 NOTE — NURSING NOTE
Mariajose, care coordination is aware that pt is insisting today on leaving , states she is leaving regardless

## 2024-03-15 ENCOUNTER — DOCUMENTATION (OUTPATIENT)
Dept: PRIMARY CARE | Facility: CLINIC | Age: 49
End: 2024-03-15
Payer: MEDICARE

## 2024-03-15 ENCOUNTER — DOCUMENTATION (OUTPATIENT)
Dept: PHARMACY | Facility: CLINIC | Age: 49
End: 2024-03-15

## 2024-03-15 ENCOUNTER — HOME CARE VISIT (OUTPATIENT)
Dept: HOME HEALTH SERVICES | Facility: HOME HEALTH | Age: 49
End: 2024-03-15
Payer: MEDICARE

## 2024-03-15 ENCOUNTER — PATIENT OUTREACH (OUTPATIENT)
Dept: PRIMARY CARE | Facility: CLINIC | Age: 49
End: 2024-03-15
Payer: MEDICARE

## 2024-03-15 VITALS
SYSTOLIC BLOOD PRESSURE: 112 MMHG | BODY MASS INDEX: 35 KG/M2 | WEIGHT: 205 LBS | HEART RATE: 88 BPM | DIASTOLIC BLOOD PRESSURE: 60 MMHG | HEIGHT: 64 IN | RESPIRATION RATE: 18 BRPM | OXYGEN SATURATION: 98 % | TEMPERATURE: 98.8 F

## 2024-03-15 DIAGNOSIS — Z79.4 TYPE 2 DIABETES MELLITUS WITH HYPERGLYCEMIA, WITH LONG-TERM CURRENT USE OF INSULIN (MULTI): Primary | ICD-10-CM

## 2024-03-15 DIAGNOSIS — E11.65 TYPE 2 DIABETES MELLITUS WITH HYPERGLYCEMIA, WITH LONG-TERM CURRENT USE OF INSULIN (MULTI): Primary | ICD-10-CM

## 2024-03-15 DIAGNOSIS — L97.524 NON-PRESSURE CHRONIC ULCER OF OTHER PART OF LEFT FOOT WITH NECROSIS OF BONE (MULTI): Primary | ICD-10-CM

## 2024-03-15 PROCEDURE — 0023 HH SOC

## 2024-03-15 PROCEDURE — 1090000001 HH PPS REVENUE CREDIT

## 2024-03-15 PROCEDURE — 169592 NO-PAY CLAIM PROCEDURE

## 2024-03-15 PROCEDURE — 1090000002 HH PPS REVENUE DEBIT

## 2024-03-15 PROCEDURE — G0299 HHS/HOSPICE OF RN EA 15 MIN: HCPCS | Mod: HHH

## 2024-03-15 RX ORDER — DEXTROSE 4 G
TABLET,CHEWABLE ORAL
Qty: 1 EACH | Refills: 0 | Status: SHIPPED | OUTPATIENT
Start: 2024-03-15

## 2024-03-15 NOTE — PROGRESS NOTES
Patient has declined PCP follow up and TCM services at this time. She states that she will be finding a new PCP and following closely with podiatry. She was thankful for this call.

## 2024-03-15 NOTE — PROGRESS NOTES
SPOKE W/ PT - DELIVERY IS SCHEDULED FOR YESTERDAY BY 9 PM.  ASKED PT IF THERE ARE ANY QUESTIONS TO A PHARMACIST. PT SAID: NO QUESTIONS.

## 2024-03-16 ENCOUNTER — HOME CARE VISIT (OUTPATIENT)
Dept: HOME HEALTH SERVICES | Facility: HOME HEALTH | Age: 49
End: 2024-03-16
Payer: MEDICARE

## 2024-03-16 PROCEDURE — 1090000001 HH PPS REVENUE CREDIT

## 2024-03-16 PROCEDURE — 1090000002 HH PPS REVENUE DEBIT

## 2024-03-17 PROCEDURE — 1090000002 HH PPS REVENUE DEBIT

## 2024-03-17 PROCEDURE — 1090000001 HH PPS REVENUE CREDIT

## 2024-03-18 ENCOUNTER — HOME CARE VISIT (OUTPATIENT)
Dept: HOME HEALTH SERVICES | Facility: HOME HEALTH | Age: 49
End: 2024-03-18
Payer: MEDICARE

## 2024-03-18 ENCOUNTER — TELEPHONE (OUTPATIENT)
Dept: CARE COORDINATION | Facility: CLINIC | Age: 49
End: 2024-03-18
Payer: MEDICARE

## 2024-03-18 ENCOUNTER — LAB REQUISITION (OUTPATIENT)
Dept: LAB | Facility: LAB | Age: 49
End: 2024-03-18
Payer: MEDICARE

## 2024-03-18 ENCOUNTER — DOCUMENTATION (OUTPATIENT)
Dept: PHARMACY | Facility: CLINIC | Age: 49
End: 2024-03-18

## 2024-03-18 ENCOUNTER — HOME INFUSION (OUTPATIENT)
Dept: INFUSION THERAPY | Age: 49
End: 2024-03-18
Payer: MEDICARE

## 2024-03-18 VITALS
HEART RATE: 90 BPM | OXYGEN SATURATION: 97 % | TEMPERATURE: 96.8 F | SYSTOLIC BLOOD PRESSURE: 126 MMHG | DIASTOLIC BLOOD PRESSURE: 80 MMHG | RESPIRATION RATE: 18 BRPM

## 2024-03-18 DIAGNOSIS — M86.272 SUBACUTE OSTEOMYELITIS, LEFT ANKLE AND FOOT (MULTI): ICD-10-CM

## 2024-03-18 LAB
ALBUMIN SERPL-MCNC: 3.5 G/DL (ref 3.5–5)
ALP BLD-CCNC: 117 U/L (ref 35–125)
ALT SERPL-CCNC: 19 U/L (ref 5–40)
ANION GAP SERPL CALC-SCNC: 14 MMOL/L
AST SERPL-CCNC: 14 U/L (ref 5–40)
BASOPHILS # BLD AUTO: 0.08 X10*3/UL (ref 0–0.1)
BASOPHILS NFR BLD AUTO: 0.7 %
BILIRUB SERPL-MCNC: <0.2 MG/DL (ref 0.1–1.2)
BUN SERPL-MCNC: 7 MG/DL (ref 8–25)
CALCIUM SERPL-MCNC: 8.9 MG/DL (ref 8.5–10.4)
CHLORIDE SERPL-SCNC: 97 MMOL/L (ref 97–107)
CO2 SERPL-SCNC: 22 MMOL/L (ref 24–31)
CREAT SERPL-MCNC: 0.6 MG/DL (ref 0.4–1.6)
CRP SERPL-MCNC: 2 MG/DL (ref 0–2)
EGFRCR SERPLBLD CKD-EPI 2021: >90 ML/MIN/1.73M*2
EOSINOPHIL # BLD AUTO: 0.82 X10*3/UL (ref 0–0.7)
EOSINOPHIL NFR BLD AUTO: 7.2 %
ERYTHROCYTE [DISTWIDTH] IN BLOOD BY AUTOMATED COUNT: 14.5 % (ref 11.5–14.5)
ERYTHROCYTE [SEDIMENTATION RATE] IN BLOOD BY WESTERGREN METHOD: 60 MM/H (ref 0–20)
GLUCOSE SERPL-MCNC: 370 MG/DL (ref 65–99)
HCT VFR BLD AUTO: 34.4 % (ref 36–46)
HGB BLD-MCNC: 10.9 G/DL (ref 12–16)
IMM GRANULOCYTES # BLD AUTO: 0.08 X10*3/UL (ref 0–0.7)
IMM GRANULOCYTES NFR BLD AUTO: 0.7 % (ref 0–0.9)
LYMPHOCYTES # BLD AUTO: 3.19 X10*3/UL (ref 1.2–4.8)
LYMPHOCYTES NFR BLD AUTO: 27.9 %
MCH RBC QN AUTO: 28.1 PG (ref 26–34)
MCHC RBC AUTO-ENTMCNC: 31.7 G/DL (ref 32–36)
MCV RBC AUTO: 89 FL (ref 80–100)
MONOCYTES # BLD AUTO: 0.66 X10*3/UL (ref 0.1–1)
MONOCYTES NFR BLD AUTO: 5.8 %
NEUTROPHILS # BLD AUTO: 6.59 X10*3/UL (ref 1.2–7.7)
NEUTROPHILS NFR BLD AUTO: 57.7 %
NRBC BLD-RTO: 0 /100 WBCS (ref 0–0)
PLATELET # BLD AUTO: 474 X10*3/UL (ref 150–450)
POTASSIUM SERPL-SCNC: 4.7 MMOL/L (ref 3.4–5.1)
PROT SERPL-MCNC: 6.8 G/DL (ref 5.9–7.9)
RBC # BLD AUTO: 3.88 X10*6/UL (ref 4–5.2)
SODIUM SERPL-SCNC: 133 MMOL/L (ref 133–145)
WBC # BLD AUTO: 11.4 X10*3/UL (ref 4.4–11.3)

## 2024-03-18 PROCEDURE — 1090000001 HH PPS REVENUE CREDIT

## 2024-03-18 PROCEDURE — 1090000002 HH PPS REVENUE DEBIT

## 2024-03-18 PROCEDURE — 86140 C-REACTIVE PROTEIN: CPT

## 2024-03-18 PROCEDURE — G0299 HHS/HOSPICE OF RN EA 15 MIN: HCPCS | Mod: HHH

## 2024-03-18 PROCEDURE — 85652 RBC SED RATE AUTOMATED: CPT

## 2024-03-18 PROCEDURE — 80053 COMPREHEN METABOLIC PANEL: CPT

## 2024-03-18 PROCEDURE — 85025 COMPLETE CBC W/AUTO DIFF WBC: CPT

## 2024-03-18 NOTE — PROGRESS NOTES
Chart review - Mary Pedroza is a 48 y.o. patient on home care for L foot OM.  Med - ceftriaxone 2 gm thru 4/17. Dr. Street following    No new labs to review this fill    Rec'd chat from HC RN, patient and caregiver had 2 doses leak and were wasted. Confirmed doses in the home thru 3/19. Pharmacy will send 2 replacement doses (no bill) as well as regular delivery 3/18.     Pharmacy to mix 3/18  and deliver straight with supplies to match:  2x ceftriaxone 2 gm MB+ (replacement doses)  7x ceftriaxone 2 gm MB+ (regular doses)  DOS: 3/20 - 3/28    Follow up 3/28 - check labs, check progress, deliver straight

## 2024-03-18 NOTE — PROGRESS NOTES
"This educator spoke with patient, on this date, in regard to referral received, during her hospital stay, for outpatient diabetes education. Educator identified self and nature of the call, however, patient appeared argumentative and asked \"how did you get my number\" and \"why are you recording this\"? I explained to patient that I obtained her telephone number through her medical record and that I was in no way recording her. Patient repeatedly stated that I was \"breaking the law by recording her\".  I attempted again to reassure patient that I was not recording her, however, these attempts were unsuccessful. I informed patient that I would be taking her response as an indication that she was not interested in outpatient diabetes education at this time and call was then ended by myself. This conversation will be reviewed with Diabetes .  "

## 2024-03-19 DIAGNOSIS — E78.2 MIXED HYPERLIPIDEMIA: Primary | ICD-10-CM

## 2024-03-19 PROCEDURE — 1090000001 HH PPS REVENUE CREDIT

## 2024-03-19 PROCEDURE — 1090000002 HH PPS REVENUE DEBIT

## 2024-03-19 ASSESSMENT — ENCOUNTER SYMPTOMS
PAIN LOCATION - PAIN SEVERITY: 5/10
PAIN: 1
PERSON REPORTING PAIN: PATIENT
APPETITE LEVEL: GOOD
MUSCLE WEAKNESS: 1
PAIN LOCATION: LEFT FOOT
CHANGE IN APPETITE: UNCHANGED

## 2024-03-19 ASSESSMENT — ACTIVITIES OF DAILY LIVING (ADL)
ENTERING_EXITING_HOME: ONE PERSON
OASIS_M1830: 05

## 2024-03-20 PROCEDURE — 1090000001 HH PPS REVENUE CREDIT

## 2024-03-20 PROCEDURE — 1090000002 HH PPS REVENUE DEBIT

## 2024-03-20 RX ORDER — SIMVASTATIN 20 MG/1
20 TABLET, FILM COATED ORAL NIGHTLY
Qty: 90 TABLET | Refills: 0 | Status: SHIPPED | OUTPATIENT
Start: 2024-03-20

## 2024-03-21 ENCOUNTER — TELEPHONE (OUTPATIENT)
Dept: OBSTETRICS AND GYNECOLOGY | Facility: CLINIC | Age: 49
End: 2024-03-21
Payer: MEDICARE

## 2024-03-21 ENCOUNTER — HOME CARE VISIT (OUTPATIENT)
Dept: HOME HEALTH SERVICES | Facility: HOME HEALTH | Age: 49
End: 2024-03-21
Payer: MEDICARE

## 2024-03-21 PROCEDURE — 1090000001 HH PPS REVENUE CREDIT

## 2024-03-21 PROCEDURE — G0299 HHS/HOSPICE OF RN EA 15 MIN: HCPCS | Mod: HHH

## 2024-03-21 PROCEDURE — 1090000002 HH PPS REVENUE DEBIT

## 2024-03-21 NOTE — TELEPHONE ENCOUNTER
Est pt last seen 02/08/2024 Annual / pt calling states that she was ordered a mamm and dexa at her annual visit / pt wanting Dr Gandhi to know that she had her toes and part of her foot removed on 03/12/2024 / pt wants Dr Gandhi to know that she will have mamm and dexa complete when she is up and getting around better / pt worried that if she does not complete in time that Dr Gandhi will not let her have her Depo injection / pt states that she will try to have completed before injection that is shed for 5/1/2024 / advised pt that most important to take care of her foot / advised message to Dr Gandhi

## 2024-03-22 PROCEDURE — 1090000001 HH PPS REVENUE CREDIT

## 2024-03-22 PROCEDURE — 1090000002 HH PPS REVENUE DEBIT

## 2024-03-23 PROCEDURE — 1090000001 HH PPS REVENUE CREDIT

## 2024-03-23 PROCEDURE — 1090000002 HH PPS REVENUE DEBIT

## 2024-03-24 PROCEDURE — 1090000002 HH PPS REVENUE DEBIT

## 2024-03-24 PROCEDURE — 1090000001 HH PPS REVENUE CREDIT

## 2024-03-25 ENCOUNTER — LAB REQUISITION (OUTPATIENT)
Dept: LAB | Facility: LAB | Age: 49
End: 2024-03-25
Payer: MEDICARE

## 2024-03-25 ENCOUNTER — HOME CARE VISIT (OUTPATIENT)
Dept: HOME HEALTH SERVICES | Facility: HOME HEALTH | Age: 49
End: 2024-03-25
Payer: MEDICARE

## 2024-03-25 VITALS
RESPIRATION RATE: 18 BRPM | HEART RATE: 92 BPM | TEMPERATURE: 97.9 F | SYSTOLIC BLOOD PRESSURE: 130 MMHG | OXYGEN SATURATION: 98 % | DIASTOLIC BLOOD PRESSURE: 84 MMHG

## 2024-03-25 DIAGNOSIS — Z47.81 ENCOUNTER FOR ORTHOPEDIC AFTERCARE FOLLOWING SURGICAL AMPUTATION: ICD-10-CM

## 2024-03-25 LAB
ALBUMIN SERPL-MCNC: 3.6 G/DL (ref 3.5–5)
ALP BLD-CCNC: 102 U/L (ref 35–125)
ALT SERPL-CCNC: 11 U/L (ref 5–40)
ANION GAP SERPL CALC-SCNC: 15 MMOL/L
AST SERPL-CCNC: 10 U/L (ref 5–40)
BASOPHILS # BLD AUTO: 0.04 X10*3/UL (ref 0–0.1)
BASOPHILS NFR BLD AUTO: 0.4 %
BILIRUB SERPL-MCNC: 0.2 MG/DL (ref 0.1–1.2)
BUN SERPL-MCNC: 9 MG/DL (ref 8–25)
CALCIUM SERPL-MCNC: 9.2 MG/DL (ref 8.5–10.4)
CHLORIDE SERPL-SCNC: 97 MMOL/L (ref 97–107)
CO2 SERPL-SCNC: 21 MMOL/L (ref 24–31)
CREAT SERPL-MCNC: 0.6 MG/DL (ref 0.4–1.6)
CRP SERPL-MCNC: 1.1 MG/DL (ref 0–2)
EGFRCR SERPLBLD CKD-EPI 2021: >90 ML/MIN/1.73M*2
EOSINOPHIL # BLD AUTO: 0.75 X10*3/UL (ref 0–0.7)
EOSINOPHIL NFR BLD AUTO: 7.2 %
ERYTHROCYTE [DISTWIDTH] IN BLOOD BY AUTOMATED COUNT: 15.9 % (ref 11.5–14.5)
ERYTHROCYTE [SEDIMENTATION RATE] IN BLOOD BY WESTERGREN METHOD: 78 MM/H (ref 0–20)
GLUCOSE SERPL-MCNC: 285 MG/DL (ref 65–99)
HCT VFR BLD AUTO: 40.2 % (ref 36–46)
HGB BLD-MCNC: 12.8 G/DL (ref 12–16)
IMM GRANULOCYTES # BLD AUTO: 0.05 X10*3/UL (ref 0–0.7)
IMM GRANULOCYTES NFR BLD AUTO: 0.5 % (ref 0–0.9)
LYMPHOCYTES # BLD AUTO: 3.37 X10*3/UL (ref 1.2–4.8)
LYMPHOCYTES NFR BLD AUTO: 32.2 %
MCH RBC QN AUTO: 28 PG (ref 26–34)
MCHC RBC AUTO-ENTMCNC: 31.8 G/DL (ref 32–36)
MCV RBC AUTO: 88 FL (ref 80–100)
MONOCYTES # BLD AUTO: 0.51 X10*3/UL (ref 0.1–1)
MONOCYTES NFR BLD AUTO: 4.9 %
NEUTROPHILS # BLD AUTO: 5.74 X10*3/UL (ref 1.2–7.7)
NEUTROPHILS NFR BLD AUTO: 54.8 %
NRBC BLD-RTO: 0 /100 WBCS (ref 0–0)
PLATELET # BLD AUTO: 355 X10*3/UL (ref 150–450)
POTASSIUM SERPL-SCNC: 4.3 MMOL/L (ref 3.4–5.1)
PROT SERPL-MCNC: 6.9 G/DL (ref 5.9–7.9)
RBC # BLD AUTO: 4.57 X10*6/UL (ref 4–5.2)
SODIUM SERPL-SCNC: 133 MMOL/L (ref 133–145)
WBC # BLD AUTO: 10.5 X10*3/UL (ref 4.4–11.3)

## 2024-03-25 PROCEDURE — G0299 HHS/HOSPICE OF RN EA 15 MIN: HCPCS | Mod: HHH

## 2024-03-25 PROCEDURE — 1090000002 HH PPS REVENUE DEBIT

## 2024-03-25 PROCEDURE — 85652 RBC SED RATE AUTOMATED: CPT

## 2024-03-25 PROCEDURE — 80053 COMPREHEN METABOLIC PANEL: CPT

## 2024-03-25 PROCEDURE — 85025 COMPLETE CBC W/AUTO DIFF WBC: CPT

## 2024-03-25 PROCEDURE — 1090000001 HH PPS REVENUE CREDIT

## 2024-03-25 PROCEDURE — 86140 C-REACTIVE PROTEIN: CPT

## 2024-03-26 PROCEDURE — 1090000001 HH PPS REVENUE CREDIT

## 2024-03-26 PROCEDURE — 1090000002 HH PPS REVENUE DEBIT

## 2024-03-27 ENCOUNTER — HOME INFUSION (OUTPATIENT)
Dept: INFUSION THERAPY | Age: 49
End: 2024-03-27

## 2024-03-27 PROCEDURE — 1090000002 HH PPS REVENUE DEBIT

## 2024-03-27 PROCEDURE — 1090000001 HH PPS REVENUE CREDIT

## 2024-03-27 NOTE — PROGRESS NOTES
Chart review - Mary Pedroza is a 48 y.o. patient on home care for L foot OM.  Med - ceftriaxone 2 gm thru 4/17. Dr. Street following     Labs from 3/25 reviewed: ESR: 78     [facility] call to patient, agreeable to deliver of homepumps on 3/28 for start 3/29. HC RN to see patient for re teach of new delivery form. Patient confirmed she will use the remaining minibags thru 3/28.     Pharmacy to mix 3/28  and deliver straight with supplies to match:  7x ceftriaxone 2 gm HP  DOS: 3/29 - 4/4     Follow up 4/3 - check labs, check progress, deliver OVN

## 2024-03-28 ENCOUNTER — DOCUMENTATION (OUTPATIENT)
Dept: PHARMACY | Facility: CLINIC | Age: 49
End: 2024-03-28

## 2024-03-28 PROCEDURE — 1090000002 HH PPS REVENUE DEBIT

## 2024-03-28 PROCEDURE — 1090000001 HH PPS REVENUE CREDIT

## 2024-03-29 ENCOUNTER — HOME CARE VISIT (OUTPATIENT)
Dept: HOME HEALTH SERVICES | Facility: HOME HEALTH | Age: 49
End: 2024-03-29
Payer: MEDICARE

## 2024-03-29 PROCEDURE — 1090000001 HH PPS REVENUE CREDIT

## 2024-03-29 PROCEDURE — 1090000002 HH PPS REVENUE DEBIT

## 2024-03-29 PROCEDURE — G0299 HHS/HOSPICE OF RN EA 15 MIN: HCPCS | Mod: HHH

## 2024-03-30 PROCEDURE — 1090000001 HH PPS REVENUE CREDIT

## 2024-03-30 PROCEDURE — 1090000002 HH PPS REVENUE DEBIT

## 2024-03-31 PROCEDURE — 1090000001 HH PPS REVENUE CREDIT

## 2024-03-31 PROCEDURE — 1090000002 HH PPS REVENUE DEBIT

## 2024-03-31 ASSESSMENT — ENCOUNTER SYMPTOMS
MUSCLE WEAKNESS: 1
PAIN LOCATION - PAIN SEVERITY: 5/10
APPETITE LEVEL: GOOD
CHANGE IN APPETITE: UNCHANGED
PERSON REPORTING PAIN: PATIENT
PAIN: 1
PAIN LOCATION: LEFT FOOT

## 2024-04-01 ENCOUNTER — LAB REQUISITION (OUTPATIENT)
Dept: LAB | Facility: LAB | Age: 49
End: 2024-04-01
Payer: MEDICARE

## 2024-04-01 ENCOUNTER — HOME CARE VISIT (OUTPATIENT)
Dept: HOME HEALTH SERVICES | Facility: HOME HEALTH | Age: 49
End: 2024-04-01
Payer: MEDICARE

## 2024-04-01 VITALS
OXYGEN SATURATION: 99 % | RESPIRATION RATE: 18 BRPM | TEMPERATURE: 98.2 F | SYSTOLIC BLOOD PRESSURE: 132 MMHG | DIASTOLIC BLOOD PRESSURE: 80 MMHG | HEART RATE: 90 BPM

## 2024-04-01 DIAGNOSIS — E11.65 TYPE 2 DIABETES MELLITUS WITH HYPERGLYCEMIA, WITH LONG-TERM CURRENT USE OF INSULIN (MULTI): Primary | ICD-10-CM

## 2024-04-01 DIAGNOSIS — Z47.81 ENCOUNTER FOR ORTHOPEDIC AFTERCARE FOLLOWING SURGICAL AMPUTATION: ICD-10-CM

## 2024-04-01 DIAGNOSIS — Z79.4 TYPE 2 DIABETES MELLITUS WITH HYPERGLYCEMIA, WITH LONG-TERM CURRENT USE OF INSULIN (MULTI): Primary | ICD-10-CM

## 2024-04-01 LAB
ALBUMIN SERPL-MCNC: 3.7 G/DL (ref 3.5–5)
ALP BLD-CCNC: 109 U/L (ref 35–125)
ALT SERPL-CCNC: 12 U/L (ref 5–40)
ANION GAP SERPL CALC-SCNC: 16 MMOL/L
AST SERPL-CCNC: 150 U/L (ref 5–40)
BASOPHILS # BLD AUTO: 0.05 X10*3/UL (ref 0–0.1)
BASOPHILS NFR BLD AUTO: 0.6 %
BILIRUB SERPL-MCNC: 0.2 MG/DL (ref 0.1–1.2)
BUN SERPL-MCNC: 11 MG/DL (ref 8–25)
CALCIUM SERPL-MCNC: 9 MG/DL (ref 8.5–10.4)
CHLORIDE SERPL-SCNC: 102 MMOL/L (ref 97–107)
CO2 SERPL-SCNC: 19 MMOL/L (ref 24–31)
CREAT SERPL-MCNC: 0.5 MG/DL (ref 0.4–1.6)
CRP SERPL-MCNC: <0.3 MG/DL (ref 0–2)
EGFRCR SERPLBLD CKD-EPI 2021: >90 ML/MIN/1.73M*2
EOSINOPHIL # BLD AUTO: 0.7 X10*3/UL (ref 0–0.7)
EOSINOPHIL NFR BLD AUTO: 8.3 %
ERYTHROCYTE [DISTWIDTH] IN BLOOD BY AUTOMATED COUNT: 15 % (ref 11.5–14.5)
ERYTHROCYTE [SEDIMENTATION RATE] IN BLOOD BY WESTERGREN METHOD: 38 MM/H (ref 0–20)
GLUCOSE SERPL-MCNC: 331 MG/DL (ref 65–99)
HCT VFR BLD AUTO: 41.5 % (ref 36–46)
HGB BLD-MCNC: 13.5 G/DL (ref 12–16)
IMM GRANULOCYTES # BLD AUTO: 0.02 X10*3/UL (ref 0–0.7)
IMM GRANULOCYTES NFR BLD AUTO: 0.2 % (ref 0–0.9)
LYMPHOCYTES # BLD AUTO: 2.71 X10*3/UL (ref 1.2–4.8)
LYMPHOCYTES NFR BLD AUTO: 32.1 %
MCH RBC QN AUTO: 28.4 PG (ref 26–34)
MCHC RBC AUTO-ENTMCNC: 32.5 G/DL (ref 32–36)
MCV RBC AUTO: 87 FL (ref 80–100)
MONOCYTES # BLD AUTO: 0.49 X10*3/UL (ref 0.1–1)
MONOCYTES NFR BLD AUTO: 5.8 %
NEUTROPHILS # BLD AUTO: 4.46 X10*3/UL (ref 1.2–7.7)
NEUTROPHILS NFR BLD AUTO: 53 %
NRBC BLD-RTO: 0 /100 WBCS (ref 0–0)
PLATELET # BLD AUTO: 261 X10*3/UL (ref 150–450)
POTASSIUM SERPL-SCNC: 4.7 MMOL/L (ref 3.4–5.1)
PROT SERPL-MCNC: 6.8 G/DL (ref 5.9–7.9)
RBC # BLD AUTO: 4.75 X10*6/UL (ref 4–5.2)
SODIUM SERPL-SCNC: 137 MMOL/L (ref 133–145)
WBC # BLD AUTO: 8.4 X10*3/UL (ref 4.4–11.3)

## 2024-04-01 PROCEDURE — 85025 COMPLETE CBC W/AUTO DIFF WBC: CPT

## 2024-04-01 PROCEDURE — 1090000001 HH PPS REVENUE CREDIT

## 2024-04-01 PROCEDURE — 1090000002 HH PPS REVENUE DEBIT

## 2024-04-01 PROCEDURE — G0299 HHS/HOSPICE OF RN EA 15 MIN: HCPCS | Mod: HHH

## 2024-04-01 PROCEDURE — 85652 RBC SED RATE AUTOMATED: CPT

## 2024-04-01 PROCEDURE — 80053 COMPREHEN METABOLIC PANEL: CPT

## 2024-04-01 PROCEDURE — 86140 C-REACTIVE PROTEIN: CPT

## 2024-04-02 PROCEDURE — 1090000002 HH PPS REVENUE DEBIT

## 2024-04-02 PROCEDURE — 1090000001 HH PPS REVENUE CREDIT

## 2024-04-03 ENCOUNTER — DOCUMENTATION (OUTPATIENT)
Dept: PHARMACY | Facility: CLINIC | Age: 49
End: 2024-04-03

## 2024-04-03 ENCOUNTER — HOME INFUSION (OUTPATIENT)
Dept: INFUSION THERAPY | Age: 49
End: 2024-04-03
Payer: MEDICARE

## 2024-04-03 PROCEDURE — 1090000002 HH PPS REVENUE DEBIT

## 2024-04-03 PROCEDURE — 1090000001 HH PPS REVENUE CREDIT

## 2024-04-03 NOTE — PROGRESS NOTES
CALLED PT AND LEFT VM - DELIVERY IS SCHEDULED FOR THURSDAY  8  PM.  LET PT KNOW WE'RE SENDING STND SUPPLIES AND ASKED TO CALL W/ ANY QUESTIONS.

## 2024-04-03 NOTE — PROGRESS NOTES
Chart review - Mary Pedroza is a 48 y.o. patient on home care for L foot OM.  Med - ceftriaxone 2 gm thru 4/17. Dr. Street following    Labs from 4/1 reviewed: ESR trending down: 38, all others WNL     [facility] call to patient, unable to leave message about delivery. Per notes, patient should have doses in home thru 4/4.     Pharmacy to mix 4/3 and deliver 4/4 with supplies to match:  7x ceftriaxone 2 gm   DOS: 4/5 - 4/11     Follow up 4/10 - check labs, check progress, deliver remainder OVN

## 2024-04-04 PROCEDURE — 1090000001 HH PPS REVENUE CREDIT

## 2024-04-04 PROCEDURE — 1090000002 HH PPS REVENUE DEBIT

## 2024-04-05 PROCEDURE — 1090000001 HH PPS REVENUE CREDIT

## 2024-04-05 PROCEDURE — 1090000002 HH PPS REVENUE DEBIT

## 2024-04-06 PROCEDURE — 1090000001 HH PPS REVENUE CREDIT

## 2024-04-06 PROCEDURE — 1090000002 HH PPS REVENUE DEBIT

## 2024-04-06 ASSESSMENT — ENCOUNTER SYMPTOMS
PAIN LOCATION - PAIN SEVERITY: 3/10
PERSON REPORTING PAIN: PATIENT
PAIN LOCATION: LEFT FOOT
APPETITE LEVEL: GOOD
PAIN LOCATION: LEFT FOOT
PERSON REPORTING PAIN: PATIENT
PAIN: 1
MUSCLE WEAKNESS: 1
PERSON REPORTING PAIN: PATIENT
CHANGE IN APPETITE: UNCHANGED
PAIN LOCATION - PAIN SEVERITY: 4/10
PAIN LOCATION: LEFT FOOT
PAIN LOCATION - PAIN SEVERITY: 4/10

## 2024-04-07 PROCEDURE — 1090000001 HH PPS REVENUE CREDIT

## 2024-04-07 PROCEDURE — 1090000002 HH PPS REVENUE DEBIT

## 2024-04-08 ENCOUNTER — HOME CARE VISIT (OUTPATIENT)
Dept: HOME HEALTH SERVICES | Facility: HOME HEALTH | Age: 49
End: 2024-04-08
Payer: MEDICARE

## 2024-04-08 ENCOUNTER — LAB REQUISITION (OUTPATIENT)
Dept: LAB | Facility: LAB | Age: 49
End: 2024-04-08
Payer: MEDICARE

## 2024-04-08 VITALS
HEART RATE: 88 BPM | RESPIRATION RATE: 18 BRPM | DIASTOLIC BLOOD PRESSURE: 80 MMHG | SYSTOLIC BLOOD PRESSURE: 134 MMHG | OXYGEN SATURATION: 100 % | TEMPERATURE: 98.4 F

## 2024-04-08 DIAGNOSIS — M86.272 SUBACUTE OSTEOMYELITIS, LEFT ANKLE AND FOOT (MULTI): ICD-10-CM

## 2024-04-08 DIAGNOSIS — Z47.81 ENCOUNTER FOR ORTHOPEDIC AFTERCARE FOLLOWING SURGICAL AMPUTATION: ICD-10-CM

## 2024-04-08 LAB
ALBUMIN SERPL-MCNC: 3.9 G/DL (ref 3.5–5)
ALP BLD-CCNC: 98 U/L (ref 35–125)
ALT SERPL-CCNC: 11 U/L (ref 5–40)
ANION GAP SERPL CALC-SCNC: 12 MMOL/L
AST SERPL-CCNC: 9 U/L (ref 5–40)
BASOPHILS # BLD AUTO: 0.05 X10*3/UL (ref 0–0.1)
BASOPHILS NFR BLD AUTO: 0.5 %
BILIRUB SERPL-MCNC: 0.3 MG/DL (ref 0.1–1.2)
BUN SERPL-MCNC: 16 MG/DL (ref 8–25)
CALCIUM SERPL-MCNC: 9.7 MG/DL (ref 8.5–10.4)
CHLORIDE SERPL-SCNC: 100 MMOL/L (ref 97–107)
CO2 SERPL-SCNC: 24 MMOL/L (ref 24–31)
CREAT SERPL-MCNC: 0.6 MG/DL (ref 0.4–1.6)
CRP SERPL-MCNC: <0.3 MG/DL (ref 0–2)
EGFRCR SERPLBLD CKD-EPI 2021: >90 ML/MIN/1.73M*2
EOSINOPHIL # BLD AUTO: 0.5 X10*3/UL (ref 0–0.7)
EOSINOPHIL NFR BLD AUTO: 4.5 %
ERYTHROCYTE [DISTWIDTH] IN BLOOD BY AUTOMATED COUNT: 14.6 % (ref 11.5–14.5)
ERYTHROCYTE [SEDIMENTATION RATE] IN BLOOD BY WESTERGREN METHOD: 31 MM/H (ref 0–20)
GLUCOSE SERPL-MCNC: 319 MG/DL (ref 65–99)
HCT VFR BLD AUTO: 44.2 % (ref 36–46)
HGB BLD-MCNC: 14.1 G/DL (ref 12–16)
IMM GRANULOCYTES # BLD AUTO: 0.03 X10*3/UL (ref 0–0.7)
IMM GRANULOCYTES NFR BLD AUTO: 0.3 % (ref 0–0.9)
LYMPHOCYTES # BLD AUTO: 3.01 X10*3/UL (ref 1.2–4.8)
LYMPHOCYTES NFR BLD AUTO: 27.4 %
MCH RBC QN AUTO: 27.7 PG (ref 26–34)
MCHC RBC AUTO-ENTMCNC: 31.9 G/DL (ref 32–36)
MCV RBC AUTO: 87 FL (ref 80–100)
MONOCYTES # BLD AUTO: 0.58 X10*3/UL (ref 0.1–1)
MONOCYTES NFR BLD AUTO: 5.3 %
NEUTROPHILS # BLD AUTO: 6.83 X10*3/UL (ref 1.2–7.7)
NEUTROPHILS NFR BLD AUTO: 62 %
NRBC BLD-RTO: 0 /100 WBCS (ref 0–0)
PLATELET # BLD AUTO: 230 X10*3/UL (ref 150–450)
POTASSIUM SERPL-SCNC: 4.4 MMOL/L (ref 3.4–5.1)
PROT SERPL-MCNC: 6.9 G/DL (ref 5.9–7.9)
RBC # BLD AUTO: 5.09 X10*6/UL (ref 4–5.2)
SODIUM SERPL-SCNC: 136 MMOL/L (ref 133–145)
WBC # BLD AUTO: 11 X10*3/UL (ref 4.4–11.3)

## 2024-04-08 PROCEDURE — G0299 HHS/HOSPICE OF RN EA 15 MIN: HCPCS | Mod: HHH

## 2024-04-08 PROCEDURE — 85025 COMPLETE CBC W/AUTO DIFF WBC: CPT

## 2024-04-08 PROCEDURE — 86140 C-REACTIVE PROTEIN: CPT

## 2024-04-08 PROCEDURE — 80053 COMPREHEN METABOLIC PANEL: CPT

## 2024-04-08 PROCEDURE — 85652 RBC SED RATE AUTOMATED: CPT

## 2024-04-08 PROCEDURE — 1090000001 HH PPS REVENUE CREDIT

## 2024-04-08 PROCEDURE — 1090000002 HH PPS REVENUE DEBIT

## 2024-04-08 ASSESSMENT — ENCOUNTER SYMPTOMS
PERSON REPORTING PAIN: PATIENT
PAIN: 1
PAIN LOCATION - PAIN SEVERITY: 3/10
PAIN LOCATION: LEFT FOOT

## 2024-04-09 PROCEDURE — 1090000002 HH PPS REVENUE DEBIT

## 2024-04-09 PROCEDURE — 1090000001 HH PPS REVENUE CREDIT

## 2024-04-10 ENCOUNTER — DOCUMENTATION (OUTPATIENT)
Dept: PHARMACY | Facility: CLINIC | Age: 49
End: 2024-04-10

## 2024-04-10 ENCOUNTER — HOME INFUSION (OUTPATIENT)
Dept: INFUSION THERAPY | Age: 49
End: 2024-04-10
Payer: MEDICARE

## 2024-04-10 PROCEDURE — 1090000001 HH PPS REVENUE CREDIT

## 2024-04-10 PROCEDURE — 1090000002 HH PPS REVENUE DEBIT

## 2024-04-10 NOTE — PROGRESS NOTES
Chart review - Mary Pedroza is a 48 y.o. patient on home care for L foot OM.  Med - ceftriaxone 2 gm thru 4/17. Dr. Street following     Labs from 4/8 reviewed: ESR still trending down: 31, all others WNL     [facility] call to patient, tolerating infusions well, reports much easier infusions with change from MB+ to home pumps. Confirmed doses in home thru 4/11, agreeable to delivery of remainder of medication and supplies.     Pharmacy to mix 4/10 and deliver 4/11 with supplies to match:  6x ceftriaxone 2 gm HP  DOS: 4/12 - 4/17     Follow up 4/17 - Plan of care Dr Street, 10:45 ID appt

## 2024-04-10 NOTE — PROGRESS NOTES
SPOKE W/ PT - DELIVERY IS SCHEDULED FOR THURSDAY BY 9 PM.  ASKED PT IF THERE ARE ANY QUESTIONS TO A PHARMACIST. PT SAID: NO QUESTIONS.

## 2024-04-11 PROCEDURE — 1090000002 HH PPS REVENUE DEBIT

## 2024-04-11 PROCEDURE — 1090000001 HH PPS REVENUE CREDIT

## 2024-04-12 PROCEDURE — 1090000001 HH PPS REVENUE CREDIT

## 2024-04-12 PROCEDURE — 1090000002 HH PPS REVENUE DEBIT

## 2024-04-13 PROCEDURE — 1090000002 HH PPS REVENUE DEBIT

## 2024-04-13 PROCEDURE — 1090000001 HH PPS REVENUE CREDIT

## 2024-04-14 PROCEDURE — 1090000001 HH PPS REVENUE CREDIT

## 2024-04-14 PROCEDURE — 1090000002 HH PPS REVENUE DEBIT

## 2024-04-15 PROCEDURE — 1090000001 HH PPS REVENUE CREDIT

## 2024-04-15 PROCEDURE — 1090000002 HH PPS REVENUE DEBIT

## 2024-04-16 ENCOUNTER — HOME CARE VISIT (OUTPATIENT)
Dept: HOME HEALTH SERVICES | Facility: HOME HEALTH | Age: 49
End: 2024-04-16
Payer: MEDICARE

## 2024-04-16 VITALS
RESPIRATION RATE: 16 BRPM | HEART RATE: 92 BPM | TEMPERATURE: 98.3 F | SYSTOLIC BLOOD PRESSURE: 134 MMHG | DIASTOLIC BLOOD PRESSURE: 84 MMHG | OXYGEN SATURATION: 94 %

## 2024-04-16 PROCEDURE — 1090000002 HH PPS REVENUE DEBIT

## 2024-04-16 PROCEDURE — 86140 C-REACTIVE PROTEIN: CPT

## 2024-04-16 PROCEDURE — 0023 HH SOC

## 2024-04-16 PROCEDURE — 80053 COMPREHEN METABOLIC PANEL: CPT

## 2024-04-16 PROCEDURE — 1090000001 HH PPS REVENUE CREDIT

## 2024-04-16 PROCEDURE — 85025 COMPLETE CBC W/AUTO DIFF WBC: CPT

## 2024-04-16 PROCEDURE — G0299 HHS/HOSPICE OF RN EA 15 MIN: HCPCS | Mod: HHH

## 2024-04-16 PROCEDURE — 85652 RBC SED RATE AUTOMATED: CPT

## 2024-04-16 RX ADMIN — SODIUM CHLORIDE 10 ML: 9 INJECTION, SOLUTION INTRAVENOUS at 10:00

## 2024-04-17 ENCOUNTER — HOME INFUSION (OUTPATIENT)
Dept: INFUSION THERAPY | Age: 49
End: 2024-04-17
Payer: MEDICARE

## 2024-04-17 ENCOUNTER — DOCUMENTATION (OUTPATIENT)
Dept: PHARMACY | Facility: CLINIC | Age: 49
End: 2024-04-17

## 2024-04-17 DIAGNOSIS — M86.272 SUBACUTE OSTEOMYELITIS OF LEFT FOOT (MULTI): Primary | ICD-10-CM

## 2024-04-17 PROCEDURE — 1090000001 HH PPS REVENUE CREDIT

## 2024-04-17 PROCEDURE — 1090000002 HH PPS REVENUE DEBIT

## 2024-04-17 RX ORDER — CEFTRIAXONE 2 G/50ML
2 INJECTION, SOLUTION INTRAVENOUS EVERY 24 HOURS
Qty: 650 ML | Refills: 0 | Status: SHIPPED
Start: 2024-04-17 | End: 2024-04-30

## 2024-04-17 NOTE — PROGRESS NOTES
Chart review - Mary Pedroza is a 48 y.o. patient on home care for L foot OM.  Med - ceftriaxone 2 gm thru *4/30*. Dr. Street following    Appt with Dr Street 4/17, received verbal order from Leeann to update stop date to 4/30. This is a hard stop date, can pull PICC line after this dose. Order entered into [facility].     Labs from 4/16 reviewed: ESR 28, all others WNL    [facility] call to patient, tolerating infusions well. Infused last dose this morning. Agreeable to delivery of  week of medication with standard supplies/flushes to match any time today.  to call on the way.     Pharmacy to mix 4/17 and deliver straight with supplies to match:  7x ceftriaxone 2 gm HP  DOS: 4/18 - 4/24     Follow up 4/24 - check labs, remainder straight

## 2024-04-18 PROCEDURE — 1090000001 HH PPS REVENUE CREDIT

## 2024-04-18 PROCEDURE — 1090000002 HH PPS REVENUE DEBIT

## 2024-04-19 PROCEDURE — 1090000001 HH PPS REVENUE CREDIT

## 2024-04-19 PROCEDURE — 1090000002 HH PPS REVENUE DEBIT

## 2024-04-20 PROCEDURE — 1090000001 HH PPS REVENUE CREDIT

## 2024-04-20 PROCEDURE — 1090000002 HH PPS REVENUE DEBIT

## 2024-04-21 PROCEDURE — 1090000002 HH PPS REVENUE DEBIT

## 2024-04-21 PROCEDURE — 1090000001 HH PPS REVENUE CREDIT

## 2024-04-22 ENCOUNTER — HOME CARE VISIT (OUTPATIENT)
Dept: HOME HEALTH SERVICES | Facility: HOME HEALTH | Age: 49
End: 2024-04-22
Payer: MEDICARE

## 2024-04-22 VITALS
HEART RATE: 90 BPM | RESPIRATION RATE: 16 BRPM | OXYGEN SATURATION: 92 % | SYSTOLIC BLOOD PRESSURE: 142 MMHG | DIASTOLIC BLOOD PRESSURE: 90 MMHG | TEMPERATURE: 98.1 F

## 2024-04-22 PROCEDURE — 1090000001 HH PPS REVENUE CREDIT

## 2024-04-22 PROCEDURE — 86140 C-REACTIVE PROTEIN: CPT

## 2024-04-22 PROCEDURE — 85652 RBC SED RATE AUTOMATED: CPT

## 2024-04-22 PROCEDURE — 80053 COMPREHEN METABOLIC PANEL: CPT

## 2024-04-22 PROCEDURE — 85025 COMPLETE CBC W/AUTO DIFF WBC: CPT

## 2024-04-22 PROCEDURE — 1090000002 HH PPS REVENUE DEBIT

## 2024-04-22 PROCEDURE — G0299 HHS/HOSPICE OF RN EA 15 MIN: HCPCS | Mod: HHH

## 2024-04-23 ENCOUNTER — HOME INFUSION (OUTPATIENT)
Dept: INFUSION THERAPY | Age: 49
End: 2024-04-23
Payer: MEDICARE

## 2024-04-23 ENCOUNTER — DOCUMENTATION (OUTPATIENT)
Dept: PHARMACY | Facility: CLINIC | Age: 49
End: 2024-04-23

## 2024-04-23 ENCOUNTER — ANESTHESIA EVENT (OUTPATIENT)
Dept: OPERATING ROOM | Facility: HOSPITAL | Age: 49
End: 2024-04-23
Payer: MEDICARE

## 2024-04-23 PROCEDURE — 1090000002 HH PPS REVENUE DEBIT

## 2024-04-23 PROCEDURE — 1090000001 HH PPS REVENUE CREDIT

## 2024-04-23 RX ADMIN — SODIUM CHLORIDE 10 ML: 9 INJECTION, SOLUTION INTRAVENOUS at 10:30

## 2024-04-23 SDOH — HEALTH STABILITY: MENTAL HEALTH: CURRENT SMOKER: 1

## 2024-04-23 ASSESSMENT — ENCOUNTER SYMPTOMS
CHANGE IN APPETITE: UNCHANGED
PAIN: 1
APPETITE LEVEL: GOOD
CHANGE IN APPETITE: UNCHANGED
PAIN LOCATION: LEFT FOOT
PERSON REPORTING PAIN: PATIENT
PAIN LOCATION - PAIN SEVERITY: 4/10
PAIN: 1
PAIN LOCATION - PAIN SEVERITY: 4/10
MUSCLE WEAKNESS: 1
PAIN LOCATION: LEFT FOOT
MUSCLE WEAKNESS: 1
PERSON REPORTING PAIN: PATIENT
APPETITE LEVEL: GOOD

## 2024-04-23 NOTE — PROGRESS NOTES
Chart review - Mary Pedroza is a 48 y.o. patient on home care for L foot OM.  Med - ceftriaxone 2 gm thru dose on 4/30. Dr. Street following  This is a hard stop date, can pull PICC line after that dose.      Labs from 4/22 reviewed: ESR decreased to 25, all others WNL     [facility] call to patient, tolerating infusions well. Has outpatient surgery tomorrow. Agreeable to delivery of remainder of medication with standard supplies/flushes to match any time today.  to call on the way.     Pharmacy to mix 4/23 and deliver straight with supplies to match:  6x ceftriaxone 2 gm HP  DOS: 4/25 - 4/30     Follow up 4/30 - confirm visit scheduled to remove PICC abd discharge patient.

## 2024-04-24 ENCOUNTER — HOSPITAL ENCOUNTER (OUTPATIENT)
Facility: HOSPITAL | Age: 49
Setting detail: OUTPATIENT SURGERY
Discharge: HOME | End: 2024-04-24
Attending: PODIATRIST | Admitting: PODIATRIST
Payer: MEDICARE

## 2024-04-24 ENCOUNTER — ANESTHESIA (OUTPATIENT)
Dept: OPERATING ROOM | Facility: HOSPITAL | Age: 49
End: 2024-04-24
Payer: MEDICARE

## 2024-04-24 ENCOUNTER — PHARMACY VISIT (OUTPATIENT)
Dept: PHARMACY | Facility: CLINIC | Age: 49
End: 2024-04-24
Payer: MEDICARE

## 2024-04-24 VITALS
RESPIRATION RATE: 17 BRPM | WEIGHT: 205 LBS | BODY MASS INDEX: 32.95 KG/M2 | TEMPERATURE: 97.5 F | HEIGHT: 66 IN | HEART RATE: 92 BPM | OXYGEN SATURATION: 100 % | DIASTOLIC BLOOD PRESSURE: 54 MMHG | SYSTOLIC BLOOD PRESSURE: 95 MMHG

## 2024-04-24 DIAGNOSIS — G89.18 ACUTE POSTOPERATIVE PAIN: Primary | ICD-10-CM

## 2024-04-24 LAB
GLUCOSE BLD MANUAL STRIP-MCNC: 269 MG/DL (ref 74–99)
PREGNANCY TEST URINE, POC: NEGATIVE

## 2024-04-24 PROCEDURE — 2500000004 HC RX 250 GENERAL PHARMACY W/ HCPCS (ALT 636 FOR OP/ED): Performed by: ANESTHESIOLOGY

## 2024-04-24 PROCEDURE — 81025 URINE PREGNANCY TEST: CPT | Performed by: PODIATRIST

## 2024-04-24 PROCEDURE — 2500000005 HC RX 250 GENERAL PHARMACY W/O HCPCS: Performed by: PODIATRIST

## 2024-04-24 PROCEDURE — 82947 ASSAY GLUCOSE BLOOD QUANT: CPT

## 2024-04-24 PROCEDURE — 7100000010 HC PHASE TWO TIME - EACH INCREMENTAL 1 MINUTE: Performed by: PODIATRIST

## 2024-04-24 PROCEDURE — 2500000005 HC RX 250 GENERAL PHARMACY W/O HCPCS: Performed by: ANESTHESIOLOGY

## 2024-04-24 PROCEDURE — 3700000001 HC GENERAL ANESTHESIA TIME - INITIAL BASE CHARGE: Performed by: PODIATRIST

## 2024-04-24 PROCEDURE — RXMED WILLOW AMBULATORY MEDICATION CHARGE

## 2024-04-24 PROCEDURE — 3600000003 HC OR TIME - INITIAL BASE CHARGE - PROCEDURE LEVEL THREE: Performed by: PODIATRIST

## 2024-04-24 PROCEDURE — 7100000002 HC RECOVERY ROOM TIME - EACH INCREMENTAL 1 MINUTE: Performed by: PODIATRIST

## 2024-04-24 PROCEDURE — 1090000002 HH PPS REVENUE DEBIT

## 2024-04-24 PROCEDURE — 3600000008 HC OR TIME - EACH INCREMENTAL 1 MINUTE - PROCEDURE LEVEL THREE: Performed by: PODIATRIST

## 2024-04-24 PROCEDURE — 7100000001 HC RECOVERY ROOM TIME - INITIAL BASE CHARGE: Performed by: PODIATRIST

## 2024-04-24 PROCEDURE — 3700000002 HC GENERAL ANESTHESIA TIME - EACH INCREMENTAL 1 MINUTE: Performed by: PODIATRIST

## 2024-04-24 PROCEDURE — 1090000001 HH PPS REVENUE CREDIT

## 2024-04-24 PROCEDURE — 7100000009 HC PHASE TWO TIME - INITIAL BASE CHARGE: Performed by: PODIATRIST

## 2024-04-24 RX ORDER — HEPARIN SODIUM,PORCINE/PF 10 UNIT/ML
50 SYRINGE (ML) INTRAVENOUS EVERY 12 HOURS
Status: DISCONTINUED | OUTPATIENT
Start: 2024-04-24 | End: 2024-04-24 | Stop reason: HOSPADM

## 2024-04-24 RX ORDER — BUPIVACAINE HYDROCHLORIDE 5 MG/ML
INJECTION, SOLUTION PERINEURAL AS NEEDED
Status: DISCONTINUED | OUTPATIENT
Start: 2024-04-24 | End: 2024-04-24 | Stop reason: HOSPADM

## 2024-04-24 RX ORDER — ALBUTEROL SULFATE 0.83 MG/ML
2.5 SOLUTION RESPIRATORY (INHALATION) ONCE AS NEEDED
Status: DISCONTINUED | OUTPATIENT
Start: 2024-04-24 | End: 2024-04-24 | Stop reason: HOSPADM

## 2024-04-24 RX ORDER — PROPOFOL 10 MG/ML
INJECTION, EMULSION INTRAVENOUS AS NEEDED
Status: DISCONTINUED | OUTPATIENT
Start: 2024-04-24 | End: 2024-04-24

## 2024-04-24 RX ORDER — SODIUM CHLORIDE, SODIUM LACTATE, POTASSIUM CHLORIDE, CALCIUM CHLORIDE 600; 310; 30; 20 MG/100ML; MG/100ML; MG/100ML; MG/100ML
50 INJECTION, SOLUTION INTRAVENOUS CONTINUOUS
Status: DISCONTINUED | OUTPATIENT
Start: 2024-04-24 | End: 2024-04-24 | Stop reason: HOSPADM

## 2024-04-24 RX ORDER — CEFAZOLIN 1 G/1
INJECTION, POWDER, FOR SOLUTION INTRAVENOUS AS NEEDED
Status: DISCONTINUED | OUTPATIENT
Start: 2024-04-24 | End: 2024-04-24

## 2024-04-24 RX ORDER — FENTANYL CITRATE 50 UG/ML
12.5 INJECTION, SOLUTION INTRAMUSCULAR; INTRAVENOUS EVERY 5 MIN PRN
Status: DISCONTINUED | OUTPATIENT
Start: 2024-04-24 | End: 2024-04-24 | Stop reason: HOSPADM

## 2024-04-24 RX ORDER — LIDOCAINE HYDROCHLORIDE 10 MG/ML
INJECTION INFILTRATION; PERINEURAL AS NEEDED
Status: DISCONTINUED | OUTPATIENT
Start: 2024-04-24 | End: 2024-04-24

## 2024-04-24 RX ORDER — MIDAZOLAM HYDROCHLORIDE 1 MG/ML
INJECTION, SOLUTION INTRAMUSCULAR; INTRAVENOUS AS NEEDED
Status: DISCONTINUED | OUTPATIENT
Start: 2024-04-24 | End: 2024-04-24

## 2024-04-24 RX ORDER — ACETAMINOPHEN 500 MG
500 TABLET ORAL EVERY 6 HOURS PRN
Qty: 30 TABLET | Refills: 0 | Status: SHIPPED | OUTPATIENT
Start: 2024-04-24

## 2024-04-24 RX ORDER — SODIUM CHLORIDE, SODIUM LACTATE, POTASSIUM CHLORIDE, CALCIUM CHLORIDE 600; 310; 30; 20 MG/100ML; MG/100ML; MG/100ML; MG/100ML
100 INJECTION, SOLUTION INTRAVENOUS CONTINUOUS
Status: DISCONTINUED | OUTPATIENT
Start: 2024-04-24 | End: 2024-04-24 | Stop reason: HOSPADM

## 2024-04-24 RX ORDER — MEPERIDINE HYDROCHLORIDE 25 MG/ML
12.5 INJECTION INTRAMUSCULAR; INTRAVENOUS; SUBCUTANEOUS EVERY 10 MIN PRN
Status: DISCONTINUED | OUTPATIENT
Start: 2024-04-24 | End: 2024-04-24 | Stop reason: HOSPADM

## 2024-04-24 RX ORDER — FENTANYL CITRATE 50 UG/ML
50 INJECTION, SOLUTION INTRAMUSCULAR; INTRAVENOUS EVERY 5 MIN PRN
Status: DISCONTINUED | OUTPATIENT
Start: 2024-04-24 | End: 2024-04-24 | Stop reason: HOSPADM

## 2024-04-24 RX ORDER — HEPARIN SODIUM,PORCINE/PF 10 UNIT/ML
50 SYRINGE (ML) INTRAVENOUS AS NEEDED
Status: DISCONTINUED | OUTPATIENT
Start: 2024-04-24 | End: 2024-04-24 | Stop reason: HOSPADM

## 2024-04-24 RX ORDER — FENTANYL CITRATE 50 UG/ML
INJECTION, SOLUTION INTRAMUSCULAR; INTRAVENOUS AS NEEDED
Status: DISCONTINUED | OUTPATIENT
Start: 2024-04-24 | End: 2024-04-24

## 2024-04-24 RX ORDER — ONDANSETRON HYDROCHLORIDE 2 MG/ML
4 INJECTION, SOLUTION INTRAVENOUS ONCE AS NEEDED
Status: DISCONTINUED | OUTPATIENT
Start: 2024-04-24 | End: 2024-04-24 | Stop reason: HOSPADM

## 2024-04-24 RX ORDER — CEFAZOLIN SODIUM 2 G/100ML
2 INJECTION, SOLUTION INTRAVENOUS ONCE
Status: DISCONTINUED | OUTPATIENT
Start: 2024-04-24 | End: 2024-04-24 | Stop reason: HOSPADM

## 2024-04-24 RX ORDER — IBUPROFEN 600 MG/1
600 TABLET ORAL EVERY 6 HOURS PRN
Qty: 30 TABLET | Refills: 0 | Status: SHIPPED | OUTPATIENT
Start: 2024-04-24

## 2024-04-24 RX ADMIN — LIDOCAINE HYDROCHLORIDE 50 MG: 10 INJECTION, SOLUTION INFILTRATION; PERINEURAL at 08:05

## 2024-04-24 RX ADMIN — PROPOFOL 200 MG: 10 INJECTION, EMULSION INTRAVENOUS at 08:05

## 2024-04-24 RX ADMIN — MIDAZOLAM HYDROCHLORIDE 2 MG: 1 INJECTION, SOLUTION INTRAMUSCULAR; INTRAVENOUS at 08:03

## 2024-04-24 RX ADMIN — CEFAZOLIN 2 G: 330 INJECTION, POWDER, FOR SOLUTION INTRAMUSCULAR; INTRAVENOUS at 08:10

## 2024-04-24 RX ADMIN — SODIUM CHLORIDE, SODIUM LACTATE, POTASSIUM CHLORIDE, AND CALCIUM CHLORIDE: 600; 310; 30; 20 INJECTION, SOLUTION INTRAVENOUS at 08:03

## 2024-04-24 RX ADMIN — FENTANYL CITRATE 50 MCG: 0.05 INJECTION, SOLUTION INTRAMUSCULAR; INTRAVENOUS at 08:05

## 2024-04-24 ASSESSMENT — PAIN SCALES - GENERAL
PAIN_LEVEL: 0
PAINLEVEL_OUTOF10: 0 - NO PAIN

## 2024-04-24 ASSESSMENT — PAIN - FUNCTIONAL ASSESSMENT
PAIN_FUNCTIONAL_ASSESSMENT: 0-10

## 2024-04-24 ASSESSMENT — COLUMBIA-SUICIDE SEVERITY RATING SCALE - C-SSRS
2. HAVE YOU ACTUALLY HAD ANY THOUGHTS OF KILLING YOURSELF?: NO
6. HAVE YOU EVER DONE ANYTHING, STARTED TO DO ANYTHING, OR PREPARED TO DO ANYTHING TO END YOUR LIFE?: NO
1. IN THE PAST MONTH, HAVE YOU WISHED YOU WERE DEAD OR WISHED YOU COULD GO TO SLEEP AND NOT WAKE UP?: NO

## 2024-04-24 NOTE — PERIOPERATIVE NURSING NOTE
0856- Arrived to [facility] via cart with RN, VSS. Dressing to left foot dry and intact. Alert, denies pain and/or nausea. PICC line infusing without difficulty. Repositioned in bed, set up with drink and snack. Friend called to bedside, call light within reach. [facility] called with pt status.     0972- [facility] delivered home going medications.Pt tolerating PO, continues to deny pain and/or nausea. Consulted PICC line RN, pt home dose of Heparin flush for PICC not available. OK to flush with NS and pt to use home prescription of 50 units/ml with afternoon ANTB dose. Reviewed discharge instructions, pt questions answered. Left foot dressing remains dry and intact. PICC with brisk blood return flushed with 10 ml NS and capped. Discharged home.

## 2024-04-24 NOTE — H&P
Surgical History & Physical    Patient Name and MRN: [place] PEDROZA, [id number]  PCP: Luba Griffin MD     HPI:  [place] Pedroza 48 y.o. female presents for left foot surgery. Patient underwent a TMA with flap closure a month ago. She presents today for wound debridement at the left TMA site and flap advancement. Patient has been following regularly as an out patient in the office. She has developed dehiscence along the original flap site and developed areas of partial thickness wound. Patient presents today for revision of TMA site. Patient reports being NPO since midnight. Denies current constitutional symptoms.      Past Medical History:   Diagnosis Date    Bipolar 1 disorder (Multi)     Bursitis of right hip 09/15/2023    Diabetes mellitus, type 2 (Multi)     Hyperlipidemia      Past Surgical History:   Procedure Laterality Date     SECTION, CLASSIC  2014     Section     SECTION, LOW TRANSVERSE       SECTION, LOW TRANSVERSE       SECTION, LOW TRANSVERSE  2005    CHOLECYSTECTOMY  2012    GALLBLADDER SURGERY  2014    Gallbladder Surgery     Family History   Problem Relation Name Age of Onset    No Known Problems Mother      Hypertension Father      Diabetes Father      Other (cholesterol issues) Father      No Known Problems Sister      No Known Problems Daughter      No Known Problems Son      No Known Problems Son      Breast cancer Other       Social History     Tobacco Use    Smoking status: Every Day     Current packs/day: 1.00     Average packs/day: 1 pack/day for 15.0 years (15.0 ttl pk-yrs)     Types: Cigarettes     Passive exposure: Current    Smokeless tobacco: Never   Vaping Use    Vaping status: Never Used   Substance Use Topics    Alcohol use: Not Currently    Drug use: Never     Prior to Admission Medications   Prescriptions Last Dose Informant Patient Reported? Taking?   0.9 % sodium chloride (sodium chloride 0.9%) solution  2024  Yes Yes   Sig: Infuse 10 mL into a venous catheter once daily. Flush picc line with 10 mls normal saline before and after administration of iv antibiotics sash method   ARIPiprazole (Abilify) 5 mg tablet 2024 Self Yes Yes   Si tablet (5 mg) once daily.   FreeStyle Bridget sensor system (FreeStyle Bridget 2 Sensor) kit  Self No No   Sig: USE SENSOR AS DIRECTED EVERY 2 WEEKS   Januvia 25 mg tablet 2024 Self Yes Yes   Si tablet (25 mg) once daily.   SUMAtriptan (Imitrex) 100 mg tablet More than a month Self Yes No   Sig: Take 1 tablet (100 mg) by mouth 1 time if needed for migraine.   blood sugar diagnostic (OneTouch Ultra Test) strip  Self No No   Sig: USE AS DIRECTED TO CHECK BLOOD SUGAR TWICE A DAY   blood-glucose meter misc   No No   Sig: One touch ultra glucose meter, use daily as directed   cefTRIAXone (Rocephin) 2 gram/50 mL IV 2024  No Yes   Sig: Infuse 50 mL (2 g) at 100 mL/hr over 30 minutes into a venous catheter once every 24 hours for 13 days.   doxepin (SINEquan) 100 mg capsule 2024 Self Yes Yes   Si capsule (100 mg) once daily at bedtime.   fenofibrate (Tricor) 145 mg tablet 2024 Self Yes Yes   Sig: Take 1 tablet (145 mg) by mouth once daily.   glimepiride (Amaryl) 4 mg tablet 2024 Self Yes Yes   Sig: Take 1 tablet (4 mg) by mouth once daily in the morning. Take before meals.   heparin sodium,bovine (HEPARIN, BOVINE, INJ) 2024  Yes Yes   Sig: Infuse 10 Units/mL into a venous catheter once daily. Flush picc line with 5 mls of 10 units/ml heaprin after administration of iv antibiotics sash method  Indications: picc patency   hydrOXYzine HCL (Atarax) 25 mg tablet 2024 Self Yes Yes   Sig: Take 1 tablet (25 mg) by mouth 2 times a day as needed for anxiety.   insulin glargine (Lantus Solostar U-100 Insulin) 100 unit/mL (3 mL) pen 2024  No Yes   Sig: Inject 75 Units under the skin once daily at bedtime. Take as directed per insulin instructions.  "  medroxyPROGESTERone (Depo-Provera) 150 mg/mL injection  Self No No   Sig: Inject 1 mL (150 mg) into the muscle every 12 weeks.   naproxen (Naprosyn) 250 mg tablet 2024 Self Yes Yes   Sig: Take 1 tablet (250 mg) by mouth 2 times a day with meals.   oxyCODONE (Roxicodone) 5 mg immediate release tablet Not Taking  No No   Sig: Take 1 tablet (5 mg) by mouth every 4 hours if needed for severe pain (7 - 10).   Patient not taking: Reported on 2024   pen needle, diabetic (BD Ultra-Fine Short Pen Needle) 31 gauge x 5/16\" needle  Self Yes No   Sig: as directed . as directed for 90 days   propranolol (Inderal) 20 mg tablet 2024 Self Yes Yes   Si tablet (20 mg) 2 times a day.   simvastatin (Zocor) 20 mg tablet 2024  No Yes   Sig: TAKE 1 TABLET BY MOUTH EVERY EVENING   venlafaxine XR (Effexor-XR) 75 mg 24 hr capsule 2024 Self Yes Yes   Si capsule (75 mg) once daily.      Facility-Administered Medications: None     [unfilled]      REVIEW OF SYSTEMS:  Constitutional:  Negative for fatigue and fever.   Respiratory:  Negative for cough, chest tightness and shortness of breath.    Cardiovascular:  Negative for chest pain, palpitations and leg swelling.   Gastrointestinal:  Negative for abdominal pain, diarrhea and nausea.   Skin: Negative.    Psychiatric/Behavioral: Negative.         PHYSICAL EXAM:  [unfilled]  Patient Vitals for the past 24 hrs:   BP Temp Temp src Pulse Resp SpO2 Height Weight   24 0653 108/66 36.3 °C (97.4 °F) Temporal 96 18 98 % 1.676 m (5' 6\") 93 kg (205 lb)       Constitutional:       Appearance: Normal appearance.   Cardiovascular:      Rate and Rhythm: Normal rate and regular rhythm.      Pulses: Normal pulses.   Pulmonary:      Effort: Pulmonary effort is normal.      Breath sounds: Normal breath sounds.   Abdominal:      General: Abdomen is flat. Bowel sounds are normal.      Palpations: Abdomen is soft.   Musculoskeletal:         General: Normal range of motion. "   Neurological:      Mental Status: She is alert.        ASSESSMENT:  48 y.o. female  presenting with left TMA site dehisence, which has been unrelieved by conservative treatment. Patient has been seen in office for weekly wound care and had persistent gaping along the incision of the TMA site and previous flap closure.     PLAN:  Patient was educated on the details of the procedure as well as medically reasonable risks, benefits, alternatives and prognosis. Patient was educated to their apparent understanding on potential complications including but not limited to infection, recurrence, persistent pain, swelling, disability, nerve injury/entrapment, healing complications, complications with anesthesia, and deep vein thrombosis/pulmonary embolism. All questions were answered to the patient’s apparent satisfaction.  No guarantees were given as to the outcome of the surgery. Patient was consented for left foot wound debridement and flap closure of TMA site.    ERAS patient?: No    COVID-19 Risk Consent:   Surgeon has reviewed the key risks related to jesus COVID-19 and subsequent sequelae.     04/24/24 at 7:14 AM - Cierra Wang DPM

## 2024-04-24 NOTE — SIGNIFICANT EVENT
Patient admitted with PICC to right upper arm placed by [facility] IV nurse, dressing clean and dry, brisk blood return noted. Flushes well.

## 2024-04-24 NOTE — ANESTHESIA PREPROCEDURE EVALUATION
Patient: Mary Pedroza    Procedure Information       Date/Time: 24 0745    Procedure: Wound Debridement, Flap Closure (Left: Foot) - *AOA*    [facility]    Surgeons: James Aponte DPM            Relevant Problems   Cardiac   (+) Mixed hyperlipidemia      Neuro   (+) Anxiety   (+) Bipolar I disorder with depression (Multi)   (+) Depression   (+) Major depressive disorder      GI   (+) Rectal hemorrhage      Liver   (+) Calculus of gallbladder with cholecystitis   (+) Gallstones      Endocrine   (+) Type 2 diabetes mellitus with hyperglycemia, with long-term current use of insulin (Multi)      Musculoskeletal   (+) Osteoarthritis of knee   (+) Spondylosis of lumbar spine      HEENT   (+) Chronic sinusitis      ID   (+) Left foot infection   (+) Subacute osteomyelitis of left foot (Multi)      GYN   (+) Heavy menstrual bleeding   (+) Menorrhagia with irregular cycle     Past Surgical History:   Procedure Laterality Date     SECTION, CLASSIC  2014     Section     SECTION, LOW TRANSVERSE       SECTION, LOW TRANSVERSE       SECTION, LOW TRANSVERSE  2005    CHOLECYSTECTOMY  2012    GALLBLADDER SURGERY  2014    Gallbladder Surgery       Clinical information reviewed:                   NPO Detail:  No data recorded     Physical Exam    Airway  Mallampati: II  TM distance: >3 FB  Neck ROM: full     Cardiovascular   Comments: deferred   Dental    Pulmonary   Comments: deferred   Abdominal     Comments: deferred           Anesthesia Plan    History of general anesthesia?: yes  History of complications of general anesthesia?: no    ASA 3     general   (LMA)  The patient is a current smoker.  Patient was previously instructed to abstain from smoking on day of procedure.  Patient did not smoke on day of procedure.  Education provided regarding risk of obstructive sleep apnea.  intravenous induction   Postoperative administration  of opioids is intended.  Anesthetic plan and risks discussed with patient.

## 2024-04-24 NOTE — ANESTHESIA PROCEDURE NOTES
Airway  Date/Time: 4/24/2024 8:06 AM  Urgency: elective    Airway not difficult    Staffing  Performed: DAMARIS   Authorized by: Gonzalo Ortiz MD    Performed by: Gonzalo Ortiz MD  Patient location during procedure: OR    Indications and Patient Condition  Indications for airway management: anesthesia  Spontaneous ventilation: present  Sedation level: deep  Preoxygenated: yes  Patient position: sniffing  MILS maintained throughout  Mask difficulty assessment: 0 - not attempted  No planned trial extubation    Final Airway Details  Final airway type: supraglottic airway      Successful airway: classic  Size 4     Number of attempts at approach: 1  Ventilation between attempts: none  Number of other approaches attempted: 0

## 2024-04-24 NOTE — ANESTHESIA POSTPROCEDURE EVALUATION
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 Acquired absence of left great toe. Acquired absence of other left toes.)    Surgeons: James Aponte DPM Responsible Provider: Gonzalo Ortiz MD    Anesthesia Type: general ASA Status: 3            Anesthesia Type: general    Vitals Value Taken Time   /70 04/24/24 0851   Temp 36.2 °C (97.2 °F) 04/24/24 0850   Pulse 90 04/24/24 0854   Resp 13 04/24/24 0854   SpO2 100 % 04/24/24 0854   Vitals shown include unfiled device data.    Anesthesia Post Evaluation    Patient location during evaluation: PACU  Patient participation: complete - patient participated  Level of consciousness: awake and alert  Pain score: 0  Pain management: adequate  Multimodal analgesia pain management approach  Airway patency: patent  Two or more strategies used to mitigate risk of obstructive sleep apnea  Cardiovascular status: acceptable and blood pressure returned to baseline  Respiratory status: acceptable  Hydration status: acceptable  Postoperative Nausea and Vomiting: none        There were no known notable events for this encounter.

## 2024-04-24 NOTE — DISCHARGE INSTRUCTIONS
PODIATRIC SURGERY POST-OP INSTRUCTIONS    YOU HAD ANESTHESIA:  You must have a responsible adult drive you home and stay with you for the first 24 hours.    Do not drive a car or drink any alcohol for 24 hours after surgery.  Do not do any strenuous work or activity for the next 24 hours.  You may have mild nausea, a sore throat or raspy voice for a few days.    You received a local numbing block to the foot after your surgery. You should expect the surgical extremity to remain numb for the next 6-12 hours.    POST-OP INSTRUCTIONS:  Keep dressing clean, dry and intact until your first post-operative appointment.  Do not remove surgical dressing. You may need to loosen if necessary.   Do not shower unless using a cast protector to protect dressing.  If dressing gets wet, please call office immediately as this can lead to increased risk of infection or wound dehiscence.   Elevate surgical extremity as much as possible to help with pain and swelling.  Place ice pack behind knee of surgical extremity (20 minutes on/20 minutes off while awake). After 24 hours use ice behind the knee as needed for comfort.  Weight Bearing Status: Please remain non-weight bearing to the surgical extremity with CAM boot in place utilizing crutches, walker, or knee scooter.  Please resume all home medications.   Post-Operative Medications: You may take Ibuprofen for pain; please take as directed on bottle. You may take Tylenol for pain; please take as directed on bottle. Alternate Ibuprofen and Tylenol for pain; please take as directed on the bottles.  Post-operative appointment with Dr. Aponte for Tuesday 4/30/24 at 11am in [place].   If you have any problems or notice any unusual symptoms such as bleeding, excessive pain, fever, redness, swelling and/or discharge please call your Dr. Aponte's office at [phone].     WHEN TO CALL YOUR DOCTOR:  Fever (>100.4°F or 38.0°C) or chills.  Incision problems such as redness, warmth, swelling, or  foul smelling drainage.  Severe nausea or persistent vomiting.  Pain and swelling in your legs, especially if it is only on one side and not the other.  Pain with urination, cloudy urine, or foul smelling urine.  Or if you have any other problems or questions.  CALL 911 or go to the ED if you have any shortness of breath, difficulty breathing, or chest pain.

## 2024-04-24 NOTE — OP NOTE
PODIATRIC OPERATIVE REPORT    LOG ID: [id number]  SURGERY/PROCEDURE DATE: 4/24/2024  OR LOCATION: [facility]    SURGEON(S)/PROCEDURALIST(S) AND ASSISTANT(S):  Primary: James Aponte DPM  Resident - Assisting: Cierra Wang DPM; Bharat Michelle DPM    OTHER OR STAFF:  Circulator: Paco Conway RN  Scrub Person: Ariel Barragan RN    SURGERY/PROCEDURE(S):  Wound Debridement, Flap Closure  80575 - OK DEBRIDEMENT MUSCLE &/FASCIA 1ST 20 SQ CM/<    Wound Debridement, Flap Closure  45447 - OK ADJT TIS TRNS/REARGMT F/C/C/M/N/A/G/H/F 10SQCM/<      PRE-OP/PRE-PROCEDURE DIAGNOSIS:   Pre-op Diagnosis     * Ulcer of left foot with muscle involvement without evidence of necrosis (Multi) [L97.525]     * Acquired absence of left great toe (Multi) [Z89.412]     * S/P amputation of lesser toe, left (Multi) [Z89.422]    POST-OP/POST-PROCEDURE DIAGNOSIS: Same as Pre-Op    ANESTHESIA:   Anesthesia: Consult  ASA: III  Anesthesia Staff: Anesthesiologist: Gonzalo Ortiz MD  Intra-op Medications:   Administrations occurring from 0745 to 0900 on 04/24/24:   Medication Name Total Dose   BUPivacaine HCl (Marcaine) 0.5 % (5 mg/mL) injection 10 mL       ESTIMATED BLOOD LOSS: Minimal    SPECIMENS:   Order Name Source Comment Collection Info Order Time   CBC Blood, Venous   4/24/2024  6:24 AM     Release result to MyChart   Immediate        COMPREHENSIVE METABOLIC PANEL Blood, Venous   4/24/2024  6:24 AM     Release result to MyChart   Immediate            IMPLANTABLE DEVICES:  Nothing was implanted during the procedure    FINDINGS: Intraoperative findings consistent with clinical and radiographic findings.    DRAINS: None    TOURNIQUET TIMES:       COMPLICATIONS: None; patient tolerated the procedure well.       SURGERY/PROCEDURE DETAILS:  INDICATIONS FOR PROCEDURE:   The patient with diagnosis as outlined above presents for podiatric surgical intervention today. The patient has attempted and failed conservative treatment as  outlined in preoperative clinic notes and wishes to proceed with surgical intervention at this time. The nature of the deformity, problems anticipated procedures, recovery/convalescence, risks/complications including but not limited to numbness, CRPS, over/under correction, problems healing of soft tissue or bone, postoperative wound infection, wound dehiscence, DVT and/or persistent pain/disability have been explained to the patient in detail. An updated H&P and consent have been completed prior to today’s surgical intervention. The patient states that they have been NPO since midnight. No guarantees were given or implied, but it is expected that the patient will have a favorable outcome.  It is with this understanding that we proceed.     PRE-PROCEDURE INFORMATION:  In pre-op holding area, the left lower extremity to be operated on was clearly marked and the patient verified correct laterality of the marking.  The patient was brought to the operating room and placed on the operating table in the supine position.  A timeout was performed in which identification of the correct patient, procedure, location, and materials was done.  Following MAC sedation, local anesthesia was obtained utilizing 10cc of 0.5% Marcaine plain. The left foot was then scrubbed, prepped and draped in the usual aseptic manner. An Esmarch bandage was then utilized to exsanguinate the patient's left foot and was tied at the ankle to maintain hemostasis.    DESCRIPTION OF PROCEDURE:   Procedure 1: Excisional debridement of wound down to muscle and fascia [70654]  Attention was directed to the patient's distal left foot where prior TMA had been performed. Some sutures remained intact. Remaining sutures were removed and revealed a wound near the anterior, lateral, and plantar margins of the distal foot. Utilizing a # 15 blade, nonviable tissue was excisionally debrided from the open regions down to muscle and facia Debrided tissue included  biofilm, hyperkeratosis, fibrosis, and necrotic subcutaneous tissue. Post-debridement measurements were 6.0 cm x 1.0 cm x 0.3 cm. Following debridement, the remaining tissue had a healthy appearance with adequate bleeding. Hemostasis was maintained with manual pressure. The wound was then irrigated copiously with saline.     Procedure 2: Skin advancement flap [91396]  Attention was then directed to the debrided wound site, where the dorsal, plantar, and medial skin borders were extensively undermined. The flap were then advanced to facilitate primary closure, this closed an area approximately 6.0 cm x 1.0 cm. The area was closed with multiple horizontal mattress sutures with 2-0 Prolene and overlying retention sutures with 0-0 Prolene to reduce tension across the incisions.     Dressing was placed on the surgical extremity consisting of betadine-soaked 4x4, dry 4x4, kerlix, coban. The patient was placed in a short CAM boot.     POSTOPERATIVE INFORMATION: The patient tolerated the above noted procedure and anesthesia well and was transferred to the PACU with vital signs stable, and vascular status intact with capillary refill intact to the most distal aspect of the operative extremity. Postoperative instructions reviewed in detail with the patient with written instructions provided. Patient will return to clinic in approximately 3-5 days for first postoperative visit. Patient has the number of the clinic and was instructed to call prior to that time should any problems, questions, or concerns arise.    This is Bharat Michelle DPM dictating the operative note for Dr. Johnathon Aponte DPM.        SIGNATURE: Bharat Michelle DPM PATIENT NAME: Mary Pedroza   DATE: April 24, 2024 MRN: [id number]   TIME: 8:27 AM

## 2024-04-25 PROCEDURE — 1090000001 HH PPS REVENUE CREDIT

## 2024-04-25 PROCEDURE — 1090000002 HH PPS REVENUE DEBIT

## 2024-04-26 PROCEDURE — 1090000002 HH PPS REVENUE DEBIT

## 2024-04-26 PROCEDURE — 1090000001 HH PPS REVENUE CREDIT

## 2024-04-27 PROCEDURE — 1090000001 HH PPS REVENUE CREDIT

## 2024-04-27 PROCEDURE — 1090000002 HH PPS REVENUE DEBIT

## 2024-04-28 PROCEDURE — 1090000002 HH PPS REVENUE DEBIT

## 2024-04-28 PROCEDURE — 1090000001 HH PPS REVENUE CREDIT

## 2024-04-29 PROCEDURE — 1090000002 HH PPS REVENUE DEBIT

## 2024-04-29 PROCEDURE — 1090000001 HH PPS REVENUE CREDIT

## 2024-04-30 ENCOUNTER — HOME CARE VISIT (OUTPATIENT)
Dept: HOME HEALTH SERVICES | Facility: HOME HEALTH | Age: 49
End: 2024-04-30
Payer: MEDICARE

## 2024-04-30 VITALS
SYSTOLIC BLOOD PRESSURE: 130 MMHG | TEMPERATURE: 98.6 F | RESPIRATION RATE: 18 BRPM | OXYGEN SATURATION: 96 % | DIASTOLIC BLOOD PRESSURE: 80 MMHG | HEART RATE: 84 BPM

## 2024-04-30 PROCEDURE — 1090000002 HH PPS REVENUE DEBIT

## 2024-04-30 PROCEDURE — G0299 HHS/HOSPICE OF RN EA 15 MIN: HCPCS | Mod: HHH

## 2024-04-30 PROCEDURE — 1090000001 HH PPS REVENUE CREDIT

## 2024-04-30 RX ADMIN — SODIUM CHLORIDE 10 ML: 9 INJECTION, SOLUTION INTRAVENOUS at 10:15

## 2024-05-01 ENCOUNTER — CLINICAL SUPPORT (OUTPATIENT)
Dept: OBSTETRICS AND GYNECOLOGY | Facility: CLINIC | Age: 49
End: 2024-05-01
Payer: MEDICARE

## 2024-05-01 ENCOUNTER — APPOINTMENT (OUTPATIENT)
Dept: OBSTETRICS AND GYNECOLOGY | Facility: CLINIC | Age: 49
End: 2024-05-01
Payer: MEDICARE

## 2024-05-01 VITALS
SYSTOLIC BLOOD PRESSURE: 120 MMHG | WEIGHT: 205 LBS | BODY MASS INDEX: 32.95 KG/M2 | HEIGHT: 66 IN | DIASTOLIC BLOOD PRESSURE: 70 MMHG

## 2024-05-01 DIAGNOSIS — N92.1 MENORRHAGIA WITH IRREGULAR CYCLE: Primary | ICD-10-CM

## 2024-05-01 PROCEDURE — 1090000002 HH PPS REVENUE DEBIT

## 2024-05-01 PROCEDURE — 96372 THER/PROPH/DIAG INJ SC/IM: CPT | Performed by: OBSTETRICS & GYNECOLOGY

## 2024-05-01 PROCEDURE — 2500000004 HC RX 250 GENERAL PHARMACY W/ HCPCS (ALT 636 FOR OP/ED): Performed by: OBSTETRICS & GYNECOLOGY

## 2024-05-01 PROCEDURE — 1090000001 HH PPS REVENUE CREDIT

## 2024-05-01 RX ORDER — MEDROXYPROGESTERONE ACETATE 150 MG/ML
150 INJECTION, SUSPENSION INTRAMUSCULAR ONCE
Status: COMPLETED | OUTPATIENT
Start: 2024-05-01 | End: 2024-05-01

## 2024-05-01 RX ADMIN — MEDROXYPROGESTERONE ACETATE 150 MG: 150 INJECTION, SUSPENSION INTRAMUSCULAR at 13:57

## 2024-05-01 ASSESSMENT — PATIENT HEALTH QUESTIONNAIRE - PHQ9
SUM OF ALL RESPONSES TO PHQ9 QUESTIONS 1 & 2: 0
1. LITTLE INTEREST OR PLEASURE IN DOING THINGS: NOT AT ALL
2. FEELING DOWN, DEPRESSED OR HOPELESS: NOT AT ALL

## 2024-05-01 ASSESSMENT — PAIN SCALES - GENERAL: PAINLEVEL: 0-NO PAIN

## 2024-05-01 ASSESSMENT — LIFESTYLE VARIABLES
HOW MANY STANDARD DRINKS CONTAINING ALCOHOL DO YOU HAVE ON A TYPICAL DAY: PATIENT DOES NOT DRINK
SKIP TO QUESTIONS 9-10: 1
HOW OFTEN DO YOU HAVE A DRINK CONTAINING ALCOHOL: NEVER
AUDIT-C TOTAL SCORE: 0
HOW OFTEN DO YOU HAVE SIX OR MORE DRINKS ON ONE OCCASION: NEVER

## 2024-05-01 ASSESSMENT — ENCOUNTER SYMPTOMS
DEPRESSION: 0
LOSS OF SENSATION IN FEET: 0
OCCASIONAL FEELINGS OF UNSTEADINESS: 0

## 2024-05-01 NOTE — PROGRESS NOTES
HPI   47 yo  with dm2 (dx 2012), depression, anxiety, dyslipidemia. A1c-10.7% June 23, march 2024 9.4% (no aurora monitor again).  No showed last 2 visits, seen once here.    Testing sugars 4X/day with aurora 2. No low sugars. Am sugars low 100's, no readings at lunch, ,upper 100's/low 200's at dinner, not testing in the evening. Pt is trying to watch carbs in the diet. Not physically active. Meds being covered.           Taking Lantus 70 units pen, januvia 25mg, glimepiride 4mg daily.   -did not tolerate metformin  - no tzd, no glp-1RA, no sglt2-i .    Taking simvastatin 20mg for lipids and tolerating.    Using aurora, still doesn't have reader with her.    -had amputation on foot on L side        Current Outpatient Medications:     acetaminophen (Tylenol) 500 mg tablet, Take 1 tablet (500 mg) by mouth every 6 hours if needed for mild pain (1 - 3)., Disp: 30 tablet, Rfl: 0    ARIPiprazole (Abilify) 5 mg tablet, 1 tablet (5 mg) once daily., Disp: , Rfl:     blood sugar diagnostic (OneTouch Ultra Test) strip, USE AS DIRECTED TO CHECK BLOOD SUGAR TWICE A DAY, Disp: 100 each, Rfl: 11    blood-glucose meter misc, One touch ultra glucose meter, use daily as directed, Disp: 1 each, Rfl: 0    doxepin (SINEquan) 100 mg capsule, 1 capsule (100 mg) once daily at bedtime., Disp: , Rfl:     fenofibrate (Tricor) 145 mg tablet, Take 1 tablet (145 mg) by mouth once daily., Disp: , Rfl:     glimepiride (Amaryl) 4 mg tablet, Take 1 tablet (4 mg) by mouth once daily in the morning. Take before meals., Disp: , Rfl:     hydrOXYzine HCL (Atarax) 25 mg tablet, Take 1 tablet (25 mg) by mouth 2 times a day as needed for anxiety., Disp: , Rfl:     insulin glargine (Lantus Solostar U-100 Insulin) 100 unit/mL (3 mL) pen, Inject 75 Units under the skin once daily at bedtime. Take as directed per insulin instructions., Disp: 80 mL, Rfl: 3    Januvia 25 mg tablet, 1 tablet (25 mg) once daily., Disp: , Rfl:     medroxyPROGESTERone (Depo-Provera) 150  "mg/mL injection, Inject 1 mL (150 mg) into the muscle every 12 weeks., Disp: 1 mL, Rfl: 3    pen needle, diabetic (BD Ultra-Fine Short Pen Needle) 31 gauge x 5/16\" needle, as directed . as directed for 90 days, Disp: , Rfl:     propranolol (Inderal) 20 mg tablet, 1 tablet (20 mg) 2 times a day., Disp: , Rfl:     simvastatin (Zocor) 20 mg tablet, TAKE 1 TABLET BY MOUTH EVERY EVENING, Disp: 90 tablet, Rfl: 0    venlafaxine XR (Effexor-XR) 75 mg 24 hr capsule, 1 capsule (75 mg) once daily., Disp: , Rfl:     0.9 % sodium chloride (sodium chloride 0.9%) solution, Infuse 10 mL into a venous catheter once daily. Flush picc line with 10 mls normal saline before and after administration of iv antibiotics sash method, Disp: , Rfl:     FreeStyle Bridget sensor system (FreeStyle Bridget 2 Sensor) kit, USE SENSOR AS DIRECTED EVERY 2 WEEKS, Disp: 2 each, Rfl: 11    ibuprofen 600 mg tablet, Take 1 tablet (600 mg) by mouth every 6 hours if needed for mild pain (1 - 3). (Patient not taking: Reported on 5/3/2024), Disp: 30 tablet, Rfl: 0    SUMAtriptan (Imitrex) 100 mg tablet, Take 1 tablet (100 mg) by mouth 1 time if needed for migraine., Disp: , Rfl:       Allergies as of 05/03/2024    (Not on File)         Review of Systems   Cardiology: Lightheadedness-denies.  Chest pain-denies.  Leg edema-denies.  Palpitations-denies.  Respiratory: Cough-denies. Shortness of breath-denies.  Wheezing-denies.  Gastroenterology: Constipation-denies.  Diarrhea-denies.  Heartburn-denies.  Endocrinology: Cold intolerance-denies.  Heat intolerance-denies.  Sweats-denies.  Neurology: Headache-denies.  Tremor-denies.  Neuropathy in extremities +  Psychology: Low energy-denies.  Irritability-denies.  Sleep disturbances-denies.      /73 (BP Location: Right arm, Patient Position: Sitting)   Pulse 103   Wt 93.9 kg (207 lb)   LMP  (LMP Unknown) Comment: no periods d/t injection  BMI 33.41 kg/m²       Labs:  Lab Results   Component Value Date    WBC " 11.2 04/22/2024    NRBC 0.0 04/22/2024    RBC 5.42 (H) 04/22/2024    HGB 15.2 04/22/2024    HCT 46.6 (H) 04/22/2024     04/22/2024     Lab Results   Component Value Date    CALCIUM 9.2 04/22/2024    AST 10 04/22/2024    ALKPHOS 95 04/22/2024    BILITOT 0.3 04/22/2024    PROT 7.0 04/22/2024    ALBUMIN 4.0 04/22/2024    GLOB 3.7 07/28/2023    AGR 1.0 (L) 07/28/2023     04/22/2024    K 4.2 04/22/2024     04/22/2024    CO2 23 (L) 04/22/2024    ANIONGAP 14 04/22/2024    BUN 11 04/22/2024    CREATININE 0.50 04/22/2024    UREACREAUR 10.0 07/28/2023    GLUCOSE 247 (H) 04/22/2024    ALT 13 04/22/2024    EGFR >90 04/22/2024     Lab Results   Component Value Date    CHOL 314 (H) 08/04/2022    TRIG 1,208 (H) 08/04/2022    HDL 23 (L) 08/04/2022    LDLCALC  08/04/2022     Unable to report calculated LDL due to increased triglycerides. Direct     Lab Results   Component Value Date    MICROALBCREA 21.6 12/21/2022     Lab Results   Component Value Date    TSH 1.99 05/24/2021     Lab Results   Component Value Date    [id number] 424 05/24/2021     Lab Results   Component Value Date    HGBA1C 9.4 (H) 03/08/2024         Physical Exam   General Appearance: pleasant, cooperative, no acute distress  HEENT: no chemosis, no proptosis, no lid lag, no lid retraction  Neck: no lymphadenopathy, no thyromegaly, no dominant thyroid nodules  Heart: no murmurs, regular rate and rhythm, S1 and S2  Lungs: no wheezes, no rhonci, no rales  Extremities: no lower extremity swelling      Assessment/Plan   1. Type 2 diabetes mellitus with hyperglycemia, with long-term current use of insulin (Multi)  -labs reviewed  -A1c/labs reviewed  -glycemic values reviewed    -pt needs to bring aurora to visit and show up for visits   -start ozempic 0.25mg weekly for 4 weeks, then 0.5mg weekly  -instructed on how to use a sample pen  -went over risk/benefits/warnings  -lower lantus by 5 units q 3 days if am sugars <100    -next step likely prandial  insulin    2. Mixed hyperlipidemia  -on statin, would prefer different meds  -should improve with better sugar control    3. Essential hypertension, benign  -at target on therapy will follow      Follow Up:  Massiel Vega 3 months    Medical Decision Making  Complexity of problem: Chronic illness of diabetes mellitus uncontrolled, progressing  Data analyzed and reviewed: Reviewed prior notes, blood glucose data, labs including HgbA1c, lipids, serum chemistries.  Ordered tests.   Risk of complications and morbidities: Is definite because of use of insulin and risk of hypoglycemia.  Prescription medications reviewed and modifications made.  Compliance assessed.  Addressed social determinants of health including food insecurity.

## 2024-05-02 PROCEDURE — 1090000001 HH PPS REVENUE CREDIT

## 2024-05-02 PROCEDURE — 1090000002 HH PPS REVENUE DEBIT

## 2024-05-03 ENCOUNTER — OFFICE VISIT (OUTPATIENT)
Dept: ENDOCRINOLOGY | Facility: CLINIC | Age: 49
End: 2024-05-03
Payer: MEDICARE

## 2024-05-03 VITALS
WEIGHT: 207 LBS | HEART RATE: 103 BPM | DIASTOLIC BLOOD PRESSURE: 73 MMHG | SYSTOLIC BLOOD PRESSURE: 114 MMHG | BODY MASS INDEX: 33.41 KG/M2

## 2024-05-03 DIAGNOSIS — E11.65 TYPE 2 DIABETES MELLITUS WITH HYPERGLYCEMIA, WITH LONG-TERM CURRENT USE OF INSULIN (MULTI): Primary | ICD-10-CM

## 2024-05-03 DIAGNOSIS — E78.2 MIXED HYPERLIPIDEMIA: ICD-10-CM

## 2024-05-03 DIAGNOSIS — I10 ESSENTIAL HYPERTENSION, BENIGN: ICD-10-CM

## 2024-05-03 DIAGNOSIS — Z79.4 TYPE 2 DIABETES MELLITUS WITH HYPERGLYCEMIA, WITH LONG-TERM CURRENT USE OF INSULIN (MULTI): Primary | ICD-10-CM

## 2024-05-03 PROCEDURE — 3078F DIAST BP <80 MM HG: CPT | Performed by: INTERNAL MEDICINE

## 2024-05-03 PROCEDURE — 1090000001 HH PPS REVENUE CREDIT

## 2024-05-03 PROCEDURE — 3074F SYST BP LT 130 MM HG: CPT | Performed by: INTERNAL MEDICINE

## 2024-05-03 PROCEDURE — 99214 OFFICE O/P EST MOD 30 MIN: CPT | Performed by: INTERNAL MEDICINE

## 2024-05-03 PROCEDURE — RXMED WILLOW AMBULATORY MEDICATION CHARGE

## 2024-05-03 PROCEDURE — 3046F HEMOGLOBIN A1C LEVEL >9.0%: CPT | Performed by: INTERNAL MEDICINE

## 2024-05-03 PROCEDURE — 3008F BODY MASS INDEX DOCD: CPT | Performed by: INTERNAL MEDICINE

## 2024-05-03 PROCEDURE — 1090000002 HH PPS REVENUE DEBIT

## 2024-05-03 RX ORDER — SEMAGLUTIDE 0.68 MG/ML
0.5 INJECTION, SOLUTION SUBCUTANEOUS
Qty: 3 ML | Refills: 5 | Status: SHIPPED | OUTPATIENT
Start: 2024-05-03

## 2024-05-03 ASSESSMENT — PAIN SCALES - GENERAL: PAINLEVEL: 0-NO PAIN

## 2024-05-04 PROCEDURE — 1090000002 HH PPS REVENUE DEBIT

## 2024-05-04 PROCEDURE — 1090000001 HH PPS REVENUE CREDIT

## 2024-05-05 PROCEDURE — 1090000002 HH PPS REVENUE DEBIT

## 2024-05-05 PROCEDURE — 1090000001 HH PPS REVENUE CREDIT

## 2024-05-06 PROCEDURE — 1090000002 HH PPS REVENUE DEBIT

## 2024-05-06 PROCEDURE — 1090000001 HH PPS REVENUE CREDIT

## 2024-05-06 ASSESSMENT — ENCOUNTER SYMPTOMS
PAIN LOCATION: LEFT FOOT
PAIN: 1
PERSON REPORTING PAIN: PATIENT
PAIN LOCATION - PAIN SEVERITY: 2/10

## 2024-05-06 ASSESSMENT — ACTIVITIES OF DAILY LIVING (ADL)
HOME_HEALTH_OASIS: 01
OASIS_M1830: 01

## 2024-05-08 ENCOUNTER — PHARMACY VISIT (OUTPATIENT)
Dept: PHARMACY | Facility: CLINIC | Age: 49
End: 2024-05-08
Payer: MEDICARE

## 2024-06-13 DIAGNOSIS — E78.2 MIXED HYPERLIPIDEMIA: ICD-10-CM

## 2024-06-13 DIAGNOSIS — E11.65 TYPE 2 DIABETES MELLITUS WITH HYPERGLYCEMIA, WITH LONG-TERM CURRENT USE OF INSULIN (MULTI): Primary | ICD-10-CM

## 2024-06-13 DIAGNOSIS — Z79.4 TYPE 2 DIABETES MELLITUS WITH HYPERGLYCEMIA, WITH LONG-TERM CURRENT USE OF INSULIN (MULTI): Primary | ICD-10-CM

## 2024-06-14 DIAGNOSIS — E78.2 MIXED HYPERLIPIDEMIA: ICD-10-CM

## 2024-06-14 DIAGNOSIS — E11.65 TYPE 2 DIABETES MELLITUS WITH HYPERGLYCEMIA, WITH LONG-TERM CURRENT USE OF INSULIN (MULTI): ICD-10-CM

## 2024-06-14 DIAGNOSIS — Z79.4 TYPE 2 DIABETES MELLITUS WITH HYPERGLYCEMIA, WITH LONG-TERM CURRENT USE OF INSULIN (MULTI): ICD-10-CM

## 2024-06-14 DIAGNOSIS — Z01.89 ENCOUNTER FOR ROUTINE LABORATORY TESTING: Primary | ICD-10-CM

## 2024-06-14 DIAGNOSIS — E78.6 LOW HDL (UNDER 40): ICD-10-CM

## 2024-06-14 DIAGNOSIS — R79.9 ABNORMAL FINDING OF BLOOD CHEMISTRY, UNSPECIFIED: ICD-10-CM

## 2024-06-14 DIAGNOSIS — E55.9 VITAMIN D DEFICIENCY: ICD-10-CM

## 2024-06-14 RX ORDER — SIMVASTATIN 20 MG/1
20 TABLET, FILM COATED ORAL NIGHTLY
Qty: 90 TABLET | Refills: 0 | Status: SHIPPED | OUTPATIENT
Start: 2024-06-14

## 2024-06-14 RX ORDER — SITAGLIPTIN 25 MG/1
25 TABLET, FILM COATED ORAL DAILY
Qty: 90 TABLET | Refills: 3 | Status: SHIPPED | OUTPATIENT
Start: 2024-06-14

## 2024-06-14 RX ORDER — GLIMEPIRIDE 4 MG/1
TABLET ORAL
Qty: 90 TABLET | Refills: 1 | Status: SHIPPED | OUTPATIENT
Start: 2024-06-14

## 2024-06-14 RX ORDER — PEN NEEDLE, DIABETIC 31 GX5/16"
NEEDLE, DISPOSABLE MISCELLANEOUS
Qty: 100 EACH | Refills: 3 | Status: SHIPPED | OUTPATIENT
Start: 2024-06-14

## 2024-07-03 NOTE — CONSULTS
"Inpatient Diabetes Education Consult follow up:    PCOT glucose today 3/12/24 at 07:24 was 76 mg/dl and at 12:44 was 149 mg/dl.  No change in diabetes medications.   Will continue to monitor POCT glucose results.   PTA Medications:    Current Outpatient Medications   Medication Instructions    ARIPiprazole (Abilify) 5 mg tablet 1 tablet (5 mg) once daily.    blood sugar diagnostic (OneTouch Ultra Test) strip USE AS DIRECTED TO CHECK BLOOD SUGAR TWICE A DAY    cefTRIAXone (Rocephin) 2 gram/50 mL IV 2 g, intravenous, Every 24 hours    doxepin (SINEquan) 100 mg capsule 1 capsule (100 mg) once daily at bedtime.    fenofibrate (Tricor) 145 mg tablet 1 tablet, oral, Daily    FreeStyle Bridget sensor system (FreeStyle Bridget 2 Sensor) kit USE SENSOR AS DIRECTED EVERY 2 WEEKS    glimepiride (AMARYL) 4 mg, oral, Daily before breakfast    hydrOXYzine HCL (ATARAX) 25 mg, oral, 2 times daily PRN    insulin glargine (Lantus Solostar U-100 Insulin) 100 unit/mL (3 mL) pen INJECT UNDER THE SKIN AS DIRECTED  UP TO 80 UNITS DAILY    Januvia 25 mg tablet 1 tablet (25 mg) once daily.    medroxyPROGESTERone (DEPO-PROVERA) 150 mg, intramuscular, every 12 weeks    naproxen (NAPROSYN) 250 mg, oral, 2 times daily with meals    pen needle, diabetic (BD Ultra-Fine Short Pen Needle) 31 gauge x 5/16\" needle  as directed . as directed for 90 days<BR>    propranolol (Inderal) 20 mg tablet 1 tablet (20 mg) 2 times a day.    simvastatin (ZOCOR) 20 mg, oral, Every evening    SUMAtriptan (Imitrex) 100 mg tablet oral    venlafaxine XR (Effexor-XR) 75 mg 24 hr capsule 1 capsule (75 mg) once daily.       Glucose   Date/Time Value Ref Range Status   03/12/2024 05:54 AM 84 65 - 99 mg/dL Final   03/11/2024 05:39 AM 71 65 - 99 mg/dL Final   03/10/2024 06:08 AM 73 65 - 99 mg/dL Final   03/09/2024 06:31 AM 82 65 - 99 mg/dL Final   03/08/2024 06:32  (H) 65 - 99 mg/dL Final   03/07/2024 09:36  (H) 65 - 99 mg/dL Final   07/28/2023 04:49  (H) 65 - " "99 MG/DL Final   01/14/2023 09:16  (H) 65 - 99 MG/DL Final   08/04/2022 09:33  (H) 65 - 99 MG/DL Final   05/24/2021 12:12  (H) 65 - 99 MG/DL Final     Comment:     RESULT CHECKED  CALLED TO DR AMOS OFFICE/RANDY RODRIGUEZ [facility]     10/24/2020 09:36  (H) 65 - 99 MG/DL Final   10/10/2018 06:26  (H) 65 - 99 MG/DL Final   07/12/2018 06:43  (H) 65 - 99 MG/DL Final   04/10/2018 11:02  (H) 65 - 99 MG/DL Final     No results found for: \"CPEPTIDE\"  Hemoglobin A1C   Date Value Ref Range Status   03/08/2024 9.4 (H) See below % Final   12/21/2022 9.8 (H) 4.0 - 6.0 % Final     Comment:     Hemoglobin A1C levels are related to mean blood glucose during the   preceding 2-3 months. The relationship table below may be used as a   general guide. Each 1% increase in HGB A1C is a reflection of an   increase in mean glucose of approximately 30 mg/dl.   Reference: Diabetes Care, volume 29, supplement 1 Jan. 2006                        HGB A1C ................. Approx. Mean Glucose   _______________________________________________   6%   ...............................  120 mg/dl   7%   ...............................  150 mg/dl   8%   ...............................  180 mg/dl   9%   ...............................  210 mg/dl   10%  ...............................  240 mg/dl  Performed at [address] [address]     08/04/2022 10.7 (H) 4.0 - 6.0 % Final     Comment:     Hemoglobin A1C levels are related to mean blood glucose during the   preceding 2-3 months. The relationship table below may be used as a   general guide. Each 1% increase in HGB A1C is a reflection of an   increase in mean glucose of approximately 30 mg/dl.   Reference: Diabetes Care, volume 29, supplement 1 Jan. 2006                        HGB A1C ................. Approx. Mean Glucose   _______________________________________________   6%   ...............................  120 mg/dl   7%   " ...............................  150 mg/dl   8%   ...............................  180 mg/dl   9%   ...............................  210 mg/dl   10%  ...............................  240 mg/dl  Performed at [address] [address]     10/21/2021 11.1 (H) 4.0 - 6.0 % Final     Comment:     Hemoglobin A1C levels are related to mean blood glucose during the   preceding 2-3 months. The relationship table below may be used as a   general guide. Each 1% increase in HGB A1C is a reflection of an   increase in mean glucose of approximately 30 mg/dl.   Reference: Diabetes Care, volume 29, supplement 1 Jan. 2006                        HGB A1C ................. Approx. Mean Glucose   _______________________________________________   6%   ...............................  120 mg/dl   7%   ...............................  150 mg/dl   8%   ...............................  180 mg/dl   9%   ...............................  210 mg/dl   10%  ...............................  240 mg/dl  Performed at [address] [address]     05/24/2021 11.8 (H) 4.0 - 6.0 % Final     Comment:     Hemoglobin A1C levels are related to mean blood glucose during the   preceding 2-3 months. The relationship table below may be used as a   general guide. Each 1% increase in HGB A1C is a reflection of an   increase in mean glucose of approximately 30 mg/dl.   Reference: Diabetes Care, volume 29, supplement 1 Jan. 2006                        HGB A1C ................. Approx. Mean Glucose   _______________________________________________   6%   ...............................  120 mg/dl   7%   ...............................  150 mg/dl   8%   ...............................  180 mg/dl   9%   ...............................  210 mg/dl   10%  ...............................  240 mg/dl  Performed at [address] [address]          Suicidal ideation

## 2024-07-17 ENCOUNTER — CLINICAL SUPPORT (OUTPATIENT)
Dept: OBSTETRICS AND GYNECOLOGY | Facility: CLINIC | Age: 49
End: 2024-07-17
Payer: MEDICARE

## 2024-07-17 VITALS
SYSTOLIC BLOOD PRESSURE: 119 MMHG | DIASTOLIC BLOOD PRESSURE: 79 MMHG | BODY MASS INDEX: 32.95 KG/M2 | HEIGHT: 66 IN | WEIGHT: 205 LBS

## 2024-07-17 DIAGNOSIS — N92.0 MENORRHAGIA WITH REGULAR CYCLE: ICD-10-CM

## 2024-07-17 DIAGNOSIS — Z30.42 SURVEILLANCE FOR DEPO-PROVERA CONTRACEPTION: Primary | ICD-10-CM

## 2024-07-17 DIAGNOSIS — E11.65 TYPE 2 DIABETES MELLITUS WITH HYPERGLYCEMIA, WITH LONG-TERM CURRENT USE OF INSULIN (MULTI): Primary | ICD-10-CM

## 2024-07-17 DIAGNOSIS — Z79.4 TYPE 2 DIABETES MELLITUS WITH HYPERGLYCEMIA, WITH LONG-TERM CURRENT USE OF INSULIN (MULTI): Primary | ICD-10-CM

## 2024-07-17 PROCEDURE — 2500000004 HC RX 250 GENERAL PHARMACY W/ HCPCS (ALT 636 FOR OP/ED): Performed by: OBSTETRICS & GYNECOLOGY

## 2024-07-17 PROCEDURE — 96372 THER/PROPH/DIAG INJ SC/IM: CPT | Performed by: OBSTETRICS & GYNECOLOGY

## 2024-07-17 RX ORDER — MEDROXYPROGESTERONE ACETATE 150 MG/ML
150 INJECTION, SUSPENSION INTRAMUSCULAR ONCE
Status: COMPLETED | OUTPATIENT
Start: 2024-07-17 | End: 2024-07-17

## 2024-07-17 ASSESSMENT — PAIN SCALES - GENERAL: PAINLEVEL: 0-NO PAIN

## 2024-07-17 ASSESSMENT — LIFESTYLE VARIABLES
HOW OFTEN DO YOU HAVE SIX OR MORE DRINKS ON ONE OCCASION: NEVER
HOW OFTEN DO YOU HAVE A DRINK CONTAINING ALCOHOL: NEVER
HOW MANY STANDARD DRINKS CONTAINING ALCOHOL DO YOU HAVE ON A TYPICAL DAY: PATIENT DOES NOT DRINK
SKIP TO QUESTIONS 9-10: 1
AUDIT-C TOTAL SCORE: 0

## 2024-07-17 ASSESSMENT — ENCOUNTER SYMPTOMS
LOSS OF SENSATION IN FEET: 0
OCCASIONAL FEELINGS OF UNSTEADINESS: 0
DEPRESSION: 0

## 2024-07-18 RX ORDER — LANCETS 33 GAUGE
EACH MISCELLANEOUS
Qty: 100 EACH | Refills: 1 | Status: SHIPPED | OUTPATIENT
Start: 2024-07-18

## 2024-08-01 DIAGNOSIS — E78.2 MIXED HYPERLIPIDEMIA: ICD-10-CM

## 2024-08-01 NOTE — TELEPHONE ENCOUNTER
LOV 2.21 24 last filled 6/14 #90 0 RF (Recent left foot amputation) no follow up scheduled at this time   Island Pedicle Flap With Canthal Suspension Text: The defect edges were debeveled with a #15 scalpel blade.  Given the location of the defect, shape of the defect and the proximity to free margins an island pedicle advancement flap was deemed most appropriate.  Using a sterile surgical marker, an appropriate advancement flap was drawn incorporating the defect, outlining the appropriate donor tissue and placing the expected incisions within the relaxed skin tension lines where possible. The area thus outlined was incised deep to adipose tissue with a #15 scalpel blade.  The skin margins were undermined to an appropriate distance in all directions around the primary defect and laterally outward around the island pedicle utilizing iris scissors.  There was minimal undermining beneath the pedicle flap. A suspension suture was placed in the canthal tendon to prevent tension and prevent ectropion.

## 2024-08-05 ENCOUNTER — APPOINTMENT (OUTPATIENT)
Dept: ENDOCRINOLOGY | Facility: CLINIC | Age: 49
End: 2024-08-05
Payer: MEDICARE

## 2024-08-05 VITALS
DIASTOLIC BLOOD PRESSURE: 68 MMHG | WEIGHT: 207.2 LBS | SYSTOLIC BLOOD PRESSURE: 94 MMHG | BODY MASS INDEX: 33.44 KG/M2 | HEART RATE: 105 BPM

## 2024-08-05 DIAGNOSIS — Z79.4 TYPE 2 DIABETES MELLITUS WITH HYPERGLYCEMIA, WITH LONG-TERM CURRENT USE OF INSULIN (MULTI): Primary | ICD-10-CM

## 2024-08-05 DIAGNOSIS — I10 ESSENTIAL HYPERTENSION, BENIGN: ICD-10-CM

## 2024-08-05 DIAGNOSIS — E78.2 MIXED HYPERLIPIDEMIA: ICD-10-CM

## 2024-08-05 DIAGNOSIS — E11.65 TYPE 2 DIABETES MELLITUS WITH HYPERGLYCEMIA, WITH LONG-TERM CURRENT USE OF INSULIN (MULTI): Primary | ICD-10-CM

## 2024-08-05 LAB — POC HEMOGLOBIN A1C: 11 % (ref 4.2–6.5)

## 2024-08-05 PROCEDURE — 83036 HEMOGLOBIN GLYCOSYLATED A1C: CPT | Performed by: INTERNAL MEDICINE

## 2024-08-05 PROCEDURE — 99214 OFFICE O/P EST MOD 30 MIN: CPT | Performed by: INTERNAL MEDICINE

## 2024-08-05 RX ORDER — TIRZEPATIDE 5 MG/.5ML
5 INJECTION, SOLUTION SUBCUTANEOUS
Qty: 2 ML | Refills: 5 | Status: SHIPPED | OUTPATIENT
Start: 2024-08-05

## 2024-08-05 RX ORDER — BLOOD-GLUCOSE SENSOR
1 EACH MISCELLANEOUS
Qty: 2 EACH | Refills: 5 | Status: SHIPPED | OUTPATIENT
Start: 2024-08-05 | End: 2024-08-07 | Stop reason: SDUPTHER

## 2024-08-05 RX ORDER — BLOOD-GLUCOSE,RECEIVER,CONT
EACH MISCELLANEOUS
Qty: 1 EACH | Refills: 0 | Status: SHIPPED | OUTPATIENT
Start: 2024-08-05 | End: 2024-08-07 | Stop reason: SDUPTHER

## 2024-08-05 RX ORDER — SIMVASTATIN 20 MG/1
20 TABLET, FILM COATED ORAL NIGHTLY
Qty: 90 TABLET | Refills: 0 | Status: SHIPPED | OUTPATIENT
Start: 2024-08-05

## 2024-08-05 RX ORDER — TIRZEPATIDE 2.5 MG/.5ML
2.5 INJECTION, SOLUTION SUBCUTANEOUS
Qty: 2 ML | Refills: 0 | Status: SHIPPED | OUTPATIENT
Start: 2024-08-05

## 2024-08-05 NOTE — PROGRESS NOTES
HPI     47 yo  with dm2 (dx 2012), depression, anxiety, dyslipidemia. A1c-10.7% June 2023, March 2024 9.4%, today 11%.      Testing sugars 4X/day with aurora 2 -hasn't been using d/t issues with adhesion. No low sugars. Am sugars mid 100's, similar at lunch, not testing at dinner, low 200's at bedtime. Pt is trying to watch carbs in the diet. Not physically active. Meds being covered.           Taking Lantus 75 units pen, januvia 25mg, glimepiride 4mg daily. Not taking Ozempic added last visit, per pt caused numb sensation 1st week taking so she stopped.   -did not tolerate metformin  -no tzd, no [id number]     Taking simvastatin 20mg for lipids and tolerating.    -had amputation on foot on L side        Current Outpatient Medications:     ARIPiprazole (Abilify) 5 mg tablet, 1 tablet (5 mg) once daily., Disp: , Rfl:     blood sugar diagnostic (OneTouch Ultra Test) strip, USE AS DIRECTED TO CHECK BLOOD SUGAR TWICE A DAY, Disp: 100 each, Rfl: 11    blood-glucose meter misc, One touch ultra glucose meter, use daily as directed, Disp: 1 each, Rfl: 0    doxepin (SINEquan) 100 mg capsule, 1 capsule (100 mg) once daily at bedtime., Disp: , Rfl:     fenofibrate (Tricor) 145 mg tablet, Take 1 tablet (145 mg) by mouth once daily., Disp: , Rfl:     glimepiride (Amaryl) 4 mg tablet, TAKE 1 TABLET BY MOUTH ONCE DAILY WITH BREAKFAST OR FIRST MAIN MEAL OF THE DAY, Disp: 90 tablet, Rfl: 1    hydrOXYzine HCL (Atarax) 25 mg tablet, Take 1 tablet (25 mg) by mouth 2 times a day as needed for anxiety., Disp: , Rfl:     insulin glargine (Lantus Solostar U-100 Insulin) 100 unit/mL (3 mL) pen, Inject 75 Units under the skin once daily at bedtime. Take as directed per insulin instructions., Disp: 80 mL, Rfl: 3    lancets (OneTouch Delica Plus Lancet) 33 gauge misc, USE AS DIRECTED TO CHECK BLOOD SUGAR ONCE DAILY, Disp: 100 each, Rfl: 1    medroxyPROGESTERone (Depo-Provera) 150 mg/mL injection, Inject 1 mL (150 mg) into the muscle every 12  "weeks., Disp: 1 mL, Rfl: 3    pen needle, diabetic (BD Ultra-Fine Short Pen Needle) 31 gauge x 5/16\" needle, USE 1 PEN NEEDLE WITH LANTUS ONCE DAILY, Disp: 100 each, Rfl: 3    propranolol (Inderal) 20 mg tablet, 1 tablet (20 mg) 2 times a day., Disp: , Rfl:     simvastatin (Zocor) 20 mg tablet, TAKE 1 TABLET BY MOUTH EVERY EVENING, Disp: 90 tablet, Rfl: 0    SITagliptin phosphate (Januvia) 25 mg tablet, TAKE 1 TABLET BY MOUTH ONCE DAILY AS DIRECTED, Disp: 90 tablet, Rfl: 3    SUMAtriptan (Imitrex) 100 mg tablet, Take 1 tablet (100 mg) by mouth 1 time if needed for migraine., Disp: , Rfl:     venlafaxine XR (Effexor-XR) 75 mg 24 hr capsule, 1 capsule (75 mg) once daily., Disp: , Rfl:     FreeStyle Bridget 3 [place] misc, Use as instructed to check blood sugar, Disp: 1 each, Rfl: 0    FreeStyle Bridget 3 Sensor device, 1 each every 14 (fourteen) days., Disp: 2 each, Rfl: 5    Mounjaro 2.5 mg/0.5 mL pen injector, Inject 2.5 mg under the skin 1 (one) time per week. For first 4 weeks, Disp: 2 mL, Rfl: 0    Mounjaro 5 mg/0.5 mL pen injector, Inject 5 mg under the skin 1 (one) time per week., Disp: 2 mL, Rfl: 5    Allergies as of 08/05/2024    (No Known Allergies)       BP 94/68   Pulse 105   Wt 94 kg (207 lb 3.2 oz)   BMI 33.44 kg/m²     Labs:   Lab Results   Component Value Date    WBC 11.2 04/22/2024    NRBC 0.0 04/22/2024    RBC 5.42 (H) 04/22/2024    HGB 15.2 04/22/2024    HCT 46.6 (H) 04/22/2024     04/22/2024     Lab Results   Component Value Date    CALCIUM 9.2 04/22/2024    AST 10 04/22/2024    ALKPHOS 95 04/22/2024    BILITOT 0.3 04/22/2024    PROT 7.0 04/22/2024    ALBUMIN 4.0 04/22/2024    GLOB 3.7 07/28/2023    AGR 1.0 (L) 07/28/2023     04/22/2024    K 4.2 04/22/2024     04/22/2024    CO2 23 (L) 04/22/2024    ANIONGAP 14 04/22/2024    BUN 11 04/22/2024    CREATININE 0.50 04/22/2024    UREACREAUR 10.0 07/28/2023    GLUCOSE 247 (H) 04/22/2024    ALT 13 04/22/2024    EGFR >90 04/22/2024     Lab " Results   Component Value Date    CHOL 314 (H) 08/04/2022    TRIG 1,208 (H) 08/04/2022    HDL 23 (L) 08/04/2022    LDLCALC  08/04/2022     Unable to report calculated LDL due to increased triglycerides. Direct     Lab Results   Component Value Date    MICROALBCREA 21.6 12/21/2022     Lab Results   Component Value Date    TSH 1.99 05/24/2021     Lab Results   Component Value Date    HEPPKHEY30 424 05/24/2021     Lab Results   Component Value Date    HGBA1C 11.0 (A) 08/05/2024         Physical Exam    Foot exam: 2+ DP pulses, decreased vibratory sense, +callus, previous L 5 toes amputated         Assessment/Plan   1. Type 2 diabetes mellitus with hyperglycemia, with long-term current use of insulin (Multi)    -A1c ordered & reviewed   -labs reviewed  -glycemic values reviewed    -pt interested in trying mounjaro, ran test claim covered no charge through ins   -start 2.5mg weekly x4 weeks, then 5mg weekly thereafter   -instructed on how to use a sample pen,  went over risk/benefits/warnings    -issues with aurora 2 cgm, interested in trying aurora 3   -[employer] sent for reader (smartphone not compatible) & sensors,  call for training once you have supplies if need help setting up     -next step likely prandial insulin    2. Mixed hyperlipidemia  -on statin, would prefer different meds  -should improve with better sugar control    3. Essential hypertension, benign  -at target on therapy will follow        Follow up: 3mon bb    -labs/tests/notes reviewed  -reviewed and counseled patient on medication monitoring and side effects    Treatment and plan discussed with Dr. Escalante.  SULAIMAN Kim, PharmD, BC-ADM, CDCES.     Medical Decision Making  Complexity of problem: Chronic illness of diabetes mellitus uncontrolled, progressing  Data analyzed and reviewed: Reviewed prior notes, blood glucose data, labs including HgbA1c, lipids, serum chemistries.  Ordered tests.   Risk of complications and morbidities: Is definite because of use  of insulin and risk of hypoglycemia.  Prescription medications reviewed and modifications made.  Compliance assessed.  Addressed social determinants of health including food insecurity.

## 2024-08-05 NOTE — PATIENT INSTRUCTIONS
Begin Moujaro 2.5mg once weekly for first 4 weeks.  Then,  increase to 5mg weekly thereafter     Will send prescriptions for Tysdo 3 reader/sensors  - call office once you get your supplies to schedule free training visit.  Be sure to bring supplies with you to visit!

## 2024-08-06 DIAGNOSIS — E11.65 TYPE 2 DIABETES MELLITUS WITH HYPERGLYCEMIA, WITH LONG-TERM CURRENT USE OF INSULIN (MULTI): ICD-10-CM

## 2024-08-06 DIAGNOSIS — Z79.4 TYPE 2 DIABETES MELLITUS WITH HYPERGLYCEMIA, WITH LONG-TERM CURRENT USE OF INSULIN (MULTI): ICD-10-CM

## 2024-08-06 NOTE — PROGRESS NOTES
Attestation signed by Jacky Escalante MD on 8/6/24 at 7:48 AM.    I, Dr Jacky Escalante, have reviewed this progress note, medication list, vital signs, any pertinent lab values, and any CGM data if present with the [facility] and  face to face during this visit today. This note reflects the treatment plan that was made under my direction after reviewing the above mentioned elements while face to face with the patient and CDE.  I personally answered and addressed any questions and concerns the patient had during the visit today.  The CDE entered the data in this note under my direction and I personally reviewed it, signed any lab or medication orders that I instructed to be completed. I am the billing provider for this visit and the level of service was determined by my involvement in the Medical Decision Making Component of this visit while face to face with the patient.

## 2024-08-07 RX ORDER — BLOOD-GLUCOSE,RECEIVER,CONT
EACH MISCELLANEOUS
Qty: 1 EACH | Refills: 0 | Status: SHIPPED | OUTPATIENT
Start: 2024-08-07

## 2024-08-07 RX ORDER — BLOOD-GLUCOSE SENSOR
1 EACH MISCELLANEOUS
Qty: 2 EACH | Refills: 5 | Status: SHIPPED | OUTPATIENT
Start: 2024-08-07

## 2024-08-07 NOTE — TELEPHONE ENCOUNTER
[facility] called to get status of PA    Office will follow up with patient to notify office of PA

## 2024-08-07 NOTE — TELEPHONE ENCOUNTER
We are sending your prescription to [facility] for benefits investigation and affordability support.      If you have any questions or would like to check the status of your prescription(s),  please contact the pharmacy directly at [phone].

## 2024-08-15 ENCOUNTER — TELEPHONE (OUTPATIENT)
Dept: ENDOCRINOLOGY | Facility: CLINIC | Age: 49
End: 2024-08-15
Payer: MEDICARE

## 2024-08-15 NOTE — TELEPHONE ENCOUNTER
Pt calling in to see if paperwork can be sent to her foot Doctor so she can receive an insert ( )    Please Advise

## 2024-08-19 PROCEDURE — RXMED WILLOW AMBULATORY MEDICATION CHARGE

## 2024-08-20 ENCOUNTER — PHARMACY VISIT (OUTPATIENT)
Dept: PHARMACY | Facility: CLINIC | Age: 49
End: 2024-08-20
Payer: MEDICARE

## 2024-08-23 ENCOUNTER — HOSPITAL ENCOUNTER (OUTPATIENT)
Dept: RADIOLOGY | Facility: CLINIC | Age: 49
Discharge: HOME | End: 2024-08-23
Payer: MEDICARE

## 2024-08-23 ENCOUNTER — APPOINTMENT (OUTPATIENT)
Dept: RADIOLOGY | Facility: CLINIC | Age: 49
End: 2024-08-23
Payer: MEDICARE

## 2024-08-23 DIAGNOSIS — M81.0 PERIMENOPAUSAL BONE LOSS: ICD-10-CM

## 2024-08-23 DIAGNOSIS — M85.80 OSTEOPENIA DETERMINED BY X-RAY: ICD-10-CM

## 2024-08-23 PROCEDURE — 77063 BREAST TOMOSYNTHESIS BI: CPT | Performed by: RADIOLOGY

## 2024-08-23 PROCEDURE — 77067 SCR MAMMO BI INCL CAD: CPT

## 2024-08-23 PROCEDURE — 77067 SCR MAMMO BI INCL CAD: CPT | Performed by: RADIOLOGY

## 2024-08-23 PROCEDURE — 77080 DXA BONE DENSITY AXIAL: CPT

## 2024-08-23 ASSESSMENT — LIFESTYLE VARIABLES
3_OR_MORE_DRINKS_PER_DAY: N
CURRENT_SMOKER: Y

## 2024-08-27 ENCOUNTER — CLINICAL SUPPORT (OUTPATIENT)
Dept: PRIMARY CARE | Facility: CLINIC | Age: 49
End: 2024-08-27
Payer: MEDICARE

## 2024-08-27 DIAGNOSIS — Z11.1 SCREENING FOR TUBERCULOSIS: ICD-10-CM

## 2024-08-27 PROCEDURE — 86580 TB INTRADERMAL TEST: CPT | Performed by: INTERNAL MEDICINE

## 2024-08-30 ENCOUNTER — CLINICAL SUPPORT (OUTPATIENT)
Dept: PRIMARY CARE | Facility: CLINIC | Age: 49
End: 2024-08-30
Payer: MEDICARE

## 2024-08-30 DIAGNOSIS — Z11.1 ENCOUNTER FOR SCREENING FOR RESPIRATORY TUBERCULOSIS: ICD-10-CM

## 2024-08-30 LAB
INDURATION: NORMAL MM
TB SKIN TEST: NEGATIVE

## 2024-10-14 ENCOUNTER — APPOINTMENT (OUTPATIENT)
Dept: OBSTETRICS AND GYNECOLOGY | Facility: CLINIC | Age: 49
End: 2024-10-14
Payer: MEDICARE

## 2024-10-21 ENCOUNTER — APPOINTMENT (OUTPATIENT)
Dept: OBSTETRICS AND GYNECOLOGY | Facility: CLINIC | Age: 49
End: 2024-10-21
Payer: MEDICARE

## 2024-10-25 ENCOUNTER — CLINICAL SUPPORT (OUTPATIENT)
Dept: OBSTETRICS AND GYNECOLOGY | Facility: CLINIC | Age: 49
End: 2024-10-25
Payer: MEDICARE

## 2024-10-25 VITALS — DIASTOLIC BLOOD PRESSURE: 60 MMHG | BODY MASS INDEX: 33.41 KG/M2 | WEIGHT: 207 LBS | SYSTOLIC BLOOD PRESSURE: 110 MMHG

## 2024-10-25 DIAGNOSIS — Z30.42 SURVEILLANCE FOR DEPO-PROVERA CONTRACEPTION: Primary | ICD-10-CM

## 2024-10-25 LAB — PREGNANCY TEST URINE, POC: NEGATIVE

## 2024-10-25 PROCEDURE — 81025 URINE PREGNANCY TEST: CPT

## 2024-10-25 PROCEDURE — 2500000004 HC RX 250 GENERAL PHARMACY W/ HCPCS (ALT 636 FOR OP/ED)

## 2024-10-25 PROCEDURE — 96372 THER/PROPH/DIAG INJ SC/IM: CPT

## 2024-10-25 RX ORDER — MEDROXYPROGESTERONE ACETATE 150 MG/ML
150 INJECTION, SUSPENSION INTRAMUSCULAR ONCE
Status: COMPLETED | OUTPATIENT
Start: 2024-10-25 | End: 2024-10-25

## 2024-10-25 ASSESSMENT — LIFESTYLE VARIABLES
HOW OFTEN DO YOU HAVE SIX OR MORE DRINKS ON ONE OCCASION: NEVER
HOW OFTEN DO YOU HAVE A DRINK CONTAINING ALCOHOL: NEVER
SKIP TO QUESTIONS 9-10: 1
HOW MANY STANDARD DRINKS CONTAINING ALCOHOL DO YOU HAVE ON A TYPICAL DAY: PATIENT DOES NOT DRINK
AUDIT-C TOTAL SCORE: 0

## 2024-10-25 ASSESSMENT — ENCOUNTER SYMPTOMS
OCCASIONAL FEELINGS OF UNSTEADINESS: 0
LOSS OF SENSATION IN FEET: 0
DEPRESSION: 0

## 2024-10-25 ASSESSMENT — PATIENT HEALTH QUESTIONNAIRE - PHQ9
1. LITTLE INTEREST OR PLEASURE IN DOING THINGS: NOT AT ALL
SUM OF ALL RESPONSES TO PHQ9 QUESTIONS 1 & 2: 0
2. FEELING DOWN, DEPRESSED OR HOPELESS: NOT AT ALL

## 2024-10-30 DIAGNOSIS — E78.2 MIXED HYPERLIPIDEMIA: ICD-10-CM

## 2024-10-30 RX ORDER — SIMVASTATIN 20 MG/1
20 TABLET, FILM COATED ORAL NIGHTLY
Qty: 90 TABLET | Refills: 0 | Status: SHIPPED | OUTPATIENT
Start: 2024-10-30

## 2024-11-04 DIAGNOSIS — E11.65 TYPE 2 DIABETES MELLITUS WITH HYPERGLYCEMIA, WITH LONG-TERM CURRENT USE OF INSULIN: ICD-10-CM

## 2024-11-04 DIAGNOSIS — Z79.4 TYPE 2 DIABETES MELLITUS WITH HYPERGLYCEMIA, WITH LONG-TERM CURRENT USE OF INSULIN: ICD-10-CM

## 2024-11-04 RX ORDER — LANCETS 33 GAUGE
EACH MISCELLANEOUS
Qty: 100 EACH | Refills: 1 | Status: SHIPPED | OUTPATIENT
Start: 2024-11-04

## 2024-11-12 DIAGNOSIS — E11.65 TYPE 2 DIABETES MELLITUS WITH HYPERGLYCEMIA, WITH LONG-TERM CURRENT USE OF INSULIN: Primary | ICD-10-CM

## 2024-11-12 DIAGNOSIS — Z79.4 TYPE 2 DIABETES MELLITUS WITH HYPERGLYCEMIA, WITH LONG-TERM CURRENT USE OF INSULIN: Primary | ICD-10-CM

## 2024-11-12 RX ORDER — HYDROCHLOROTHIAZIDE 12.5 MG/1
CAPSULE ORAL
Qty: 2 EACH | Refills: 11 | Status: SHIPPED | OUTPATIENT
Start: 2024-11-12

## 2024-11-22 ENCOUNTER — APPOINTMENT (OUTPATIENT)
Dept: PRIMARY CARE | Facility: CLINIC | Age: 49
End: 2024-11-22
Payer: MEDICARE

## 2024-11-22 DIAGNOSIS — G89.29 CHRONIC NONINTRACTABLE HEADACHE, UNSPECIFIED HEADACHE TYPE: Primary | ICD-10-CM

## 2024-11-22 DIAGNOSIS — R51.9 CHRONIC NONINTRACTABLE HEADACHE, UNSPECIFIED HEADACHE TYPE: Primary | ICD-10-CM

## 2024-11-22 NOTE — PROGRESS NOTES
"[facility]: [facility]  PROGRESS NOTE      Mary Pedroza is a 48 y.o. female that is presenting today for c/o of worsening and more frequent headaches.  The patient has a PMH of DM II, Mixed lipidemia, anxiety, bipolar, depression and psychoactive substance abuse.  She is a patient of Dr. Griffin.    Assessment/Plan   {Assess/Plan SmartLinks (Optional):63881}  Subjective   HPI  Review of Systems   Objective   There were no vitals filed for this visit.   There is no height or weight on file to calculate BMI.  Physical Exam  Diagnostic Results   Lab Results   Component Value Date    GLUCOSE 247 (H) 04/22/2024    CALCIUM 9.2 04/22/2024     04/22/2024    K 4.2 04/22/2024    CO2 23 (L) 04/22/2024     04/22/2024    BUN 11 04/22/2024    CREATININE 0.50 04/22/2024     Lab Results   Component Value Date    ALT 13 04/22/2024    AST 10 04/22/2024    ALKPHOS 95 04/22/2024    BILITOT 0.3 04/22/2024     Lab Results   Component Value Date    WBC 11.2 04/22/2024    HGB 15.2 04/22/2024    HCT 46.6 (H) 04/22/2024    MCV 86 04/22/2024     04/22/2024     Lab Results   Component Value Date    CHOL 314 (H) 08/04/2022    CHOL 289 (H) 05/24/2021    CHOL 529 (H) 10/24/2020     Lab Results   Component Value Date    HDL 23 (L) 08/04/2022    HDL 24 (L) 05/24/2021    HDL 17 (L) 10/24/2020     Lab Results   Component Value Date    LDLCALC  08/04/2022     Unable to report calculated LDL due to increased triglycerides. Direct    LDLCALC  05/24/2021     Unable to report calculated LDL due to increased triglycerides. Direct    LDLCALC  10/24/2020     Unable to report calculated LDL due to increased triglycerides. Direct     Lab Results   Component Value Date    TRIG 1,208 (H) 08/04/2022    TRIG 1,784 (H) 05/24/2021    TRIG 2,528 (H) 10/24/2020     No components found for: \"CHOLHDL\"  Lab Results   Component Value Date    HGBA1C 11.0 (A) 08/05/2024     Other labs not included in the list above were " reviewed either before or during this encounter.    History    Past Medical History:   Diagnosis Date    Anxiety and depression     Bipolar 1 disorder (Multi)     Bursitis of right hip 09/15/2023    Diabetes mellitus, type 2 (Multi)     Hyperlipidemia      Past Surgical History:   Procedure Laterality Date    AMPUTATION FOOT / TOE Left      SECTION, LOW TRANSVERSE       SECTION, LOW TRANSVERSE       SECTION, LOW TRANSVERSE      CHOLECYSTECTOMY  2012     Family History   Problem Relation Name Age of Onset    Ovarian cancer Mother      Hypertension Father      Diabetes Father      Other (cholesterol issues) Father      No Known Problems Sister      No Known Problems Daughter      No Known Problems Son      No Known Problems Son      Ovarian cancer Maternal Grandmother      Breast cancer Father's Sister       Social History     Socioeconomic History    Marital status: Legally      Spouse name: Not on file    Number of children: Not on file    Years of education: Not on file    Highest education level: Not on file   Occupational History    Not on file   Tobacco Use    Smoking status: Every Day     Current packs/day: 1.00     Average packs/day: 1 pack/day for 15.0 years (15.0 ttl pk-yrs)     Types: Cigarettes     Passive exposure: Current    Smokeless tobacco: Never   Vaping Use    Vaping status: Some Days    Substances: Nicotine   Substance and Sexual Activity    Alcohol use: Not Currently    Drug use: Never    Sexual activity: Not Currently     Partners: Male     Birth control/protection: Injection   Other Topics Concern    Not on file   Social History Narrative    ** Merged History Encounter **          Social Drivers of Health     Financial Resource Strain: High Risk (3/8/2024)    Overall Financial Resource Strain (CARDIA)     Difficulty of Paying Living Expenses: Hard   Food Insecurity: Food Insecurity Present (3/8/2024)    Hunger Vital Sign     Worried About Running  Out of Food in the Last Year: Often true     Ran Out of Food in the Last Year: Often true   Transportation Needs: No Transportation Needs (4/30/2024)    OASIS : Transportation     Lack of Transportation (Medical): No     Lack of Transportation (Non-Medical): No     Patient Unable or Declines to Respond: No   Physical Activity: Inactive (3/8/2024)    Exercise Vital Sign     Days of Exercise per Week: 0 days     Minutes of Exercise per Session: 0 min   Stress: Stress Concern Present (3/8/2024)    [employer] - Occupational Stress Questionnaire     Feeling of Stress : To some extent   Social Connections: Feeling Socially Integrated (4/30/2024)    OASIS : Social Isolation     Frequency of experiencing loneliness or isolation: Never   Recent Concern: Social Connections - Moderately Isolated (3/8/2024)    Social Connection and Isolation Panel [NHANES]     Frequency of Communication with Friends and Family: Three times a week     Frequency of Social Gatherings with Friends and Family: More than three times a week     Attends Temple Services: Never     Active Member of Clubs or Organizations: Yes     Attends Club or Organization Meetings: Never     Marital Status:    Intimate Partner Violence: Not At Risk (3/8/2024)    Humiliation, Afraid, Rape, and Kick questionnaire     Fear of Current or Ex-Partner: No     Emotionally Abused: No     Physically Abused: No     Sexually Abused: No   Housing Stability: High Risk (3/8/2024)    Housing Stability Vital Sign     Unable to Pay for Housing in the Last Year: Yes     Number of Places Lived in the Last Year: 2     Unstable Housing in the Last Year: No     No Known Allergies  Current Outpatient Medications on File Prior to Visit   Medication Sig Dispense Refill    ARIPiprazole (Abilify) 5 mg tablet 1 tablet (5 mg) once daily.      blood sugar diagnostic (OneTouch Ultra Test) strip USE AS DIRECTED TO CHECK BLOOD SUGAR TWICE A   "each 11    blood-glucose meter misc One touch ultra glucose meter, use daily as directed 1 each 0    blood-glucose sensor (FreeStyle Bridget 3 Plus Sensor) device Use every 15 days 2 each 11    doxepin (SINEquan) 100 mg capsule 1 capsule (100 mg) once daily at bedtime.      fenofibrate (Tricor) 145 mg tablet Take 1 tablet (145 mg) by mouth once daily.      FreeStyle Bridget 3 [place] misc Use as instructed to check blood sugar 1 each 0    FreeStyle Bridget 3 Sensor device Use daily and change every 14 (fourteen) days. 2 each 5    glimepiride (Amaryl) 4 mg tablet TAKE 1 TABLET BY MOUTH ONCE DAILY WITH BREAKFAST OR FIRST MAIN MEAL OF THE DAY 90 tablet 1    hydrOXYzine HCL (Atarax) 25 mg tablet Take 1 tablet (25 mg) by mouth 2 times a day as needed for anxiety.      insulin glargine (Lantus Solostar U-100 Insulin) 100 unit/mL (3 mL) pen Inject 75 Units under the skin once daily at bedtime. Take as directed per insulin instructions. 80 mL 3    medroxyPROGESTERone (Depo-Provera) 150 mg/mL injection Inject 1 mL (150 mg) into the muscle every 12 weeks. 1 mL 3    Mounjaro 2.5 mg/0.5 mL pen injector Inject 2.5 mg under the skin 1 (one) time per week. For first 4 weeks 2 mL 0    Mounjaro 5 mg/0.5 mL pen injector Inject 5 mg under the skin 1 (one) time per week. 2 mL 5    OneTouch Delica Plus Lancet 33 gauge misc USE AS DIRECTED TO CHECK BLOOD SUGAR ONCE DAILY 100 each 1    pen needle, diabetic (BD Ultra-Fine Short Pen Needle) 31 gauge x 5/16\" needle USE 1 PEN NEEDLE WITH LANTUS ONCE DAILY 100 each 3    propranolol (Inderal) 20 mg tablet 1 tablet (20 mg) 2 times a day.      simvastatin (Zocor) 20 mg tablet TAKE 1 TABLET BY MOUTH EVERY NIGHT AT BEDTIME 90 tablet 0    SITagliptin phosphate (Januvia) 25 mg tablet TAKE 1 TABLET BY MOUTH ONCE DAILY AS DIRECTED 90 tablet 3    SUMAtriptan (Imitrex) 100 mg tablet Take 1 tablet (100 mg) by mouth 1 time if needed for migraine.      venlafaxine XR (Effexor-XR) 75 mg 24 hr capsule 1 capsule (75 " mg) once daily.       No current facility-administered medications on file prior to visit.     Immunization History   Administered Date(s) Administered    Influenza, seasonal, injectable 11/03/2012, 10/13/2014    PPD Test 08/27/2024     Patient's medical history was reviewed and updated either before or during this encounter.       Disha Mora, APRN-CNP

## 2024-12-05 ENCOUNTER — APPOINTMENT (OUTPATIENT)
Dept: PRIMARY CARE | Facility: CLINIC | Age: 49
End: 2024-12-05
Payer: MEDICARE

## 2024-12-16 ENCOUNTER — APPOINTMENT (OUTPATIENT)
Dept: OBSTETRICS AND GYNECOLOGY | Facility: CLINIC | Age: 49
End: 2024-12-16
Payer: MEDICARE

## 2024-12-19 DIAGNOSIS — E11.65 TYPE 2 DIABETES MELLITUS WITH HYPERGLYCEMIA, WITH LONG-TERM CURRENT USE OF INSULIN: ICD-10-CM

## 2024-12-19 DIAGNOSIS — Z79.4 TYPE 2 DIABETES MELLITUS WITH HYPERGLYCEMIA, WITH LONG-TERM CURRENT USE OF INSULIN: ICD-10-CM

## 2024-12-19 RX ORDER — GLIMEPIRIDE 4 MG/1
TABLET ORAL
Qty: 30 TABLET | Refills: 6 | Status: SHIPPED | OUTPATIENT
Start: 2024-12-19

## 2025-01-16 ENCOUNTER — APPOINTMENT (OUTPATIENT)
Dept: OBSTETRICS AND GYNECOLOGY | Facility: CLINIC | Age: 50
End: 2025-01-16
Payer: MEDICARE

## 2025-01-16 ENCOUNTER — CLINICAL SUPPORT (OUTPATIENT)
Dept: OBSTETRICS AND GYNECOLOGY | Facility: CLINIC | Age: 50
End: 2025-01-16
Payer: MEDICARE

## 2025-01-16 VITALS
DIASTOLIC BLOOD PRESSURE: 56 MMHG | WEIGHT: 198 LBS | BODY MASS INDEX: 31.08 KG/M2 | SYSTOLIC BLOOD PRESSURE: 92 MMHG | HEIGHT: 67 IN

## 2025-01-16 DIAGNOSIS — Z30.42 SURVEILLANCE FOR DEPO-PROVERA CONTRACEPTION: Primary | ICD-10-CM

## 2025-01-16 PROCEDURE — 2500000004 HC RX 250 GENERAL PHARMACY W/ HCPCS (ALT 636 FOR OP/ED)

## 2025-01-16 PROCEDURE — 96372 THER/PROPH/DIAG INJ SC/IM: CPT

## 2025-01-16 RX ORDER — MEDROXYPROGESTERONE ACETATE 150 MG/ML
150 INJECTION, SUSPENSION INTRAMUSCULAR ONCE
Status: COMPLETED | OUTPATIENT
Start: 2025-01-16 | End: 2025-01-16

## 2025-01-16 RX ADMIN — MEDROXYPROGESTERONE ACETATE 150 MG: 150 INJECTION, SUSPENSION INTRAMUSCULAR at 09:33

## 2025-01-16 ASSESSMENT — LIFESTYLE VARIABLES
HOW OFTEN DO YOU HAVE A DRINK CONTAINING ALCOHOL: NEVER
SKIP TO QUESTIONS 9-10: 1
AUDIT-C TOTAL SCORE: 0
HOW OFTEN DO YOU HAVE SIX OR MORE DRINKS ON ONE OCCASION: NEVER
HOW MANY STANDARD DRINKS CONTAINING ALCOHOL DO YOU HAVE ON A TYPICAL DAY: PATIENT DOES NOT DRINK

## 2025-01-16 ASSESSMENT — ENCOUNTER SYMPTOMS
LOSS OF SENSATION IN FEET: 0
OCCASIONAL FEELINGS OF UNSTEADINESS: 0
DEPRESSION: 0

## 2025-01-16 ASSESSMENT — PAIN SCALES - GENERAL: PAINLEVEL_OUTOF10: 0-NO PAIN

## 2025-01-31 DIAGNOSIS — E11.9 TYPE 2 DIABETES MELLITUS WITHOUT COMPLICATION, WITHOUT LONG-TERM CURRENT USE OF INSULIN (MULTI): ICD-10-CM

## 2025-01-31 RX ORDER — BLOOD SUGAR DIAGNOSTIC
STRIP MISCELLANEOUS
Qty: 100 EACH | Refills: 11 | Status: SHIPPED | OUTPATIENT
Start: 2025-01-31

## 2025-02-04 ENCOUNTER — APPOINTMENT (OUTPATIENT)
Dept: OBSTETRICS AND GYNECOLOGY | Facility: CLINIC | Age: 50
End: 2025-02-04
Payer: MEDICARE

## 2025-02-11 ENCOUNTER — APPOINTMENT (OUTPATIENT)
Dept: OBSTETRICS AND GYNECOLOGY | Facility: CLINIC | Age: 50
End: 2025-02-11
Payer: MEDICARE

## 2025-02-13 ENCOUNTER — APPOINTMENT (OUTPATIENT)
Dept: OBSTETRICS AND GYNECOLOGY | Facility: CLINIC | Age: 50
End: 2025-02-13
Payer: MEDICARE

## 2025-02-26 ENCOUNTER — APPOINTMENT (OUTPATIENT)
Dept: PRIMARY CARE | Facility: CLINIC | Age: 50
End: 2025-02-26
Payer: MEDICARE

## 2025-03-04 ENCOUNTER — TELEPHONE (OUTPATIENT)
Dept: OBSTETRICS AND GYNECOLOGY | Facility: CLINIC | Age: 50
End: 2025-03-04
Payer: MEDICARE

## 2025-03-04 NOTE — TELEPHONE ENCOUNTER
Est pt last seen Annual 02/08/2024 /Last Depo 01/16/2025 / pt has Annual and depo appt sched for 04/08/2025 / Pt requesting depo RX sent to [facility] pharm listed / advised msg to Dr Gandhi

## 2025-03-05 DIAGNOSIS — N92.1 MENORRHAGIA WITH IRREGULAR CYCLE: ICD-10-CM

## 2025-03-05 RX ORDER — MEDROXYPROGESTERONE ACETATE 150 MG/ML
150 INJECTION, SUSPENSION INTRAMUSCULAR
Qty: 1 ML | Refills: 3 | Status: SHIPPED | OUTPATIENT
Start: 2025-03-05

## 2025-03-05 NOTE — TELEPHONE ENCOUNTER
Spoke to pt / pt plans on having Depo injection at this visit / Pt states she was planing on discussing option to control her bleeding if she was to go off the Depo / pt would like 1 refill at this time .

## 2025-03-14 DIAGNOSIS — Z79.4 TYPE 2 DIABETES MELLITUS WITH HYPERGLYCEMIA, WITH LONG-TERM CURRENT USE OF INSULIN: ICD-10-CM

## 2025-03-14 DIAGNOSIS — E11.65 TYPE 2 DIABETES MELLITUS WITH HYPERGLYCEMIA, WITH LONG-TERM CURRENT USE OF INSULIN: ICD-10-CM

## 2025-03-14 RX ORDER — TIRZEPATIDE 5 MG/.5ML
INJECTION, SOLUTION SUBCUTANEOUS
Qty: 2 ML | Refills: 5 | Status: SHIPPED | OUTPATIENT
Start: 2025-03-14

## 2025-04-10 ENCOUNTER — APPOINTMENT (OUTPATIENT)
Dept: OBSTETRICS AND GYNECOLOGY | Facility: CLINIC | Age: 50
End: 2025-04-10
Payer: MEDICARE

## 2025-04-10 ENCOUNTER — CLINICAL SUPPORT (OUTPATIENT)
Dept: OBSTETRICS AND GYNECOLOGY | Facility: CLINIC | Age: 50
End: 2025-04-10
Payer: MEDICARE

## 2025-04-10 VITALS
WEIGHT: 205.6 LBS | HEIGHT: 67 IN | SYSTOLIC BLOOD PRESSURE: 122 MMHG | DIASTOLIC BLOOD PRESSURE: 68 MMHG | BODY MASS INDEX: 32.27 KG/M2

## 2025-04-10 DIAGNOSIS — Z30.42 SURVEILLANCE FOR DEPO-PROVERA CONTRACEPTION: Primary | ICD-10-CM

## 2025-04-10 PROCEDURE — 2500000004 HC RX 250 GENERAL PHARMACY W/ HCPCS (ALT 636 FOR OP/ED): Performed by: OBSTETRICS & GYNECOLOGY

## 2025-04-10 PROCEDURE — 96372 THER/PROPH/DIAG INJ SC/IM: CPT | Performed by: OBSTETRICS & GYNECOLOGY

## 2025-04-10 RX ORDER — MEDROXYPROGESTERONE ACETATE 150 MG/ML
150 INJECTION, SUSPENSION INTRAMUSCULAR ONCE
Status: COMPLETED | OUTPATIENT
Start: 2025-04-10 | End: 2025-04-10

## 2025-04-10 RX ADMIN — MEDROXYPROGESTERONE ACETATE 150 MG: 150 INJECTION, SUSPENSION INTRAMUSCULAR at 10:31

## 2025-04-10 ASSESSMENT — LIFESTYLE VARIABLES
SKIP TO QUESTIONS 9-10: 1
HOW OFTEN DO YOU HAVE A DRINK CONTAINING ALCOHOL: NEVER
AUDIT-C TOTAL SCORE: 0
HOW OFTEN DO YOU HAVE SIX OR MORE DRINKS ON ONE OCCASION: NEVER
HOW MANY STANDARD DRINKS CONTAINING ALCOHOL DO YOU HAVE ON A TYPICAL DAY: PATIENT DOES NOT DRINK

## 2025-04-10 ASSESSMENT — PAIN SCALES - GENERAL: PAINLEVEL_OUTOF10: 0-NO PAIN

## 2025-04-10 ASSESSMENT — ENCOUNTER SYMPTOMS
LOSS OF SENSATION IN FEET: 0
OCCASIONAL FEELINGS OF UNSTEADINESS: 0
DEPRESSION: 0

## 2025-05-06 ENCOUNTER — APPOINTMENT (OUTPATIENT)
Dept: RADIOLOGY | Facility: HOSPITAL | Age: 50
DRG: 872 | End: 2025-05-06
Payer: MEDICARE

## 2025-05-06 ENCOUNTER — HOSPITAL ENCOUNTER (INPATIENT)
Facility: HOSPITAL | Age: 50
DRG: 872 | End: 2025-05-06
Admitting: STUDENT IN AN ORGANIZED HEALTH CARE EDUCATION/TRAINING PROGRAM
Payer: MEDICARE

## 2025-05-06 DIAGNOSIS — F31.70 BIPOLAR DISORDER IN FULL REMISSION, MOST RECENT EPISODE UNSPECIFIED TYPE (MULTI): ICD-10-CM

## 2025-05-06 DIAGNOSIS — M86.272 SUBACUTE OSTEOMYELITIS OF LEFT FOOT (MULTI): ICD-10-CM

## 2025-05-06 DIAGNOSIS — E11.59 TYPE 2 DIABETES MELLITUS WITH OTHER CIRCULATORY COMPLICATIONS: ICD-10-CM

## 2025-05-06 DIAGNOSIS — T14.8XXA WOUND INFECTION: Primary | ICD-10-CM

## 2025-05-06 DIAGNOSIS — L08.9 WOUND INFECTION: Primary | ICD-10-CM

## 2025-05-06 DIAGNOSIS — M86.171 OTHER ACUTE OSTEOMYELITIS OF RIGHT ANKLE: ICD-10-CM

## 2025-05-06 PROBLEM — Z72.0 TOBACCO USE: Status: ACTIVE | Noted: 2025-05-06

## 2025-05-06 PROBLEM — I10 PRIMARY HYPERTENSION: Status: ACTIVE | Noted: 2025-05-06

## 2025-05-06 PROBLEM — F31.9 BIPOLAR DISORDER: Status: ACTIVE | Noted: 2025-05-06

## 2025-05-06 PROBLEM — A41.9 SEPSIS WITHOUT ACUTE ORGAN DYSFUNCTION (MULTI): Status: ACTIVE | Noted: 2025-05-06

## 2025-05-06 LAB
ALBUMIN SERPL BCP-MCNC: 4.1 G/DL (ref 3.4–5)
ALP SERPL-CCNC: 101 U/L (ref 33–110)
ALT SERPL W P-5'-P-CCNC: 13 U/L (ref 7–45)
ANION GAP SERPL CALCULATED.3IONS-SCNC: 15 MMOL/L (ref 10–20)
AST SERPL W P-5'-P-CCNC: 12 U/L (ref 9–39)
BASOPHILS # BLD AUTO: 0.05 X10*3/UL (ref 0–0.1)
BASOPHILS NFR BLD AUTO: 0.3 %
BILIRUB SERPL-MCNC: 0.6 MG/DL (ref 0–1.2)
BUN SERPL-MCNC: 16 MG/DL (ref 6–23)
CALCIUM SERPL-MCNC: 9.2 MG/DL (ref 8.6–10.3)
CHLORIDE SERPL-SCNC: 96 MMOL/L (ref 98–107)
CO2 SERPL-SCNC: 25 MMOL/L (ref 21–32)
CREAT SERPL-MCNC: 0.84 MG/DL (ref 0.5–1.05)
CRP SERPL-MCNC: 2.29 MG/DL
EGFRCR SERPLBLD CKD-EPI 2021: 84 ML/MIN/1.73M*2
EOSINOPHIL # BLD AUTO: 0.15 X10*3/UL (ref 0–0.7)
EOSINOPHIL NFR BLD AUTO: 1 %
ERYTHROCYTE [DISTWIDTH] IN BLOOD BY AUTOMATED COUNT: 12.5 % (ref 11.5–14.5)
ERYTHROCYTE [SEDIMENTATION RATE] IN BLOOD BY WESTERGREN METHOD: 41 MM/H (ref 0–30)
EST. AVERAGE GLUCOSE BLD GHB EST-MCNC: 226 MG/DL
GLUCOSE BLD MANUAL STRIP-MCNC: 224 MG/DL (ref 74–99)
GLUCOSE BLD MANUAL STRIP-MCNC: 455 MG/DL (ref 74–99)
GLUCOSE BLD MANUAL STRIP-MCNC: 513 MG/DL (ref 74–99)
GLUCOSE SERPL-MCNC: 532 MG/DL (ref 74–99)
HBA1C MFR BLD: 9.5 % (ref ?–5.7)
HCT VFR BLD AUTO: 49.1 % (ref 36–46)
HGB BLD-MCNC: 16.8 G/DL (ref 12–16)
IMM GRANULOCYTES # BLD AUTO: 0.06 X10*3/UL (ref 0–0.7)
IMM GRANULOCYTES NFR BLD AUTO: 0.4 % (ref 0–0.9)
LACTATE SERPL-SCNC: 2.3 MMOL/L (ref 0.4–2)
LACTATE SERPL-SCNC: 2.6 MMOL/L (ref 0.4–2)
LYMPHOCYTES # BLD AUTO: 2.68 X10*3/UL (ref 1.2–4.8)
LYMPHOCYTES NFR BLD AUTO: 17.3 %
MCH RBC QN AUTO: 29.9 PG (ref 26–34)
MCHC RBC AUTO-ENTMCNC: 34.2 G/DL (ref 32–36)
MCV RBC AUTO: 88 FL (ref 80–100)
MONOCYTES # BLD AUTO: 0.91 X10*3/UL (ref 0.1–1)
MONOCYTES NFR BLD AUTO: 5.9 %
NEUTROPHILS # BLD AUTO: 11.63 X10*3/UL (ref 1.2–7.7)
NEUTROPHILS NFR BLD AUTO: 75.1 %
NRBC BLD-RTO: 0 /100 WBCS (ref 0–0)
PLATELET # BLD AUTO: 273 X10*3/UL (ref 150–450)
POTASSIUM SERPL-SCNC: 3.7 MMOL/L (ref 3.5–5.3)
PROT SERPL-MCNC: 8.2 G/DL (ref 6.4–8.2)
RBC # BLD AUTO: 5.61 X10*6/UL (ref 4–5.2)
SODIUM SERPL-SCNC: 132 MMOL/L (ref 136–145)
WBC # BLD AUTO: 15.5 X10*3/UL (ref 4.4–11.3)

## 2025-05-06 PROCEDURE — 36415 COLL VENOUS BLD VENIPUNCTURE: CPT

## 2025-05-06 PROCEDURE — 85652 RBC SED RATE AUTOMATED: CPT

## 2025-05-06 PROCEDURE — 2060000001 HC INTERMEDIATE ICU ROOM DAILY

## 2025-05-06 PROCEDURE — 87077 CULTURE AEROBIC IDENTIFY: CPT | Mod: WESLAB

## 2025-05-06 PROCEDURE — 87040 BLOOD CULTURE FOR BACTERIA: CPT | Mod: WESLAB

## 2025-05-06 PROCEDURE — 99291 CRITICAL CARE FIRST HOUR: CPT

## 2025-05-06 PROCEDURE — 86140 C-REACTIVE PROTEIN: CPT

## 2025-05-06 PROCEDURE — 82947 ASSAY GLUCOSE BLOOD QUANT: CPT

## 2025-05-06 PROCEDURE — 96366 THER/PROPH/DIAG IV INF ADDON: CPT

## 2025-05-06 PROCEDURE — 83605 ASSAY OF LACTIC ACID: CPT

## 2025-05-06 PROCEDURE — 83036 HEMOGLOBIN GLYCOSYLATED A1C: CPT | Mod: WESLAB | Performed by: STUDENT IN AN ORGANIZED HEALTH CARE EDUCATION/TRAINING PROGRAM

## 2025-05-06 PROCEDURE — 2500000004 HC RX 250 GENERAL PHARMACY W/ HCPCS (ALT 636 FOR OP/ED): Mod: JZ | Performed by: STUDENT IN AN ORGANIZED HEALTH CARE EDUCATION/TRAINING PROGRAM

## 2025-05-06 PROCEDURE — 2500000002 HC RX 250 W HCPCS SELF ADMINISTERED DRUGS (ALT 637 FOR MEDICARE OP, ALT 636 FOR OP/ED): Performed by: STUDENT IN AN ORGANIZED HEALTH CARE EDUCATION/TRAINING PROGRAM

## 2025-05-06 PROCEDURE — 80053 COMPREHEN METABOLIC PANEL: CPT

## 2025-05-06 PROCEDURE — 96365 THER/PROPH/DIAG IV INF INIT: CPT

## 2025-05-06 PROCEDURE — 2500000001 HC RX 250 WO HCPCS SELF ADMINISTERED DRUGS (ALT 637 FOR MEDICARE OP): Performed by: STUDENT IN AN ORGANIZED HEALTH CARE EDUCATION/TRAINING PROGRAM

## 2025-05-06 PROCEDURE — 99223 1ST HOSP IP/OBS HIGH 75: CPT | Performed by: STUDENT IN AN ORGANIZED HEALTH CARE EDUCATION/TRAINING PROGRAM

## 2025-05-06 PROCEDURE — 96367 TX/PROPH/DG ADDL SEQ IV INF: CPT

## 2025-05-06 PROCEDURE — 99291 CRITICAL CARE FIRST HOUR: CPT | Mod: 25

## 2025-05-06 PROCEDURE — 85025 COMPLETE CBC W/AUTO DIFF WBC: CPT

## 2025-05-06 PROCEDURE — 2500000004 HC RX 250 GENERAL PHARMACY W/ HCPCS (ALT 636 FOR OP/ED)

## 2025-05-06 PROCEDURE — 73610 X-RAY EXAM OF ANKLE: CPT | Mod: RT

## 2025-05-06 PROCEDURE — 73610 X-RAY EXAM OF ANKLE: CPT | Mod: RIGHT SIDE | Performed by: RADIOLOGY

## 2025-05-06 RX ORDER — INSULIN LISPRO 100 [IU]/ML
0-15 INJECTION, SOLUTION INTRAVENOUS; SUBCUTANEOUS
Status: DISCONTINUED | OUTPATIENT
Start: 2025-05-06 | End: 2025-05-14 | Stop reason: HOSPADM

## 2025-05-06 RX ORDER — VENLAFAXINE HYDROCHLORIDE 150 MG/1
150 CAPSULE, EXTENDED RELEASE ORAL DAILY
Status: DISCONTINUED | OUTPATIENT
Start: 2025-05-06 | End: 2025-05-14 | Stop reason: HOSPADM

## 2025-05-06 RX ORDER — PANTOPRAZOLE SODIUM 40 MG/10ML
40 INJECTION, POWDER, LYOPHILIZED, FOR SOLUTION INTRAVENOUS
Status: DISCONTINUED | OUTPATIENT
Start: 2025-05-07 | End: 2025-05-14 | Stop reason: HOSPADM

## 2025-05-06 RX ORDER — ONDANSETRON HYDROCHLORIDE 2 MG/ML
4 INJECTION, SOLUTION INTRAVENOUS EVERY 8 HOURS PRN
Status: DISCONTINUED | OUTPATIENT
Start: 2025-05-06 | End: 2025-05-14 | Stop reason: HOSPADM

## 2025-05-06 RX ORDER — ACETAMINOPHEN 325 MG/1
650 TABLET ORAL EVERY 4 HOURS PRN
Status: DISCONTINUED | OUTPATIENT
Start: 2025-05-06 | End: 2025-05-14 | Stop reason: HOSPADM

## 2025-05-06 RX ORDER — INSULIN GLARGINE 100 [IU]/ML
30 INJECTION, SOLUTION SUBCUTANEOUS NIGHTLY
Status: DISCONTINUED | OUTPATIENT
Start: 2025-05-06 | End: 2025-05-06

## 2025-05-06 RX ORDER — PROPRANOLOL HYDROCHLORIDE 20 MG/1
20 TABLET ORAL 2 TIMES DAILY
Status: DISCONTINUED | OUTPATIENT
Start: 2025-05-06 | End: 2025-05-14 | Stop reason: HOSPADM

## 2025-05-06 RX ORDER — SIMVASTATIN 20 MG/1
20 TABLET, FILM COATED ORAL NIGHTLY
Status: DISCONTINUED | OUTPATIENT
Start: 2025-05-06 | End: 2025-05-14 | Stop reason: HOSPADM

## 2025-05-06 RX ORDER — DEXTROSE 50 % IN WATER (D50W) INTRAVENOUS SYRINGE
12.5
Status: DISCONTINUED | OUTPATIENT
Start: 2025-05-06 | End: 2025-05-14 | Stop reason: HOSPADM

## 2025-05-06 RX ORDER — VANCOMYCIN 1.5 G/300ML
1500 INJECTION, SOLUTION INTRAVENOUS ONCE
Status: COMPLETED | OUTPATIENT
Start: 2025-05-06 | End: 2025-05-06

## 2025-05-06 RX ORDER — ACETAMINOPHEN 160 MG/5ML
650 SOLUTION ORAL EVERY 4 HOURS PRN
Status: DISCONTINUED | OUTPATIENT
Start: 2025-05-06 | End: 2025-05-14 | Stop reason: HOSPADM

## 2025-05-06 RX ORDER — ONDANSETRON 4 MG/1
4 TABLET, ORALLY DISINTEGRATING ORAL EVERY 8 HOURS PRN
Status: DISCONTINUED | OUTPATIENT
Start: 2025-05-06 | End: 2025-05-14 | Stop reason: HOSPADM

## 2025-05-06 RX ORDER — PANTOPRAZOLE SODIUM 40 MG/1
40 TABLET, DELAYED RELEASE ORAL
Status: DISCONTINUED | OUTPATIENT
Start: 2025-05-07 | End: 2025-05-14 | Stop reason: HOSPADM

## 2025-05-06 RX ORDER — ARIPIPRAZOLE 5 MG/1
10 TABLET ORAL NIGHTLY
Status: DISCONTINUED | OUTPATIENT
Start: 2025-05-06 | End: 2025-05-14 | Stop reason: HOSPADM

## 2025-05-06 RX ORDER — ACETAMINOPHEN 650 MG/1
650 SUPPOSITORY RECTAL EVERY 4 HOURS PRN
Status: DISCONTINUED | OUTPATIENT
Start: 2025-05-06 | End: 2025-05-14 | Stop reason: HOSPADM

## 2025-05-06 RX ORDER — POLYETHYLENE GLYCOL 3350 17 G/17G
17 POWDER, FOR SOLUTION ORAL DAILY
Status: DISCONTINUED | OUTPATIENT
Start: 2025-05-07 | End: 2025-05-14 | Stop reason: HOSPADM

## 2025-05-06 RX ORDER — VANCOMYCIN HYDROCHLORIDE 1 G/20ML
INJECTION, POWDER, LYOPHILIZED, FOR SOLUTION INTRAVENOUS DAILY PRN
Status: DISCONTINUED | OUTPATIENT
Start: 2025-05-06 | End: 2025-05-09

## 2025-05-06 RX ORDER — IBUPROFEN 200 MG
600 TABLET ORAL EVERY 6 HOURS PRN
COMMUNITY
End: 2025-05-14 | Stop reason: HOSPADM

## 2025-05-06 RX ORDER — INSULIN GLARGINE 100 [IU]/ML
40 INJECTION, SOLUTION SUBCUTANEOUS NIGHTLY
Status: DISCONTINUED | OUTPATIENT
Start: 2025-05-06 | End: 2025-05-07

## 2025-05-06 RX ORDER — ENOXAPARIN SODIUM 100 MG/ML
40 INJECTION SUBCUTANEOUS EVERY 24 HOURS
Status: DISCONTINUED | OUTPATIENT
Start: 2025-05-06 | End: 2025-05-14 | Stop reason: HOSPADM

## 2025-05-06 RX ORDER — DEXTROSE 50 % IN WATER (D50W) INTRAVENOUS SYRINGE
25
Status: DISCONTINUED | OUTPATIENT
Start: 2025-05-06 | End: 2025-05-14 | Stop reason: HOSPADM

## 2025-05-06 RX ORDER — DOXEPIN HYDROCHLORIDE 50 MG/1
100 CAPSULE ORAL NIGHTLY
Status: DISCONTINUED | OUTPATIENT
Start: 2025-05-06 | End: 2025-05-14 | Stop reason: HOSPADM

## 2025-05-06 RX ADMIN — INSULIN LISPRO 15 UNITS: 100 INJECTION, SOLUTION INTRAVENOUS; SUBCUTANEOUS at 17:43

## 2025-05-06 RX ADMIN — SIMVASTATIN 20 MG: 20 TABLET, FILM COATED ORAL at 20:22

## 2025-05-06 RX ADMIN — ARIPIPRAZOLE 10 MG: 10 TABLET ORAL at 20:20

## 2025-05-06 RX ADMIN — DOXEPIN HYDROCHLORIDE 100 MG: 50 CAPSULE ORAL at 21:02

## 2025-05-06 RX ADMIN — PROPRANOLOL HYDROCHLORIDE 20 MG: 20 TABLET ORAL at 21:04

## 2025-05-06 RX ADMIN — VANCOMYCIN 1.5 G: 1.5 INJECTION, SOLUTION INTRAVENOUS at 15:50

## 2025-05-06 RX ADMIN — SODIUM CHLORIDE 1848 ML: 900 INJECTION, SOLUTION INTRAVENOUS at 16:32

## 2025-05-06 RX ADMIN — ACETAMINOPHEN 650 MG: 325 TABLET ORAL at 22:29

## 2025-05-06 RX ADMIN — PIPERACILLIN SODIUM AND TAZOBACTAM SODIUM 3.38 G: 3; .375 INJECTION, SOLUTION INTRAVENOUS at 15:50

## 2025-05-06 RX ADMIN — INSULIN GLARGINE 40 UNITS: 100 INJECTION, SOLUTION SUBCUTANEOUS at 21:04

## 2025-05-06 RX ADMIN — PIPERACILLIN SODIUM AND TAZOBACTAM SODIUM 3.38 G: 3; .375 INJECTION, SOLUTION INTRAVENOUS at 21:47

## 2025-05-06 RX ADMIN — ENOXAPARIN SODIUM 40 MG: 40 INJECTION SUBCUTANEOUS at 20:22

## 2025-05-06 SDOH — SOCIAL STABILITY: SOCIAL INSECURITY: WITHIN THE LAST YEAR, HAVE YOU BEEN AFRAID OF YOUR PARTNER OR EX-PARTNER?: NO

## 2025-05-06 SDOH — SOCIAL STABILITY: SOCIAL NETWORK: HOW OFTEN DO YOU ATTEND CHURCH OR RELIGIOUS SERVICES?: NEVER

## 2025-05-06 SDOH — SOCIAL STABILITY: SOCIAL INSECURITY
WITHIN THE LAST YEAR, HAVE YOU BEEN RAPED OR FORCED TO HAVE ANY KIND OF SEXUAL ACTIVITY BY YOUR PARTNER OR EX-PARTNER?: NO

## 2025-05-06 SDOH — HEALTH STABILITY: MENTAL HEALTH: HOW OFTEN DO YOU HAVE A DRINK CONTAINING ALCOHOL?: MONTHLY OR LESS

## 2025-05-06 SDOH — ECONOMIC STABILITY: INCOME INSECURITY: IN THE PAST 12 MONTHS HAS THE ELECTRIC, GAS, OIL, OR WATER COMPANY THREATENED TO SHUT OFF SERVICES IN YOUR HOME?: NO

## 2025-05-06 SDOH — HEALTH STABILITY: MENTAL HEALTH
DO YOU FEEL STRESS - TENSE, RESTLESS, NERVOUS, OR ANXIOUS, OR UNABLE TO SLEEP AT NIGHT BECAUSE YOUR MIND IS TROUBLED ALL THE TIME - THESE DAYS?: TO SOME EXTENT

## 2025-05-06 SDOH — HEALTH STABILITY: MENTAL HEALTH: HOW MANY DRINKS CONTAINING ALCOHOL DO YOU HAVE ON A TYPICAL DAY WHEN YOU ARE DRINKING?: 1 OR 2

## 2025-05-06 SDOH — ECONOMIC STABILITY: FOOD INSECURITY: WITHIN THE PAST 12 MONTHS, THE FOOD YOU BOUGHT JUST DIDN'T LAST AND YOU DIDN'T HAVE MONEY TO GET MORE.: NEVER TRUE

## 2025-05-06 SDOH — SOCIAL STABILITY: SOCIAL INSECURITY: WITHIN THE LAST YEAR, HAVE YOU BEEN HUMILIATED OR EMOTIONALLY ABUSED IN OTHER WAYS BY YOUR PARTNER OR EX-PARTNER?: NO

## 2025-05-06 SDOH — SOCIAL STABILITY: SOCIAL NETWORK: HOW OFTEN DO YOU GET TOGETHER WITH FRIENDS OR RELATIVES?: NEVER

## 2025-05-06 SDOH — SOCIAL STABILITY: SOCIAL INSECURITY: ARE YOU MARRIED, WIDOWED, DIVORCED, SEPARATED, NEVER MARRIED, OR LIVING WITH A PARTNER?: DIVORCED

## 2025-05-06 SDOH — HEALTH STABILITY: MENTAL HEALTH: HOW OFTEN DO YOU HAVE SIX OR MORE DRINKS ON ONE OCCASION?: NEVER

## 2025-05-06 SDOH — SOCIAL STABILITY: SOCIAL NETWORK: HOW OFTEN DO YOU ATTEND MEETINGS OF THE CLUBS OR ORGANIZATIONS YOU BELONG TO?: NEVER

## 2025-05-06 SDOH — HEALTH STABILITY: PHYSICAL HEALTH
HOW OFTEN DO YOU NEED TO HAVE SOMEONE HELP YOU WHEN YOU READ INSTRUCTIONS, PAMPHLETS, OR OTHER WRITTEN MATERIAL FROM YOUR DOCTOR OR PHARMACY?: NEVER

## 2025-05-06 SDOH — SOCIAL STABILITY: SOCIAL INSECURITY: ARE YOU OR HAVE YOU BEEN THREATENED OR ABUSED PHYSICALLY, EMOTIONALLY, OR SEXUALLY BY ANYONE?: NO

## 2025-05-06 SDOH — ECONOMIC STABILITY: HOUSING INSECURITY: IN THE PAST 12 MONTHS, HOW MANY TIMES HAVE YOU MOVED WHERE YOU WERE LIVING?: 0

## 2025-05-06 SDOH — SOCIAL STABILITY: SOCIAL INSECURITY: HAVE YOU HAD ANY THOUGHTS OF HARMING ANYONE ELSE?: NO

## 2025-05-06 SDOH — ECONOMIC STABILITY: HOUSING INSECURITY: IN THE LAST 12 MONTHS, WAS THERE A TIME WHEN YOU WERE NOT ABLE TO PAY THE MORTGAGE OR RENT ON TIME?: NO

## 2025-05-06 SDOH — SOCIAL STABILITY: SOCIAL INSECURITY: DO YOU FEEL ANYONE HAS EXPLOITED OR TAKEN ADVANTAGE OF YOU FINANCIALLY OR OF YOUR PERSONAL PROPERTY?: NO

## 2025-05-06 SDOH — ECONOMIC STABILITY: FOOD INSECURITY: WITHIN THE PAST 12 MONTHS, YOU WORRIED THAT YOUR FOOD WOULD RUN OUT BEFORE YOU GOT THE MONEY TO BUY MORE.: NEVER TRUE

## 2025-05-06 SDOH — ECONOMIC STABILITY: FOOD INSECURITY: HOW HARD IS IT FOR YOU TO PAY FOR THE VERY BASICS LIKE FOOD, HOUSING, MEDICAL CARE, AND HEATING?: NOT VERY HARD

## 2025-05-06 SDOH — SOCIAL STABILITY: SOCIAL INSECURITY: HAS ANYONE EVER THREATENED TO HURT YOUR FAMILY OR YOUR PETS?: NO

## 2025-05-06 SDOH — HEALTH STABILITY: PHYSICAL HEALTH: ON AVERAGE, HOW MANY MINUTES DO YOU ENGAGE IN EXERCISE AT THIS LEVEL?: 0 MIN

## 2025-05-06 SDOH — ECONOMIC STABILITY: HOUSING INSECURITY: AT ANY TIME IN THE PAST 12 MONTHS, WERE YOU HOMELESS OR LIVING IN A SHELTER (INCLUDING NOW)?: NO

## 2025-05-06 SDOH — SOCIAL STABILITY: SOCIAL NETWORK
DO YOU BELONG TO ANY CLUBS OR ORGANIZATIONS SUCH AS CHURCH GROUPS, UNIONS, FRATERNAL OR ATHLETIC GROUPS, OR SCHOOL GROUPS?: NO

## 2025-05-06 SDOH — SOCIAL STABILITY: SOCIAL INSECURITY: WERE YOU ABLE TO COMPLETE ALL THE BEHAVIORAL HEALTH SCREENINGS?: YES

## 2025-05-06 SDOH — SOCIAL STABILITY: SOCIAL INSECURITY: HAVE YOU HAD THOUGHTS OF HARMING ANYONE ELSE?: NO

## 2025-05-06 SDOH — HEALTH STABILITY: PHYSICAL HEALTH: ON AVERAGE, HOW MANY DAYS PER WEEK DO YOU ENGAGE IN MODERATE TO STRENUOUS EXERCISE (LIKE A BRISK WALK)?: 0 DAYS

## 2025-05-06 SDOH — SOCIAL STABILITY: SOCIAL NETWORK: IN A TYPICAL WEEK, HOW MANY TIMES DO YOU TALK ON THE PHONE WITH FAMILY, FRIENDS, OR NEIGHBORS?: ONCE A WEEK

## 2025-05-06 SDOH — SOCIAL STABILITY: SOCIAL INSECURITY: DO YOU FEEL UNSAFE GOING BACK TO THE PLACE WHERE YOU ARE LIVING?: NO

## 2025-05-06 SDOH — ECONOMIC STABILITY: TRANSPORTATION INSECURITY: IN THE PAST 12 MONTHS, HAS LACK OF TRANSPORTATION KEPT YOU FROM MEDICAL APPOINTMENTS OR FROM GETTING MEDICATIONS?: NO

## 2025-05-06 SDOH — SOCIAL STABILITY: SOCIAL INSECURITY: DOES ANYONE TRY TO KEEP YOU FROM HAVING/CONTACTING OTHER FRIENDS OR DOING THINGS OUTSIDE YOUR HOME?: NO

## 2025-05-06 SDOH — SOCIAL STABILITY: SOCIAL INSECURITY: ARE THERE ANY APPARENT SIGNS OF INJURIES/BEHAVIORS THAT COULD BE RELATED TO ABUSE/NEGLECT?: NO

## 2025-05-06 SDOH — SOCIAL STABILITY: SOCIAL INSECURITY: ABUSE: ADULT

## 2025-05-06 ASSESSMENT — COGNITIVE AND FUNCTIONAL STATUS - GENERAL
WALKING IN HOSPITAL ROOM: A LITTLE
MOBILITY SCORE: 22
PATIENT BASELINE BEDBOUND: NO
DAILY ACTIVITIY SCORE: 24
CLIMB 3 TO 5 STEPS WITH RAILING: A LITTLE

## 2025-05-06 ASSESSMENT — ACTIVITIES OF DAILY LIVING (ADL)
PATIENT'S MEMORY ADEQUATE TO SAFELY COMPLETE DAILY ACTIVITIES?: YES
JUDGMENT_ADEQUATE_SAFELY_COMPLETE_DAILY_ACTIVITIES: YES
GROOMING: INDEPENDENT
ADEQUATE_TO_COMPLETE_ADL: YES
WALKS IN HOME: INDEPENDENT
LACK_OF_TRANSPORTATION: NO
LACK_OF_TRANSPORTATION: NO
HEARING - LEFT EAR: FUNCTIONAL
BATHING: INDEPENDENT
FEEDING YOURSELF: INDEPENDENT
DRESSING YOURSELF: INDEPENDENT
HEARING - RIGHT EAR: FUNCTIONAL
TOILETING: INDEPENDENT

## 2025-05-06 ASSESSMENT — ENCOUNTER SYMPTOMS
WOUND: 1
DIARRHEA: 0
CHILLS: 1
DIFFICULTY URINATING: 0
APPETITE CHANGE: 0
FEVER: 0
VOMITING: 0
NAUSEA: 0
TROUBLE SWALLOWING: 0
CONFUSION: 0
SHORTNESS OF BREATH: 0
ABDOMINAL PAIN: 0

## 2025-05-06 ASSESSMENT — LIFESTYLE VARIABLES
AUDIT-C TOTAL SCORE: 1
AUDIT-C TOTAL SCORE: 1
SUBSTANCE_ABUSE_PAST_12_MONTHS: NO
SKIP TO QUESTIONS 9-10: 1
HOW MANY STANDARD DRINKS CONTAINING ALCOHOL DO YOU HAVE ON A TYPICAL DAY: 1 OR 2
SKIP TO QUESTIONS 9-10: 1
PRESCIPTION_ABUSE_PAST_12_MONTHS: NO
HOW OFTEN DO YOU HAVE A DRINK CONTAINING ALCOHOL: MONTHLY OR LESS
HOW OFTEN DO YOU HAVE 6 OR MORE DRINKS ON ONE OCCASION: NEVER
AUDIT-C TOTAL SCORE: 1

## 2025-05-06 ASSESSMENT — PAIN SCALES - GENERAL
PAINLEVEL_OUTOF10: 0 - NO PAIN
PAINLEVEL_OUTOF10: 0 - NO PAIN
PAINLEVEL_OUTOF10: 2
PAINLEVEL_OUTOF10: 0 - NO PAIN

## 2025-05-06 ASSESSMENT — PAIN - FUNCTIONAL ASSESSMENT
PAIN_FUNCTIONAL_ASSESSMENT: 0-10
PAIN_FUNCTIONAL_ASSESSMENT: FLACC (FACE, LEGS, ACTIVITY, CRY, CONSOLABILITY)

## 2025-05-06 ASSESSMENT — PAIN DESCRIPTION - ORIENTATION: ORIENTATION: RIGHT;LEFT

## 2025-05-06 ASSESSMENT — PAIN DESCRIPTION - DESCRIPTORS: DESCRIPTORS: ACHING

## 2025-05-06 ASSESSMENT — COLUMBIA-SUICIDE SEVERITY RATING SCALE - C-SSRS
6. HAVE YOU EVER DONE ANYTHING, STARTED TO DO ANYTHING, OR PREPARED TO DO ANYTHING TO END YOUR LIFE?: NO
1. IN THE PAST MONTH, HAVE YOU WISHED YOU WERE DEAD OR WISHED YOU COULD GO TO SLEEP AND NOT WAKE UP?: NO
2. HAVE YOU ACTUALLY HAD ANY THOUGHTS OF KILLING YOURSELF?: NO
2. HAVE YOU ACTUALLY HAD ANY THOUGHTS OF KILLING YOURSELF?: NO
1. IN THE PAST MONTH, HAVE YOU WISHED YOU WERE DEAD OR WISHED YOU COULD GO TO SLEEP AND NOT WAKE UP?: NO
6. HAVE YOU EVER DONE ANYTHING, STARTED TO DO ANYTHING, OR PREPARED TO DO ANYTHING TO END YOUR LIFE?: NO

## 2025-05-06 ASSESSMENT — PAIN DESCRIPTION - LOCATION: LOCATION: HEAD

## 2025-05-06 NOTE — ASSESSMENT & PLAN NOTE
Patient states she was previously on lantus 80 units nightly but has been off insulin for awhile as she has not followed up with her endocrinologist in a few years  Will start lantus 40 units night and high dose SSI, uptitrate as needed  Continue home januvia  Hold home [facility]  Hypoglycemia protocol

## 2025-05-06 NOTE — ASSESSMENT & PLAN NOTE
Meets sepsis criteria with tachycardia, leukocytosis, and lactic acidosis with source being right foot infection   Trend lactic   Management as above  Follow up wound and blood cultures

## 2025-05-06 NOTE — ED PROVIDER NOTES
HPI   Chief Complaint   Patient presents with    Diabetic Ulcer       HPI  49-year-old female with history of type 2 diabetes presents to ER for evaluation of a right lower extremity ankle wound that has been worsening over the past 3 weeks.  Patient states she developed a wound 3 weeks ago and it has gotten bigger and now has a foul odor to it and she is concerned it is infected.  She states that she did see her podiatrist Dr. Aponte who gave her a course of antibiotics but she states that the wound still continues to get worse.  She endorses chills but denies fevers.  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  She otherwise has no complaints.      Patient History   Medical History[1]  Surgical History[2]  Family History[3]  Social History[4]    Physical Exam   ED Triage Vitals [05/06/25 1402]   Temperature Heart Rate Respirations BP   37.4 °C (99.3 °F) (!) 107 20 110/79      Pulse Ox Temp Source Heart Rate Source Patient Position   100 % Oral -- --      BP Location FiO2 (%)     -- --       Physical Exam  Vitals and nursing note reviewed.   Constitutional:       General: She is not in acute distress.     Appearance: She is well-developed.      Comments: Nontoxic-appearing resting in hospital bed   HENT:      Head: Normocephalic and atraumatic.   Eyes:      Conjunctiva/sclera: Conjunctivae normal.   Cardiovascular:      Rate and Rhythm: Regular rhythm. Tachycardia present.      Heart sounds: No murmur heard.  Pulmonary:      Effort: Pulmonary effort is normal. No respiratory distress.      Breath sounds: Normal breath sounds.   Abdominal:      Palpations: Abdomen is soft.      Tenderness: There is no abdominal tenderness.   Musculoskeletal:      Cervical back: Neck supple.      Comments: Ulcerative necrotic base foul-smelling wound to the lateral malleolus approximately 5 cm in diameter circumferential no significant surrounding erythema or  warmth   Skin:     General: Skin is warm and dry.      Capillary Refill: Capillary refill takes less than 2 seconds.   Neurological:      Mental Status: She is alert.   Psychiatric:         Mood and Affect: Mood normal.           ED Course & MDM   Diagnoses as of 05/06/25 1940   Wound infection                 No data recorded     [place] Coma Scale Score: 15 (05/06/25 1403 : Lisha Porter, RN)                           Medical Decision Making  Parts of this chart have been completed using voice recognition software. Please excuse any errors of transcription.  My thought process and reason for plan has been formulated from the time that I saw the patient until the time of disposition and is not specific to one specific moment during their visit and furthermore my MDM encompasses this entire chart and not only this text box.      HPI: Detailed above.    Exam: A medically appropriate exam performed, outlined above, given the known history and presentation.    History obtained from: Patient    Medications given during visit:  Medications   insulin lispro injection 0-15 Units (15 Units subcutaneous Given 5/6/25 1743)   ARIPiprazole (Abilify) tablet 10 mg (has no administration in time range)   doxepin (SINEquan) capsule 100 mg (has no administration in time range)   propranolol (Inderal) tablet 20 mg (has no administration in time range)   simvastatin (Zocor) tablet 20 mg (has no administration in time range)   SITagliptin phosphate (Januvia) tablet 25 mg (has no administration in time range)   venlafaxine XR (Effexor-XR) 24 hr capsule 150 mg (has no administration in time range)   enoxaparin (Lovenox) syringe 40 mg (has no administration in time range)   pantoprazole (ProtoNix) EC tablet 40 mg (has no administration in time range)     Or   pantoprazole (Protonix) injection 40 mg (has no administration in time range)   acetaminophen (Tylenol) tablet 650 mg (has no administration in time range)     Or   acetaminophen  (Tylenol) oral liquid 650 mg (has no administration in time range)     Or   acetaminophen (Tylenol) suppository 650 mg (has no administration in time range)   ondansetron ODT (Zofran-ODT) disintegrating tablet 4 mg (has no administration in time range)     Or   ondansetron (Zofran) injection 4 mg (has no administration in time range)   polyethylene glycol (Glycolax, Miralax) packet 17 g (has no administration in time range)   vancomycin (Vancocin) pharmacy to dose - pharmacy monitoring (has no administration in time range)   piperacillin-tazobactam (Zosyn) 3.375 g in dextrose (iso) IV 50 mL (has no administration in time range)   glucagon (Glucagen) injection 1 mg (has no administration in time range)   dextrose 50 % injection 25 g (has no administration in time range)   glucagon (Glucagen) injection 1 mg (has no administration in time range)   dextrose 50 % injection 12.5 g (has no administration in time range)   vancomycin (Vancocin) 1,250 mg in dextrose 5% 250 mL IV (has no administration in time range)   insulin glargine (Lantus) injection 40 Units (has no administration in time range)   vancomycin (Xellia) 1.5 g in diluent combination  mL (0 g intravenous Stopped 5/6/25 1730)   piperacillin-tazobactam (Zosyn) 3.375 g in dextrose (iso) IV 50 mL (0 g intravenous Stopped 5/6/25 1628)   sodium chloride 0.9 % bolus 1,848 mL (0 mL intravenous Stopped 5/6/25 1858)        Diagnostic/tests  Labs Reviewed   CBC WITH AUTO DIFFERENTIAL - Abnormal       Result Value    WBC 15.5 (*)     nRBC 0.0      RBC 5.61 (*)     Hemoglobin 16.8 (*)     Hematocrit 49.1 (*)     MCV 88      MCH 29.9      MCHC 34.2      RDW 12.5      Platelets 273      Neutrophils % 75.1      Immature Granulocytes %, Automated 0.4      Lymphocytes % 17.3      Monocytes % 5.9      Eosinophils % 1.0      Basophils % 0.3      Neutrophils Absolute 11.63 (*)     Immature Granulocytes Absolute, Automated 0.06      Lymphocytes Absolute 2.68      Monocytes  Absolute 0.91      Eosinophils Absolute 0.15      Basophils Absolute 0.05     COMPREHENSIVE METABOLIC PANEL - Abnormal    Glucose 532 (*)     Sodium 132 (*)     Potassium 3.7      Chloride 96 (*)     Bicarbonate 25      Anion Gap 15      Urea Nitrogen 16      Creatinine 0.84      eGFR 84      Calcium 9.2      Albumin 4.1      Alkaline Phosphatase 101      Total Protein 8.2      AST 12      Bilirubin, Total 0.6      ALT 13     LACTATE - Abnormal    Lactate 2.3 (*)     Narrative:     Venipuncture immediately after or during the administration of Metamizole may lead to falsely low results. Testing should be performed immediately prior to Metamizole dosing.   SEDIMENTATION RATE, AUTOMATED - Abnormal    Sedimentation Rate 41 (*)    C-REACTIVE PROTEIN - Abnormal    C-Reactive Protein 2.29 (*)    LACTATE - Abnormal    Lactate 2.6 (*)     Narrative:     Venipuncture immediately after or during the administration of Metamizole may lead to falsely low results. Testing should be performed immediately prior to Metamizole dosing.   POCT GLUCOSE - Abnormal    POCT Glucose 455 (*)    POCT GLUCOSE - Abnormal    POCT Glucose 513 (*)    BLOOD CULTURE - Normal    Blood Culture Loaded on Instrument - Culture in progress     BLOOD CULTURE - Normal    Blood Culture Loaded on Instrument - Culture in progress     TISSUE/WOUND CULTURE/SMEAR   HEMOGLOBIN A1C   BASIC METABOLIC PANEL   CBC   VANCOMYCIN   POCT GLUCOSE METER      XR ankle right 3+ views   Final Result   No acute osseous involvement. Soft tissue swelling with deformity   consistent with the patient's wound.             MACRO:   None        Signed by: Tanner Diaz 5/6/2025 3:59 PM   Dictation workstation:   [id number]           Considerations/further MDM:  Patient is a 49-year-old female with history of diabetic medication noncompliance presenting for evaluation of right foot wound    Differential diagnosis associated with the patient presentation includes: Wound infection  versus osteomyelitis versus sepsis    Patient awake alert nontoxic-appearing but tachycardic upon arrival.  She does have evidence of infectious source with foul-smelling discharge and wound to the right lower extremity.  Workup is consistent with leukocytosis of 15.5, elevated sed rate, elevated CRP as well as elevated glucose.  Patient was provided full 30 cc/kg sepsis bolus and started on vancomycin and Zosyn for therapy.  X-ray without evidence of osteomyelitis.  Patient admitted for further evaluation management of her diabetic wound infection.  She is updated on findings and agreeable with plans of care.      Procedure  Critical Care    Performed by: Lanie Cristina PA-C  Authorized by: Andrae Petty DO    Critical care provider statement:     Critical care time (minutes):  32    Critical care time was exclusive of:  Separately billable procedures and treating other patients    Critical care was necessary to treat or prevent imminent or life-threatening deterioration of the following conditions:  Sepsis    Critical care was time spent personally by me on the following activities:  Development of treatment plan with patient or surrogate, examination of patient, obtaining history from patient or surrogate, ordering and performing treatments and interventions, ordering and review of laboratory studies, ordering and review of radiographic studies and review of old charts    Care discussed with: admitting provider           [1]   Past Medical History:  Diagnosis Date    Anxiety and depression     Bipolar 1 disorder (Multi)     Bursitis of right hip 09/15/2023    Diabetes mellitus, type 2 (Multi)     Hyperlipidemia    [2]   Past Surgical History:  Procedure Laterality Date    AMPUTATION FOOT / TOE Left      SECTION, LOW TRANSVERSE       SECTION, LOW TRANSVERSE       SECTION, LOW TRANSVERSE  2005    CHOLECYSTECTOMY  2012   [3]   Family History  Problem Relation Name Age of Onset     Ovarian cancer Mother      Hypertension Father      Diabetes Father      Other (cholesterol issues) Father      No Known Problems Sister      No Known Problems Daughter      No Known Problems Son      No Known Problems Son      Ovarian cancer Maternal Grandmother      Breast cancer Father's Sister     [4]   Social History  Tobacco Use    Smoking status: Every Day     Current packs/day: 1.00     Average packs/day: 1 pack/day for 15.0 years (15.0 ttl pk-yrs)     Types: Cigarettes     Passive exposure: Current    Smokeless tobacco: Never   Vaping Use    Vaping status: Some Days    Substances: Nicotine   Substance Use Topics    Alcohol use: Not Currently    Drug use: Never        Lanie Cristina PA-C  05/06/25 1940

## 2025-05-06 NOTE — H&P
History Of Present Illness  Mary Pedroza is a 49 y.o. female hx of T2DM, HTN, tobacco use who presented with wound infection of the right foot. Patient states she noticed a wound on her right foot about 3 weeks ago. Over the next several days, she noticed that the wound was getting larger and started having drainage from it. She was concerned for developing infection so she contacted Dr. Aponte's office and was prescribed an antibiotic. She is not sure which antibiotic she took but reports taking it for about 2 weeks and finished the course about 1 week ago. States the antibiotic didn't really help and her wound continued to worsen which prompted her to come to the ED. She has hx of diabetic foot infection of the left foot with osteomyelitis and had left TMA on 3/7/25. Patient endorses chills but denies fever, SOB, CP, nausea, vomiting, abdominal pain, constipation, diarrhea. Reports smoking <1/2 pack of cigarettes a day. Denies daily alcohol use or illicit substance use.     In the ED, HR initially tachycardic at 107. /97. SpO2 100% on RA. Afebrile. Labs notable for Na 132, K 3.7, Cr 0.84, lactic acid 2.3 -> 2.6, CRP 2.29, WBC 15.5, Hgb 16.8, ESR 41, . XR right ankle showing soft tissue ulceration but no evidence of osteomyelitis. Wound culture and blood cultures obtained. Given sepsis IVF bolus, vanc x1, and zosyn x1.     Code status discussed with patient - FULL CODE     Past Medical History  She has a past medical history of Anxiety and depression, Bipolar 1 disorder (Multi), Bursitis of right hip (09/15/2023), Diabetes mellitus, type 2 (Multi), and Hyperlipidemia.    Surgical History  She has a past surgical history that includes Cholecystectomy (2012);  section, low transverse ();  section, low transverse ();  section, low transverse (); and Amputation foot / toe (Left).     Social History  She reports that she has been smoking cigarettes. She has a 15  pack-year smoking history. She has been exposed to tobacco smoke. She has never used smokeless tobacco. She reports that she does not currently use alcohol. She reports that she does not use drugs.    Family History  Family History[1]     Allergies  Patient has no known allergies.    Review of Systems   Constitutional:  Positive for chills. Negative for appetite change and fever.   HENT:  Negative for congestion and trouble swallowing.    Respiratory:  Negative for shortness of breath.    Cardiovascular:  Negative for chest pain.   Gastrointestinal:  Negative for abdominal pain, diarrhea, nausea and vomiting.   Genitourinary:  Negative for difficulty urinating.   Skin:  Positive for wound.   Psychiatric/Behavioral:  Negative for confusion.         Physical Exam  Constitutional:       General: She is not in acute distress.     Appearance: She is not toxic-appearing.   HENT:      Head: Normocephalic.      Mouth/Throat:      Pharynx: Oropharynx is clear.   Eyes:      General: No scleral icterus.  Cardiovascular:      Rate and Rhythm: Normal rate.   Pulmonary:      Effort: No respiratory distress.      Breath sounds: No wheezing.   Abdominal:      General: There is no distension.      Palpations: Abdomen is soft.   Musculoskeletal:      Right lower leg: Edema (mild nonpitting edema of the RLE) present.      Left lower leg: No edema.   Skin:     Comments: RLE with dressing in place   Neurological:      Mental Status: She is alert.          Last Recorded Vitals  /67 (BP Location: Left arm, Patient Position: Sitting)   Pulse 85   Temp 37.4 °C (99.3 °F) (Oral)   Resp 20   Wt 90.3 kg (199 lb)   SpO2 100%     Relevant Results           Assessment/Plan   Assessment & Plan  Wound infection  Diabetic foot infection (right)  Consult podiatry - follows with Dr. Aponte  Consult ID  Continue vanc/zosyn for now, defer antibiotics to ID  Follow up wound culture  Sepsis without acute organ dysfunction (Multi)  Meets sepsis  criteria with tachycardia, leukocytosis, and lactic acidosis with source being right foot infection   Trend lactic   Management as above  Follow up wound and blood cultures   Type 2 diabetes mellitus with hyperglycemia (Multi)  Patient states she was previously on lantus 80 units nightly but has been off insulin for awhile as she has not followed up with her endocrinologist in a few years  Will start lantus 40 units night and high dose SSI, uptitrate as needed  Continue home januvia  Hold home amaryl  Hypoglycemia protocol   Bipolar disorder  Continue home propranolol, abilify, doxepin, and effexor   Tobacco use  Recommend cessation  Patient declined NRT    Plan:  Admit to RNF  Consult ID and podiatry  Vanc/zosyn for now  Follow up wound and blood cultures   Dispo TBD pending podiatry evaluation and clinical course   FULL CODE       Laura Correa MD         [1]   Family History  Problem Relation Name Age of Onset    Ovarian cancer Mother      Hypertension Father      Diabetes Father      Other (cholesterol issues) Father      No Known Problems Sister      No Known Problems Daughter      No Known Problems Son      No Known Problems Son      Ovarian cancer Maternal Grandmother      Breast cancer Father's Sister

## 2025-05-06 NOTE — ASSESSMENT & PLAN NOTE
Diabetic foot infection (right)  Consult podiatry - follows with Dr. Aponte  Consult ID  Continue vanc/zosyn for now, defer antibiotics to ID  Follow up wound culture

## 2025-05-06 NOTE — PROGRESS NOTES
Pharmacy Medication History Review    Mary Pedroza is a 49 y.o. female. Pharmacy reviewed the patient's [id number] medications and allergies for accuracy.    Medications ADDED:  Ibuprofen 200mg   Medications CHANGED:  Lantus - not taking  Mounjaro 2.5mg - not taking   Mounjaro 5mg - not taking   Simvastatin 20mg - not taking   Medications REMOVED:   Sodium chloride IV   Ceftriaxone 2g  Doxycycline 100mg   Fenofibrate 145mg  Hydroxyzine 25mg  Mirtazapine 15mg   Naproxen 500mg   Sumatriptan 100mg      The list below reflects the updated PTA list. Comments regarding how patient may be taking medications differently can be found in the Admit Orders Activity  Prior to Admission Medications   Prescriptions Last Dose Informant   ARIPiprazole (Abilify) 10 mg tablet 2025 Self   Take one tablet (10mg) by mouth once daily.     FreeStyle Bridget 3 [place] misc Unknown Self   Sig: Use as instructed to check blood sugar   FreeStyle Bridget 3 Sensor device Unknown Self   Sig: Use daily and change every 14 (fourteen) days.   OneTouch Delica Plus Lancet 33 gauge misc Unknown Self   Sig: USE AS DIRECTED TO CHECK BLOOD SUGAR ONCE DAILY   OneTouch Ultra Test strip Unknown Self   Sig: USE AS DIRECTED TO CHECK BLOOD SUGAR TWICE A DAY   SITagliptin phosphate (Januvia) 25 mg tablet 2025 Self   Sig: TAKE 1 TABLET BY MOUTH ONCE DAILY AS DIRECTED   blood-glucose meter misc Unknown Self   Sig: One touch ultra glucose meter, use daily as directed   blood-glucose sensor (FreeStyle Bridget 3 Plus Sensor) device Unknown Self   Sig: Use every 15 days   doxepin (SINEquan) 100 mg capsule  Self   Si capsule (100 mg) once daily at bedtime.   glimepiride (Amaryl) 4 mg tablet 2025 Self   Sig: TAKE 1 TABLET BY MOUTH ONCE DAILY WITH BREAKFAST OR FIRST MAIN MEAL OF THE DAY   ibuprofen 200 mg tablet 2025 Self   Sig: Take 3 tablets (600 mg) by mouth every 6 hours prn   medroxyPROGESTERone (Depo-Provera) 150 mg/mL injection Past Month  "Self   Sig: Inject 1 mL (150 mg) into the muscle every 12 weeks.   pen needle, diabetic (BD Ultra-Fine Short Pen Needle) 31 gauge x \" needle Unknown Self   Sig: USE 1 PEN NEEDLE WITH LANTUS ONCE DAILY   propranolol (Inderal) 20 mg tablet 2025 Self   Si tablet (20 mg) 2 times a day.   venlafaxine XR (Effexor-XR) 150 mg 24 hr capsule 2025 Self      Facility-Administered Medications: None        The list below reflects the updated allergy list. Please review each documented allergy for additional clarification and justification.  Allergies  Reviewed by Diandra Garcia CPhT on 2025   No Known Allergies         Pharmacy has been updated to [facility].    Sources used to complete the med history include dispense history, PTA medication list, patient interview. Patient is a fair historian.    Below are additional concerns with the patient's PTA list.  None     Diandra Garcia CPhT-Adv  Please reach out via [place] Secure Chat for questions    "

## 2025-05-06 NOTE — Clinical Note
Vessel(s): right iliac artery, right SFA, right popliteal artery, right PTA and right dorsalis pedis artery. Injected with hand injections. Multiple views taken.

## 2025-05-06 NOTE — CONSULTS
Vancomycin Dosing by Pharmacy- INITIAL    Mary Pedroza is a 49 y.o. year old female who Pharmacy has been consulted for vancomycin dosing for other Diabetic Foot Infection. Based on the patient's indication and renal status this patient will be dosed based on a goal AUC of 400-600.     Renal function is currently stable.    Visit Vitals  /67 (BP Location: Left arm, Patient Position: Sitting)   Pulse 85   Temp 37.4 °C (99.3 °F) (Oral)   Resp 20        Lab Results   Component Value Date    CREATININE 0.84 2025    CREATININE 0.50 2024    CREATININE 0.60 2024    CREATININE 0.60 2024        Patient weight is as follows:   Vitals:    25 1402   Weight: 90.3 kg (199 lb)       Cultures:  No results found for the encounter in last 14 days.        No intake/output data recorded.  I/O during current shift:  No intake/output data recorded.    Temp (24hrs), Av.4 °C (99.3 °F), Min:37.4 °C (99.3 °F), Max:37.4 °C (99.3 °F)         Assessment/Plan     Patient has already been given a loading dose of 1,500 mg  @16:00.  Will initiate vancomycin maintenance, 1,250 mg every 12 hours.    This dosing regimen is predicted by [employer] to result in the following pharmacokinetic parameters:    Loading dose: 1.5g x1  @16:00  Regimen: 1250 mg IV every 12 hours.  Start time: 02:00 on 2025  Exposure target: AUC24 (range)400-600 mg/L.hr   [id number]: 509 mg/L.hr  AUC24,ss: 574 mg/L.hr  Probability of AUC24 > 400: 84 %  Ctrough,ss: 18.2 mg/L  Probability of Ctrough,ss > 20: 42 %    Follow-up level will be ordered on  at 05:00 unless clinically indicated sooner.  Will continue to monitor renal function daily while on vancomycin and order serum creatinine at least every 48 hours if not already ordered.  Follow for continued vancomycin needs, clinical response, and signs/symptoms of toxicity.       Gi Diallo, PharmD

## 2025-05-06 NOTE — ED PROVIDER NOTES
THIS IS MY [facility] SUPERVISORY AND SHARED VISIT NOTE:    I personally saw the patient and made/approved the management plan and take responsibility for the patient management.    History: Patient is a 49-year-old female who has a history of diabetes who presents with a chief complaint of a wound on her right lower extremity.  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     HEENT: Normocephalic, atraumatic. Extraocular muscles are intact. Pupils equal round and reactive to light.  CHEST: Nontender to palpation. Clear to auscultation bilaterally. No rales, rhonchi, or wheezing.   HEART: S1, S2. Regular rate and rhythm. No murmurs, gallops or rubs.  Strong and equal pulses in the extremities.   ABDOMEN: Soft,.  non-tender.  No rebound or guarding, bowel sounds normal x 4 quadrants  NEUROLOGICAL: Awake, alert and oriented x 3.   MSK: Wound to right foot    MDM: Patient seen and evaluated at bedside, patient is in no acute distress.  I will order a CBC, CMP, CRP, sedimentation rate, lactate, wound culture, x-ray, give the patient Banken Zosyn, normal saline. Differential diagnosis includes but is not limited to diabetic ulcer, osteomyelitis,  CBC does show a leukocytosis of 15.5, hemoglobin 16.8, hematocrit 49.1, sedimentation rate 41, CRP 2.29, CMP does show hyperglycemia with glucose 532, sodium 132, chloride 96, lactate 2.6, patient x-ray as below, patient given IV antibiotics and admitted to the general medicine team with podiatry consultation.    Diagnosis: Wound infection  Bedside PICC Imaging   Final Result      MR ankle right wo IV contrast   Final Result   Please see separate  report of the forefoot for other findings. This   report reflects the ankle and hindfoot)        1. Acute osteomyelitis of the lateral malleolus which involves the   distal fibula up to 5 cm proximal to the tip of the lateral   malleolus. This is due to a deep ulceration (3.7 cm x 3.4 cm)   overlying lateral malleolus that extends to the bone surface and may   be associated with a small abscess measuring 0.9 cm x 0.5 cm abutting   the lateral cortex of the lateral malleolus.        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        3. Fluid distention of the peroneal tendon sheath in the retro   malleolar groove and below the level of the lateral malleolus that   may be related to infectious tenosynovitis or reactive in nature, the   former favored.        4. Nonspecific effusions of the tibiotalar and talonavicular joints.   No evidence of bone marrow edema or replacement in the subchondral   bone. Findings are favored to be reactive in nature.        MACRO:   None.        Signed by: Cecilio Clarke 5/7/2025 10:07 PM   Dictation workstation:   [id number]      MR foot right wo IV contrast   Final Result   (Please see separate report of the hindfoot and ankle for findings   regarding these areas.).        No acute osteomyelitis involving the right forefoot.        Diffuse edema in the dorsal soft tissues that could relate to   cellulitis and/or venous/lymphatic insufficiency.        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        MACRO:   None.        Signed by: Cecilio Clarke 5/7/2025 10:13 PM   Dictation workstation:   [id number]      Vascular US PVR without exercise    Final Result      XR ankle right 3+ views   Final Result   No acute osseous involvement. Soft tissue swelling with deformity   consistent with the patient's wound.             MACRO:   None        Signed by: Tanner Diaz 5/6/2025 3:59 PM   Dictation workstation:   [id number]        Results for orders placed or performed during the hospital encounter of 05/06/25   POCT GLUCOSE    Collection Time: 05/06/25  2:09 PM   Result Value Ref Range    POCT Glucose 455 (H) 74 - 99 mg/dL   Blood Culture    Collection Time: 05/06/25  2:50 PM    Specimen: Peripheral Venipuncture; Blood culture   Result Value Ref Range    Blood Culture No growth at 4 days -  FINAL REPORT    Blood Culture    Collection Time: 05/06/25  2:50 PM    Specimen: Peripheral Venipuncture; Blood culture   Result Value Ref Range    Blood Culture No growth at 4 days -  FINAL REPORT    CBC and Auto Differential    Collection Time: 05/06/25  2:50 PM   Result Value Ref Range    WBC 15.5 (H) 4.4 - 11.3 x10*3/uL    nRBC 0.0 0.0 - 0.0 /100 WBCs    RBC 5.61 (H) 4.00 - 5.20 x10*6/uL    Hemoglobin 16.8 (H) 12.0 - 16.0 g/dL    Hematocrit 49.1 (H) 36.0 - 46.0 %    MCV 88 80 - 100 fL    MCH 29.9 26.0 - 34.0 pg    MCHC 34.2 32.0 - 36.0 g/dL    RDW 12.5 11.5 - 14.5 %    Platelets 273 150 - 450 x10*3/uL    Neutrophils % 75.1 40.0 - 80.0 %    Immature Granulocytes %, Automated 0.4 0.0 - 0.9 %    Lymphocytes % 17.3 13.0 - 44.0 %    Monocytes % 5.9 2.0 - 10.0 %    Eosinophils % 1.0 0.0 - 6.0 %    Basophils % 0.3 0.0 - 2.0 %    Neutrophils Absolute 11.63 (H) 1.20 - 7.70 x10*3/uL    Immature Granulocytes Absolute, Automated 0.06 0.00 - 0.70 x10*3/uL    Lymphocytes Absolute 2.68 1.20 - 4.80 x10*3/uL    Monocytes Absolute 0.91 0.10 - 1.00 x10*3/uL    Eosinophils Absolute 0.15 0.00 - 0.70 x10*3/uL    Basophils Absolute 0.05 0.00 - 0.10 x10*3/uL   Comprehensive metabolic panel    Collection Time: 05/06/25  2:50 PM   Result Value Ref Range    Glucose 532 (HH) 74 - 99 mg/dL     Sodium 132 (L) 136 - 145 mmol/L    Potassium 3.7 3.5 - 5.3 mmol/L    Chloride 96 (L) 98 - 107 mmol/L    Bicarbonate 25 21 - 32 mmol/L    Anion Gap 15 10 - 20 mmol/L    Urea Nitrogen 16 6 - 23 mg/dL    Creatinine 0.84 0.50 - 1.05 mg/dL    eGFR 84 >60 mL/min/1.73m*2    Calcium 9.2 8.6 - 10.3 mg/dL    Albumin 4.1 3.4 - 5.0 g/dL    Alkaline Phosphatase 101 33 - 110 U/L    Total Protein 8.2 6.4 - 8.2 g/dL    AST 12 9 - 39 U/L    Bilirubin, Total 0.6 0.0 - 1.2 mg/dL    ALT 13 7 - 45 U/L   Lactate    Collection Time: 05/06/25  2:50 PM   Result Value Ref Range    Lactate 2.3 (H) 0.4 - 2.0 mmol/L   Sedimentation rate, automated    Collection Time: 05/06/25  2:50 PM   Result Value Ref Range    Sedimentation Rate 41 (H) 0 - 30 mm/h   C-reactive protein    Collection Time: 05/06/25  2:50 PM   Result Value Ref Range    C-Reactive Protein 2.29 (H) <1.00 mg/dL   Hemoglobin A1c    Collection Time: 05/06/25  2:50 PM   Result Value Ref Range    Hemoglobin A1C 9.5 (H) See comment %    Estimated Average Glucose 226 Not Established mg/dL   Tissue/Wound Culture/Smear    Collection Time: 05/06/25  3:42 PM    Specimen: Wound/Tissue; Tissue/Biopsy   Result Value Ref Range    Tissue/Wound Culture/Smear (A)      (2+) Few Methicillin Susceptible Staphylococcus aureus (MSSA)    Tissue/Wound Culture/Smear (A)      (3+) Moderate Streptococcus agalactiae (Group B Streptococcus)    Tissue/Wound Culture/Smear       (4+) Abundant Mixed Gram-Positive and Gram-Negative Bacteria    Tissue/Wound Culture/Smear (4+) Abundant Mixed Anaerobic Bacteria     Beta Lactamase (Cefinase) Positive     Gram Stain No polymorphonuclear leukocytes seen (A)     Gram Stain (A)      (4+) Abundant Mixed Gram positive and Gram negative bacteria       Susceptibility    Methicillin Susceptible Staphylococcus aureus (MSSA) - MICROSCAN     Oxacillin  Susceptible ug/ml     Trimethoprim/Sulfamethoxazole  Susceptible ug/ml     Tetracycline  Susceptible ug/ml     Clindamycin   Susceptible ug/ml     Erythromycin  Susceptible ug/ml     Vancomycin  Susceptible ug/ml   Lactate    Collection Time: 05/06/25  5:37 PM   Result Value Ref Range    Lactate 2.6 (H) 0.4 - 2.0 mmol/L   POCT GLUCOSE    Collection Time: 05/06/25  5:39 PM   Result Value Ref Range    POCT Glucose 513 (H) 74 - 99 mg/dL   POCT GLUCOSE    Collection Time: 05/06/25  9:01 PM   Result Value Ref Range    POCT Glucose 224 (H) 74 - 99 mg/dL   Basic metabolic panel    Collection Time: 05/07/25  5:13 AM   Result Value Ref Range    Glucose 317 (H) 74 - 99 mg/dL    Sodium 136 136 - 145 mmol/L    Potassium 3.7 3.5 - 5.3 mmol/L    Chloride 106 98 - 107 mmol/L    Bicarbonate 24 21 - 32 mmol/L    Anion Gap 10 10 - 20 mmol/L    Urea Nitrogen 14 6 - 23 mg/dL    Creatinine 0.72 0.50 - 1.05 mg/dL    eGFR >90 >60 mL/min/1.73m*2    Calcium 8.8 8.6 - 10.3 mg/dL   CBC    Collection Time: 05/07/25  5:13 AM   Result Value Ref Range    WBC 10.6 4.4 - 11.3 x10*3/uL    nRBC 0.0 0.0 - 0.0 /100 WBCs    RBC 4.48 4.00 - 5.20 x10*6/uL    Hemoglobin 13.5 12.0 - 16.0 g/dL    Hematocrit 39.8 36.0 - 46.0 %    MCV 89 80 - 100 fL    MCH 30.1 26.0 - 34.0 pg    MCHC 33.9 32.0 - 36.0 g/dL    RDW 12.4 11.5 - 14.5 %    Platelets 214 150 - 450 x10*3/uL   Vancomycin    Collection Time: 05/07/25  5:13 AM   Result Value Ref Range    Vancomycin 25.5 (H) 5.0 - 20.0 ug/mL   POCT GLUCOSE    Collection Time: 05/07/25  9:20 AM   Result Value Ref Range    POCT Glucose 278 (H) 74 - 99 mg/dL   POCT GLUCOSE    Collection Time: 05/07/25 11:37 AM   Result Value Ref Range    POCT Glucose 244 (H) 74 - 99 mg/dL   Tissue/Wound Culture/Smear    Collection Time: 05/07/25  1:11 PM    Specimen: Wound/Tissue; Tissue/Biopsy   Result Value Ref Range    Tissue/Wound Culture/Smear (A)      (3+) Moderate Streptococcus agalactiae (Group B Streptococcus)    Tissue/Wound Culture/Smear (A)      (3+) Moderate Methicillin Susceptible Staphylococcus aureus (MSSA)    Tissue/Wound Culture/Smear       (3+)  Moderate Mixed Gram-Positive and Gram-Negative Bacteria    Tissue/Wound Culture/Smear (3+) Moderate Mixed Anaerobic Bacteria     Beta Lactamase (Cefinase) Negative     Gram Stain No polymorphonuclear leukocytes seen (A)     Gram Stain (4+) Abundant Gram positive cocci (A)        Susceptibility    Methicillin Susceptible Staphylococcus aureus (MSSA) - MICROSCAN     Oxacillin  Susceptible ug/ml     Trimethoprim/Sulfamethoxazole  Susceptible ug/ml     Tetracycline  Resistant ug/ml     Clindamycin  Resistant ug/ml     Erythromycin  Resistant ug/ml     Vancomycin  Susceptible ug/ml   POCT GLUCOSE    Collection Time: 05/07/25  4:02 PM   Result Value Ref Range    POCT Glucose 253 (H) 74 - 99 mg/dL   POCT GLUCOSE    Collection Time: 05/07/25  7:50 PM   Result Value Ref Range    POCT Glucose 324 (H) 74 - 99 mg/dL   Basic metabolic panel    Collection Time: 05/08/25  5:56 AM   Result Value Ref Range    Glucose 192 (H) 74 - 99 mg/dL    Sodium 138 136 - 145 mmol/L    Potassium 3.7 3.5 - 5.3 mmol/L    Chloride 108 (H) 98 - 107 mmol/L    Bicarbonate 25 21 - 32 mmol/L    Anion Gap 9 (L) 10 - 20 mmol/L    Urea Nitrogen 11 6 - 23 mg/dL    Creatinine 0.56 0.50 - 1.05 mg/dL    eGFR >90 >60 mL/min/1.73m*2    Calcium 8.7 8.6 - 10.3 mg/dL   CBC    Collection Time: 05/08/25  5:56 AM   Result Value Ref Range    WBC 10.3 4.4 - 11.3 x10*3/uL    nRBC 0.0 0.0 - 0.0 /100 WBCs    RBC 4.55 4.00 - 5.20 x10*6/uL    Hemoglobin 13.4 12.0 - 16.0 g/dL    Hematocrit 39.7 36.0 - 46.0 %    MCV 87 80 - 100 fL    MCH 29.5 26.0 - 34.0 pg    MCHC 33.8 32.0 - 36.0 g/dL    RDW 12.3 11.5 - 14.5 %    Platelets 223 150 - 450 x10*3/uL   POCT GLUCOSE    Collection Time: 05/08/25  7:14 AM   Result Value Ref Range    POCT Glucose 215 (H) 74 - 99 mg/dL   POCT GLUCOSE    Collection Time: 05/08/25 11:56 AM   Result Value Ref Range    POCT Glucose 201 (H) 74 - 99 mg/dL   POCT GLUCOSE    Collection Time: 05/08/25  3:27 PM   Result Value Ref Range    POCT Glucose 164 (H) 74  - 99 mg/dL   POCT GLUCOSE    Collection Time: 05/08/25  8:33 PM   Result Value Ref Range    POCT Glucose 304 (H) 74 - 99 mg/dL   Basic metabolic panel    Collection Time: 05/09/25  7:20 AM   Result Value Ref Range    Glucose 229 (H) 74 - 99 mg/dL    Sodium 136 136 - 145 mmol/L    Potassium 3.7 3.5 - 5.3 mmol/L    Chloride 106 98 - 107 mmol/L    Bicarbonate 25 21 - 32 mmol/L    Anion Gap 9 (L) 10 - 20 mmol/L    Urea Nitrogen 12 6 - 23 mg/dL    Creatinine 0.62 0.50 - 1.05 mg/dL    eGFR >90 >60 mL/min/1.73m*2    Calcium 8.7 8.6 - 10.3 mg/dL   CBC    Collection Time: 05/09/25  7:20 AM   Result Value Ref Range    WBC 10.3 4.4 - 11.3 x10*3/uL    nRBC 0.0 0.0 - 0.0 /100 WBCs    RBC 4.75 4.00 - 5.20 x10*6/uL    Hemoglobin 14.2 12.0 - 16.0 g/dL    Hematocrit 38.9 36.0 - 46.0 %    MCV 82 80 - 100 fL    MCH 29.9 26.0 - 34.0 pg    MCHC 36.5 (H) 32.0 - 36.0 g/dL    RDW 12.2 11.5 - 14.5 %    Platelets 218 150 - 450 x10*3/uL   Vancomycin    Collection Time: 05/09/25  7:20 AM   Result Value Ref Range    Vancomycin 19.1 5.0 - 20.0 ug/mL   POCT GLUCOSE    Collection Time: 05/09/25  8:12 AM   Result Value Ref Range    POCT Glucose 236 (H) 74 - 99 mg/dL   POCT GLUCOSE    Collection Time: 05/09/25 11:04 AM   Result Value Ref Range    POCT Glucose 202 (H) 74 - 99 mg/dL   Staphylococcus Aureus/MRSA Colonization, Culture - PICC Only    Collection Time: 05/09/25 11:06 AM    Specimen: Anterior Nares; Swab   Result Value Ref Range    Staph/MRSA Screen Culture No Staphylococcus aureus isolated    POCT GLUCOSE    Collection Time: 05/09/25  4:15 PM   Result Value Ref Range    POCT Glucose 167 (H) 74 - 99 mg/dL   POCT GLUCOSE    Collection Time: 05/09/25  8:22 PM   Result Value Ref Range    POCT Glucose 222 (H) 74 - 99 mg/dL   POCT GLUCOSE    Collection Time: 05/09/25  9:23 PM   Result Value Ref Range    POCT Glucose 215 (H) 74 - 99 mg/dL   Basic metabolic panel    Collection Time: 05/10/25  4:56 AM   Result Value Ref Range    Glucose 147 (H) 74  - 99 mg/dL    Sodium 139 136 - 145 mmol/L    Potassium 3.8 3.5 - 5.3 mmol/L    Chloride 106 98 - 107 mmol/L    Bicarbonate 26 21 - 32 mmol/L    Anion Gap 11 10 - 20 mmol/L    Urea Nitrogen 15 6 - 23 mg/dL    Creatinine 0.65 0.50 - 1.05 mg/dL    eGFR >90 >60 mL/min/1.73m*2    Calcium 9.0 8.6 - 10.3 mg/dL   CBC    Collection Time: 05/10/25  4:56 AM   Result Value Ref Range    WBC 10.4 4.4 - 11.3 x10*3/uL    nRBC 0.0 0.0 - 0.0 /100 WBCs    RBC 4.74 4.00 - 5.20 x10*6/uL    Hemoglobin 14.1 12.0 - 16.0 g/dL    Hematocrit 41.6 36.0 - 46.0 %    MCV 88 80 - 100 fL    MCH 29.7 26.0 - 34.0 pg    MCHC 33.9 32.0 - 36.0 g/dL    RDW 12.2 11.5 - 14.5 %    Platelets 206 150 - 450 x10*3/uL   POCT GLUCOSE    Collection Time: 05/10/25  7:59 AM   Result Value Ref Range    POCT Glucose 145 (H) 74 - 99 mg/dL   POCT GLUCOSE    Collection Time: 05/10/25 11:44 AM   Result Value Ref Range    POCT Glucose 250 (H) 74 - 99 mg/dL   POCT GLUCOSE    Collection Time: 05/10/25  4:08 PM   Result Value Ref Range    POCT Glucose 224 (H) 74 - 99 mg/dL   POCT GLUCOSE    Collection Time: 05/10/25  8:40 PM   Result Value Ref Range    POCT Glucose 226 (H) 74 - 99 mg/dL   Basic metabolic panel    Collection Time: 05/11/25  5:34 AM   Result Value Ref Range    Glucose 186 (H) 74 - 99 mg/dL    Sodium 139 136 - 145 mmol/L    Potassium 3.6 3.5 - 5.3 mmol/L    Chloride 105 98 - 107 mmol/L    Bicarbonate 27 21 - 32 mmol/L    Anion Gap 11 10 - 20 mmol/L    Urea Nitrogen 16 6 - 23 mg/dL    Creatinine 0.60 0.50 - 1.05 mg/dL    eGFR >90 >60 mL/min/1.73m*2    Calcium 8.8 8.6 - 10.3 mg/dL   CBC    Collection Time: 05/11/25  5:34 AM   Result Value Ref Range    WBC 10.1 4.4 - 11.3 x10*3/uL    nRBC 0.0 0.0 - 0.0 /100 WBCs    RBC 4.68 4.00 - 5.20 x10*6/uL    Hemoglobin 13.9 12.0 - 16.0 g/dL    Hematocrit 40.3 36.0 - 46.0 %    MCV 86 80 - 100 fL    MCH 29.7 26.0 - 34.0 pg    MCHC 34.5 32.0 - 36.0 g/dL    RDW 12.4 11.5 - 14.5 %    Platelets 222 150 - 450 x10*3/uL   POCT GLUCOSE     Collection Time: 05/11/25  7:56 AM   Result Value Ref Range    POCT Glucose 205 (H) 74 - 99 mg/dL     Critical Care Time: 35 minutes excluding time spent performing procedures, treating other patients, and teaching time.    Critical Concern/Vital Organ System Affected: Sepsis    Critical Intervention: Discussion with consultants, initiation of IV antibiotics, obtaining history, reexamination.    Please see [facility] note for further details    Sections of this report were created using voice-to-text technology and may contain errors in translation    Andrae Petty DO  Emergency Medicine       Andrae Petty DO  05/11/25 1129

## 2025-05-06 NOTE — ED TRIAGE NOTES
Pt arrives to ED with c/o large, malodorous ulcer to R ankle x 3 weeks, took a course of abx for it but wound is getting worse

## 2025-05-06 NOTE — CONSULTS
Vancomycin Dosing by Pharmacy- INITIAL    Mary Pedroza is a 49 y.o. year old female who Pharmacy has been consulted for vancomycin dosing for other Diabetic Foot Infection. Based on the patient's indication and renal status this patient will be dosed based on a goal AUC of 400-600.     Renal function is currently stable.    Visit Vitals  /67 (BP Location: Left arm, Patient Position: Sitting)   Pulse 85   Temp 37.4 °C (99.3 °F) (Oral)   Resp 20        Lab Results   Component Value Date    CREATININE 0.84 2025    CREATININE 0.50 2024    CREATININE 0.60 2024    CREATININE 0.60 2024        Patient weight is as follows:   Vitals:    25 1402   Weight: 90.3 kg (199 lb)       Cultures:  No results found for the encounter in last 14 days.        No intake/output data recorded.  I/O during current shift:  No intake/output data recorded.    Temp (24hrs), Av.4 °C (99.3 °F), Min:37.4 °C (99.3 °F), Max:37.4 °C (99.3 °F)         Assessment/Plan     Patient has already been given a loading dose of 1,500 mg  @16:00.  Will initiate vancomycin maintenance, 1,250 mg every 12 hours.    This dosing regimen is predicted by [employer] to result in the following pharmacokinetic parameters:    Loading dose: 1.5g x1  @16:00  Regimen: 1250 mg IV every 12 hours.  Start time: 02:00 on 2025  Exposure target: AUC24 (range)400-600 mg/L.hr   [id number]: 509 mg/L.hr  AUC24,ss: 574 mg/L.hr  Probability of AUC24 > 400: 84 %  Ctrough,ss: 18.2 mg/L  Probability of Ctrough,ss > 20: 42 %    Follow-up level will be ordered on  at 05:00 unless clinically indicated sooner.  Will continue to monitor renal function daily while on vancomycin and order serum creatinine at least every 48 hours if not already ordered.  Follow for continued vancomycin needs, clinical response, and signs/symptoms of toxicity.       Gi Diallo, PharmD

## 2025-05-06 NOTE — Clinical Note
Vessel(s): right iliac artery, right SFA, right popliteal artery and right dorsalis pedis artery. Injected with hand injections. Multiple views taken.

## 2025-05-07 ENCOUNTER — APPOINTMENT (OUTPATIENT)
Dept: CARDIOLOGY | Facility: HOSPITAL | Age: 50
DRG: 872 | End: 2025-05-07
Payer: MEDICARE

## 2025-05-07 ENCOUNTER — APPOINTMENT (OUTPATIENT)
Dept: RADIOLOGY | Facility: HOSPITAL | Age: 50
DRG: 872 | End: 2025-05-07
Payer: MEDICARE

## 2025-05-07 LAB
ANION GAP SERPL CALCULATED.3IONS-SCNC: 10 MMOL/L (ref 10–20)
BUN SERPL-MCNC: 14 MG/DL (ref 6–23)
CALCIUM SERPL-MCNC: 8.8 MG/DL (ref 8.6–10.3)
CHLORIDE SERPL-SCNC: 106 MMOL/L (ref 98–107)
CO2 SERPL-SCNC: 24 MMOL/L (ref 21–32)
CREAT SERPL-MCNC: 0.72 MG/DL (ref 0.5–1.05)
EGFRCR SERPLBLD CKD-EPI 2021: >90 ML/MIN/1.73M*2
ERYTHROCYTE [DISTWIDTH] IN BLOOD BY AUTOMATED COUNT: 12.4 % (ref 11.5–14.5)
GLUCOSE BLD MANUAL STRIP-MCNC: 244 MG/DL (ref 74–99)
GLUCOSE BLD MANUAL STRIP-MCNC: 253 MG/DL (ref 74–99)
GLUCOSE BLD MANUAL STRIP-MCNC: 278 MG/DL (ref 74–99)
GLUCOSE BLD MANUAL STRIP-MCNC: 324 MG/DL (ref 74–99)
GLUCOSE SERPL-MCNC: 317 MG/DL (ref 74–99)
HCT VFR BLD AUTO: 39.8 % (ref 36–46)
HGB BLD-MCNC: 13.5 G/DL (ref 12–16)
MCH RBC QN AUTO: 30.1 PG (ref 26–34)
MCHC RBC AUTO-ENTMCNC: 33.9 G/DL (ref 32–36)
MCV RBC AUTO: 89 FL (ref 80–100)
NRBC BLD-RTO: 0 /100 WBCS (ref 0–0)
PLATELET # BLD AUTO: 214 X10*3/UL (ref 150–450)
POTASSIUM SERPL-SCNC: 3.7 MMOL/L (ref 3.5–5.3)
RBC # BLD AUTO: 4.48 X10*6/UL (ref 4–5.2)
SODIUM SERPL-SCNC: 136 MMOL/L (ref 136–145)
VANCOMYCIN SERPL-MCNC: 25.5 UG/ML (ref 5–20)
WBC # BLD AUTO: 10.6 X10*3/UL (ref 4.4–11.3)

## 2025-05-07 PROCEDURE — 1210000001 HC SEMI-PRIVATE ROOM DAILY

## 2025-05-07 PROCEDURE — 93923 UPR/LXTR ART STDY 3+ LVLS: CPT | Performed by: SURGERY

## 2025-05-07 PROCEDURE — 2500000002 HC RX 250 W HCPCS SELF ADMINISTERED DRUGS (ALT 637 FOR MEDICARE OP, ALT 636 FOR OP/ED): Performed by: STUDENT IN AN ORGANIZED HEALTH CARE EDUCATION/TRAINING PROGRAM

## 2025-05-07 PROCEDURE — 82947 ASSAY GLUCOSE BLOOD QUANT: CPT

## 2025-05-07 PROCEDURE — 2500000001 HC RX 250 WO HCPCS SELF ADMINISTERED DRUGS (ALT 637 FOR MEDICARE OP): Performed by: STUDENT IN AN ORGANIZED HEALTH CARE EDUCATION/TRAINING PROGRAM

## 2025-05-07 PROCEDURE — 99232 SBSQ HOSP IP/OBS MODERATE 35: CPT | Performed by: STUDENT IN AN ORGANIZED HEALTH CARE EDUCATION/TRAINING PROGRAM

## 2025-05-07 PROCEDURE — 85027 COMPLETE CBC AUTOMATED: CPT | Performed by: STUDENT IN AN ORGANIZED HEALTH CARE EDUCATION/TRAINING PROGRAM

## 2025-05-07 PROCEDURE — 80202 ASSAY OF VANCOMYCIN: CPT | Performed by: STUDENT IN AN ORGANIZED HEALTH CARE EDUCATION/TRAINING PROGRAM

## 2025-05-07 PROCEDURE — 73721 MRI JNT OF LWR EXTRE W/O DYE: CPT | Mod: RT

## 2025-05-07 PROCEDURE — 93923 UPR/LXTR ART STDY 3+ LVLS: CPT

## 2025-05-07 PROCEDURE — 73721 MRI JNT OF LWR EXTRE W/O DYE: CPT | Mod: RIGHT SIDE | Performed by: STUDENT IN AN ORGANIZED HEALTH CARE EDUCATION/TRAINING PROGRAM

## 2025-05-07 PROCEDURE — 87077 CULTURE AEROBIC IDENTIFY: CPT | Mod: WESLAB

## 2025-05-07 PROCEDURE — 2500000004 HC RX 250 GENERAL PHARMACY W/ HCPCS (ALT 636 FOR OP/ED): Mod: JZ | Performed by: STUDENT IN AN ORGANIZED HEALTH CARE EDUCATION/TRAINING PROGRAM

## 2025-05-07 PROCEDURE — 82374 ASSAY BLOOD CARBON DIOXIDE: CPT | Performed by: STUDENT IN AN ORGANIZED HEALTH CARE EDUCATION/TRAINING PROGRAM

## 2025-05-07 PROCEDURE — 2500000005 HC RX 250 GENERAL PHARMACY W/O HCPCS: Performed by: STUDENT IN AN ORGANIZED HEALTH CARE EDUCATION/TRAINING PROGRAM

## 2025-05-07 PROCEDURE — 36415 COLL VENOUS BLD VENIPUNCTURE: CPT | Performed by: STUDENT IN AN ORGANIZED HEALTH CARE EDUCATION/TRAINING PROGRAM

## 2025-05-07 PROCEDURE — 73718 MRI LOWER EXTREMITY W/O DYE: CPT | Mod: RT

## 2025-05-07 PROCEDURE — 73718 MRI LOWER EXTREMITY W/O DYE: CPT | Mod: RIGHT SIDE | Performed by: STUDENT IN AN ORGANIZED HEALTH CARE EDUCATION/TRAINING PROGRAM

## 2025-05-07 RX ORDER — INSULIN LISPRO 100 [IU]/ML
4 INJECTION, SOLUTION INTRAVENOUS; SUBCUTANEOUS
Status: DISCONTINUED | OUTPATIENT
Start: 2025-05-07 | End: 2025-05-09

## 2025-05-07 RX ORDER — INSULIN GLARGINE 100 [IU]/ML
50 INJECTION, SOLUTION SUBCUTANEOUS NIGHTLY
Status: DISCONTINUED | OUTPATIENT
Start: 2025-05-07 | End: 2025-05-14 | Stop reason: HOSPADM

## 2025-05-07 RX ADMIN — VANCOMYCIN HYDROCHLORIDE 1250 MG: 5 INJECTION, POWDER, LYOPHILIZED, FOR SOLUTION INTRAVENOUS at 04:00

## 2025-05-07 RX ADMIN — VANCOMYCIN HYDROCHLORIDE 1250 MG: 5 INJECTION, POWDER, LYOPHILIZED, FOR SOLUTION INTRAVENOUS at 15:24

## 2025-05-07 RX ADMIN — SITAGLIPTIN 25 MG: 25 TABLET, FILM COATED ORAL at 09:56

## 2025-05-07 RX ADMIN — INSULIN GLARGINE 50 UNITS: 100 INJECTION, SOLUTION SUBCUTANEOUS at 21:57

## 2025-05-07 RX ADMIN — ARIPIPRAZOLE 10 MG: 10 TABLET ORAL at 21:57

## 2025-05-07 RX ADMIN — INSULIN LISPRO 9 UNITS: 100 INJECTION, SOLUTION INTRAVENOUS; SUBCUTANEOUS at 17:00

## 2025-05-07 RX ADMIN — SIMVASTATIN 20 MG: 20 TABLET, FILM COATED ORAL at 21:57

## 2025-05-07 RX ADMIN — PIPERACILLIN SODIUM AND TAZOBACTAM SODIUM 3.38 G: 3; .375 INJECTION, SOLUTION INTRAVENOUS at 04:27

## 2025-05-07 RX ADMIN — DOXEPIN HYDROCHLORIDE 100 MG: 50 CAPSULE ORAL at 21:58

## 2025-05-07 RX ADMIN — VENLAFAXINE HYDROCHLORIDE 150 MG: 150 CAPSULE, EXTENDED RELEASE ORAL at 09:56

## 2025-05-07 RX ADMIN — PROPRANOLOL HYDROCHLORIDE 20 MG: 20 TABLET ORAL at 22:04

## 2025-05-07 RX ADMIN — PANTOPRAZOLE SODIUM 40 MG: 40 TABLET, DELAYED RELEASE ORAL at 06:38

## 2025-05-07 RX ADMIN — PROPRANOLOL HYDROCHLORIDE 20 MG: 20 TABLET ORAL at 09:57

## 2025-05-07 RX ADMIN — INSULIN LISPRO 4 UNITS: 100 INJECTION, SOLUTION INTRAVENOUS; SUBCUTANEOUS at 17:00

## 2025-05-07 RX ADMIN — INSULIN LISPRO 9 UNITS: 100 INJECTION, SOLUTION INTRAVENOUS; SUBCUTANEOUS at 09:57

## 2025-05-07 RX ADMIN — INSULIN LISPRO 6 UNITS: 100 INJECTION, SOLUTION INTRAVENOUS; SUBCUTANEOUS at 11:42

## 2025-05-07 RX ADMIN — PIPERACILLIN SODIUM AND TAZOBACTAM SODIUM 3.38 G: 3; .375 INJECTION, SOLUTION INTRAVENOUS at 09:56

## 2025-05-07 RX ADMIN — ENOXAPARIN SODIUM 40 MG: 40 INJECTION SUBCUTANEOUS at 21:57

## 2025-05-07 ASSESSMENT — COGNITIVE AND FUNCTIONAL STATUS - GENERAL
CLIMB 3 TO 5 STEPS WITH RAILING: A LITTLE
DAILY ACTIVITIY SCORE: 24
MOBILITY SCORE: 22
WALKING IN HOSPITAL ROOM: A LITTLE
CLIMB 3 TO 5 STEPS WITH RAILING: A LITTLE
MOBILITY SCORE: 22
WALKING IN HOSPITAL ROOM: A LITTLE
DAILY ACTIVITIY SCORE: 24
DAILY ACTIVITIY SCORE: 24
CLIMB 3 TO 5 STEPS WITH RAILING: A LITTLE

## 2025-05-07 ASSESSMENT — PAIN - FUNCTIONAL ASSESSMENT
PAIN_FUNCTIONAL_ASSESSMENT: FLACC (FACE, LEGS, ACTIVITY, CRY, CONSOLABILITY)
PAIN_FUNCTIONAL_ASSESSMENT: FLACC (FACE, LEGS, ACTIVITY, CRY, CONSOLABILITY)

## 2025-05-07 ASSESSMENT — PAIN SCALES - GENERAL
PAINLEVEL_OUTOF10: 0 - NO PAIN

## 2025-05-07 ASSESSMENT — PAIN SCALES - PAIN ASSESSMENT IN ADVANCED DEMENTIA (PAINAD)
TOTALSCORE: 0
CONSOLABILITY: NO NEED TO CONSOLE
BREATHING: NORMAL
BODYLANGUAGE: RELAXED
FACIALEXPRESSION: SMILING OR INEXPRESSIVE

## 2025-05-07 ASSESSMENT — PAIN SCALES - WONG BAKER: WONGBAKER_NUMERICALRESPONSE: NO HURT

## 2025-05-07 NOTE — CARE PLAN
Pt does not wish to answer questions at this time  Unable to assess pt   There is currently no referral for care coordination at this time

## 2025-05-07 NOTE — ASSESSMENT & PLAN NOTE
Meets sepsis criteria with tachycardia, leukocytosis, and lactic acidosis with source being right foot infection   Management as above  Follow up wound and blood cultures

## 2025-05-07 NOTE — ASSESSMENT & PLAN NOTE
Diabetic foot infection (right)  Consult podiatry - follows with Dr. Aponte  Consult ID  Continue vanc/zosyn for now, defer antibiotics to ID  Wound culture with abundant gram positive and gram negative bacteria

## 2025-05-07 NOTE — PROGRESS NOTES
Montana Pedroza is a 49 y.o. female on day 1 of admission presenting with Wound infection.      Subjective   States she feels okay today. Denies any complaints currently. Denies pain in the RLE       Objective     Last Recorded Vitals  /62   Pulse 69   Temp 37.3 °C (99.1 °F) (Temporal)   Resp 17   Wt 92.6 kg (204 lb 2.3 oz)   SpO2 95%   Intake/Output last 3 Shifts:    Intake/Output Summary (Last 24 hours) at 5/7/2025 1313  Last data filed at 5/7/2025 1043  Gross per 24 hour   Intake 2930 ml   Output 0 ml   Net 2930 ml       Admission Weight  Weight: 90.3 kg (199 lb) (05/06/25 1402)    Daily Weight  05/07/25 : 92.6 kg (204 lb 2.3 oz)    Image Results  Vascular US PVR without exercise  Preliminary Cardiology [place] [address]             Phone [phone]       Preliminary Vascular Lab Report      VASC US PVR WITHOUT EXERCISE       Patient Name:     MONTANA PEDROZA Reading Physician:  [id number] Huddleston MD  Study Date:       5/7/2025          Ordering Provider:  [id number] RENE HERNANDEZ  MRN/PID:          [id number]          Fellow:  Accession#:       [id number]      Technologist:       James Cr RVT  YOB: 1975        Technologist 2:  Gender:           F                 Encounter#:         [id number]  Admission Status: Inpatient         Location Performed: [facility]       Diagnosis/ICD: Type 2 diabetes mellitus (DM) with other circulatory                 complications-E11.59  CPT Codes:     16865 Peripheral artery PVR (multi segmental pressure       Pertinent History: Diabetic with left foot TMA.       PRELIMINARY CONCLUSIONS:     Right Lower PVR: No evidence of arterial occlusive disease in the right lower extremity at rest. Normal digital perfusion noted. Multiphasic flow is noted in the right common femoral artery, right popliteal artery, right posterior tibial artery and right dorsalis pedis  artery.  Left Lower PVR: No evidence of arterial occlusive disease in the left lower extremity at rest. Multiphasic flow is noted in the left common femoral artery, left popliteal artery, left posterior tibial artery and left dorsalis pedis artery. Patient had a left TMA. No metatarsel waveforms or TBI's.     Imaging & Doppler Findings:     RIGHT Lower PVR                Pressures Ratios  Right High Thigh               164 mmHg  1.61  Right Low Thigh                147 mmHg  1.44  Right Calf                     128 mmHg  1.25  Right Posterior Tibial (Ankle) 116 mmHg  1.14  Right Dorsalis Pedis (Ankle)   112 mmHg  1.10  Right Digit (Great Toe)        82 mmHg   0.80          LEFT Lower PVR                Pressures Ratios  Left High Thigh               168 mmHg  1.65  Left Low Thigh                153 mmHg  1.50  Left Calf                     135 mmHg  1.32  Left Posterior Tibial (Ankle) 114 mmHg  1.12  Left Dorsalis Pedis (Ankle)   107 mmHg  1.05                             Right     Left  Brachial Pressure 102 mmHg 100 mmHg          VASCULAR PRELIMINARY REPORT  completed by James Cr RVT on 5/7/2025 at 8:44:54 AM       ** Final **      Physical Exam  Constitutional:       General: She is not in acute distress.     Appearance: She is not toxic-appearing.   HENT:      Head: Normocephalic.      Mouth/Throat:      Pharynx: Oropharynx is clear.   Eyes:      General: No scleral icterus.  Cardiovascular:      Rate and Rhythm: Normal rate.   Pulmonary:      Effort: No respiratory distress.      Breath sounds: No wheezing.   Abdominal:      General: There is no distension.      Palpations: Abdomen is soft.   Musculoskeletal:      Right lower leg: No edema.      Left lower leg: No edema.   Skin:     Comments: RLE with dressing in place   Neurological:      Mental Status: She is alert and oriented to person, place, and time.   Psychiatric:         Behavior: Behavior normal.         Relevant Results                   Assessment & Plan  Wound infection  Diabetic foot infection (right)  Consult podiatry - follows with Dr. Aponte  Consult ID  Continue vanc/zosyn for now, defer antibiotics to ID  Wound culture with abundant gram positive and gram negative bacteria  Sepsis without acute organ dysfunction (Multi)  Meets sepsis criteria with tachycardia, leukocytosis, and lactic acidosis with source being right foot infection   Management as above  Follow up wound and blood cultures   Type 2 diabetes mellitus with hyperglycemia (Multi)  Patient states she was previously on lantus 80 units nightly but has been off insulin for awhile as she has not followed up with her endocrinologist in a few years  Will start lantus units night and high dose SSI, uptitrate as needed  Continue home januvia  Hold home amaryl  Hypoglycemia protocol   Bipolar disorder  Continue home propranolol, abilify, doxepin, and effexor   Tobacco use  Recommend cessation  Patient declined NRT    Plan:  BG under better control but still elevated, increase lantus to 50 units nightly and continue high dose SSI  Awaiting podiatry and ID evaluations  Continue empiric antibiotics, follow up wound culture  Downgrade to RNF           Laura Correa MD

## 2025-05-07 NOTE — CARE PLAN
Problem: Pain - Adult  Goal: Verbalizes/displays adequate comfort level or baseline comfort level  5/6/2025 2222 by Karina Begum RN  Outcome: Progressing  5/6/2025 2133 by Karina Begum RN  Outcome: Progressing     Problem: Safety - Adult  Goal: Free from fall injury  5/6/2025 2222 by Karina Begum RN  Outcome: Progressing  5/6/2025 2133 by Karina Begum RN  Outcome: Progressing     Problem: Discharge Planning  Goal: Discharge to home or other facility with appropriate resources  5/6/2025 2222 by Karina Begum RN  Outcome: Progressing  5/6/2025 2133 by Karina Begum RN  Outcome: Progressing     Problem: Chronic Conditions and Co-morbidities  Goal: Patient's chronic conditions and co-morbidity symptoms are monitored and maintained or improved  5/6/2025 2222 by Karina Begum RN  Outcome: Progressing  5/6/2025 2133 by Karina Begum RN  Outcome: Progressing     Problem: Nutrition  Goal: Nutrient intake appropriate for maintaining nutritional needs  5/6/2025 2222 by Karina Begum RN  Outcome: Progressing  5/6/2025 2133 by Karina Begum RN  Outcome: Progressing     Problem: Fall/Injury  Goal: Not fall by end of shift  5/6/2025 2222 by Karina Begum RN  Outcome: Progressing  5/6/2025 2133 by Karina Begum RN  Outcome: Progressing  Goal: Be free from injury by end of the shift  5/6/2025 2222 by Karina Begum RN  Outcome: Progressing  5/6/2025 2133 by Karina Begum RN  Outcome: Progressing  Goal: Verbalize understanding of personal risk factors for fall in the hospital  5/6/2025 2222 by Karina Begum RN  Outcome: Progressing  5/6/2025 2133 by Karina Begum RN  Outcome: Progressing  Goal: Verbalize understanding of risk factor reduction measures to prevent injury from fall in the home  5/6/2025 2222 by Karina Begum RN  Outcome: Progressing  5/6/2025 3994  by Karina Begum RN  Outcome: Progressing  Goal: Use assistive devices by end of the shift  5/6/2025 2222 by Karina Begum RN  Outcome: Progressing  5/6/2025 2133 by Karina Begum RN  Outcome: Progressing  Goal: Pace activities to prevent fatigue by end of the shift  5/6/2025 2222 by Karina Begum RN  Outcome: Progressing  5/6/2025 2133 by Karina Begum RN  Outcome: Progressing     Problem: Skin  Goal: Decreased wound size/increased tissue granulation at next dressing change  5/6/2025 2222 by Karina Begum RN  Outcome: Progressing  5/6/2025 2133 by Karina Begum RN  Outcome: Progressing  Goal: Participates in plan/prevention/treatment measures  5/6/2025 2222 by Karina Begum RN  Outcome: Progressing  5/6/2025 2133 by Karina Begum RN  Outcome: Progressing  Goal: Prevent/manage excess moisture  5/6/2025 2222 by Karina Begum RN  Outcome: Progressing  5/6/2025 2133 by Karina Begum RN  Outcome: Progressing  Goal: Prevent/minimize sheer/friction injuries  5/6/2025 2222 by Karina Begum RN  Outcome: Progressing  5/6/2025 2133 by Karina Begum RN  Outcome: Progressing  Goal: Promote/optimize nutrition  5/6/2025 2222 by Karina Begum RN  Outcome: Progressing  5/6/2025 2133 by Karina Begum RN  Outcome: Progressing  Goal: Promote skin healing  5/6/2025 2222 by Karina Begum RN  Outcome: Progressing  5/6/2025 2133 by Karina Begum RN  Outcome: Progressing   The patient's goals for the shift include Patient Safety    The clinical goals for the shift include Pain Free    Over the shift, the patient did not make progress toward the following goals. Barriers to progression include . Recommendations to address these barriers include .

## 2025-05-07 NOTE — CONSULTS
PODIATRIC MEDICINE & SURGERY - INITIAL CONSULT NOTE    Subjective   Mary Pedroza is a 49 y.o. female who is on day 1 of admission for Wound infection.     HPI: Patient resting comfortably in bed. She denies pain to the right lower extremity. Patient is familiar to Dr. Aponte; last seen in office approx. 3 weeks ago for right lateral ankle wound. Patient was prescribed oral abx and advised to follow up in 1 week, though patient did not follow up. Patient completed the oral abx as prescribed. She reports that she has been cleaning the wound with peroxide, although she was instructed in office to apply a betadine paint and foam border dressing daily. Patient states that she has been experiencing intermittent chills and increasing fatigue over the past 2 weeks or so. She also notes a foul odor from the wound and increasing right leg swelling. She reports that her blood glucose has persistently been elevated at 200-300 mg/dL. Patient presented [facility] ED yesterday, where she was found to meet sepsis criteria and admitted to medicine.     Patient has a history of left TMA (May 2024, Dr. Aponte), which is well-healed.     Patient did have PVR/MAREK study today.      Review of Systems  Constitutional: Negative.    HENT: Negative.     Eyes: Negative.    Respiratory: Negative.     Cardiovascular: Negative.    Gastrointestinal: Negative.    Endocrine: Negative.    Genitourinary: Negative.    Musculoskeletal: +per HPI  Skin: +per HPI    Past Medical History:   Diagnosis Date    Anxiety and depression     Bipolar 1 disorder (Multi)     Bursitis of right hip 09/15/2023    Diabetes mellitus, type 2 (Multi)     Hyperlipidemia      Social History     Tobacco Use    Smoking status: Every Day     Current packs/day: 1.00     Average packs/day: 1 pack/day for 15.0 years (15.0 ttl pk-yrs)     Types: Cigarettes     Passive exposure: Current    Smokeless tobacco: Never   Vaping Use    Vaping status: Some Days    Substances:  "Nicotine   Substance Use Topics    Alcohol use: Not Currently    Drug use: Never     Past Surgical History:   Procedure Laterality Date    AMPUTATION FOOT / TOE Left      SECTION, LOW TRANSVERSE       SECTION, LOW TRANSVERSE       SECTION, LOW TRANSVERSE  2005    CHOLECYSTECTOMY  2012     Recent Surgeries in Podiatry       Date Procedure Surgeon Laterality Status    2024 Wound Debridement, Flap Closure Left Foot Cierra Wang DPM; Bharat Michelle DPM; Suraj Francisco DPM; James Aponte DPM Left Posted    2024 Debridement of All Nonviable Soft Tissue and Bone;  Excision Bone Foot Left uSraj Francisco DPM Left; Left Posted    2024 Left Foot Transmetatarsal Amputation Suraj Francisco DPM; Sarah Barrera DPM Left Posted     No Known Allergies    Medications  Scheduled medications  ARIPiprazole, 10 mg, oral, Nightly  doxepin, 100 mg, oral, Nightly  enoxaparin, 40 mg, subcutaneous, q24h  insulin glargine, 50 Units, subcutaneous, Nightly  insulin lispro, 0-15 Units, subcutaneous, TID AC  pantoprazole, 40 mg, oral, Daily before breakfast   Or  pantoprazole, 40 mg, intravenous, Daily before breakfast  piperacillin-tazobactam, 3.375 g, intravenous, q6h  polyethylene glycol, 17 g, oral, Daily  propranolol, 20 mg, oral, BID  simvastatin, 20 mg, oral, Nightly  SITagliptin phosphate, 25 mg, oral, Daily  vancomycin (Vancocin), 1,250 mg, intravenous, q12h  venlafaxine XR, 150 mg, oral, Daily    Continuous medications       PRN medications: acetaminophen **OR** acetaminophen **OR** acetaminophen, dextrose, dextrose, glucagon, glucagon, ondansetron ODT **OR** ondansetron, vancomycin    Objective   Visit Vitals  /62   Pulse 69   Temp 37.3 °C (99.1 °F) (Temporal)   Resp 17   Ht 1.702 m (5' 7\")   Wt 92.6 kg (204 lb 2.3 oz)   SpO2 95%   BMI 31.97 kg/m²   OB Status Injection   Smoking Status Every Day   BSA 2.09 m²     Physical Exam  Constitutional  - Pleasant and " cooperative 49 year old female.   - NAD and AAOx3. Ill-appearing.  HEENT  - Head is normocephalic and atraumatic.   - External ear is normal B/L.   - Conjunctivae normal B/L.   - Nose is normal.   - Oropharynx is clear. Mouth is moist.   Cardiovascular  - Regular rate.   - DP and PT pulses are palpable bilateral.    - Capillary fill time is brisk to bilateral hallux.    - Skin temperature is warm to warm proximal to distal bilateral.   - Focal increase in temperature is noted to the right lateral ankle.   - 1+ non-pitting edema to the right lower extremity  - No pain with calf compression B/L  Pulmonary  - Non-labored breathing on room air  Psychological  - Appropriate mood and behavior.  Neurologic  - No focal deficit  Musculoskeletal  - Ambulates unassisted  - Muscle strength is 5/5 to the intrinsic and extrinsic muscles of the foot and ankle B/L.  - Previous left foot transmetatarsal amputation; well-healed  - No pain with right ankle joint range of motion  Dermatologic  - Good attention to pedal hygiene.   - The skin is well-hydrated to the lower extremities.   - Nails 1-5 right are intact.   - The pedal interdigital spaces are clean and dry.   - Right lateral ankle wound as described below:     WOUND #1: Right lateral ankle  Measurements: Wound measures approximately 4.5 cm x  4.5 cm x 0.8 cm. Probes to fibula.   Base: The base is 80% nonviable with fibrotic and necrotic slough. The remaining tissue base is granular.   Rim: The skin edges are intact with no maceration. No rolling of skin edges.   Drainage: Serous drainage and fibrotic/necrotic slough. Significant malodor noted.  Periwound: Mild periwound erythema and edema.  No ascending cellulitis or lymphangitis. No palpable fluctuance.   Pain: No    Right lateral ankle wound 5/7/2025        Lab Results   Component Value Date    WBC 10.6 05/07/2025    HGB 13.5 05/07/2025    HCT 39.8 05/07/2025    MCV 89 05/07/2025     05/07/2025     Lab Results    Component Value Date    GLUCOSE 317 (H) 05/07/2025    CALCIUM 8.8 05/07/2025     05/07/2025    K 3.7 05/07/2025    CO2 24 05/07/2025     05/07/2025    BUN 14 05/07/2025    CREATININE 0.72 05/07/2025     Lab Results   Component Value Date    HGBA1C 9.5 (H) 05/06/2025     Lab Results   Component Value Date    SEDRATE 41 (H) 05/06/2025     Lab Results   Component Value Date    CRP 2.29 (H) 05/06/2025      Cultures  No results found for the last 90 days.    IMAGING  Imaging  XR ankle right 3+ views  Result Date: 5/6/2025  No acute osseous involvement. Soft tissue swelling with deformity consistent with the patient's wound.     MACRO: None   Signed by: Tanner Diaz 5/6/2025 3:59 PM Dictation workstation:   [id number]    Cardiology, Vascular, and Other Imaging  Vascular US PVR without exercise  Result Date: 5/7/2025  Preliminary [place] [address]            Phone [phone]  Preliminary Vascular Lab Report   [facility] US PVR WITHOUT EXERCISE  Patient Name:     MONTANA ROCA Reading Physician:  [id number] Marisela Huddleston MD Study Date:       5/7/2025          Ordering Provider:  [id number] RENE HERNANDEZ MRN/PID:          [id number]          Fellow: Accession#:       [id number]      Technologist:       James Cr RVT YOB: 1975        Technologist 2: Gender:           F                 Encounter#:         [id number] Admission Status: Inpatient         Location Performed: [facility]  Diagnosis/ICD: Type 2 diabetes mellitus (DM) with other circulatory                complications-E11.59 CPT Codes:     99970 Peripheral artery PVR (multi segmental pressure  Pertinent History: Diabetic with left foot TMA.  PRELIMINARY CONCLUSIONS:  Right Lower PVR: No evidence of arterial occlusive disease in the right lower extremity at rest. Normal digital perfusion noted. Multiphasic flow is noted in the right common femoral  artery, right popliteal artery, right posterior tibial artery and right dorsalis pedis artery. Left Lower PVR: No evidence of arterial occlusive disease in the left lower extremity at rest. Multiphasic flow is noted in the left common femoral artery, left popliteal artery, left posterior tibial artery and left dorsalis pedis artery. Patient had a left TMA. No metatarsel waveforms or TBI's.  Imaging & Doppler Findings:  RIGHT Lower PVR                Pressures Ratios Right High Thigh               164 mmHg  1.61 Right Low Thigh                147 mmHg  1.44 Right Calf                     128 mmHg  1.25 Right Posterior Tibial (Ankle) 116 mmHg  1.14 Right Dorsalis Pedis (Ankle)   112 mmHg  1.10 Right Digit (Great Toe)        82 mmHg   0.80   LEFT Lower PVR                Pressures Ratios Left High Thigh               168 mmHg  1.65 Left Low Thigh                153 mmHg  1.50 Left Calf                     135 mmHg  1.32 Left Posterior Tibial (Ankle) 114 mmHg  1.12 Left Dorsalis Pedis (Ankle)   107 mmHg  1.05                      Right     Left Brachial Pressure 102 mmHg 100 mmHg   VASCULAR PRELIMINARY REPORT completed by James Cr RVT on 5/7/2025 at 8:44:54 AM  ** Final **     Assessment/Plan   Mary Pedroza is a 49 y.o. female who is on day 1 of admission for Wound infection. Patient examined and evaluated at bedside. Reviewed vitals, labs, imaging and notes.     #Chronic non-pressure ulceration of right lateral ankle with osseous involvement  #Cellulitis, right lower extremity  #Sepsis - resolving  #Leukocytosis - resolved  - Afebrile. Tachycardia has improved since admission. Soft BP at 102/62. WBC 10.6 this morning, decreased from 15.5 on admission.  -  XR right foot and ankle with no evidence of osseous involvement or soft tissue emphysema.  - On exam, right lateral ankle wound with nonviable tissue overlying and significant malodor.  The wound probes to fibula.   - Infectious disease following,  appreciate care.  Preliminary culture showing Staph aureus (CANDELARIA pending) and Group B streptococcus. Blood cultures NTD.  - Order for MRI right foot and ankle to further assess for osseous involvement or abscess.   - Consider surgical intervention pending MRI results. Discussed with patient that she may require incision & drainage of right ankle wound and application of wound vac. Patient expressed agreement to surgery if necessary.   - NPO after midnight     #Peripheral vascular disease  - PVR with right TBI 0.80 and MAREK 1.14, non-compressible vessels. Discussed findings with patient.    #T2DM with peripheral neuropathy, poorly controlled  - A1c 9.5%    #Nicotine use disorder  - Vaping cessation encouraged    Dressing: Right ankle wound - betadine soaked gauze, 4x4 gauze, kerlix and ACE bandage with light compression. Nursing orders entered for daily dressing   Weight-bearing: WBAT in surgical shoe  VTE prophylaxis: SCD left leg, Lovenox subq per primary team  Discharge planning: Consult to social work and TCC. History of homelessness, food insecurity and lacks transportation for appointments. PMHx of Bipolar disorder, may benefit from community resources    Thank you for the consult. Podiatry team will continue to follow.     Plan discussed with attending physician.    Florencia Zarate DPM PGY-2  Podiatric Medicine & Surgery

## 2025-05-07 NOTE — CONSULTS
"Nutrition Assessement Note    Nutrition Assessment    Reason for Assessment: Admission nursing screening (wound)    Reason for Hospital Admission:  Mary Pedroza is a 49 y.o. female who is admitted for worsening wound infection to right foot. Podiatry consult pending. Patient sleeping soundly at time of visit, unable to wake. Will provide Everardo BID for wound healing.    Malnutrition Screening Tool (MST)  Have you recently lost weight without trying?: Yes  If yes, how much weight have you lost?: Lost 14 - 23 pounds  Weight Loss Score: 2  Have you been eating poorly because of a decreased appetite?: No  Malnutrition Score: 2  Nutrition Screen  Stage 3 or 4 Pressure Injury or Multiple Non-Healing Wounds: No  Home Tube Feeding or Total Parenteral Nutrition (TPN): No  Dietitian Consult Needed: No    Medical History[1]   Surgical History[2]    Nutrition History:  Food and Nutrient History: NA     Anthropometrics:  Ht: 170.2 cm (5' 7\"), Wt: 92.6 kg (204 lb 2.3 oz), BMI: 31.97  IBW/kg (Dietitian Calculated): 61.36 kg  Percent of IBW: 151 %  Adjusted Body Weight (kg): 69.09 kg    Weight Change:  Daily Weight  05/07/25 : 92.6 kg (204 lb 2.3 oz)  04/10/25 : 93.3 kg (205 lb 9.6 oz)  01/16/25 : 89.8 kg (198 lb)  10/25/24 : 93.9 kg (207 lb)  08/05/24 : 94 kg (207 lb 3.2 oz)  07/17/24 : 93 kg (205 lb)  05/03/24 : 93.9 kg (207 lb)  05/01/24 : 93 kg (205 lb)  04/24/24 : 93 kg (205 lb)  03/15/24 : 93 kg (205 lb)    Weight History / % Weight Change: records indicate stable wt  Significant Weight Loss: No        Nutrition Focused Physical Exam Findings: defer: sleeping, no visible deficits     Nutrition Significant Labs:  Lab Results   Component Value Date    WBC 10.6 05/07/2025    HGB 13.5 05/07/2025    HCT 39.8 05/07/2025     05/07/2025    CHOL 314 (H) 08/04/2022    TRIG 1,208 (H) 08/04/2022    HDL 23 (L) 08/04/2022    LDLDIRECT 72 08/04/2022    ALT 13 05/06/2025    AST 12 05/06/2025     05/07/2025    K 3.7 " 05/07/2025     05/07/2025    CREATININE 0.72 05/07/2025    BUN 14 05/07/2025    CO2 24 05/07/2025    TSH 1.99 05/24/2021    HGBA1C 9.5 (H) 05/06/2025    ALBUR 17 12/21/2022     Nutrition Specific Medications:  Scheduled Medications[3]  Continuous Medications[4]    Dietary Orders (From admission, onward)       Start     Ordered    05/07/25 1216  Oral nutritional supplements  Until discontinued        Comments: Any flavor   Question Answer Comment   Deliver with Breakfast    Deliver with Dinner    Select supplement: Everardo        05/07/25 1215    05/06/25 2126  May Participate in Room Service  ( ROOM SERVICE MAY PARTICIPATE)  Once        Question:  .  Answer:  Yes    05/06/25 2125 05/06/25 1628  Adult diet Regular, Consistent Carb; CCD 75 gm/meal  Diet effective now        Question Answer Comment   Diet type Regular    Diet type Consistent Carb    Carb diet selection: CCD 75 gm/meal        05/06/25 1628                   Estimated Needs:   Estimated Energy Needs  Total Energy Estimated Needs in 24 hours (kCal): 2070 kCal  Energy Estimated Needs per kg Body Weight in 24 hours (kCal/kg): 30 kCal/kg  Method for Estimating Needs: Adj Wt    Estimated Protein Needs  Total Protein Estimated Needs in 24 Hours (g): 83 g  Protein Estimated Needs per kg Body Weight in 24 Hours (g/kg): 1.2 g/kg  Method for Estimating 24 Hour Protein Needs: Adj Wt    Estimated Fluid Needs  Total Fluid Estimated Needs in 24 Hours (mL): 2070 mL  Method for Estimating 24 Hour Fluid Needs: 1 mL/kcal      Nutrition Diagnosis   Nutrition Diagnosis:  Malnutrition Diagnosis  Patient has Malnutrition Diagnosis: No    Nutrition Diagnosis  Patient has Nutrition Diagnosis: Yes  Diagnosis Status (1): New  Nutrition Diagnosis 1: Increased nutrient needs  Related to (1): increased demand for nutrients  As Evidenced by (1): wound       Nutrition Interventions/Recommendations   Nutrition Interventions and Recommendations:  Nutrition Prescription:  Nutrition prescription for oral nutrition    Nutrition Recommendations:  Individualized Nutrition Prescription Provided for : continue CCD 75 diet, add Everardo for wound healing    Nutrition Interventions/Goals:   Food and/or Nutrient Delivery Interventions  Interventions: Meals and snacks, Medical food supplement  Meals and Snacks: Carbohydrate-modified diet  Goal: provide as ordered  Medical Food Supplement: Commercial beverage medical food supplement therapy  Goal: Everardo BID for wound healing    Education Documentation  No documentation found.         Nutrition Monitoring and Evaluation   Monitoring/Evaluation:   Food/Nutrient Related History Monitoring  Monitoring and Evaluation Plan: Estimated Energy Intake  Estimated Energy Intake: Energy intake greater or equal to 75% of estimated energy needs    Physical Exam Findings  Monitoring and Evaluation Plan: Skin  Skin Finding: Impaired wound healing - Improved wound healing       Goal Status: New goal(s) identified    Follow Up  Time Spent (min): 30 minutes  Last Date of Nutrition Visit: 25  Nutrition Follow-Up Needed?: 5-7 days  Follow up Comment: 25          [1]   Past Medical History:  Diagnosis Date    Anxiety and depression     Bipolar 1 disorder (Multi)     Bursitis of right hip 09/15/2023    Diabetes mellitus, type 2 (Multi)     Hyperlipidemia    [2]   Past Surgical History:  Procedure Laterality Date    AMPUTATION FOOT / TOE Left      SECTION, LOW TRANSVERSE       SECTION, LOW TRANSVERSE       SECTION, LOW TRANSVERSE  2005    CHOLECYSTECTOMY  2012   [3] ARIPiprazole, 10 mg, oral, Nightly  doxepin, 100 mg, oral, Nightly  enoxaparin, 40 mg, subcutaneous, q24h  insulin glargine, 50 Units, subcutaneous, Nightly  insulin lispro, 0-15 Units, subcutaneous, TID AC  pantoprazole, 40 mg, oral, Daily before breakfast   Or  pantoprazole, 40 mg, intravenous, Daily before breakfast  piperacillin-tazobactam, 3.375 g,  intravenous, q6h  polyethylene glycol, 17 g, oral, Daily  propranolol, 20 mg, oral, BID  simvastatin, 20 mg, oral, Nightly  SITagliptin phosphate, 25 mg, oral, Daily  vancomycin (Vancocin), 1,250 mg, intravenous, q12h  venlafaxine XR, 150 mg, oral, Daily    [4]

## 2025-05-07 NOTE — CONSULTS
"INFECTIOUS DISEASES CONSULTATION NOTE      Referred by Laura Correa MD    Reason For Consult  Right foot infection    History Of Present Illness  Mary Pedroza is a 49 y.o. female who was seen in consultation in this hospital by my partners and myself in March 2024.  She had previously undergone a left transmetatarsal amputation, and developed a significant wound infection.  She was debrided in the operating room and culture showed group B streptococcus.  A PICC line was inserted and she was discharged on ceftriaxone for 6 weeks, concluding on 4/17/2024.  She was followed by Dr. Aponte's group, had satisfactory progress, and the PICC line was removed at the conclusion of therapy, when she saw Dr. Street in follow-up on 4/17/2024.  Our group has not been involved in her care since that time    She states that about 3 weeks ago she noted a \"dark spot\" in the region of the right lateral malleolus and this became progressively larger.  The podiatrist placed her on a once daily antibiotic for 10 days but the lesion worsened and she presented to the emergency room yesterday afternoon and was admitted and placed on vancomycin and Zosyn.  Infectious disease and podiatry consultations were requested, cultures were collected, and an MRI has been ordered.  She does not describe major constitutional symptoms     Past Medical History  Diabetes  Bipolar disease  Hip bursitis  Dyslipidemia     Social History  Tobacco use: 1 pack of cigarettes per day, continuing  Alcohol use: Does not abuse alcohol    Family History  Diabetes    Allergies  Patient has no known allergies.     Review of Systems  Detailed review of systems completed.  No significant additional positives beyond what is mentioned above    Physical Exam  Vital signs:  Visit Vitals  /62   Pulse 69   Temp 37.3 °C (99.1 °F) (Temporal)   Resp 17      General: Resting comfortably in bed, no acute distress  HEENT:  No scleral icterus or conjunctival suffusion, oral " mucosa moist  Nodes:  Negative  Lungs:  Clear to auscultation  Breasts:  Not examined  Heart:  S1, S2 normal, no pathologic murmur appreciated  Abdomen:  Soft, nontender. No palpable organs or masses  Back:  No spinal or CVA tenderness  Genitalia:  Not examined  Extremities: Left TMA site unremarkable.  Dressing on the right lower extremity not removed.  No proximal lymphangitis photographs reviewed with large circular eschar over the right lateral malleolus.  Neurologic:  Alert.  Grossly non-focal.      Relevant Results  Results from last 72 hours   Lab Units 05/07/25  0513 05/06/25  1450   WBC AUTO x10*3/uL 10.6 15.5*   HEMOGLOBIN g/dL 13.5 16.8*   HEMATOCRIT % 39.8 49.1*   PLATELETS AUTO x10*3/uL 214 273   NEUTROS PCT AUTO %  --  75.1   LYMPHS PCT AUTO %  --  17.3   MONOS PCT AUTO %  --  5.9   EOS PCT AUTO %  --  1.0     Results from last 72 hours   Lab Units 05/07/25  0513   CREATININE mg/dL 0.72   ANION GAP mmol/L 10   EGFR mL/min/1.73m*2 >90     Results from last 72 hours   Lab Units 05/06/25  1450   AST U/L 12   ALT U/L 13   ALK PHOS U/L 101   BILIRUBIN TOTAL mg/dL 0.6     Microbiology:  Blood (5/6): Negative X2  Wound (5/6): Staph aureus, group B streptococcus  Wound (5/7): Pending right ankle  Imaging:  Right ankle: Soft tissue swelling, no bony abnormality      ASSESSMENT:  Right lower extremity wound infection  Has diabetes and vascular insufficiency and has developed an indolent, progressive wound over the right lateral malleolus.  No constitutional symptoms.  Leukocytosis improving.  Preliminary culture showing Staph aureus (CANDELARIA pending) and Group B streptococcus.  MRI and podiatry evaluation pending.  Agree with broad-spectrum antibiotic therapy but prefer to avoid the combination of vancomycin and Zosyn because of potential nephrotoxicity    Diabetes mellitus    Peripheral vascular disease      PLANS:  -   Continue vancomycin, pharmacy dosing  -   Change Zosyn to cefepime for the reasons noted  above  -   Await MRI  -   Await podiatry evaluation  -   Further observation and incubation of cultures     THANK YOU FOR ASKING ME TO ASSIST YOU AGAIN IN THE CARE OF YOUR PATIENT    Gonzalo Raygoza MD  [facility]  Office:  [phone]

## 2025-05-07 NOTE — NURSING NOTE
Four eye skin check completed by Pattie STRICKLAND RN and Karina FARRELL RN. Acute findings noted: unstagable wound to right outer ankle.

## 2025-05-07 NOTE — PROGRESS NOTES
Vancomycin Dosing by Pharmacy- FOLLOW UP    Mary Pedroza is a 49 y.o. year old female who Pharmacy has been consulted for vancomycin dosing for other diabetic foot infection. Based on the patient's indication and renal status this patient is being dosed based on a goal AUC of 400-600.     Renal function is currently stable.    Current vancomycin dose: 1250 mg given every 12 hours    Estimated vancomycin AUC on current dose: 513 mg/L.hr     Visit Vitals  BP (!) 83/45 (BP Location: Right arm, Patient Position: Lying)   Pulse 73   Temp 36.5 °C (97.7 °F) (Temporal)   Resp 16        Lab Results   Component Value Date    CREATININE 0.72 2025    CREATININE 0.84 2025    CREATININE 0.50 2024    CREATININE 0.60 2024        Patient weight is as follows:   Vitals:    25 0500   Weight: 92.6 kg (204 lb 2.3 oz)       Cultures:  No results found for the encounter in last 14 days.       I/O last 3 completed shifts:  In: 2690 (29 mL/kg) [P.O.:340; IV Piggyback:2350]  Out: 0 (0 mL/kg)   Weight: 92.6 kg   I/O during current shift:  No intake/output data recorded.    Temp (24hrs), Av.9 °C (98.5 °F), Min:36.5 °C (97.7 °F), Max:37.4 °C (99.3 °F)      Assessment/Plan    Within goal AUC range. Continue current vancomycin regimen.    This dosing regimen is predicted by [employer] to result in the following pharmacokinetic parameters:  Loading dose: N/A  Regimen: 1250 mg IV every 12 hours.  Start time: 16:00 on 2025  Exposure target: AUC24 (range)400-600 mg/L.hr   [id number]: 491 mg/L.hr  AUC24,ss: 513 mg/L.hr  Probability of AUC24 > 400: 83 %  Ctrough,ss: 15 mg/L  Probability of Ctrough,ss > 20: 22 %      The next level will be obtained on  at 5:00. May be obtained sooner if clinically indicated.   Will continue to monitor renal function daily while on vancomycin and order serum creatinine at least every 48 hours if not already ordered.  Follow for continued vancomycin needs, clinical response,  and signs/symptoms of toxicity.       Alejandro Higgins, PharmD

## 2025-05-07 NOTE — CARE PLAN
Problem: Pain - Adult  Goal: Verbalizes/displays adequate comfort level or baseline comfort level  Outcome: Progressing     Problem: Safety - Adult  Goal: Free from fall injury  Outcome: Progressing     Problem: Discharge Planning  Goal: Discharge to home or other facility with appropriate resources  Outcome: Progressing     Problem: Chronic Conditions and Co-morbidities  Goal: Patient's chronic conditions and co-morbidity symptoms are monitored and maintained or improved  Outcome: Progressing     Problem: Nutrition  Goal: Nutrient intake appropriate for maintaining nutritional needs  Outcome: Progressing     Problem: Fall/Injury  Goal: Not fall by end of shift  Outcome: Progressing  Goal: Be free from injury by end of the shift  Outcome: Progressing  Goal: Verbalize understanding of personal risk factors for fall in the hospital  Outcome: Progressing  Goal: Verbalize understanding of risk factor reduction measures to prevent injury from fall in the home  Outcome: Progressing  Goal: Use assistive devices by end of the shift  Outcome: Progressing  Goal: Pace activities to prevent fatigue by end of the shift  Outcome: Progressing     Problem: Skin  Goal: Decreased wound size/increased tissue granulation at next dressing change  Outcome: Progressing  Goal: Participates in plan/prevention/treatment measures  Outcome: Progressing  Goal: Prevent/manage excess moisture  Outcome: Progressing  Goal: Prevent/minimize sheer/friction injuries  Outcome: Progressing  Goal: Promote/optimize nutrition  Outcome: Progressing  Goal: Promote skin healing  Outcome: Progressing   The patient's goals for the shift include Patient Safety    The clinical goals for the shift include Pain Free    Over the shift, the patient did not make progress toward the following goals. Barriers to progression include . Recommendations to address these barriers include .

## 2025-05-07 NOTE — NURSING NOTE
Patient's IV blew. Multiple nurses/rapid nurse tried to get IV and were unsuccessful.This Nurse also called ED to see if they had a staff member that was U/S certified and was advised there was not.Nurse called MRI and was advised that patient will not need IV for MRI.Nurse on 05/08 to call IV nurse to get IV with U/S prior to procedure.

## 2025-05-07 NOTE — ASSESSMENT & PLAN NOTE
Patient states she was previously on lantus 80 units nightly but has been off insulin for awhile as she has not followed up with her endocrinologist in a few years  Will start lantus units night and high dose SSI, uptitrate as needed  Continue home januvia  Hold home [facility]  Hypoglycemia protocol

## 2025-05-08 PROBLEM — E11.59 TYPE 2 DIABETES MELLITUS WITH OTHER CIRCULATORY COMPLICATIONS: Status: ACTIVE | Noted: 2025-05-06

## 2025-05-08 LAB
ANION GAP SERPL CALCULATED.3IONS-SCNC: 9 MMOL/L (ref 10–20)
BUN SERPL-MCNC: 11 MG/DL (ref 6–23)
CALCIUM SERPL-MCNC: 8.7 MG/DL (ref 8.6–10.3)
CHLORIDE SERPL-SCNC: 108 MMOL/L (ref 98–107)
CO2 SERPL-SCNC: 25 MMOL/L (ref 21–32)
CREAT SERPL-MCNC: 0.56 MG/DL (ref 0.5–1.05)
EGFRCR SERPLBLD CKD-EPI 2021: >90 ML/MIN/1.73M*2
ERYTHROCYTE [DISTWIDTH] IN BLOOD BY AUTOMATED COUNT: 12.3 % (ref 11.5–14.5)
GLUCOSE BLD MANUAL STRIP-MCNC: 164 MG/DL (ref 74–99)
GLUCOSE BLD MANUAL STRIP-MCNC: 201 MG/DL (ref 74–99)
GLUCOSE BLD MANUAL STRIP-MCNC: 215 MG/DL (ref 74–99)
GLUCOSE BLD MANUAL STRIP-MCNC: 304 MG/DL (ref 74–99)
GLUCOSE SERPL-MCNC: 192 MG/DL (ref 74–99)
HCT VFR BLD AUTO: 39.7 % (ref 36–46)
HGB BLD-MCNC: 13.4 G/DL (ref 12–16)
MCH RBC QN AUTO: 29.5 PG (ref 26–34)
MCHC RBC AUTO-ENTMCNC: 33.8 G/DL (ref 32–36)
MCV RBC AUTO: 87 FL (ref 80–100)
NRBC BLD-RTO: 0 /100 WBCS (ref 0–0)
PLATELET # BLD AUTO: 223 X10*3/UL (ref 150–450)
POTASSIUM SERPL-SCNC: 3.7 MMOL/L (ref 3.5–5.3)
RBC # BLD AUTO: 4.55 X10*6/UL (ref 4–5.2)
SODIUM SERPL-SCNC: 138 MMOL/L (ref 136–145)
WBC # BLD AUTO: 10.3 X10*3/UL (ref 4.4–11.3)

## 2025-05-08 PROCEDURE — 82947 ASSAY GLUCOSE BLOOD QUANT: CPT

## 2025-05-08 PROCEDURE — 2500000001 HC RX 250 WO HCPCS SELF ADMINISTERED DRUGS (ALT 637 FOR MEDICARE OP): Performed by: STUDENT IN AN ORGANIZED HEALTH CARE EDUCATION/TRAINING PROGRAM

## 2025-05-08 PROCEDURE — 85027 COMPLETE CBC AUTOMATED: CPT | Performed by: STUDENT IN AN ORGANIZED HEALTH CARE EDUCATION/TRAINING PROGRAM

## 2025-05-08 PROCEDURE — 1210000001 HC SEMI-PRIVATE ROOM DAILY

## 2025-05-08 PROCEDURE — 80048 BASIC METABOLIC PNL TOTAL CA: CPT | Performed by: STUDENT IN AN ORGANIZED HEALTH CARE EDUCATION/TRAINING PROGRAM

## 2025-05-08 PROCEDURE — 2500000005 HC RX 250 GENERAL PHARMACY W/O HCPCS: Performed by: STUDENT IN AN ORGANIZED HEALTH CARE EDUCATION/TRAINING PROGRAM

## 2025-05-08 PROCEDURE — 99223 1ST HOSP IP/OBS HIGH 75: CPT | Performed by: NURSE PRACTITIONER

## 2025-05-08 PROCEDURE — 2500000004 HC RX 250 GENERAL PHARMACY W/ HCPCS (ALT 636 FOR OP/ED): Mod: JZ | Performed by: INTERNAL MEDICINE

## 2025-05-08 PROCEDURE — 2500000004 HC RX 250 GENERAL PHARMACY W/ HCPCS (ALT 636 FOR OP/ED): Mod: JZ | Performed by: STUDENT IN AN ORGANIZED HEALTH CARE EDUCATION/TRAINING PROGRAM

## 2025-05-08 PROCEDURE — 2500000002 HC RX 250 W HCPCS SELF ADMINISTERED DRUGS (ALT 637 FOR MEDICARE OP, ALT 636 FOR OP/ED): Performed by: STUDENT IN AN ORGANIZED HEALTH CARE EDUCATION/TRAINING PROGRAM

## 2025-05-08 PROCEDURE — 36415 COLL VENOUS BLD VENIPUNCTURE: CPT | Performed by: STUDENT IN AN ORGANIZED HEALTH CARE EDUCATION/TRAINING PROGRAM

## 2025-05-08 PROCEDURE — 99232 SBSQ HOSP IP/OBS MODERATE 35: CPT | Performed by: STUDENT IN AN ORGANIZED HEALTH CARE EDUCATION/TRAINING PROGRAM

## 2025-05-08 RX ADMIN — INSULIN LISPRO 4 UNITS: 100 INJECTION, SOLUTION INTRAVENOUS; SUBCUTANEOUS at 12:15

## 2025-05-08 RX ADMIN — SITAGLIPTIN 25 MG: 25 TABLET, FILM COATED ORAL at 20:54

## 2025-05-08 RX ADMIN — CEFEPIME 2 G: 2 INJECTION, POWDER, FOR SOLUTION INTRAVENOUS at 06:05

## 2025-05-08 RX ADMIN — CEFEPIME 2 G: 2 INJECTION, POWDER, FOR SOLUTION INTRAVENOUS at 23:00

## 2025-05-08 RX ADMIN — SITAGLIPTIN 25 MG: 25 TABLET, FILM COATED ORAL at 08:52

## 2025-05-08 RX ADMIN — VANCOMYCIN HYDROCHLORIDE 1250 MG: 5 INJECTION, POWDER, LYOPHILIZED, FOR SOLUTION INTRAVENOUS at 02:47

## 2025-05-08 RX ADMIN — INSULIN GLARGINE 50 UNITS: 100 INJECTION, SOLUTION SUBCUTANEOUS at 20:50

## 2025-05-08 RX ADMIN — INSULIN LISPRO 4 UNITS: 100 INJECTION, SOLUTION INTRAVENOUS; SUBCUTANEOUS at 17:09

## 2025-05-08 RX ADMIN — PANTOPRAZOLE SODIUM 40 MG: 40 TABLET, DELAYED RELEASE ORAL at 06:05

## 2025-05-08 RX ADMIN — PROPRANOLOL HYDROCHLORIDE 20 MG: 20 TABLET ORAL at 20:54

## 2025-05-08 RX ADMIN — ARIPIPRAZOLE 10 MG: 10 TABLET ORAL at 20:55

## 2025-05-08 RX ADMIN — VENLAFAXINE HYDROCHLORIDE 150 MG: 150 CAPSULE, EXTENDED RELEASE ORAL at 08:52

## 2025-05-08 RX ADMIN — ENOXAPARIN SODIUM 40 MG: 40 INJECTION SUBCUTANEOUS at 20:54

## 2025-05-08 RX ADMIN — CEFEPIME 2 G: 2 INJECTION, POWDER, FOR SOLUTION INTRAVENOUS at 14:22

## 2025-05-08 RX ADMIN — DOXEPIN HYDROCHLORIDE 100 MG: 50 CAPSULE ORAL at 20:54

## 2025-05-08 RX ADMIN — INSULIN LISPRO 3 UNITS: 100 INJECTION, SOLUTION INTRAVENOUS; SUBCUTANEOUS at 17:07

## 2025-05-08 RX ADMIN — CEFEPIME 2 G: 2 INJECTION, POWDER, FOR SOLUTION INTRAVENOUS at 00:05

## 2025-05-08 RX ADMIN — INSULIN LISPRO 6 UNITS: 100 INJECTION, SOLUTION INTRAVENOUS; SUBCUTANEOUS at 12:14

## 2025-05-08 RX ADMIN — VANCOMYCIN HYDROCHLORIDE 1250 MG: 5 INJECTION, POWDER, LYOPHILIZED, FOR SOLUTION INTRAVENOUS at 15:35

## 2025-05-08 RX ADMIN — PROPRANOLOL HYDROCHLORIDE 20 MG: 20 TABLET ORAL at 08:52

## 2025-05-08 RX ADMIN — SIMVASTATIN 20 MG: 20 TABLET, FILM COATED ORAL at 20:54

## 2025-05-08 RX ADMIN — INSULIN LISPRO 6 UNITS: 100 INJECTION, SOLUTION INTRAVENOUS; SUBCUTANEOUS at 08:52

## 2025-05-08 ASSESSMENT — COGNITIVE AND FUNCTIONAL STATUS - GENERAL
DAILY ACTIVITIY SCORE: 24
MOBILITY SCORE: 24
MOBILITY SCORE: 24
DAILY ACTIVITIY SCORE: 24

## 2025-05-08 ASSESSMENT — PAIN SCALES - GENERAL
PAINLEVEL_OUTOF10: 0 - NO PAIN

## 2025-05-08 ASSESSMENT — PAIN - FUNCTIONAL ASSESSMENT
PAIN_FUNCTIONAL_ASSESSMENT: 0-10

## 2025-05-08 NOTE — PROGRESS NOTES
05/08/25 0827   Discharge Planning   Expected Discharge Disposition Home     Per chart review pt independent from home no therapy ordered and last [facility] score 24. Patient has no needs from case management. Please consult if needs arise

## 2025-05-08 NOTE — CARE PLAN
The patient's goals for the shift include Patient Safety    The clinical goals for the shift include wound healing

## 2025-05-08 NOTE — CONSULTS
Reason For Consult  Possible candidate for muscle flap for ankle wound if adequate flow. T2DM, PVD and and ankle wound with exposed bone.Right TBI 0.80, MAREK 1.41.     History Of Present Illness  Mary Pedroza is a 49 y.o. female presenting with a past medical history of type 2 diabetes, anxiety, bipolar, right hip bursitis.  She presents with a right foot wound over the lateral malleolus that began about 3 weeks ago.  She is not sure how she obtained the wound but states that it has been progressively getting worse over the past week or 2.  She saw Dr. Buffy jerry in the outpatient setting and was prescribed an antibiotic which she reports did not help much.  She also has a history of a left transmetatarsal amputation related to a diabetic foot infection.  When she was admitted to the hospital she had leukocytosis at 15.5 as well as some tachycardia.  MRI of ankle notes acute osteomyelitis of the lateral malleolus on the right she denies any prior history of peripheral arterial disease.  She had a PVR that was completed yesterday 2025 with an MAREK of 1.14 on the right and a TBI of 0.80.  Denies any claudication or ischemic rest pain.  Wound cultures and blood cultures are currently pending.     Past Medical History  She has a past medical history of Anxiety and depression, Bipolar 1 disorder (Multi), Bursitis of right hip (09/15/2023), Diabetes mellitus, type 2 (Multi), and Hyperlipidemia.    Surgical History  She has a past surgical history that includes Cholecystectomy (2012);  section, low transverse ();  section, low transverse ();  section, low transverse (); and Amputation foot / toe (Left).     Social History  She reports that she has been smoking cigarettes. She has a 15 pack-year smoking history. She has been exposed to tobacco smoke. She has never used smokeless tobacco. She reports that she does not currently use alcohol. She reports that she does not use  "drugs.    Family History  Family History[1]     Allergies  Patient has no known allergies.    Review of Systems  CONSTITUTIONAL: Denies weight loss, fever and chills.    HEENT: Denies changes in vision and hearing.    RESPIRATORY: Denies SOB and cough.    CV: Denies palpitations and CP.    GI: Denies abdominal pain, nausea, vomiting and diarrhea.    : Denies dysuria and urinary frequency.    MSK: Denies myalgia and joint pain.    SKIN: Denies rash and pruritus.    VASC: Denies claudication, ischemic rest pain.  Positive for open wound lateral malleolus, right    NEUROLOGICAL: Denies headache and syncope.    PSYCHIATRIC: Denies recent changes in mood. Denies anxiety and depression.     Physical Exam  General: Pt is alert and oriented x 3. Pleasant, conversive  HEENT: Head is atraumatic, normocephalic.   Cardiac: Normal S1-S2.  Regular rate and rhythm.  No murmurs.  Respiratory: Lungs clear to auscultation.  No adventitious sounds.  Abdomen: Soft, nondistended, nontender.  Bowel sounds x4 quadrants.  Pulse exam: Palpable brachial and radial pulses bilaterally.  Palpable femoral, popliteal, pedal pulses bilaterally.  Extremities: Left TMA.  Right foot surgical bandage was not removed but was displaced slightly to palpate pulses.  Neuro: Moves all extremities spontaneously.  No focal deficits.  Psych: Appropriate affect.  Answers questions appropriately.     Last Recorded Vitals  Blood pressure 120/64, pulse 69, temperature 36.5 °C (97.7 °F), temperature source Oral, resp. rate 18, height 1.702 m (5' 7\"), weight 92.6 kg (204 lb 2.3 oz), SpO2 100%.    Relevant Results  MR foot right wo IV contrast  Result Date: 5/7/2025  Interpreted By:  Cecilio Clarke, STUDY: MR FOOT RIGHT WO IV CONTRAST;  5/7/2025 8:58 pm   INDICATION: Signs/Symptoms:Diabetic foot ulcer possible abscess.     COMPARISON: Right ankle radiographs 05/06/2025   ACCESSION NUMBER(S): [id number]   ORDERING CLINICIAN: RENE HERNANDEZ   TECHNIQUE: " Multiplanar, multisequence MR imaging of the right forefoot was obtained without contrast.   FINDINGS: OSSEOUS STRUCTURES: On axial T2 fat sat images there is failure of fat saturation of the 5th toe which is better depicted on sagittal STIR images as being normal signal intensity. There is no evidence of bone marrow replacement or bone destruction of the forefoot is suggest acute osteomyelitis. There is no acute fracture or malalignment.     JOINTS: Normal osteoarticular relationship of the midfoot and forefoot structures. No significant discrete chondral or osteochondral defects.     SOFT TISSUES: There is diffuse edema along the dorsal forefoot that may relate to cellulitis and/or venous/lymphatic insufficiency. No fluid collection identified.     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 There is no evidence of acute tendon rupture within the forefoot. None   LIGAMENTS: Visualized ligaments, including the Lisfranc ligament and collateral ligaments of the MTP and interphalangeal joints, are intact.       (Please see separate report of the hindfoot and ankle for findings regarding these areas.).   No acute osteomyelitis involving the right forefoot.   Diffuse edema in the dorsal soft tissues that could relate to cellulitis and/or venous/lymphatic insufficiency.   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   MACRO: None.   Signed by: Cecilio Clarke 5/7/2025 10:13 PM Dictation workstation:   [id number]    MR ankle right wo IV contrast  Result Date: 5/7/2025  Interpreted By:  Cecilio Clarke, STUDY: MR ANKLE RIGHT WO IV CONTRAST;  5/7/2025 9:15 pm   INDICATION: Signs/Symptoms:Diabetic foot ulcer possible abscess.     COMPARISON: Right ankle radiographs 05/06/2025   ACCESSION NUMBER(S): [id number]   ORDERING CLINICIAN: RENE HERNANDEZ   TECHNIQUE: Multiplanar,  multisequence MR imaging of the right ankle and hindfoot was obtained without contrast.   FINDINGS: OSSEOUS STRUCTURES/BONE MARROW: There is bone marrow replacement of the lateral malleolus characterized by increased fluid sensitive signal involving the distal fibular epiphysis and metaphysis that extends as far proximal as 5 cm from the tip of the lateral malleolus. There is confluent low signal intensity T1 weighted signal within the distal fibula. Findings are consistent with acute osteomyelitis. This is adjacent to a soft tissue ulceration (see below). The remaining bone marrow signal is within normal limits. No acute fractures or malalignment.   JOINTS: Small tibiotalar joint effusion. Small talonavicular joint effusion. No additional findings to suggest septic arthritis. Normal osteoarticular relationship of the ankle and hindfoot structures. Mild chondral thinning of the tibiotalar joint. No full-thickness chondral or osteochondral defects.   TENDONS: Intact with normal caliber and signal intensity.   MUSCULATURE: Nonspecific feathery edema within the musculature of the anterior, peroneal, and posterior compartments could relate to myositis or diabetic neuromyopathy. 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   LIGAMENTS: The tibiofibular syndesmosis is intact. The lateral collateral ligament complex (ATFL, CFL, PTFL) is intact. Evidence of remote deltoid ligament sprain with ossification at the medial malleolar origin and at its talar attachment as well as slight indistinctness of the deep deltoid ligament fibers. The spring ligament complex is intact. The Lisfranc ligament is intact.   PLANTAR FASCIA: Plantar calcaneal spur. The plantar fascia is thickened without edema.   BURSAE: No pathological bursal distension. Trace fluid in the retrocalcaneal bursa noted.   NEUROVASCULAR STRUCTURES: No significant abnormality.   SOFT TISSUES:  There is a large soft tissue ulceration overlying the lateral malleolus measuring 3.7 cm x 3.4 cm and extends to the bone surface of the lateral malleolus. Overlying the lateral cortex of the lateral malleolus there is a 0.9 cm x 0.5 cm circumscribed T2 hyperintense area that may relate to an abscess (coronal image 21, series 801; axial image 22, series 501).       Please see separate report of the forefoot for other findings. This report reflects the ankle and hindfoot)   1. Acute osteomyelitis of the lateral malleolus which involves the distal fibula up to 5 cm proximal to the tip of the lateral malleolus. This is due to a deep ulceration (3.7 cm x 3.4 cm) overlying lateral malleolus that extends to the bone surface and may be associated with a small abscess measuring 0.9 cm x 0.5 cm abutting the lateral cortex of the lateral malleolus.   2.Nonspecific feathery edema within the musculature of the anterior, peroneal, and posterior compartments and of the visualized dorsal and plantar foot could relate diabetic neuromyopathy; however, the signal within the peroneal muscles could be also related to myositis given the proximity to the ulcer and osteomyelitis.   3. Fluid distention of the peroneal tendon sheath in the retro malleolar groove and below the level of the lateral malleolus that may be related to infectious tenosynovitis or reactive in nature, the former favored.   4. Nonspecific effusions of the tibiotalar and talonavicular joints. No evidence of bone marrow edema or replacement in the subchondral bone. Findings are favored to be reactive in nature.   MACRO: None.   Signed by: Cecilio Clarke 5/7/2025 10:07 PM Dictation workstation:   [id number]    Vascular US PVR without exercise  Result Date: 5/7/2025           [place] [address]            Phone [phone]  Vascular Lab Report  VASC US PVR WITHOUT EXERCISE Patient Name:      MONTANA ROCA     Reading Physician:  [id number] Marsiela Huddleston MD Study Date:        5/7/2025             Ordering Provider:  [id number] RENE HERNANDEZ MRN/PID:           [id number]             Fellow: Accession#:        [id number]         Technologist:       James Cr RVRENUKA Date of Birth/Age: 1975 / 49      Technologist 2:                    years Gender:            F                    Encounter#:         [id number] Admission Status:  Inpatient            Location Performed: [facility]  Diagnosis/ICD: Type 2 diabetes mellitus (DM) with other circulatory                complications-E11.59 CPT Codes:     27739 Peripheral artery PVR (multi segmental pressure  Pertinent History: Diabetic with left foot TMA.  CONCLUSIONS: Right Lower PVR: No evidence of arterial occlusive disease in the right lower extremity at rest. Normal digital perfusion noted. Multiphasic flow is noted in the right common femoral artery, right popliteal artery, right posterior tibial artery and right dorsalis pedis artery. Left Lower PVR: No evidence of arterial occlusive disease in the left lower extremity at rest. Multiphasic flow is noted in the left common femoral artery, left popliteal artery, left posterior tibial artery and left dorsalis pedis artery. Patient had a left TMA. No metatarsel waveforms or TBI's.  Comparison: Compared with study from 3/8/2024, no significant change.  Imaging & Doppler Findings:  RIGHT Lower PVR                Pressures Ratios Right High Thigh               164 mmHg  1.61 Right Low Thigh                147 mmHg  1.44 Right Calf                     128 mmHg  1.25 Right Posterior Tibial (Ankle) 116 mmHg  1.14 Right Dorsalis Pedis (Ankle)   112 mmHg  1.10 Right Digit (Great Toe)        82 mmHg   0.80   LEFT Lower PVR                Pressures Ratios  "Left High Thigh               168 mmHg  1.65 Left Low Thigh                153 mmHg  1.50 Left Calf                     135 mmHg  1.32 Left Posterior Tibial (Ankle) 114 mmHg  1.12 Left Dorsalis Pedis (Ankle)   107 mmHg  1.05                      Right     Left Brachial Pressure 102 mmHg 100 mmHg   [id number] Marisela Huddleston MD Electronically signed by [id number] Marisela Huddleston MD on 5/7/2025 at 1:27:18 PM  ** Final **     XR ankle right 3+ views  Result Date: 5/6/2025  Interpreted By:  Tanner Diaz, STUDY: XR ANKLE RIGHT 3+ VIEWS; ;  5/6/2025 3:17 pm   INDICATION: Signs/Symptoms:Right lateral malleolus wound, history of diabetes.     COMPARISON: None.   ACCESSION NUMBER(S): [id number]   ORDERING CLINICIAN: ELIAZAR BEE   FINDINGS: Soft tissue ulceration from patient's wound. No radiopaque foreign body.   No stigmata of osteomyelitis. Joint spaces are normal.       No acute osseous involvement. Soft tissue swelling with deformity consistent with the patient's wound.     MACRO: None   Signed by: Tanner Diaz 5/6/2025 3:59 PM Dictation workstation:   [id number]     Visit Vitals  /63 (BP Location: Left arm, Patient Position: Lying)   Pulse 78   Temp 36.6 °C (97.9 °F) (Oral)   Resp 18   Ht 1.702 m (5' 7\")   Wt 100 kg (220 lb 6.4 oz)   SpO2 99%   BMI 34.52 kg/m²   OB Status Injection   Smoking Status Every Day   BSA 2.17 m²        Results for orders placed or performed during the hospital encounter of 05/06/25 (from the past 24 hours)   Tissue/Wound Culture/Smear    Specimen: Wound/Tissue; Tissue/Biopsy   Result Value Ref Range    Gram Stain No polymorphonuclear leukocytes seen (A)     Gram Stain (4+) Abundant Gram positive cocci (A)    POCT GLUCOSE   Result Value Ref Range    POCT Glucose 253 (H) 74 - 99 mg/dL   POCT GLUCOSE   Result Value Ref Range    POCT Glucose 324 (H) 74 - 99 mg/dL   Basic metabolic panel   Result Value Ref Range    Glucose 192 (H) 74 - 99 mg/dL    Sodium 138 136 - 145 mmol/L    Potassium 3.7 3.5 - " 5.3 mmol/L    Chloride 108 (H) 98 - 107 mmol/L    Bicarbonate 25 21 - 32 mmol/L    Anion Gap 9 (L) 10 - 20 mmol/L    Urea Nitrogen 11 6 - 23 mg/dL    Creatinine 0.56 0.50 - 1.05 mg/dL    eGFR >90 >60 mL/min/1.73m*2    Calcium 8.7 8.6 - 10.3 mg/dL   CBC   Result Value Ref Range    WBC 10.3 4.4 - 11.3 x10*3/uL    nRBC 0.0 0.0 - 0.0 /100 WBCs    RBC 4.55 4.00 - 5.20 x10*6/uL    Hemoglobin 13.4 12.0 - 16.0 g/dL    Hematocrit 39.7 36.0 - 46.0 %    MCV 87 80 - 100 fL    MCH 29.5 26.0 - 34.0 pg    MCHC 33.8 32.0 - 36.0 g/dL    RDW 12.3 11.5 - 14.5 %    Platelets 223 150 - 450 x10*3/uL   POCT GLUCOSE   Result Value Ref Range    POCT Glucose 215 (H) 74 - 99 mg/dL         Assessment/Plan   Acute osteomyelitis right lateral malleolus  Type 2 diabetes  Wound infection  Tobacco abuse    Patient with progressively worsening infection of right lower extremity.  MRI revealed acute osteomyelitis of the lateral malleolus on the right.  I reviewed the patient's PVR from March 2024 as well as most recent PVR.  Both show no evidence of peripheral arterial disease.  From a clinical perspective, the patient has easily palpable pedal pulses bilaterally.    I do not suspect any clinically significant peripheral arterial disease.  However, vascular surgery is available if the patient needs angiogram prior to muscle flap procedure by podiatry.  Would be diagnostic only.    I spent 35 minutes in the professional and overall care of this patient.      Sourav Stover, APRN-CNP         [1]   Family History  Problem Relation Name Age of Onset    Ovarian cancer Mother      Hypertension Father      Diabetes Father      Other (cholesterol issues) Father      No Known Problems Sister      No Known Problems Daughter      No Known Problems Son      No Known Problems Son      Ovarian cancer Maternal Grandmother      Breast cancer Father's Sister

## 2025-05-08 NOTE — ASSESSMENT & PLAN NOTE
Patient states she was previously on lantus 80 units nightly but has been off insulin for awhile as she has not followed up with her endocrinologist in a few years  Will start lantus units night and high dose SSI, uptitrate as needed  Continue home januvia  Hold home [facility]  Hypoglycemia protocol

## 2025-05-08 NOTE — ASSESSMENT & PLAN NOTE
Diabetic foot infection (right)  Consult podiatry - follows with Dr. Aponte  Consult ID  Antibiotics per ID  Wound culture with staph aureus and group B strep   PVRs wnl for the RLE  MRI R foot without acute osteomyelitis but does show diffuse edema in the dorsal soft tissues consistent with cellulitis and/or venous insufficiency  MRI R ankle showing acute osteomyelitis of the lateral malleolus  Vascular surgery consulted by podiatry

## 2025-05-08 NOTE — CARE PLAN
The patient's goals for the shift include Patient Safety    The clinical goals for the shift include wound healing, manage pain, prevent further infection      Problem: Pain - Adult  Goal: Verbalizes/displays adequate comfort level or baseline comfort level  Outcome: Progressing  Flowsheets (Taken 5/8/2025 1553)  Verbalizes/displays adequate comfort level or baseline comfort level:   Administer analgesics based on type and severity of pain and evaluate response   Encourage patient to monitor pain and request assistance   Assess pain using appropriate pain scale   Implement non-pharmacological measures as appropriate and evaluate response   Consider cultural and social influences on pain and pain management   Notify Licensed Independent Practitioner if interventions unsuccessful or patient reports new pain     Problem: Safety - Adult  Goal: Free from fall injury  Outcome: Progressing  Flowsheets (Taken 5/8/2025 1553)  Free from fall injury:   Instruct family/caregiver on patient safety   Based on caregiver fall risk screen, instruct family/caregiver to ask for assistance with transferring infant if caregiver noted to have fall risk factors     Problem: Discharge Planning  Goal: Discharge to home or other facility with appropriate resources  Outcome: Progressing  Flowsheets (Taken 5/8/2025 1553)  Discharge to home or other facility with appropriate resources:   Identify barriers to discharge with patient and caregiver   Arrange for needed discharge resources and transportation as appropriate   Identify discharge learning needs (meds, wound care, etc)   Arrange for interpreters to assist at discharge as needed     Problem: Chronic Conditions and Co-morbidities  Goal: Patient's chronic conditions and co-morbidity symptoms are monitored and maintained or improved  Outcome: Progressing  Flowsheets (Taken 5/8/2025 1553)  Care Plan - Patient's Chronic Conditions and Co-Morbidity Symptoms are Monitored and Maintained or  Improved:   Monitor and assess patient's chronic conditions and comorbid symptoms for stability, deterioration, or improvement   Collaborate with multidisciplinary team to address chronic and comorbid conditions and prevent exacerbation or deterioration   Update acute care plan with appropriate goals if chronic or comorbid symptoms are exacerbated and prevent overall improvement and discharge     Problem: Nutrition  Goal: Nutrient intake appropriate for maintaining nutritional needs  Outcome: Progressing     Problem: Fall/Injury  Goal: Not fall by end of shift  Outcome: Progressing  Goal: Be free from injury by end of the shift  Outcome: Progressing  Goal: Verbalize understanding of personal risk factors for fall in the hospital  Outcome: Progressing  Goal: Verbalize understanding of risk factor reduction measures to prevent injury from fall in the home  Outcome: Progressing  Goal: Use assistive devices by end of the shift  Outcome: Progressing  Goal: Pace activities to prevent fatigue by end of the shift  Outcome: Progressing     Problem: Skin  Goal: Decreased wound size/increased tissue granulation at next dressing change  Outcome: Progressing  Flowsheets (Taken 5/8/2025 3558)  Decreased wound size/increased tissue granulation at next dressing change: Promote sleep for wound healing  Goal: Participates in plan/prevention/treatment measures  Outcome: Progressing  Flowsheets (Taken 5/8/2025 1552)  Participates in plan/prevention/treatment measures:   Discuss with provider PT/OT consult   Increase activity/out of bed for meals   Elevate heels  Goal: Prevent/manage excess moisture  Outcome: Progressing  Flowsheets (Taken 5/8/2025 9367)  Prevent/manage excess moisture: Moisturize dry skin  Goal: Prevent/minimize sheer/friction injuries  Outcome: Progressing  Flowsheets (Taken 5/8/2025 1555)  Prevent/minimize sheer/friction injuries:   Use pull sheet   Increase activity/out of bed for meals  Goal: Promote/optimize  nutrition  Outcome: Progressing  Flowsheets (Taken 5/8/2025 1553)  Promote/optimize nutrition: Monitor/record intake including meals  Goal: Promote skin healing  Outcome: Progressing  Flowsheets (Taken 5/8/2025 1553)  Promote skin healing: Assess skin/pad under line(s)/device(s)     Problem: Diabetes  Goal: Achieve decreasing blood glucose levels by end of shift  Outcome: Progressing  Flowsheets (Taken 5/8/2025 1553)  Achieve decreasing blood glucose levels by end of shift: Med administration/monitoring of effect  Goal: Increase stability of blood glucose readings by end of shift  Outcome: Progressing  Flowsheets (Taken 5/8/2025 1553)  Increase stability of blood glucose readings by end of shift: Med administration/monitoring of effect  Goal: Decrease in ketones present in urine by end of shift  Outcome: Progressing  Flowsheets (Taken 5/8/2025 1553)  Decrease in ketones present in urine by end of shift:   Med administration/monitoring of effect   Monitor urine output  Goal: Maintain electrolyte levels within acceptable range throughout shift  Outcome: Progressing  Flowsheets (Taken 5/8/2025 1553)  Maintain electrolyte levels within acceptable range throughout shift:   Med administration/monitoring of effect   Monitor urine output  Goal: Maintain glucose levels >70mg/dl to <250mg/dl throughout shift  Outcome: Progressing  Flowsheets (Taken 5/8/2025 1553)  Maintain glucose levels >70mg/dl to <250mg/dl throughout shift: Med administration/monitoring of effect  Goal: No changes in neurological exam by end of shift  Outcome: Progressing  Flowsheets (Taken 5/8/2025 1553)  No changes in neurological exam by end of shift: Complete frequent neurological assessments  Goal: Learn about and adhere to nutrition recommendations by end of shift  Outcome: Progressing  Flowsheets (Taken 5/8/2025 1553)  Learn about and adhere to nutrition recommendations by end of shift: Ensure/encourage compliance with appropriate diet  Goal: Vital  signs within normal range for age by end of shift  Outcome: Progressing  Flowsheets (Taken 5/8/2025 5997)  Vital signs within normal range for age by end of shift: Med administration/monitoring of effect  Goal: Increase self care and/or family involovement by end of shift  Outcome: Progressing

## 2025-05-08 NOTE — PROGRESS NOTES
BRIEF  ID  CHART  REVIEW  NOTE      Chart reviewed  Vital signs stable  CANDELARIA's pending  MRI indicates osteomyelitis of the lateral malleolus  Awaiting podiatry evaluation  Remains on vancomycin and cefepime pending CANDELARIA's  Creatinine stable    Will reassess 5/9, please call sooner prn    Gonzalo aRygoza MD  [facility] [employer]  Office:  [phone]

## 2025-05-08 NOTE — NURSING NOTE
Pt IV to LFA removed due to leaking, a new 20 G placed RW, blood return, infusing alright, no issues at this time, will monitor.

## 2025-05-08 NOTE — PROGRESS NOTES
Mary Pedroza is a 49 y.o. female on day 2 of admission presenting with Wound infection.      Subjective   States she feels well this morning. Denies any complaints. Tolerating PO and IV abx, denies GI upset and diarrhea. Would like to know when she is going home.        Objective     Last Recorded Vitals  /64 (BP Location: Right arm, Patient Position: Lying)   Pulse 69   Temp 36.8 °C (98.2 °F) (Oral)   Resp 18   Wt 92.6 kg (204 lb 2.3 oz)   SpO2 96%   Intake/Output last 3 Shifts:    Intake/Output Summary (Last 24 hours) at 5/8/2025 1053  Last data filed at 5/7/2025 2200  Gross per 24 hour   Intake 515 ml   Output --   Net 515 ml       Admission Weight  Weight: 90.3 kg (199 lb) (05/06/25 1402)    Daily Weight  05/07/25 : 92.6 kg (204 lb 2.3 oz)    Image Results  MR foot right wo IV contrast  Narrative: Interpreted By:  Cecilio Clarke,   STUDY:  MR FOOT RIGHT WO IV CONTRAST;  5/7/2025 8:58 pm      INDICATION:  Signs/Symptoms:Diabetic foot ulcer possible abscess.          COMPARISON:  Right ankle radiographs 05/06/2025      ACCESSION NUMBER(S):  [id number]      ORDERING CLINICIAN:  RENE HERNANDEZ      TECHNIQUE:  Multiplanar, multisequence MR imaging of the right forefoot was  obtained without contrast.      FINDINGS:  OSSEOUS STRUCTURES:  On axial T2 fat sat images there is failure of fat saturation of the  5th toe which is better depicted on sagittal STIR images as being  normal signal intensity. There is no evidence of bone marrow  replacement or bone destruction of the forefoot is suggest acute  osteomyelitis. There is no acute fracture or malalignment.          JOINTS:  Normal osteoarticular relationship of the midfoot and forefoot  structures. No significant discrete chondral or osteochondral defects.          SOFT TISSUES:  There is diffuse edema along the dorsal forefoot that may relate to  cellulitis and/or venous/lymphatic insufficiency. No fluid collection  identified.           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 There is no  evidence of acute tendon rupture within the forefoot. None      LIGAMENTS:  Visualized ligaments, including the Lisfranc ligament and collateral  ligaments of the MTP and interphalangeal joints, are intact.      Impression: (Please see separate report of the hindfoot and ankle for findings  regarding these areas.).      No acute osteomyelitis involving the right forefoot.      Diffuse edema in the dorsal soft tissues that could relate to  cellulitis and/or venous/lymphatic insufficiency.      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      MACRO:  None.      Signed by: Cecilio Clarke 5/7/2025 10:13 PM  Dictation workstation:   [id number]  MR ankle right wo IV contrast  Narrative: Interpreted By:  Cecilio Clarke,   STUDY:  MR ANKLE RIGHT WO IV CONTRAST;  5/7/2025 9:15 pm      INDICATION:  Signs/Symptoms:Diabetic foot ulcer possible abscess.          COMPARISON:  Right ankle radiographs 05/06/2025      ACCESSION NUMBER(S):  [id number]      ORDERING CLINICIAN:  RENE HERNANDEZ      TECHNIQUE:  Multiplanar, multisequence MR imaging of the right ankle and hindfoot  was obtained without contrast.      FINDINGS:  OSSEOUS STRUCTURES/BONE MARROW: There is bone marrow replacement of  the lateral malleolus characterized by increased fluid sensitive  signal involving the distal fibular epiphysis and metaphysis that  extends as far proximal as 5 cm from the tip of the lateral  malleolus. There is confluent low signal intensity T1 weighted signal  within the distal fibula. Findings are consistent with acute  osteomyelitis. This is adjacent to a soft tissue ulceration (see  below). The remaining bone marrow signal is within normal limits. No  acute fractures or malalignment.      JOINTS: Small tibiotalar joint effusion.  Small talonavicular joint  effusion. No additional findings to suggest septic arthritis. Normal  osteoarticular relationship of the ankle and hindfoot structures.  Mild chondral thinning of the tibiotalar joint. No full-thickness  chondral or osteochondral defects.      TENDONS: Intact with normal caliber and signal intensity.      MUSCULATURE: Nonspecific feathery edema within the musculature of the  anterior, peroneal, and posterior compartments could relate to  myositis or diabetic neuromyopathy. There is also diffuse edema of  the plantar and dorsal musculature in the foot likely related to  diabetic neuromyopathy.      LIGAMENTS: The tibiofibular syndesmosis is intact. The lateral  collateral ligament complex (ATFL, CFL, PTFL) is intact. Evidence of  remote deltoid ligament sprain with ossification at the medial  malleolar origin and at its talar attachment as well as slight  indistinctness of the deep deltoid ligament fibers. The spring  ligament complex is intact. The Lisfranc ligament is intact.      PLANTAR FASCIA: Plantar calcaneal spur. The plantar fascia is  thickened without edema.      BURSAE: No pathological bursal distension. Trace fluid in the  retrocalcaneal bursa noted.      NEUROVASCULAR STRUCTURES: No significant abnormality.      SOFT TISSUES: There is a large soft tissue ulceration overlying the  lateral malleolus measuring 3.7 cm x 3.4 cm and extends to the bone  surface of the lateral malleolus. Overlying the lateral cortex of the  lateral malleolus there is a 0.9 cm x 0.5 cm circumscribed T2  hyperintense area that may relate to an abscess (coronal image 21,  series 801; axial image 22, series 501).      Impression: Please see separate report of the forefoot for other findings. This  report reflects the ankle and hindfoot)      1. Acute osteomyelitis of the lateral malleolus which involves the  distal fibula up to 5 cm proximal to the tip of the lateral  malleolus. This is due to a deep  ulceration (3.7 cm x 3.4 cm)  overlying lateral malleolus that extends to the bone surface and may  be associated with a small abscess measuring 0.9 cm x 0.5 cm abutting  the lateral cortex of the lateral malleolus.      2.Nonspecific feathery edema within the musculature of the anterior,  peroneal, and posterior compartments and of the visualized dorsal and  plantar foot could relate diabetic neuromyopathy; however, the signal  within the peroneal muscles could be also related to myositis given  the proximity to the ulcer and osteomyelitis.      3. Fluid distention of the peroneal tendon sheath in the retro  malleolar groove and below the level of the lateral malleolus that  may be related to infectious tenosynovitis or reactive in nature, the  former favored.      4. Nonspecific effusions of the tibiotalar and talonavicular joints.  No evidence of bone marrow edema or replacement in the subchondral  bone. Findings are favored to be reactive in nature.      MACRO:  None.      Signed by: Cecilio Clarke 5/7/2025 10:07 PM  Dictation workstation:   [id number]  Vascular US PVR [place] [address]             Phone [phone]       Vascular Lab Report     VASC US PVR WITHOUT EXERCISE    Patient Name:      MONTANA Deshpande Physician:  [id number] Marisela Huddleston MD  Study Date:        5/7/2025             Ordering Provider:  [id number] RENE HERNANDEZ  MRN/PID:           [id number]             Fellow:  Accession#:        [id number]         Technologist:       James Cr RVT  Date of Birth/Age: 1975 / 49      Technologist 2:                     years  Gender:            F                    Encounter#:         [id number]  Admission Status:  Inpatient            Location Performed: [facility]       Diagnosis/ICD: Type 2 diabetes mellitus (DM) with other circulatory                 complications-E11.59  CPT Codes:     47995 Peripheral artery PVR (multi segmental pressure       Pertinent History: Diabetic with left foot TMA.       CONCLUSIONS:  Right Lower PVR: No evidence of arterial occlusive disease in the right lower extremity at rest. Normal digital perfusion noted. Multiphasic flow is noted in the right common femoral artery, right popliteal artery, right posterior tibial artery and right dorsalis pedis artery.  Left Lower PVR: No evidence of arterial occlusive disease in the left lower extremity at rest. Multiphasic flow is noted in the left common femoral artery, left popliteal artery, left posterior tibial artery and left dorsalis pedis artery. Patient had a left TMA. No metatarsel waveforms or TBI's.     Comparison:  Compared with study from 3/8/2024, no significant change.     Imaging & Doppler Findings:     RIGHT Lower PVR                Pressures Ratios  Right High Thigh               164 mmHg  1.61  Right Low Thigh                147 mmHg  1.44  Right Calf                     128 mmHg  1.25  Right Posterior Tibial (Ankle) 116 mmHg  1.14  Right Dorsalis Pedis (Ankle)   112 mmHg  1.10  Right Digit (Great Toe)        82 mmHg   0.80          LEFT Lower PVR                Pressures Ratios  Left High Thigh               168 mmHg  1.65  Left Low Thigh                153 mmHg  1.50  Left Calf                     135 mmHg  1.32  Left Posterior Tibial (Ankle) 114 mmHg  1.12  Left Dorsalis Pedis (Ankle)   107 mmHg  1.05                             Right     Left  Brachial Pressure 102 mmHg 100 mmHg          [id number] Marisela Huddleston MD  Electronically signed by [id number] Huddleston MD on 5/7/2025 at 1:27:18 PM       ** Final **      Physical Exam  Constitutional:       General: She is not in acute distress.     Appearance: She is not toxic-appearing.   HENT:       Head: Normocephalic.      Mouth/Throat:      Pharynx: Oropharynx is clear.   Eyes:      General: No scleral icterus.  Cardiovascular:      Rate and Rhythm: Normal rate.   Pulmonary:      Effort: No respiratory distress.      Breath sounds: No wheezing.   Abdominal:      General: There is no distension.      Palpations: Abdomen is soft.   Musculoskeletal:         General: Deformity (s/p left TMA) present.      Right lower leg: Edema present.      Left lower leg: No edema.   Skin:     Comments: RLE with bandaging in place   Neurological:      Mental Status: She is alert and oriented to person, place, and time.   Psychiatric:         Behavior: Behavior normal.         Relevant Results                      Assessment & Plan  Wound infection  Diabetic foot infection (right)  Consult podiatry - follows with Dr. Aponte  Consult ID  Antibiotics per ID  Wound culture with staph aureus and group B strep   PVRs wnl for the RLE  MRI R foot without acute osteomyelitis but does show diffuse edema in the dorsal soft tissues consistent with cellulitis and/or venous insufficiency  MRI R ankle showing acute osteomyelitis of the lateral malleolus  Vascular surgery consulted by podiatry   Sepsis without acute organ dysfunction (Multi)  Meets sepsis criteria with tachycardia, leukocytosis, and lactic acidosis with source being right foot infection   Management as above  Follow up wound and blood cultures   Type 2 diabetes mellitus with hyperglycemia (Multi)  Patient states she was previously on lantus 80 units nightly but has been off insulin for awhile as she has not followed up with her endocrinologist in a few years  Will start lantus units night and high dose SSI, uptitrate as needed  Continue home januvia  Hold home amaryl  Hypoglycemia protocol   Bipolar disorder  Continue home propranolol, abilify, doxepin, and effexor   Tobacco use  Recommend cessation  Patient declined NRT    Plan:  Initially planned for OR today with podiatry  but it was canceled   Vascular surgery consulted by podiatry due to MRI findings  Continue antibiotics per ID  BG overall improved but still remain uncontrolled, will continue to adjust insulin PRN  Await surgical plans from podiatry/vascular surgery   Will need PT/OT eval after surgery   Dispo TBD pending surgical plans, ID recommendations, and therapy evaluations         Laura Correa MD

## 2025-05-08 NOTE — H&P (VIEW-ONLY)
Reason For Consult  Possible candidate for muscle flap for ankle wound if adequate flow. T2DM, PVD and and ankle wound with exposed bone.Right TBI 0.80, MAREK 1.41.     History Of Present Illness  Mary Pedroza is a 49 y.o. female presenting with a past medical history of type 2 diabetes, anxiety, bipolar, right hip bursitis.  She presents with a right foot wound over the lateral malleolus that began about 3 weeks ago.  She is not sure how she obtained the wound but states that it has been progressively getting worse over the past week or 2.  She saw Dr. Buffy jerry in the outpatient setting and was prescribed an antibiotic which she reports did not help much.  She also has a history of a left transmetatarsal amputation related to a diabetic foot infection.  When she was admitted to the hospital she had leukocytosis at 15.5 as well as some tachycardia.  MRI of ankle notes acute osteomyelitis of the lateral malleolus on the right she denies any prior history of peripheral arterial disease.  She had a PVR that was completed yesterday 2025 with an MAREK of 1.14 on the right and a TBI of 0.80.  Denies any claudication or ischemic rest pain.  Wound cultures and blood cultures are currently pending.     Past Medical History  She has a past medical history of Anxiety and depression, Bipolar 1 disorder (Multi), Bursitis of right hip (09/15/2023), Diabetes mellitus, type 2 (Multi), and Hyperlipidemia.    Surgical History  She has a past surgical history that includes Cholecystectomy (2012);  section, low transverse ();  section, low transverse ();  section, low transverse (); and Amputation foot / toe (Left).     Social History  She reports that she has been smoking cigarettes. She has a 15 pack-year smoking history. She has been exposed to tobacco smoke. She has never used smokeless tobacco. She reports that she does not currently use alcohol. She reports that she does not use  "drugs.    Family History  Family History[1]     Allergies  Patient has no known allergies.    Review of Systems  CONSTITUTIONAL: Denies weight loss, fever and chills.    HEENT: Denies changes in vision and hearing.    RESPIRATORY: Denies SOB and cough.    CV: Denies palpitations and CP.    GI: Denies abdominal pain, nausea, vomiting and diarrhea.    : Denies dysuria and urinary frequency.    MSK: Denies myalgia and joint pain.    SKIN: Denies rash and pruritus.    VASC: Denies claudication, ischemic rest pain.  Positive for open wound lateral malleolus, right    NEUROLOGICAL: Denies headache and syncope.    PSYCHIATRIC: Denies recent changes in mood. Denies anxiety and depression.     Physical Exam  General: Pt is alert and oriented x 3. Pleasant, conversive  HEENT: Head is atraumatic, normocephalic.   Cardiac: Normal S1-S2.  Regular rate and rhythm.  No murmurs.  Respiratory: Lungs clear to auscultation.  No adventitious sounds.  Abdomen: Soft, nondistended, nontender.  Bowel sounds x4 quadrants.  Pulse exam: Palpable brachial and radial pulses bilaterally.  Palpable femoral, popliteal, pedal pulses bilaterally.  Extremities: Left TMA.  Right foot surgical bandage was not removed but was displaced slightly to palpate pulses.  Neuro: Moves all extremities spontaneously.  No focal deficits.  Psych: Appropriate affect.  Answers questions appropriately.     Last Recorded Vitals  Blood pressure 120/64, pulse 69, temperature 36.5 °C (97.7 °F), temperature source Oral, resp. rate 18, height 1.702 m (5' 7\"), weight 92.6 kg (204 lb 2.3 oz), SpO2 100%.    Relevant Results  MR foot right wo IV contrast  Result Date: 5/7/2025  Interpreted By:  Cecilio Clarke, STUDY: MR FOOT RIGHT WO IV CONTRAST;  5/7/2025 8:58 pm   INDICATION: Signs/Symptoms:Diabetic foot ulcer possible abscess.     COMPARISON: Right ankle radiographs 05/06/2025   ACCESSION NUMBER(S): [id number]   ORDERING CLINICIAN: RENE HERNANDEZ   TECHNIQUE: " Multiplanar, multisequence MR imaging of the right forefoot was obtained without contrast.   FINDINGS: OSSEOUS STRUCTURES: On axial T2 fat sat images there is failure of fat saturation of the 5th toe which is better depicted on sagittal STIR images as being normal signal intensity. There is no evidence of bone marrow replacement or bone destruction of the forefoot is suggest acute osteomyelitis. There is no acute fracture or malalignment.     JOINTS: Normal osteoarticular relationship of the midfoot and forefoot structures. No significant discrete chondral or osteochondral defects.     SOFT TISSUES: There is diffuse edema along the dorsal forefoot that may relate to cellulitis and/or venous/lymphatic insufficiency. No fluid collection identified.     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 There is no evidence of acute tendon rupture within the forefoot. None   LIGAMENTS: Visualized ligaments, including the Lisfranc ligament and collateral ligaments of the MTP and interphalangeal joints, are intact.       (Please see separate report of the hindfoot and ankle for findings regarding these areas.).   No acute osteomyelitis involving the right forefoot.   Diffuse edema in the dorsal soft tissues that could relate to cellulitis and/or venous/lymphatic insufficiency.   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   MACRO: None.   Signed by: Cecilio Clarke 5/7/2025 10:13 PM Dictation workstation:   [id number]    MR ankle right wo IV contrast  Result Date: 5/7/2025  Interpreted By:  Cecilio Clarke, STUDY: MR ANKLE RIGHT WO IV CONTRAST;  5/7/2025 9:15 pm   INDICATION: Signs/Symptoms:Diabetic foot ulcer possible abscess.     COMPARISON: Right ankle radiographs 05/06/2025   ACCESSION NUMBER(S): [id number]   ORDERING CLINICIAN: RENE HERNANDEZ   TECHNIQUE: Multiplanar,  multisequence MR imaging of the right ankle and hindfoot was obtained without contrast.   FINDINGS: OSSEOUS STRUCTURES/BONE MARROW: There is bone marrow replacement of the lateral malleolus characterized by increased fluid sensitive signal involving the distal fibular epiphysis and metaphysis that extends as far proximal as 5 cm from the tip of the lateral malleolus. There is confluent low signal intensity T1 weighted signal within the distal fibula. Findings are consistent with acute osteomyelitis. This is adjacent to a soft tissue ulceration (see below). The remaining bone marrow signal is within normal limits. No acute fractures or malalignment.   JOINTS: Small tibiotalar joint effusion. Small talonavicular joint effusion. No additional findings to suggest septic arthritis. Normal osteoarticular relationship of the ankle and hindfoot structures. Mild chondral thinning of the tibiotalar joint. No full-thickness chondral or osteochondral defects.   TENDONS: Intact with normal caliber and signal intensity.   MUSCULATURE: Nonspecific feathery edema within the musculature of the anterior, peroneal, and posterior compartments could relate to myositis or diabetic neuromyopathy. 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   LIGAMENTS: The tibiofibular syndesmosis is intact. The lateral collateral ligament complex (ATFL, CFL, PTFL) is intact. Evidence of remote deltoid ligament sprain with ossification at the medial malleolar origin and at its talar attachment as well as slight indistinctness of the deep deltoid ligament fibers. The spring ligament complex is intact. The Lisfranc ligament is intact.   PLANTAR FASCIA: Plantar calcaneal spur. The plantar fascia is thickened without edema.   BURSAE: No pathological bursal distension. Trace fluid in the retrocalcaneal bursa noted.   NEUROVASCULAR STRUCTURES: No significant abnormality.   SOFT TISSUES:  There is a large soft tissue ulceration overlying the lateral malleolus measuring 3.7 cm x 3.4 cm and extends to the bone surface of the lateral malleolus. Overlying the lateral cortex of the lateral malleolus there is a 0.9 cm x 0.5 cm circumscribed T2 hyperintense area that may relate to an abscess (coronal image 21, series 801; axial image 22, series 501).       Please see separate report of the forefoot for other findings. This report reflects the ankle and hindfoot)   1. Acute osteomyelitis of the lateral malleolus which involves the distal fibula up to 5 cm proximal to the tip of the lateral malleolus. This is due to a deep ulceration (3.7 cm x 3.4 cm) overlying lateral malleolus that extends to the bone surface and may be associated with a small abscess measuring 0.9 cm x 0.5 cm abutting the lateral cortex of the lateral malleolus.   2.Nonspecific feathery edema within the musculature of the anterior, peroneal, and posterior compartments and of the visualized dorsal and plantar foot could relate diabetic neuromyopathy; however, the signal within the peroneal muscles could be also related to myositis given the proximity to the ulcer and osteomyelitis.   3. Fluid distention of the peroneal tendon sheath in the retro malleolar groove and below the level of the lateral malleolus that may be related to infectious tenosynovitis or reactive in nature, the former favored.   4. Nonspecific effusions of the tibiotalar and talonavicular joints. No evidence of bone marrow edema or replacement in the subchondral bone. Findings are favored to be reactive in nature.   MACRO: None.   Signed by: Cecilio Clarke 5/7/2025 10:07 PM Dictation workstation:   [id number]    Vascular US PVR without exercise  Result Date: 5/7/2025           [place] [address]            Phone [phone]  Vascular Lab Report  VASC US PVR WITHOUT EXERCISE Patient Name:      MONTANA ROCA     Reading Physician:  [id number] Marisela Huddleston MD Study Date:        5/7/2025             Ordering Provider:  [id number] RENE HERNANDEZ MRN/PID:           [id number]             Fellow: Accession#:        [id number]         Technologist:       James Cr RVRENUKA Date of Birth/Age: 1975 / 49      Technologist 2:                    years Gender:            F                    Encounter#:         [id number] Admission Status:  Inpatient            Location Performed: [facility]  Diagnosis/ICD: Type 2 diabetes mellitus (DM) with other circulatory                complications-E11.59 CPT Codes:     11646 Peripheral artery PVR (multi segmental pressure  Pertinent History: Diabetic with left foot TMA.  CONCLUSIONS: Right Lower PVR: No evidence of arterial occlusive disease in the right lower extremity at rest. Normal digital perfusion noted. Multiphasic flow is noted in the right common femoral artery, right popliteal artery, right posterior tibial artery and right dorsalis pedis artery. Left Lower PVR: No evidence of arterial occlusive disease in the left lower extremity at rest. Multiphasic flow is noted in the left common femoral artery, left popliteal artery, left posterior tibial artery and left dorsalis pedis artery. Patient had a left TMA. No metatarsel waveforms or TBI's.  Comparison: Compared with study from 3/8/2024, no significant change.  Imaging & Doppler Findings:  RIGHT Lower PVR                Pressures Ratios Right High Thigh               164 mmHg  1.61 Right Low Thigh                147 mmHg  1.44 Right Calf                     128 mmHg  1.25 Right Posterior Tibial (Ankle) 116 mmHg  1.14 Right Dorsalis Pedis (Ankle)   112 mmHg  1.10 Right Digit (Great Toe)        82 mmHg   0.80   LEFT Lower PVR                Pressures Ratios  "Left High Thigh               168 mmHg  1.65 Left Low Thigh                153 mmHg  1.50 Left Calf                     135 mmHg  1.32 Left Posterior Tibial (Ankle) 114 mmHg  1.12 Left Dorsalis Pedis (Ankle)   107 mmHg  1.05                      Right     Left Brachial Pressure 102 mmHg 100 mmHg   [id number] Marisela Huddleston MD Electronically signed by [id number] Marisela Huddleston MD on 5/7/2025 at 1:27:18 PM  ** Final **     XR ankle right 3+ views  Result Date: 5/6/2025  Interpreted By:  Tanner Diaz, STUDY: XR ANKLE RIGHT 3+ VIEWS; ;  5/6/2025 3:17 pm   INDICATION: Signs/Symptoms:Right lateral malleolus wound, history of diabetes.     COMPARISON: None.   ACCESSION NUMBER(S): [id number]   ORDERING CLINICIAN: ELIAZAR BEE   FINDINGS: Soft tissue ulceration from patient's wound. No radiopaque foreign body.   No stigmata of osteomyelitis. Joint spaces are normal.       No acute osseous involvement. Soft tissue swelling with deformity consistent with the patient's wound.     MACRO: None   Signed by: Tanner Diaz 5/6/2025 3:59 PM Dictation workstation:   [id number]     Visit Vitals  /63 (BP Location: Left arm, Patient Position: Lying)   Pulse 78   Temp 36.6 °C (97.9 °F) (Oral)   Resp 18   Ht 1.702 m (5' 7\")   Wt 100 kg (220 lb 6.4 oz)   SpO2 99%   BMI 34.52 kg/m²   OB Status Injection   Smoking Status Every Day   BSA 2.17 m²        Results for orders placed or performed during the hospital encounter of 05/06/25 (from the past 24 hours)   Tissue/Wound Culture/Smear    Specimen: Wound/Tissue; Tissue/Biopsy   Result Value Ref Range    Gram Stain No polymorphonuclear leukocytes seen (A)     Gram Stain (4+) Abundant Gram positive cocci (A)    POCT GLUCOSE   Result Value Ref Range    POCT Glucose 253 (H) 74 - 99 mg/dL   POCT GLUCOSE   Result Value Ref Range    POCT Glucose 324 (H) 74 - 99 mg/dL   Basic metabolic panel   Result Value Ref Range    Glucose 192 (H) 74 - 99 mg/dL    Sodium 138 136 - 145 mmol/L    Potassium 3.7 3.5 - " 5.3 mmol/L    Chloride 108 (H) 98 - 107 mmol/L    Bicarbonate 25 21 - 32 mmol/L    Anion Gap 9 (L) 10 - 20 mmol/L    Urea Nitrogen 11 6 - 23 mg/dL    Creatinine 0.56 0.50 - 1.05 mg/dL    eGFR >90 >60 mL/min/1.73m*2    Calcium 8.7 8.6 - 10.3 mg/dL   CBC   Result Value Ref Range    WBC 10.3 4.4 - 11.3 x10*3/uL    nRBC 0.0 0.0 - 0.0 /100 WBCs    RBC 4.55 4.00 - 5.20 x10*6/uL    Hemoglobin 13.4 12.0 - 16.0 g/dL    Hematocrit 39.7 36.0 - 46.0 %    MCV 87 80 - 100 fL    MCH 29.5 26.0 - 34.0 pg    MCHC 33.8 32.0 - 36.0 g/dL    RDW 12.3 11.5 - 14.5 %    Platelets 223 150 - 450 x10*3/uL   POCT GLUCOSE   Result Value Ref Range    POCT Glucose 215 (H) 74 - 99 mg/dL         Assessment/Plan   Acute osteomyelitis right lateral malleolus  Type 2 diabetes  Wound infection  Tobacco abuse    Patient with progressively worsening infection of right lower extremity.  MRI revealed acute osteomyelitis of the lateral malleolus on the right.  I reviewed the patient's PVR from March 2024 as well as most recent PVR.  Both show no evidence of peripheral arterial disease.  From a clinical perspective, the patient has easily palpable pedal pulses bilaterally.    I do not suspect any clinically significant peripheral arterial disease.  However, vascular surgery is available if the patient needs angiogram prior to muscle flap procedure by podiatry.  Would be diagnostic only.    I spent 35 minutes in the professional and overall care of this patient.      Sourav Stover, APRN-CNP         [1]   Family History  Problem Relation Name Age of Onset    Ovarian cancer Mother      Hypertension Father      Diabetes Father      Other (cholesterol issues) Father      No Known Problems Sister      No Known Problems Daughter      No Known Problems Son      No Known Problems Son      Ovarian cancer Maternal Grandmother      Breast cancer Father's Sister

## 2025-05-09 ENCOUNTER — APPOINTMENT (OUTPATIENT)
Dept: CARDIOLOGY | Facility: HOSPITAL | Age: 50
DRG: 872 | End: 2025-05-09
Payer: MEDICARE

## 2025-05-09 LAB
ANION GAP SERPL CALCULATED.3IONS-SCNC: 9 MMOL/L (ref 10–20)
B-LACTAMASE ORGANISM ISLT: NEGATIVE
BACTERIA SPEC CULT: ABNORMAL
BUN SERPL-MCNC: 12 MG/DL (ref 6–23)
CALCIUM SERPL-MCNC: 8.7 MG/DL (ref 8.6–10.3)
CHLORIDE SERPL-SCNC: 106 MMOL/L (ref 98–107)
CO2 SERPL-SCNC: 25 MMOL/L (ref 21–32)
CREAT SERPL-MCNC: 0.62 MG/DL (ref 0.5–1.05)
EGFRCR SERPLBLD CKD-EPI 2021: >90 ML/MIN/1.73M*2
ERYTHROCYTE [DISTWIDTH] IN BLOOD BY AUTOMATED COUNT: 12.2 % (ref 11.5–14.5)
GLUCOSE BLD MANUAL STRIP-MCNC: 167 MG/DL (ref 74–99)
GLUCOSE BLD MANUAL STRIP-MCNC: 202 MG/DL (ref 74–99)
GLUCOSE BLD MANUAL STRIP-MCNC: 215 MG/DL (ref 74–99)
GLUCOSE BLD MANUAL STRIP-MCNC: 222 MG/DL (ref 74–99)
GLUCOSE BLD MANUAL STRIP-MCNC: 236 MG/DL (ref 74–99)
GLUCOSE SERPL-MCNC: 229 MG/DL (ref 74–99)
GRAM STN SPEC: ABNORMAL
GRAM STN SPEC: ABNORMAL
HCT VFR BLD AUTO: 38.9 % (ref 36–46)
HGB BLD-MCNC: 14.2 G/DL (ref 12–16)
MCH RBC QN AUTO: 29.9 PG (ref 26–34)
MCHC RBC AUTO-ENTMCNC: 36.5 G/DL (ref 32–36)
MCV RBC AUTO: 82 FL (ref 80–100)
NRBC BLD-RTO: 0 /100 WBCS (ref 0–0)
PLATELET # BLD AUTO: 218 X10*3/UL (ref 150–450)
POTASSIUM SERPL-SCNC: 3.7 MMOL/L (ref 3.5–5.3)
RBC # BLD AUTO: 4.75 X10*6/UL (ref 4–5.2)
SODIUM SERPL-SCNC: 136 MMOL/L (ref 136–145)
VANCOMYCIN SERPL-MCNC: 19.1 UG/ML (ref 5–20)
WBC # BLD AUTO: 10.3 X10*3/UL (ref 4.4–11.3)

## 2025-05-09 PROCEDURE — 82947 ASSAY GLUCOSE BLOOD QUANT: CPT

## 2025-05-09 PROCEDURE — 2500000002 HC RX 250 W HCPCS SELF ADMINISTERED DRUGS (ALT 637 FOR MEDICARE OP, ALT 636 FOR OP/ED): Performed by: STUDENT IN AN ORGANIZED HEALTH CARE EDUCATION/TRAINING PROGRAM

## 2025-05-09 PROCEDURE — 2500000004 HC RX 250 GENERAL PHARMACY W/ HCPCS (ALT 636 FOR OP/ED): Mod: JZ | Performed by: INTERNAL MEDICINE

## 2025-05-09 PROCEDURE — 85027 COMPLETE CBC AUTOMATED: CPT | Performed by: STUDENT IN AN ORGANIZED HEALTH CARE EDUCATION/TRAINING PROGRAM

## 2025-05-09 PROCEDURE — 80048 BASIC METABOLIC PNL TOTAL CA: CPT | Performed by: STUDENT IN AN ORGANIZED HEALTH CARE EDUCATION/TRAINING PROGRAM

## 2025-05-09 PROCEDURE — 02HV33Z INSERTION OF INFUSION DEVICE INTO SUPERIOR VENA CAVA, PERCUTANEOUS APPROACH: ICD-10-PCS | Performed by: INTERNAL MEDICINE

## 2025-05-09 PROCEDURE — 99232 SBSQ HOSP IP/OBS MODERATE 35: CPT | Performed by: STUDENT IN AN ORGANIZED HEALTH CARE EDUCATION/TRAINING PROGRAM

## 2025-05-09 PROCEDURE — 2500000004 HC RX 250 GENERAL PHARMACY W/ HCPCS (ALT 636 FOR OP/ED): Performed by: STUDENT IN AN ORGANIZED HEALTH CARE EDUCATION/TRAINING PROGRAM

## 2025-05-09 PROCEDURE — 36573 INSJ PICC RS&I 5 YR+: CPT

## 2025-05-09 PROCEDURE — C1751 CATH, INF, PER/CENT/MIDLINE: HCPCS

## 2025-05-09 PROCEDURE — 99232 SBSQ HOSP IP/OBS MODERATE 35: CPT | Performed by: NURSE PRACTITIONER

## 2025-05-09 PROCEDURE — 2780000003 HC OR 278 NO HCPCS

## 2025-05-09 PROCEDURE — 2500000001 HC RX 250 WO HCPCS SELF ADMINISTERED DRUGS (ALT 637 FOR MEDICARE OP): Performed by: STUDENT IN AN ORGANIZED HEALTH CARE EDUCATION/TRAINING PROGRAM

## 2025-05-09 PROCEDURE — 2500000005 HC RX 250 GENERAL PHARMACY W/O HCPCS: Performed by: STUDENT IN AN ORGANIZED HEALTH CARE EDUCATION/TRAINING PROGRAM

## 2025-05-09 PROCEDURE — 1210000001 HC SEMI-PRIVATE ROOM DAILY

## 2025-05-09 PROCEDURE — 80202 ASSAY OF VANCOMYCIN: CPT | Performed by: STUDENT IN AN ORGANIZED HEALTH CARE EDUCATION/TRAINING PROGRAM

## 2025-05-09 PROCEDURE — 36415 COLL VENOUS BLD VENIPUNCTURE: CPT | Performed by: STUDENT IN AN ORGANIZED HEALTH CARE EDUCATION/TRAINING PROGRAM

## 2025-05-09 PROCEDURE — 87081 CULTURE SCREEN ONLY: CPT | Mod: WESLAB | Performed by: INTERNAL MEDICINE

## 2025-05-09 RX ORDER — VANCOMYCIN 1.5 G/300ML
1500 INJECTION, SOLUTION INTRAVENOUS EVERY 12 HOURS
Status: DISCONTINUED | OUTPATIENT
Start: 2025-05-09 | End: 2025-05-09

## 2025-05-09 RX ORDER — LIDOCAINE HYDROCHLORIDE 10 MG/ML
5 INJECTION, SOLUTION INFILTRATION; PERINEURAL ONCE
Status: DISCONTINUED | OUTPATIENT
Start: 2025-05-09 | End: 2025-05-14 | Stop reason: HOSPADM

## 2025-05-09 RX ORDER — INSULIN LISPRO 100 [IU]/ML
6 INJECTION, SOLUTION INTRAVENOUS; SUBCUTANEOUS
Status: DISCONTINUED | OUTPATIENT
Start: 2025-05-09 | End: 2025-05-14 | Stop reason: HOSPADM

## 2025-05-09 RX ADMIN — SIMVASTATIN 20 MG: 20 TABLET, FILM COATED ORAL at 20:18

## 2025-05-09 RX ADMIN — CEFEPIME 2 G: 2 INJECTION, POWDER, FOR SOLUTION INTRAVENOUS at 06:20

## 2025-05-09 RX ADMIN — INSULIN LISPRO 3 UNITS: 100 INJECTION, SOLUTION INTRAVENOUS; SUBCUTANEOUS at 16:19

## 2025-05-09 RX ADMIN — VANCOMYCIN HYDROCHLORIDE 1250 MG: 5 INJECTION, POWDER, LYOPHILIZED, FOR SOLUTION INTRAVENOUS at 04:00

## 2025-05-09 RX ADMIN — INSULIN LISPRO 6 UNITS: 100 INJECTION, SOLUTION INTRAVENOUS; SUBCUTANEOUS at 11:12

## 2025-05-09 RX ADMIN — AMPICILLIN SODIUM AND SULBACTAM SODIUM 3 G: 2; 1 INJECTION, POWDER, FOR SOLUTION INTRAMUSCULAR; INTRAVENOUS at 11:07

## 2025-05-09 RX ADMIN — PROPRANOLOL HYDROCHLORIDE 20 MG: 20 TABLET ORAL at 08:17

## 2025-05-09 RX ADMIN — SITAGLIPTIN 25 MG: 25 TABLET, FILM COATED ORAL at 08:34

## 2025-05-09 RX ADMIN — INSULIN LISPRO 6 UNITS: 100 INJECTION, SOLUTION INTRAVENOUS; SUBCUTANEOUS at 08:21

## 2025-05-09 RX ADMIN — VANCOMYCIN 1.5 G: 1.5 INJECTION, SOLUTION INTRAVENOUS at 12:25

## 2025-05-09 RX ADMIN — DOXEPIN HYDROCHLORIDE 100 MG: 50 CAPSULE ORAL at 20:19

## 2025-05-09 RX ADMIN — VENLAFAXINE HYDROCHLORIDE 150 MG: 150 CAPSULE, EXTENDED RELEASE ORAL at 08:17

## 2025-05-09 RX ADMIN — INSULIN LISPRO 4 UNITS: 100 INJECTION, SOLUTION INTRAVENOUS; SUBCUTANEOUS at 08:19

## 2025-05-09 RX ADMIN — AMPICILLIN SODIUM AND SULBACTAM SODIUM 3 G: 2; 1 INJECTION, POWDER, FOR SOLUTION INTRAMUSCULAR; INTRAVENOUS at 22:36

## 2025-05-09 RX ADMIN — ENOXAPARIN SODIUM 40 MG: 40 INJECTION SUBCUTANEOUS at 20:19

## 2025-05-09 RX ADMIN — INSULIN LISPRO 6 UNITS: 100 INJECTION, SOLUTION INTRAVENOUS; SUBCUTANEOUS at 16:19

## 2025-05-09 RX ADMIN — ARIPIPRAZOLE 10 MG: 10 TABLET ORAL at 20:18

## 2025-05-09 RX ADMIN — PANTOPRAZOLE SODIUM 40 MG: 40 INJECTION, POWDER, FOR SOLUTION INTRAVENOUS at 06:20

## 2025-05-09 RX ADMIN — INSULIN GLARGINE 50 UNITS: 100 INJECTION, SOLUTION SUBCUTANEOUS at 20:26

## 2025-05-09 RX ADMIN — INSULIN LISPRO 6 UNITS: 100 INJECTION, SOLUTION INTRAVENOUS; SUBCUTANEOUS at 11:08

## 2025-05-09 RX ADMIN — AMPICILLIN SODIUM AND SULBACTAM SODIUM 3 G: 2; 1 INJECTION, POWDER, FOR SOLUTION INTRAMUSCULAR; INTRAVENOUS at 16:20

## 2025-05-09 ASSESSMENT — COGNITIVE AND FUNCTIONAL STATUS - GENERAL
DAILY ACTIVITIY SCORE: 24
DAILY ACTIVITIY SCORE: 24
MOBILITY SCORE: 24
MOBILITY SCORE: 24

## 2025-05-09 ASSESSMENT — PAIN SCALES - GENERAL
PAINLEVEL_OUTOF10: 0 - NO PAIN

## 2025-05-09 ASSESSMENT — PAIN - FUNCTIONAL ASSESSMENT
PAIN_FUNCTIONAL_ASSESSMENT: 0-10

## 2025-05-09 ASSESSMENT — ACTIVITIES OF DAILY LIVING (ADL): LACK_OF_TRANSPORTATION: NO

## 2025-05-09 NOTE — PROGRESS NOTES
INFECTIOUS DISEASES PROGRESS NOTE    Consulted / following patient for:  Osteomyelitis right lateral malleolus    Subjective   Interval History:   No new complaints.  Wonders about podiatry/surgery plans     Objective   PHYSICAL EXAMINATION  Vital signs:  Visit Vitals  /65 (BP Location: Left arm, Patient Position: Lying)   Pulse 74   Temp 37.2 °C (99 °F) (Oral)   Resp 16      General: Resting comfortably, no acute distress  Extremities: Right lower extremity dressing in place, dry, no proximal erythema.  No malodor     Relevant Results  WBC: 10,300    Results from last 72 hours   Lab Units 05/09/25  0720   CREATININE mg/dL 0.62   ANION GAP mmol/L 9*   EGFR mL/min/1.73m*2 >90     Results from last 72 hours   Lab Units 05/06/25  1450   AST U/L 12   ALT U/L 13   ALK PHOS U/L 101   BILIRUBIN TOTAL mg/dL 0.6     Microbiology:  Blood (5/6): Negative X2  Wound (5/6, 5/7): Staph aureus (CANDELARIA pending), Group B streptococcus  Imaging:  MRI right ankle:   IMPRESSION:  Please see separate report of the forefoot for other findings. This  report reflects the ankle and hindfoot)      1. Acute osteomyelitis of the lateral malleolus which involves the  distal fibula up to 5 cm proximal to the tip of the lateral  malleolus. This is due to a deep ulceration (3.7 cm x 3.4 cm)  overlying lateral malleolus that extends to the bone surface and may  be associated with a small abscess measuring 0.9 cm x 0.5 cm abutting  the lateral cortex of the lateral malleolus.      2.Nonspecific feathery edema within the musculature of the anterior,  peroneal, and posterior compartments and of the visualized dorsal and  plantar foot could relate diabetic neuromyopathy; however, the signal  within the peroneal muscles could be also related to myositis given  the proximity to the ulcer and osteomyelitis.      3. Fluid distention of the peroneal tendon sheath in the retro  malleolar groove and below the level of the lateral malleolus that  may be  related to infectious tenosynovitis or reactive in nature, the  former favored.      4. Nonspecific effusions of the tibiotalar and talonavicular joints.  No evidence of bone marrow edema or replacement in the subchondral  bone. Findings are favored to be reactive in nature.      ASSESSMENT:  Right lateral malleolus osteomyelitis  Preliminary culture shows Staph aureus (CANDELARIA pending) and group B streptococcus.  Awaiting podiatry plans    PLANS:  -   Continue vancomycin, pending CANDELARIA, pharmacy dosing  -   De-escalate from Zosyn to Unasyn  -   Place PICC line  -   Await further podiatry plans    Gonzalo Raygoza MD  [facility]  Office:  [phone]

## 2025-05-09 NOTE — ASSESSMENT & PLAN NOTE
Diabetic foot infection (right)  Consult podiatry - follows with Dr. Aponte  Consult ID  Antibiotics per ID  Wound culture with staph aureus and group B strep   PVRs wnl for the RLE  MRI R foot without acute osteomyelitis but does show diffuse edema in the dorsal soft tissues consistent with cellulitis and/or venous insufficiency  MRI R ankle showing acute osteomyelitis of the lateral malleolus  Vascular surgery consulted by podiatry   Tentative plans for RLE angiogram, timing TBD

## 2025-05-09 NOTE — PROGRESS NOTES
Mary Pedroza is a 49 y.o. female on day 3 of admission presenting with Wound infection.      Subjective   States she feels well today. She is frustrated that no one from podiatry saw her yesterday to discuss test results and surgical plans. States she does not want a PICC line because she does not know or feel comfortable administering IV antibiotics herself. States she doesn't want to stay in the hospital much longer and will leave AMA.        Objective     Last Recorded Vitals  /65 (BP Location: Left arm, Patient Position: Lying)   Pulse 74   Temp 37.2 °C (99 °F) (Oral)   Resp 16   Wt 92.6 kg (204 lb 2.3 oz)   SpO2 100%   Intake/Output last 3 Shifts:    Intake/Output Summary (Last 24 hours) at 5/9/2025 1033  Last data filed at 5/9/2025 0800  Gross per 24 hour   Intake 2075 ml   Output --   Net 2075 ml       Admission Weight  Weight: 90.3 kg (199 lb) (05/06/25 1402)    Daily Weight  05/07/25 : 92.6 kg (204 lb 2.3 oz)    Image Results  MR foot right wo IV contrast  Narrative: Interpreted By:  Cecilio Clarke,   STUDY:  MR FOOT RIGHT WO IV CONTRAST;  5/7/2025 8:58 pm      INDICATION:  Signs/Symptoms:Diabetic foot ulcer possible abscess.          COMPARISON:  Right ankle radiographs 05/06/2025      ACCESSION NUMBER(S):  [id number]      ORDERING CLINICIAN:  RENE HERNANDEZ      TECHNIQUE:  Multiplanar, multisequence MR imaging of the right forefoot was  obtained without contrast.      FINDINGS:  OSSEOUS STRUCTURES:  On axial T2 fat sat images there is failure of fat saturation of the  5th toe which is better depicted on sagittal STIR images as being  normal signal intensity. There is no evidence of bone marrow  replacement or bone destruction of the forefoot is suggest acute  osteomyelitis. There is no acute fracture or malalignment.          JOINTS:  Normal osteoarticular relationship of the midfoot and forefoot  structures. No significant discrete chondral or osteochondral defects.          SOFT  TISSUES:  There is diffuse edema along the dorsal forefoot that may relate to  cellulitis and/or venous/lymphatic insufficiency. No fluid collection  identified.          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 There is no  evidence of acute tendon rupture within the forefoot. None      LIGAMENTS:  Visualized ligaments, including the Lisfranc ligament and collateral  ligaments of the MTP and interphalangeal joints, are intact.      Impression: (Please see separate report of the hindfoot and ankle for findings  regarding these areas.).      No acute osteomyelitis involving the right forefoot.      Diffuse edema in the dorsal soft tissues that could relate to  cellulitis and/or venous/lymphatic insufficiency.      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      MACRO:  None.      Signed by: Cecilio Clarke 5/7/2025 10:13 PM  Dictation workstation:   [id number]  MR ankle right wo IV contrast  Narrative: Interpreted By:  Cecilio Clarke,   STUDY:  MR ANKLE RIGHT WO IV CONTRAST;  5/7/2025 9:15 pm      INDICATION:  Signs/Symptoms:Diabetic foot ulcer possible abscess.          COMPARISON:  Right ankle radiographs 05/06/2025      ACCESSION NUMBER(S):  [id number]      ORDERING CLINICIAN:  RENE HERNANDEZ      TECHNIQUE:  Multiplanar, multisequence MR imaging of the right ankle and hindfoot  was obtained without contrast.      FINDINGS:  OSSEOUS STRUCTURES/BONE MARROW: There is bone marrow replacement of  the lateral malleolus characterized by increased fluid sensitive  signal involving the distal fibular epiphysis and metaphysis that  extends as far proximal as 5 cm from the tip of the lateral  malleolus. There is confluent low signal intensity T1 weighted signal  within the distal fibula. Findings are consistent with acute  osteomyelitis. This is adjacent to a soft  tissue ulceration (see  below). The remaining bone marrow signal is within normal limits. No  acute fractures or malalignment.      JOINTS: Small tibiotalar joint effusion. Small talonavicular joint  effusion. No additional findings to suggest septic arthritis. Normal  osteoarticular relationship of the ankle and hindfoot structures.  Mild chondral thinning of the tibiotalar joint. No full-thickness  chondral or osteochondral defects.      TENDONS: Intact with normal caliber and signal intensity.      MUSCULATURE: Nonspecific feathery edema within the musculature of the  anterior, peroneal, and posterior compartments could relate to  myositis or diabetic neuromyopathy. There is also diffuse edema of  the plantar and dorsal musculature in the foot likely related to  diabetic neuromyopathy.      LIGAMENTS: The tibiofibular syndesmosis is intact. The lateral  collateral ligament complex (ATFL, CFL, PTFL) is intact. Evidence of  remote deltoid ligament sprain with ossification at the medial  malleolar origin and at its talar attachment as well as slight  indistinctness of the deep deltoid ligament fibers. The spring  ligament complex is intact. The Lisfranc ligament is intact.      PLANTAR FASCIA: Plantar calcaneal spur. The plantar fascia is  thickened without edema.      BURSAE: No pathological bursal distension. Trace fluid in the  retrocalcaneal bursa noted.      NEUROVASCULAR STRUCTURES: No significant abnormality.      SOFT TISSUES: There is a large soft tissue ulceration overlying the  lateral malleolus measuring 3.7 cm x 3.4 cm and extends to the bone  surface of the lateral malleolus. Overlying the lateral cortex of the  lateral malleolus there is a 0.9 cm x 0.5 cm circumscribed T2  hyperintense area that may relate to an abscess (coronal image 21,  series 801; axial image 22, series 501).      Impression: Please see separate report of the forefoot for other findings. This  report reflects the ankle and  hindfoot)      1. Acute osteomyelitis of the lateral malleolus which involves the  distal fibula up to 5 cm proximal to the tip of the lateral  malleolus. This is due to a deep ulceration (3.7 cm x 3.4 cm)  overlying lateral malleolus that extends to the bone surface and may  be associated with a small abscess measuring 0.9 cm x 0.5 cm abutting  the lateral cortex of the lateral malleolus.      2.Nonspecific feathery edema within the musculature of the anterior,  peroneal, and posterior compartments and of the visualized dorsal and  plantar foot could relate diabetic neuromyopathy; however, the signal  within the peroneal muscles could be also related to myositis given  the proximity to the ulcer and osteomyelitis.      3. Fluid distention of the peroneal tendon sheath in the retro  malleolar groove and below the level of the lateral malleolus that  may be related to infectious tenosynovitis or reactive in nature, the  former favored.      4. Nonspecific effusions of the tibiotalar and talonavicular joints.  No evidence of bone marrow edema or replacement in the subchondral  bone. Findings are favored to be reactive in nature.      MACRO:  None.      Signed by: Cecilio Clarke 5/7/2025 10:07 PM  Dictation workstation:   [id number]  Vascular US [place]VR [place] [address]             Phone [phone]       Vascular Lab Report     [facility] US PVR WITHOUT EXERCISE    Patient Name:      MONTANA Deshpande Physician:  [id number] Marisela Huddleston MD  Study Date:        5/7/2025             Ordering Provider:  [id number] RENE HERNANDEZ  MRN/PID:           [id number]             Fellow:  Accession#:        [id number]         Technologist:       James Cr RVT  Date of Birth/Age: 1975 / 49      Technologist 2:                      years  Gender:            F                    Encounter#:         [id number]  Admission Status:  Inpatient            Location Performed: [facility]       Diagnosis/ICD: Type 2 diabetes mellitus (DM) with other circulatory                 complications-E11.59  CPT Codes:     33583 Peripheral artery PVR (multi segmental pressure       Pertinent History: Diabetic with left foot TMA.       CONCLUSIONS:  Right Lower PVR: No evidence of arterial occlusive disease in the right lower extremity at rest. Normal digital perfusion noted. Multiphasic flow is noted in the right common femoral artery, right popliteal artery, right posterior tibial artery and right dorsalis pedis artery.  Left Lower PVR: No evidence of arterial occlusive disease in the left lower extremity at rest. Multiphasic flow is noted in the left common femoral artery, left popliteal artery, left posterior tibial artery and left dorsalis pedis artery. Patient had a left TMA. No metatarsel waveforms or TBI's.     Comparison:  Compared with study from 3/8/2024, no significant change.     Imaging & Doppler Findings:     RIGHT Lower PVR                Pressures Ratios  Right High Thigh               164 mmHg  1.61  Right Low Thigh                147 mmHg  1.44  Right Calf                     128 mmHg  1.25  Right Posterior Tibial (Ankle) 116 mmHg  1.14  Right Dorsalis Pedis (Ankle)   112 mmHg  1.10  Right Digit (Great Toe)        82 mmHg   0.80          LEFT Lower PVR                Pressures Ratios  Left High Thigh               168 mmHg  1.65  Left Low Thigh                153 mmHg  1.50  Left Calf                     135 mmHg  1.32  Left Posterior Tibial (Ankle) 114 mmHg  1.12  Left Dorsalis Pedis (Ankle)   107 mmHg  1.05                             Right     Left  Brachial Pressure 102 mmHg 100 mmHg          [id number] Marisela Huddleston MD  Electronically signed by [id number] Huddleston MD on  5/7/2025 at 1:27:18 PM       ** Final **      Physical Exam  Constitutional:       General: She is not in acute distress.     Appearance: She is not toxic-appearing.   HENT:      Head: Normocephalic.      Mouth/Throat:      Pharynx: Oropharynx is clear.   Eyes:      General: No scleral icterus.  Cardiovascular:      Rate and Rhythm: Normal rate.   Pulmonary:      Effort: No respiratory distress.      Breath sounds: No wheezing.   Abdominal:      General: There is no distension.      Palpations: Abdomen is soft.   Musculoskeletal:      Right lower leg: No edema.      Left lower leg: No edema.      Comments: RLE with bandaging in place   Neurological:      Mental Status: She is alert and oriented to person, place, and time.         Relevant Results                    Assessment & Plan  Wound infection  Diabetic foot infection (right)  Consult podiatry - follows with Dr. Aponte  Consult ID  Antibiotics per ID  Wound culture with staph aureus and group B strep   PVRs wnl for the RLE  MRI R foot without acute osteomyelitis but does show diffuse edema in the dorsal soft tissues consistent with cellulitis and/or venous insufficiency  MRI R ankle showing acute osteomyelitis of the lateral malleolus  Vascular surgery consulted by podiatry   Tentative plans for RLE angiogram, timing TBD  Sepsis without acute organ dysfunction (Multi)  Meets sepsis criteria with tachycardia, leukocytosis, and lactic acidosis with source being right foot infection   Management as above  Follow up wound and blood cultures   Type 2 diabetes mellitus with hyperglycemia (Multi)  Patient states she was previously on lantus 80 units nightly but has been off insulin for awhile as she has not followed up with her endocrinologist in a few years  Will start lantus units night and high dose SSI, uptitrate as needed  Continue home januvia  Hold home amaryl  Hypoglycemia protocol   Bipolar disorder  Continue home propranolol, abilify, doxepin, and effexor    Tobacco use  Recommend cessation  Patient declined NRT    Plan:  Patient is frustrated about lack of communication from podiatry service -> will reach out to them this morning and ask them to come by and talk to her today  Wound culture showing staph aureus and group B strep -> continue IV antibiotics per ID  Awaiting vascular and podiatry plans - possible ?RLE angiogram  Will need therapy eval after surgery   Dispo TBD           Laura Correa MD

## 2025-05-09 NOTE — NURSING NOTE
Assumed care.  Seen patient sitting at the edge of the bed, awake no distress or denies needs and any discomfort at this time.  Bed at lowest position and locked.  Call light in reach.

## 2025-05-09 NOTE — PROGRESS NOTES
Vascular Access Team Intervention Note (PICC)     Visit Date: 5/9/2025      Patient Name: Mary Pedroza         MRN: [id number]                Reason for Visit: PICC insertion  Right basilic 4F single lumen PICC placed without difficulty per Abdon RODRIGUEZ. Catheter length 39cm with 0 exposed, arm circumference 31cm. Brisk blood return and flushes easily, Tip confirmation in SVC per 3CG  Curos cap intact. Patient tolerated well.               Plan: RN aware line good to use.     Kamilla Caceres RN  5/9/2025  12:29 PM

## 2025-05-09 NOTE — CARE PLAN
The patient's goals for the shift include Patient Safety    The clinical goals for the shift include manage pain, rest      Problem: Pain - Adult  Goal: Verbalizes/displays adequate comfort level or baseline comfort level  Outcome: Progressing     Problem: Safety - Adult  Goal: Free from fall injury  Outcome: Progressing     Problem: Discharge Planning  Goal: Discharge to home or other facility with appropriate resources  Outcome: Progressing     Problem: Chronic Conditions and Co-morbidities  Goal: Patient's chronic conditions and co-morbidity symptoms are monitored and maintained or improved  Outcome: Progressing     Problem: Nutrition  Goal: Nutrient intake appropriate for maintaining nutritional needs  Outcome: Progressing     Problem: Fall/Injury  Goal: Not fall by end of shift  Outcome: Progressing  Goal: Be free from injury by end of the shift  Outcome: Progressing  Goal: Verbalize understanding of personal risk factors for fall in the hospital  Outcome: Progressing  Goal: Verbalize understanding of risk factor reduction measures to prevent injury from fall in the home  Outcome: Progressing  Goal: Use assistive devices by end of the shift  Outcome: Progressing  Goal: Pace activities to prevent fatigue by end of the shift  Outcome: Progressing     Problem: Skin  Goal: Decreased wound size/increased tissue granulation at next dressing change  Outcome: Progressing  Goal: Participates in plan/prevention/treatment measures  Outcome: Progressing  Goal: Prevent/manage excess moisture  Outcome: Progressing  Goal: Prevent/minimize sheer/friction injuries  Outcome: Progressing  Goal: Promote/optimize nutrition  Outcome: Progressing  Goal: Promote skin healing  Outcome: Progressing     Problem: Diabetes  Goal: Achieve decreasing blood glucose levels by end of shift  Outcome: Progressing  Goal: Increase stability of blood glucose readings by end of shift  Outcome: Progressing  Goal: Decrease in ketones present in urine  by end of shift  Outcome: Progressing  Goal: Maintain electrolyte levels within acceptable range throughout shift  Outcome: Progressing  Goal: Maintain glucose levels >70mg/dl to <250mg/dl throughout shift  Outcome: Progressing  Goal: No changes in neurological exam by end of shift  Outcome: Progressing  Goal: Learn about and adhere to nutrition recommendations by end of shift  Outcome: Progressing  Goal: Vital signs within normal range for age by end of shift  Outcome: Progressing  Goal: Increase self care and/or family involovement by end of shift  Outcome: Progressing

## 2025-05-09 NOTE — PROGRESS NOTES
Vancomycin Dosing by Pharmacy- Cessation of Therapy    Consult to pharmacy for vancomycin dosing has been discontinued by the prescriber, pharmacy will sign off at this time.    Please call pharmacy if there are further questions or re-enter a consult if vancomycin is resumed.     Juan David Banda, JajaD

## 2025-05-09 NOTE — PROGRESS NOTES
TCC met with patient at bedside. Introduced self and explained role. Patient lives home with a roommate. Patient is independent from home, and uses no assistive devices. Patient reports smoking about 1 pack of cigarettes a day, and states she drinks alcohol on a social basis.  PCP is Luba Griffin . Patient does not have a LW or POA. Declined paperwork at this time. Plan is to return home. Patient being followed by podiatry and vascular at this time   TCC will follow for discharge needs.        05/09/25 0987   Discharge Planning   Living Arrangements Friends   Support Systems Friends/neighbors   Type of Residence Private residence   Do you have animals or pets at home? Yes   Type of Animals or Pets Dog   Who is requesting discharge planning? Provider   Expected Discharge Disposition Othe   Financial Resource Strain   How hard is it for you to pay for the very basics like food, housing, medical care, and heating? Somewhat   Housing Stability   In the last 12 months, was there a time when you were not able to pay the mortgage or rent on time? N   In the past 12 months, how many times have you moved where you were living? 0   At any time in the past 12 months, were you homeless or living in a shelter (including now)? N   Transportation Needs   In the past 12 months, has lack of transportation kept you from medical appointments or from getting medications? no   In the past 12 months, has lack of transportation kept you from meetings, work, or from getting things needed for daily living? No

## 2025-05-09 NOTE — PROGRESS NOTES
Vancomycin Dosing by Pharmacy- FOLLOW UP    Mary Pedroza is a 49 y.o. year old female who Pharmacy has been consulted for vancomycin dosing for other diabetic foot infection. Based on the patient's indication and renal status this patient is being dosed based on a goal AUC of 400-600.     Renal function is currently stable.    Current vancomycin dose: 1250 mg given every 12 hours    Estimated vancomycin AUC on current dose: 387 mg/L.hr     Visit Vitals  /65 (BP Location: Left arm, Patient Position: Lying)   Pulse 74   Temp 37.2 °C (99 °F) (Oral)   Resp 16        Lab Results   Component Value Date    CREATININE 0.62 2025    CREATININE 0.56 2025    CREATININE 0.72 2025    CREATININE 0.84 2025        Patient weight is as follows:   Vitals:    25 0500   Weight: 92.6 kg (204 lb 2.3 oz)       Cultures:  Susceptibility data for the encounter in last 14 days.  Collected Specimen Info Organism   25 Tissue/Biopsy from Wound/Tissue Staphylococcus aureus     Streptococcus agalactiae (Group B Streptococcus)   25 Tissue/Biopsy from Wound/Tissue Staphylococcus aureus     Streptococcus agalactiae (Group B Streptococcus)     Mixed Gram-Positive and Gram-Negative Bacteria        I/O last 3 completed shifts:  In: 2235 (24.1 mL/kg) [P.O.:1010; IV Piggyback:1225]  Out: - (0 mL/kg)   Weight: 92.6 kg   I/O during current shift:  I/O this shift:  In: 100 [IV Piggyback:100]  Out: -     Temp (24hrs), Av.9 °C (98.5 °F), Min:36.5 °C (97.7 °F), Max:37.4 °C (99.3 °F)      Assessment/Plan    Below goal AUC. Orders placed for new vancomcyin regimen of 1500 mg every 12 hours to begin at 1200.     This dosing regimen is predicted by [employer] to result in the following pharmacokinetic parameters:    Regimen: 1500 mg IV every 12 hours.  Start time: 16:00 on 2025  Exposure target: AUC24 (range)400-600 mg/L.hr   [id number]: 456 mg/L.hr  AUC24,ss: 463 mg/L.hr  Probability of AUC24 > 400: 81  %  Ctrough,ss: 11.9 mg/L  Probability of Ctrough,ss > 20: 3 %      The next level will be obtained on 5/10 at 0500. May be obtained sooner if clinically indicated.   Will continue to monitor renal function daily while on vancomycin and order serum creatinine at least every 48 hours if not already ordered.  Follow for continued vancomycin needs, clinical response, and signs/symptoms of toxicity.       Edmundo Chau [facility]

## 2025-05-09 NOTE — PROGRESS NOTES
"Mary Pedroza is a 49 y.o. female on day 3 of admission presenting with Wound infection.    Subjective          Objective     Physical Exam    General: Pt is alert and oriented x 3. Pleasant, conversive  HEENT: Head is atraumatic, normocephalic.   Cardiac: Normal S1-S2.  Regular rate and rhythm.  No murmurs.  Respiratory: Lungs clear to auscultation.  No adventitious sounds.  Abdomen: Soft, nondistended, nontender.  Bowel sounds x4 quadrants.  Pulse exam: Palpable brachial and radial pulses bilaterally.  Palpable femoral, popliteal, pedal pulses bilaterally.  Extremities: Left TMA.  Right foot surgical bandage was not removed but was displaced slightly to palpate pulses.  Neuro: Moves all extremities spontaneously.  No focal deficits.  Psych: Appropriate affect.  Answers questions appropriately.    Last Recorded Vitals  Blood pressure 113/65, pulse 74, temperature 37.2 °C (99 °F), temperature source Oral, resp. rate 16, height 1.702 m (5' 7\"), weight 92.6 kg (204 lb 2.3 oz), SpO2 100%.  Intake/Output last 3 Shifts:  I/O last 3 completed shifts:  In: 2235 (24.1 mL/kg) [P.O.:1010; IV Piggyback:1225]  Out: - (0 mL/kg)   Weight: 92.6 kg     Relevant Results  Current Outpatient Medications   Medication Instructions    ARIPiprazole (Abilify) 10 mg tablet Take 1 tablet (10 mg) by mouth once daily.    blood-glucose meter misc One touch ultra glucose meter, use daily as directed    blood-glucose sensor (FreeStyle Bridget 3 Plus Sensor) device Use every 15 days    doxepin (SINEquan) 100 mg capsule 1 capsule (100 mg) once daily at bedtime.    FreeStyle Bridget 3 Chandler misc Use as instructed to check blood sugar    FreeStyle Bridget 3 Sensor device Use daily and change every 14 (fourteen) days.    glimepiride (Amaryl) 4 mg tablet TAKE 1 TABLET BY MOUTH ONCE DAILY WITH BREAKFAST OR FIRST MAIN MEAL OF THE DAY    ibuprofen 600 mg, oral, Every 6 hours PRN    Januvia 25 mg, oral, Daily, as directed    Lantus Solostar U-100 Insulin 75 " "Units, subcutaneous, Nightly, Take as directed per insulin instructions.    medroxyPROGESTERone (DEPO-PROVERA) 150 mg, intramuscular, every 12 weeks    Mounjaro 5 mg/0.5 mL pen injector INJECT 5 MG UNDER THE SKIN 1 TIME PER WEEK    Mounjaro 2.5 mg, subcutaneous, Once Weekly, For first 4 weeks    OneTouch Delica Plus Lancet 33 gauge misc USE AS DIRECTED TO CHECK BLOOD SUGAR ONCE DAILY    OneTouch Ultra Test strip USE AS DIRECTED TO CHECK BLOOD SUGAR TWICE A DAY    pen needle, diabetic (BD Ultra-Fine Short Pen Needle) 31 gauge x 5/16\" needle USE 1 PEN NEEDLE WITH LANTUS ONCE DAILY    propranolol (Inderal) 20 mg tablet 1 tablet (20 mg) 2 times a day.    simvastatin (ZOCOR) 20 mg, oral, Nightly    venlafaxine XR (Effexor-XR) 150 mg 24 hr capsule Take 1 capsule (150 mg) by mouth once daily.                       Assessment/Plan   Acute osteomyelitis right lateral malleolus  Type 2 diabetes  Wound infection  Tobacco abuse     Patient with progressively worsening infection of right lower extremity.  MRI revealed acute osteomyelitis of the lateral malleolus on the right.  I reviewed the patient's PVR from March 2024 as well as most recent PVR.  Both show no evidence of peripheral arterial disease.  From a clinical perspective, the patient has easily palpable pedal pulses bilaterally.     I do not suspect any clinically significant peripheral arterial disease.     Discussed with podiatry the need for diagnostic angiogram in preparation for musculocutaneous flap.  Attempting to schedule early next week.          I spent 35 minutes in the professional and overall care of this patient.      Sourav Stover, APRN-CNP      "

## 2025-05-09 NOTE — CARE PLAN
The patient's goals for the shift include Patient Safety    The clinical goals for the shift include manage pain, prevent further infection    Over the shift, the patient did not make progress toward the following goals. Barriers to progression include pain. Recommendations to address these barriers include pain control.

## 2025-05-09 NOTE — ASSESSMENT & PLAN NOTE
Patient states she was previously on lantus 80 units nightly but has been off insulin for awhile as she has not followed up with her endocrinologist in a few years  Will start lantus units night and high dose SSI, uptitrate as needed  Continue home januvia  Hold home [facility]  Hypoglycemia protocol

## 2025-05-10 LAB
ANION GAP SERPL CALCULATED.3IONS-SCNC: 11 MMOL/L (ref 10–20)
B-LACTAMASE ORGANISM ISLT: POSITIVE
BACTERIA BLD CULT: NORMAL
BACTERIA BLD CULT: NORMAL
BACTERIA SPEC CULT: ABNORMAL
BUN SERPL-MCNC: 15 MG/DL (ref 6–23)
CALCIUM SERPL-MCNC: 9 MG/DL (ref 8.6–10.3)
CHLORIDE SERPL-SCNC: 106 MMOL/L (ref 98–107)
CO2 SERPL-SCNC: 26 MMOL/L (ref 21–32)
CREAT SERPL-MCNC: 0.65 MG/DL (ref 0.5–1.05)
EGFRCR SERPLBLD CKD-EPI 2021: >90 ML/MIN/1.73M*2
ERYTHROCYTE [DISTWIDTH] IN BLOOD BY AUTOMATED COUNT: 12.2 % (ref 11.5–14.5)
GLUCOSE BLD MANUAL STRIP-MCNC: 145 MG/DL (ref 74–99)
GLUCOSE BLD MANUAL STRIP-MCNC: 224 MG/DL (ref 74–99)
GLUCOSE BLD MANUAL STRIP-MCNC: 226 MG/DL (ref 74–99)
GLUCOSE BLD MANUAL STRIP-MCNC: 250 MG/DL (ref 74–99)
GLUCOSE SERPL-MCNC: 147 MG/DL (ref 74–99)
GRAM STN SPEC: ABNORMAL
GRAM STN SPEC: ABNORMAL
HCT VFR BLD AUTO: 41.6 % (ref 36–46)
HGB BLD-MCNC: 14.1 G/DL (ref 12–16)
MCH RBC QN AUTO: 29.7 PG (ref 26–34)
MCHC RBC AUTO-ENTMCNC: 33.9 G/DL (ref 32–36)
MCV RBC AUTO: 88 FL (ref 80–100)
NRBC BLD-RTO: 0 /100 WBCS (ref 0–0)
PLATELET # BLD AUTO: 206 X10*3/UL (ref 150–450)
POTASSIUM SERPL-SCNC: 3.8 MMOL/L (ref 3.5–5.3)
RBC # BLD AUTO: 4.74 X10*6/UL (ref 4–5.2)
SODIUM SERPL-SCNC: 139 MMOL/L (ref 136–145)
WBC # BLD AUTO: 10.4 X10*3/UL (ref 4.4–11.3)

## 2025-05-10 PROCEDURE — 82947 ASSAY GLUCOSE BLOOD QUANT: CPT

## 2025-05-10 PROCEDURE — 85027 COMPLETE CBC AUTOMATED: CPT | Performed by: STUDENT IN AN ORGANIZED HEALTH CARE EDUCATION/TRAINING PROGRAM

## 2025-05-10 PROCEDURE — 99232 SBSQ HOSP IP/OBS MODERATE 35: CPT | Performed by: INTERNAL MEDICINE

## 2025-05-10 PROCEDURE — 2500000002 HC RX 250 W HCPCS SELF ADMINISTERED DRUGS (ALT 637 FOR MEDICARE OP, ALT 636 FOR OP/ED): Performed by: STUDENT IN AN ORGANIZED HEALTH CARE EDUCATION/TRAINING PROGRAM

## 2025-05-10 PROCEDURE — 2500000001 HC RX 250 WO HCPCS SELF ADMINISTERED DRUGS (ALT 637 FOR MEDICARE OP): Performed by: STUDENT IN AN ORGANIZED HEALTH CARE EDUCATION/TRAINING PROGRAM

## 2025-05-10 PROCEDURE — 2500000004 HC RX 250 GENERAL PHARMACY W/ HCPCS (ALT 636 FOR OP/ED): Mod: JZ | Performed by: STUDENT IN AN ORGANIZED HEALTH CARE EDUCATION/TRAINING PROGRAM

## 2025-05-10 PROCEDURE — 82374 ASSAY BLOOD CARBON DIOXIDE: CPT | Performed by: STUDENT IN AN ORGANIZED HEALTH CARE EDUCATION/TRAINING PROGRAM

## 2025-05-10 PROCEDURE — 2500000004 HC RX 250 GENERAL PHARMACY W/ HCPCS (ALT 636 FOR OP/ED): Mod: JZ | Performed by: INTERNAL MEDICINE

## 2025-05-10 PROCEDURE — 1210000001 HC SEMI-PRIVATE ROOM DAILY

## 2025-05-10 RX ADMIN — INSULIN LISPRO 6 UNITS: 100 INJECTION, SOLUTION INTRAVENOUS; SUBCUTANEOUS at 08:35

## 2025-05-10 RX ADMIN — INSULIN LISPRO 6 UNITS: 100 INJECTION, SOLUTION INTRAVENOUS; SUBCUTANEOUS at 16:23

## 2025-05-10 RX ADMIN — PROPRANOLOL HYDROCHLORIDE 20 MG: 20 TABLET ORAL at 08:37

## 2025-05-10 RX ADMIN — INSULIN LISPRO 6 UNITS: 100 INJECTION, SOLUTION INTRAVENOUS; SUBCUTANEOUS at 11:51

## 2025-05-10 RX ADMIN — AMPICILLIN SODIUM AND SULBACTAM SODIUM 3 G: 2; 1 INJECTION, POWDER, FOR SOLUTION INTRAMUSCULAR; INTRAVENOUS at 22:42

## 2025-05-10 RX ADMIN — AMPICILLIN SODIUM AND SULBACTAM SODIUM 3 G: 2; 1 INJECTION, POWDER, FOR SOLUTION INTRAMUSCULAR; INTRAVENOUS at 11:26

## 2025-05-10 RX ADMIN — ARIPIPRAZOLE 10 MG: 10 TABLET ORAL at 20:23

## 2025-05-10 RX ADMIN — SITAGLIPTIN 25 MG: 25 TABLET, FILM COATED ORAL at 08:37

## 2025-05-10 RX ADMIN — INSULIN LISPRO 6 UNITS: 100 INJECTION, SOLUTION INTRAVENOUS; SUBCUTANEOUS at 16:21

## 2025-05-10 RX ADMIN — AMPICILLIN SODIUM AND SULBACTAM SODIUM 3 G: 2; 1 INJECTION, POWDER, FOR SOLUTION INTRAMUSCULAR; INTRAVENOUS at 16:29

## 2025-05-10 RX ADMIN — AMPICILLIN SODIUM AND SULBACTAM SODIUM 3 G: 2; 1 INJECTION, POWDER, FOR SOLUTION INTRAMUSCULAR; INTRAVENOUS at 04:57

## 2025-05-10 RX ADMIN — DOXEPIN HYDROCHLORIDE 100 MG: 50 CAPSULE ORAL at 20:23

## 2025-05-10 RX ADMIN — INSULIN GLARGINE 50 UNITS: 100 INJECTION, SOLUTION SUBCUTANEOUS at 21:32

## 2025-05-10 RX ADMIN — SIMVASTATIN 20 MG: 20 TABLET, FILM COATED ORAL at 20:23

## 2025-05-10 RX ADMIN — VENLAFAXINE HYDROCHLORIDE 150 MG: 150 CAPSULE, EXTENDED RELEASE ORAL at 08:37

## 2025-05-10 RX ADMIN — INSULIN LISPRO 6 UNITS: 100 INJECTION, SOLUTION INTRAVENOUS; SUBCUTANEOUS at 11:52

## 2025-05-10 RX ADMIN — ENOXAPARIN SODIUM 40 MG: 40 INJECTION SUBCUTANEOUS at 20:23

## 2025-05-10 RX ADMIN — PROPRANOLOL HYDROCHLORIDE 20 MG: 20 TABLET ORAL at 20:23

## 2025-05-10 RX ADMIN — PANTOPRAZOLE SODIUM 40 MG: 40 TABLET, DELAYED RELEASE ORAL at 06:05

## 2025-05-10 ASSESSMENT — COGNITIVE AND FUNCTIONAL STATUS - GENERAL
DAILY ACTIVITIY SCORE: 24
MOBILITY SCORE: 24
DAILY ACTIVITIY SCORE: 24
MOBILITY SCORE: 24

## 2025-05-10 ASSESSMENT — PAIN SCALES - GENERAL
PAINLEVEL_OUTOF10: 0 - NO PAIN

## 2025-05-10 ASSESSMENT — PAIN - FUNCTIONAL ASSESSMENT
PAIN_FUNCTIONAL_ASSESSMENT: 0-10

## 2025-05-10 NOTE — CARE PLAN
The clinical goals for the shift include up to chair        Problem: Pain - Adult  Goal: Verbalizes/displays adequate comfort level or baseline comfort level  Outcome: Progressing     Problem: Safety - Adult  Goal: Free from fall injury  Outcome: Progressing     Problem: Discharge Planning  Goal: Discharge to home or other facility with appropriate resources  Outcome: Progressing     Problem: Nutrition  Goal: Nutrient intake appropriate for maintaining nutritional needs  Outcome: Progressing

## 2025-05-10 NOTE — PROGRESS NOTES
"PODIATRIC MEDICINE & SURGERY - PROGRESS NOTE    Subjective   Mary Pedroza is a 49 y.o. female who is on day 4 of admission for Wound infection.      Interval HPI: Patient seen at bedside. Patient denies any pain today to right lower extremity today. Patient states she believes swelling, redness, odor have improved since admission. Denies any other pedal complaints at this time. Denies any constitutional symptoms at this time.    Review Of Systems:  A 10+ point ROS was completed and is negative unless as otherwise noted.    Medical History[1]    Social History[2]    Recent Surgeries in Podiatry       Date Procedure Surgeon Laterality Status    04/24/2024 Wound Debridement, Flap Closure Cierra Wang DPM; Bharat Michelle DPM; Suraj Francisco DPM; James Aponte DPM Left Posted    03/11/2024 Debridement of All Nonviable Soft Tissue and Bone;  Excision Bone Foot Suraj Francisco DPM Left; Left Posted    03/07/2024 Foot Transmetatarsal Amputation Suraj Francisco DPM; Sarah Barrera DPM Left Posted    <div class=\"[id number]\"></div>            RX Allergies[3]    Medications  Scheduled medications  Scheduled Medications[4]  Continuous medications   Continuous Medications[5]   PRN Medications[6]    Objective   Visit Vitals  /61 (BP Location: Left arm, Patient Position: Lying)   Pulse 90   Temp 37.2 °C (99 °F) (Oral)   Resp 18   Ht 1.702 m (5' 7\")   Wt 92.6 kg (204 lb 2.3 oz)   SpO2 98%   BMI 31.97 kg/m²   OB Status Injection   Smoking Status Every Day   BSA 2.09 m²       Physical Exam   Pleasant and cooperative 49 year old female.   - NAD and AAOx3. Ill-appearing.    Vascular:   DP and PT pulses are palpable bilateral. Capillary fill time is brisk to bilateral hallux. Skin temperature is warm to warm proximal to distal bilateral. Focal increase in temperature is noted to the right lateral ankle- improved. 1+ non-pitting edema to the right lower extremity. No pain with calf compression " B/L    Neuro:  No focal deficit. Gross sensation is intact. Protective sensation mildly diminished b/l.    Musculoskeletal   Ambulates unassisted. Muscle strength is 5/5 to the intrinsic and extrinsic muscles of the foot and ankle B/L. Previous left foot transmetatarsal amputation; well-healed. No pain with right ankle joint range of motion    Dermatologic:  Good attention to pedal hygiene. The skin is well-hydrated to the lower extremities. Nails 1-5 right are intact. The pedal interdigital spaces are clean and dry. Right lateral ankle wound as described below:      WOUND #1: Right lateral ankle  Measurements: Wound measures approximately 4.5 cm x  4.5 cm x 0.8 cm. Probes to fibula.   Base: The base is 40% nonviable with fibrotic and necrotic slough- improved. The remaining tissue base is granular.   Rim: The skin edges are intact with no maceration. No rolling of skin edges.   Drainage: Serous drainage and fibrotic/necrotic slough- improved. Mild malodor noted.  Periwound: Mild periwound erythema and edema.  No ascending cellulitis or lymphangitis. No palpable fluctuance.   Pain: No    Lab Results   Component Value Date    WBC 10.4 05/10/2025    HGB 14.1 05/10/2025    HCT 41.6 05/10/2025     05/10/2025    CHOL 314 (H) 08/04/2022    TRIG 1,208 (H) 08/04/2022    HDL 23 (L) 08/04/2022    LDLDIRECT 72 08/04/2022    ALT 13 05/06/2025    AST 12 05/06/2025     05/10/2025    K 3.8 05/10/2025     05/10/2025    CREATININE 0.65 05/10/2025    BUN 15 05/10/2025    CO2 26 05/10/2025    TSH 1.99 05/24/2021    HGBA1C 9.5 (H) 05/06/2025    ALBUR 17 12/21/2022     par  Lab Results   Component Value Date    SEDRATE 41 (H) 05/06/2025     Lab Results   Component Value Date    CRP 2.29 (H) 05/06/2025        Cultures  Susceptibility data from last 90 days.  Collected Specimen Info Organism Clindamycin Erythromycin Oxacillin Tetracycline Trimethoprim/Sulfamethoxazole Vancomycin   05/07/25 Tissue/Biopsy from  Wound/Tissue Methicillin Susceptible Staphylococcus aureus (MSSA)  R  R  S  R  S  S     Streptococcus agalactiae (Group B Streptococcus)           Mixed Anaerobic Bacteria           Mixed Gram-Positive and Gram-Negative Bacteria         05/06/25 Tissue/Biopsy from Wound/Tissue Methicillin Susceptible Staphylococcus aureus (MSSA)  S  S  S  S  S  S     Streptococcus agalactiae (Group B Streptococcus)           Mixed Anaerobic Bacteria           Mixed Gram-Positive and Gram-Negative Bacteria             Imaging  Imaging  MR foot right wo IV contrast  Result Date: 5/7/2025  (Please see separate report of the hindfoot and ankle for findings regarding these areas.).   No acute osteomyelitis involving the right forefoot.   Diffuse edema in the dorsal soft tissues that could relate to cellulitis and/or venous/lymphatic insufficiency.   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   MACRO: None.   Signed by: Cecilio Clarke 5/7/2025 10:13 PM Dictation workstation:   [id number]    MR ankle right wo IV contrast  Result Date: 5/7/2025  Please see separate report of the forefoot for other findings. This report reflects the ankle and hindfoot)   1. Acute osteomyelitis of the lateral malleolus which involves the distal fibula up to 5 cm proximal to the tip of the lateral malleolus. This is due to a deep ulceration (3.7 cm x 3.4 cm) overlying lateral malleolus that extends to the bone surface and may be associated with a small abscess measuring 0.9 cm x 0.5 cm abutting the lateral cortex of the lateral malleolus.   2.Nonspecific feathery edema within the musculature of the anterior, peroneal, and posterior compartments and of the visualized dorsal and plantar foot could relate diabetic neuromyopathy; however, the signal within the peroneal muscles could be also related to myositis given the proximity to the ulcer and osteomyelitis.   3. Fluid distention of the peroneal  tendon sheath in the retro malleolar groove and below the level of the lateral malleolus that may be related to infectious tenosynovitis or reactive in nature, the former favored.   4. Nonspecific effusions of the tibiotalar and talonavicular joints. No evidence of bone marrow edema or replacement in the subchondral bone. Findings are favored to be reactive in nature.   MACRO: None.   Signed by: Cecilio Clarke 5/7/2025 10:07 PM Dictation workstation:   [id number]    XR ankle right 3+ views  Result Date: 5/6/2025  No acute osseous involvement. Soft tissue swelling with deformity consistent with the patient's wound.     MACRO: None   Signed by: Tanner Diaz 5/6/2025 3:59 PM Dictation workstation:   [id number]      Cardiology, Vascular, and Other Imaging  Bedside PICC Imaging  Result Date: 5/9/2025  These images are not reportable by radiology and will not be interpreted by  Radiologists.    Vascular US PVR without exercise  Result Date: 5/7/2025           [place] [address]            Phone [phone]  Vascular Lab Report  VASC US PVR WITHOUT EXERCISE Patient Name:      MONTANA ROCA    Reading Physician:  [id number] Marisela Huddleston MD Study Date:        5/7/2025             Ordering Provider:  [id number] RENE HERNANDEZ MRN/PID:           [id number]             Fellow: Accession#:        [id number]         Technologist:       James Cr RVT Date of Birth/Age: 1975 / 49      Technologist 2:                    years Gender:            F                    Encounter#:         [id number] Admission Status:  Inpatient            Location Performed: [facility]  Diagnosis/ICD: Type 2 diabetes mellitus (DM) with other circulatory                complications-E11.59 CPT Codes:      10115 Peripheral artery PVR (multi segmental pressure  Pertinent History: Diabetic with left foot TMA.  CONCLUSIONS: Right Lower PVR: No evidence of arterial occlusive disease in the right lower extremity at rest. Normal digital perfusion noted. Multiphasic flow is noted in the right common femoral artery, right popliteal artery, right posterior tibial artery and right dorsalis pedis artery. Left Lower PVR: No evidence of arterial occlusive disease in the left lower extremity at rest. Multiphasic flow is noted in the left common femoral artery, left popliteal artery, left posterior tibial artery and left dorsalis pedis artery. Patient had a left TMA. No metatarsel waveforms or TBI's.  Comparison: Compared with study from 3/8/2024, no significant change.  Imaging & Doppler Findings:  RIGHT Lower PVR                Pressures Ratios Right High Thigh               164 mmHg  1.61 Right Low Thigh                147 mmHg  1.44 Right Calf                     128 mmHg  1.25 Right Posterior Tibial (Ankle) 116 mmHg  1.14 Right Dorsalis Pedis (Ankle)   112 mmHg  1.10 Right Digit (Great Toe)        82 mmHg   0.80   LEFT Lower PVR                Pressures Ratios Left High Thigh               168 mmHg  1.65 Left Low Thigh                153 mmHg  1.50 Left Calf                     135 mmHg  1.32 Left Posterior Tibial (Ankle) 114 mmHg  1.12 Left Dorsalis Pedis (Ankle)   107 mmHg  1.05                      Right     Left Brachial Pressure 102 mmHg 100 mmHg   [id number] Marisela Huddleston MD Electronically signed by [id number] Marisela Huddleston MD on 5/7/2025 at 1:27:18 PM  ** Final **         Assessment/Plan   Mary Pedroza is a 49 y.o. female who is on day 4 of admission for Wound infection. Podiatry following for right Wound infection. Patient examined and evaluated at bedside. Reviewed vitals, labs, imaging and notes.      #Chronic non-pressure ulceration of right lateral ankle with osseous involvement  #Cellulitis, right lower  extremity  #Leukocytosis - resolved  - Afebrile. Tachycardia has improved since admission.   WBC 10.4 this morning, decreased from 15.5 on admission.  -  XR right foot and ankle with no evidence of osseous involvement or soft tissue emphysema.  - MRI read noted above    - On exam, right lateral ankle wound with notable improvement of necrotic and nonviable tissue.     Sat at patient's bedside this morning and discussed plan with patient at length. Plan at this time is to perform a peroneal flap, this will require diagnostic angio from Vascular surgery in order to perform flap procedure. Case discussed with Vascular. Patient expressed agreement to plan. Podiatry will re-evaluate her following vascular assessment. Patient is agreeable with plan.    #Peripheral vascular disease  - PVR with right TBI 0.80 and MAREK 1.14, non-compressible vessels. Discussed findings with patient.     #T2DM with peripheral neuropathy, poorly controlled  - A1c 9.5%     #Nicotine use disorder  - Vaping cessation encouraged     Dressing: Right ankle wound - betadine soaked gauze, 4x4 gauze, kerlix and ACE bandage with light compression. Nursing orders entered for daily dressing   Weight-bearing: WBAT in surgical shoe  VTE prophylaxis: SCD left leg, Lovenox subq per primary team     Podiatry team will continue to follow.      Plan discussed with attending physician.    Juan Gomes DPM PGY-2  Podiatric Medicine & Surgery          [1]   Past Medical History:  Diagnosis Date    Anxiety and depression     Bipolar 1 disorder (Multi)     Bursitis of right hip 09/15/2023    Diabetes mellitus, type 2 (Multi)     Hyperlipidemia    [2]   Social History  Tobacco Use    Smoking status: Every Day     Current packs/day: 1.00     Average packs/day: 1 pack/day for 15.0 years (15.0 ttl pk-yrs)     Types: Cigarettes     Passive exposure: Current    Smokeless tobacco: Never   Vaping Use    Vaping status: Some Days    Substances: Nicotine   Substance Use Topics     Alcohol use: Not Currently    Drug use: Never   [3] No Known Allergies  [4] ampicillin-sulbactam, 3 g, intravenous, q6h  ARIPiprazole, 10 mg, oral, Nightly  doxepin, 100 mg, oral, Nightly  enoxaparin, 40 mg, subcutaneous, q24h  insulin glargine, 50 Units, subcutaneous, Nightly  insulin lispro, 0-15 Units, subcutaneous, TID AC  insulin lispro, 6 Units, subcutaneous, TID  lidocaine, 5 mL, infiltration, Once  pantoprazole, 40 mg, oral, Daily before breakfast   Or  pantoprazole, 40 mg, intravenous, Daily before breakfast  polyethylene glycol, 17 g, oral, Daily  propranolol, 20 mg, oral, BID  simvastatin, 20 mg, oral, Nightly  SITagliptin phosphate, 25 mg, oral, Daily  venlafaxine XR, 150 mg, oral, Daily  [5]    [6] PRN medications: acetaminophen **OR** acetaminophen **OR** acetaminophen, alteplase, dextrose, dextrose, glucagon, glucagon, ondansetron ODT **OR** ondansetron

## 2025-05-10 NOTE — ASSESSMENT & PLAN NOTE
Patient states she was previously on lantus 80 units nightly but has been off insulin for awhile as she has not followed up with her endocrinologist in a few years  Will start lantus units night and high dose SSI, uptitrate as needed  Continue home januvia  Hold home [facility]  Hypoglycemia protocol

## 2025-05-10 NOTE — NURSING NOTE
Assumed care.  Seen patient sitting on the edge of the bed with no complaints of pain or any discomfort.  Denies any other needs at this time.  Bed at lowest position and locked.  Call light within reach.  Plan of care ongoing.

## 2025-05-10 NOTE — PROGRESS NOTES
Mary Pedroza is a 49 y.o. female on day 4 of admission presenting with Wound infection.      Subjective   States she feels well today. No active complaints.     Objective     Last Recorded Vitals  BP 97/55 (BP Location: Left arm, Patient Position: Lying)   Pulse 79   Temp 37.4 °C (99.3 °F) (Oral)   Resp 18   Wt 92.6 kg (204 lb 2.3 oz)   SpO2 97%   Intake/Output last 3 Shifts:    Intake/Output Summary (Last 24 hours) at 5/10/2025 1412  Last data filed at 5/10/2025 1300  Gross per 24 hour   Intake 740 ml   Output --   Net 740 ml       Admission Weight  Weight: 90.3 kg (199 lb) (05/06/25 1402)    Daily Weight  05/07/25 : 92.6 kg (204 lb 2.3 oz)    Image Results  Bedside PICC Imaging  These images are not reportable by radiology and will not be interpreted   by  Radiologists.      Physical Exam  Constitutional:       General: She is not in acute distress.     Appearance: She is not toxic-appearing.   HENT:      Head: Normocephalic.      Mouth/Throat:      Pharynx: Oropharynx is clear.   Eyes:      General: No scleral icterus.  Cardiovascular:      Rate and Rhythm: Normal rate.   Pulmonary:      Effort: No respiratory distress.      Breath sounds: No wheezing.   Abdominal:      General: There is no distension.      Palpations: Abdomen is soft.   Musculoskeletal:      Right lower leg: No edema.      Left lower leg: No edema.      Comments: RLE with bandaging in place   Neurological:      Mental Status: She is alert and oriented to person, place, and time.         Relevant Results                    Assessment & Plan  Wound infection  Diabetic foot infection (right)  Consult podiatry - follows with Dr. Aponte  Consult ID  Antibiotics per ID  Wound culture with staph aureus and group B strep   PVRs wnl for the RLE  MRI R foot without acute osteomyelitis but does show diffuse edema in the dorsal soft tissues consistent with cellulitis and/or venous insufficiency  MRI R ankle showing acute osteomyelitis of the  lateral malleolus  Vascular surgery consulted by podiatry   Tentative plans for RLE angiogram, timing TBD  Sepsis without acute organ dysfunction (Multi)  Meets sepsis criteria with tachycardia, leukocytosis, and lactic acidosis with source being right foot infection   Management as above  Follow up wound and blood cultures   Type 2 diabetes mellitus with hyperglycemia (Multi)  Patient states she was previously on lantus 80 units nightly but has been off insulin for awhile as she has not followed up with her endocrinologist in a few years  Will start lantus units night and high dose SSI, uptitrate as needed  Continue home januvia  Hold home amaryl  Hypoglycemia protocol   Bipolar disorder  Continue home propranolol, abilify, doxepin, and effexor   Tobacco use  Recommend cessation  Patient declined NRT    Plan:  Patient is frustrated about lack of communication from podiatry service -> will reach out to them this morning and ask them to come by and talk to her today  Wound culture showing staph aureus and group B strep -> continue IV antibiotics per ID  Awaiting vascular and podiatry plans - possible ?RLE angiogram  Will need therapy eval after surgery   Dispo TBD    5/10/25    Rt ankle osteomyelitis due to Strep. And MSSA. Unasyn. Had PICC placed.    PVD. Vascular to arrange for an angiogram.    Podiatry is planning to perform a flap.    DM2, controlled.           Esme Henderson MD

## 2025-05-11 VITALS
HEART RATE: 78 BPM | WEIGHT: 204.15 LBS | DIASTOLIC BLOOD PRESSURE: 65 MMHG | RESPIRATION RATE: 17 BRPM | TEMPERATURE: 99.7 F | SYSTOLIC BLOOD PRESSURE: 122 MMHG | OXYGEN SATURATION: 98 % | HEIGHT: 67 IN | BODY MASS INDEX: 32.04 KG/M2

## 2025-05-11 LAB
ANION GAP SERPL CALCULATED.3IONS-SCNC: 11 MMOL/L (ref 10–20)
BUN SERPL-MCNC: 16 MG/DL (ref 6–23)
CALCIUM SERPL-MCNC: 8.8 MG/DL (ref 8.6–10.3)
CHLORIDE SERPL-SCNC: 105 MMOL/L (ref 98–107)
CO2 SERPL-SCNC: 27 MMOL/L (ref 21–32)
CREAT SERPL-MCNC: 0.6 MG/DL (ref 0.5–1.05)
EGFRCR SERPLBLD CKD-EPI 2021: >90 ML/MIN/1.73M*2
ERYTHROCYTE [DISTWIDTH] IN BLOOD BY AUTOMATED COUNT: 12.4 % (ref 11.5–14.5)
GLUCOSE BLD MANUAL STRIP-MCNC: 205 MG/DL (ref 74–99)
GLUCOSE BLD MANUAL STRIP-MCNC: 214 MG/DL (ref 74–99)
GLUCOSE BLD MANUAL STRIP-MCNC: 219 MG/DL (ref 74–99)
GLUCOSE BLD MANUAL STRIP-MCNC: 241 MG/DL (ref 74–99)
GLUCOSE SERPL-MCNC: 186 MG/DL (ref 74–99)
HCT VFR BLD AUTO: 40.3 % (ref 36–46)
HGB BLD-MCNC: 13.9 G/DL (ref 12–16)
MCH RBC QN AUTO: 29.7 PG (ref 26–34)
MCHC RBC AUTO-ENTMCNC: 34.5 G/DL (ref 32–36)
MCV RBC AUTO: 86 FL (ref 80–100)
NRBC BLD-RTO: 0 /100 WBCS (ref 0–0)
PLATELET # BLD AUTO: 222 X10*3/UL (ref 150–450)
POTASSIUM SERPL-SCNC: 3.6 MMOL/L (ref 3.5–5.3)
RBC # BLD AUTO: 4.68 X10*6/UL (ref 4–5.2)
SODIUM SERPL-SCNC: 139 MMOL/L (ref 136–145)
STAPHYLOCOCCUS SPEC CULT: NORMAL
WBC # BLD AUTO: 10.1 X10*3/UL (ref 4.4–11.3)

## 2025-05-11 PROCEDURE — 99232 SBSQ HOSP IP/OBS MODERATE 35: CPT | Performed by: NURSE PRACTITIONER

## 2025-05-11 PROCEDURE — 82310 ASSAY OF CALCIUM: CPT | Performed by: STUDENT IN AN ORGANIZED HEALTH CARE EDUCATION/TRAINING PROGRAM

## 2025-05-11 PROCEDURE — 2500000001 HC RX 250 WO HCPCS SELF ADMINISTERED DRUGS (ALT 637 FOR MEDICARE OP): Performed by: STUDENT IN AN ORGANIZED HEALTH CARE EDUCATION/TRAINING PROGRAM

## 2025-05-11 PROCEDURE — 82947 ASSAY GLUCOSE BLOOD QUANT: CPT

## 2025-05-11 PROCEDURE — 2500000004 HC RX 250 GENERAL PHARMACY W/ HCPCS (ALT 636 FOR OP/ED): Mod: JZ | Performed by: INTERNAL MEDICINE

## 2025-05-11 PROCEDURE — 2500000004 HC RX 250 GENERAL PHARMACY W/ HCPCS (ALT 636 FOR OP/ED): Mod: JZ | Performed by: STUDENT IN AN ORGANIZED HEALTH CARE EDUCATION/TRAINING PROGRAM

## 2025-05-11 PROCEDURE — 84702 CHORIONIC GONADOTROPIN TEST: CPT | Performed by: NURSE PRACTITIONER

## 2025-05-11 PROCEDURE — 99232 SBSQ HOSP IP/OBS MODERATE 35: CPT | Performed by: STUDENT IN AN ORGANIZED HEALTH CARE EDUCATION/TRAINING PROGRAM

## 2025-05-11 PROCEDURE — 2500000002 HC RX 250 W HCPCS SELF ADMINISTERED DRUGS (ALT 637 FOR MEDICARE OP, ALT 636 FOR OP/ED): Performed by: STUDENT IN AN ORGANIZED HEALTH CARE EDUCATION/TRAINING PROGRAM

## 2025-05-11 PROCEDURE — 85027 COMPLETE CBC AUTOMATED: CPT | Performed by: STUDENT IN AN ORGANIZED HEALTH CARE EDUCATION/TRAINING PROGRAM

## 2025-05-11 PROCEDURE — 1210000001 HC SEMI-PRIVATE ROOM DAILY

## 2025-05-11 RX ADMIN — INSULIN LISPRO 6 UNITS: 100 INJECTION, SOLUTION INTRAVENOUS; SUBCUTANEOUS at 10:07

## 2025-05-11 RX ADMIN — ARIPIPRAZOLE 10 MG: 10 TABLET ORAL at 20:31

## 2025-05-11 RX ADMIN — INSULIN LISPRO 6 UNITS: 100 INJECTION, SOLUTION INTRAVENOUS; SUBCUTANEOUS at 10:05

## 2025-05-11 RX ADMIN — PROPRANOLOL HYDROCHLORIDE 20 MG: 20 TABLET ORAL at 20:31

## 2025-05-11 RX ADMIN — SIMVASTATIN 20 MG: 20 TABLET, FILM COATED ORAL at 20:31

## 2025-05-11 RX ADMIN — ENOXAPARIN SODIUM 40 MG: 40 INJECTION SUBCUTANEOUS at 20:30

## 2025-05-11 RX ADMIN — PANTOPRAZOLE SODIUM 40 MG: 40 TABLET, DELAYED RELEASE ORAL at 06:12

## 2025-05-11 RX ADMIN — AMPICILLIN SODIUM AND SULBACTAM SODIUM 3 G: 2; 1 INJECTION, POWDER, FOR SOLUTION INTRAMUSCULAR; INTRAVENOUS at 12:11

## 2025-05-11 RX ADMIN — PROPRANOLOL HYDROCHLORIDE 20 MG: 20 TABLET ORAL at 10:05

## 2025-05-11 RX ADMIN — INSULIN LISPRO 6 UNITS: 100 INJECTION, SOLUTION INTRAVENOUS; SUBCUTANEOUS at 12:10

## 2025-05-11 RX ADMIN — INSULIN GLARGINE 50 UNITS: 100 INJECTION, SOLUTION SUBCUTANEOUS at 20:33

## 2025-05-11 RX ADMIN — INSULIN LISPRO 6 UNITS: 100 INJECTION, SOLUTION INTRAVENOUS; SUBCUTANEOUS at 16:19

## 2025-05-11 RX ADMIN — DOXEPIN HYDROCHLORIDE 100 MG: 50 CAPSULE ORAL at 20:30

## 2025-05-11 RX ADMIN — AMPICILLIN SODIUM AND SULBACTAM SODIUM 3 G: 2; 1 INJECTION, POWDER, FOR SOLUTION INTRAMUSCULAR; INTRAVENOUS at 22:35

## 2025-05-11 RX ADMIN — SITAGLIPTIN 25 MG: 25 TABLET, FILM COATED ORAL at 10:09

## 2025-05-11 RX ADMIN — AMPICILLIN SODIUM AND SULBACTAM SODIUM 3 G: 2; 1 INJECTION, POWDER, FOR SOLUTION INTRAMUSCULAR; INTRAVENOUS at 16:19

## 2025-05-11 RX ADMIN — VENLAFAXINE HYDROCHLORIDE 150 MG: 150 CAPSULE, EXTENDED RELEASE ORAL at 10:05

## 2025-05-11 RX ADMIN — AMPICILLIN SODIUM AND SULBACTAM SODIUM 3 G: 2; 1 INJECTION, POWDER, FOR SOLUTION INTRAMUSCULAR; INTRAVENOUS at 05:26

## 2025-05-11 ASSESSMENT — COGNITIVE AND FUNCTIONAL STATUS - GENERAL
MOBILITY SCORE: 24
DAILY ACTIVITIY SCORE: 24
MOBILITY SCORE: 23
DAILY ACTIVITIY SCORE: 24
CLIMB 3 TO 5 STEPS WITH RAILING: A LITTLE

## 2025-05-11 ASSESSMENT — PAIN SCALES - GENERAL
PAINLEVEL_OUTOF10: 0 - NO PAIN
PAINLEVEL_OUTOF10: 0 - NO PAIN

## 2025-05-11 NOTE — CARE PLAN
Over the shift, the patient did not make progress toward the following goals. Barriers to progression include . Recommendations to address these barriers include .      Problem: Pain - Adult  Goal: Verbalizes/displays adequate comfort level or baseline comfort level  5/11/2025 0713 by Tera Lopez RN  Outcome: Progressing  5/11/2025 0157 by Tera Lopez RN  Outcome: Progressing     Problem: Safety - Adult  Goal: Free from fall injury  5/11/2025 0713 by Tera Lopez RN  Outcome: Progressing  5/11/2025 0157 by Tera Lopez RN  Outcome: Progressing     Problem: Discharge Planning  Goal: Discharge to home or other facility with appropriate resources  5/11/2025 0713 by Tera Lopez RN  Outcome: Progressing  5/11/2025 0157 by Tera Lopez RN  Outcome: Progressing     Problem: Chronic Conditions and Co-morbidities  Goal: Patient's chronic conditions and co-morbidity symptoms are monitored and maintained or improved  5/11/2025 0713 by Tera Lopez RN  Outcome: Progressing  5/11/2025 0157 by Tera Lopez RN  Outcome: Progressing     Problem: Nutrition  Goal: Nutrient intake appropriate for maintaining nutritional needs  5/11/2025 0713 by Tera Lopez RN  Outcome: Progressing  5/11/2025 0157 by Tera Lopez RN  Outcome: Progressing     Problem: Fall/Injury  Goal: Not fall by end of shift  5/11/2025 0713 by Tera Lopez RN  Outcome: Progressing  5/11/2025 0157 by Tera Lopez RN  Outcome: Progressing  Goal: Be free from injury by end of the shift  5/11/2025 0713 by Tera Lopez RN  Outcome: Progressing  5/11/2025 0157 by Tera Lopez RN  Outcome: Progressing  Goal: Verbalize understanding of personal risk factors for fall in the hospital  5/11/2025 0713 by Tera Lopez RN  Outcome: Progressing  5/11/2025 0157 by Tera Lopez RN  Outcome: Progressing  Goal: Verbalize understanding of risk factor reduction measures to  prevent injury from fall in the home  5/11/2025 0713 by Tera Lopez RN  Outcome: Progressing  5/11/2025 0157 by Tera Lopez RN  Outcome: Progressing  Goal: Use assistive devices by end of the shift  5/11/2025 0713 by Tera Lopez RN  Outcome: Progressing  5/11/2025 0157 by Tera Lopez RN  Outcome: Progressing  Goal: Pace activities to prevent fatigue by end of the shift  5/11/2025 0713 by Tera Lopez RN  Outcome: Progressing  5/11/2025 0157 by Tera Lopez RN  Outcome: Progressing     Problem: Skin  Goal: Decreased wound size/increased tissue granulation at next dressing change  5/11/2025 0713 by Tera Lopez RN  Outcome: Progressing  5/11/2025 0157 by Tera Lopez RN  Outcome: Progressing  Goal: Participates in plan/prevention/treatment measures  5/11/2025 0713 by Tera Lopez RN  Outcome: Progressing  5/11/2025 0157 by Tera Lopez RN  Outcome: Progressing  Goal: Prevent/manage excess moisture  5/11/2025 0713 by Tera Lopez RN  Outcome: Progressing  5/11/2025 0157 by Tera Lopez RN  Outcome: Progressing  Goal: Prevent/minimize sheer/friction injuries  5/11/2025 0713 by Tera Lopez RN  Outcome: Progressing  5/11/2025 0157 by Tera Lopez RN  Outcome: Progressing  Goal: Promote/optimize nutrition  5/11/2025 0713 by Tera Lopez RN  Outcome: Progressing  5/11/2025 0157 by Tera Lopez RN  Outcome: Progressing  Goal: Promote skin healing  5/11/2025 0713 by Tera Lopez RN  Outcome: Progressing  5/11/2025 0157 by Tera Lopez RN  Outcome: Progressing     Problem: Diabetes  Goal: Achieve decreasing blood glucose levels by end of shift  5/11/2025 0713 by Tera Lopez RN  Outcome: Progressing  5/11/2025 0157 by Tera Lopez, RN  Outcome: Progressing  Goal: Increase stability of blood glucose readings by end of shift  5/11/2025 0713 by Tera Lopez RN  Outcome:  Progressing  5/11/2025 0157 by Tera Lopez RN  Outcome: Progressing  Goal: Decrease in ketones present in urine by end of shift  5/11/2025 0713 by Tera Lopez RN  Outcome: Progressing  5/11/2025 0157 by Tera Lopez RN  Outcome: Progressing  Goal: Maintain electrolyte levels within acceptable range throughout shift  5/11/2025 0713 by Tera Lopez RN  Outcome: Progressing  5/11/2025 0157 by Tera Lopez RN  Outcome: Progressing  Goal: Maintain glucose levels >70mg/dl to <250mg/dl throughout shift  5/11/2025 0713 by Tera Lopez RN  Outcome: Progressing  5/11/2025 0157 by Tera Lopez RN  Outcome: Progressing  Goal: No changes in neurological exam by end of shift  5/11/2025 0713 by Tera Lopez RN  Outcome: Progressing  5/11/2025 0157 by Tera Lopez RN  Outcome: Progressing  Goal: Learn about and adhere to nutrition recommendations by end of shift  5/11/2025 0713 by Tera Lopez RN  Outcome: Progressing  5/11/2025 0157 by Tera Lopez RN  Outcome: Progressing  Goal: Vital signs within normal range for age by end of shift  5/11/2025 0713 by Tera Lopez RN  Outcome: Progressing  5/11/2025 0157 by Tera Lopez RN  Outcome: Progressing  Goal: Increase self care and/or family involovement by end of shift  5/11/2025 0713 by Tera Lopez RN  Outcome: Progressing  5/11/2025 0157 by Tera Lopez RN  Outcome: Progressing

## 2025-05-11 NOTE — ASSESSMENT & PLAN NOTE
Meets sepsis criteria with tachycardia, leukocytosis, and lactic acidosis with source being right foot infection   Management as above

## 2025-05-11 NOTE — ASSESSMENT & PLAN NOTE
Patient states she was previously on lantus 80 units nightly but has been off insulin for awhile as she has not followed up with her endocrinologist in a few years  Will start lantus units night and high dose SSI, uptitrate as needed  Continue home januvia  Hold home [facility]  Hypoglycemia protocol

## 2025-05-11 NOTE — ASSESSMENT & PLAN NOTE
Diabetic foot infection (right)  Consult podiatry - follows with Dr. Aponte  Consult ID  Antibiotics per ID  Wound culture with MSSA and group B strep   PVRs wnl for the RLE  MRI RLE with acute osteomyelitis   Vascular surgery consulted by podiatry   Tentative plans for RLE angiogram, timing TBD

## 2025-05-11 NOTE — PROGRESS NOTES
Mary Pedroza is a 49 y.o. female on day 5 of admission presenting with Wound infection.      Subjective   Feels well today. Denies any complaints currently. Tolerating abx, denies GI upset or diarrhea.        Objective     Last Recorded Vitals  /69 (BP Location: Left arm, Patient Position: Lying)   Pulse 81   Temp 36 °C (96.8 °F) (Oral)   Resp 18   Wt 92.6 kg (204 lb 2.3 oz)   SpO2 98%   Intake/Output last 3 Shifts:    Intake/Output Summary (Last 24 hours) at 5/11/2025 1058  Last data filed at 5/11/2025 0526  Gross per 24 hour   Intake 940 ml   Output --   Net 940 ml       Admission Weight  Weight: 90.3 kg (199 lb) (05/06/25 1402)    Daily Weight  05/07/25 : 92.6 kg (204 lb 2.3 oz)    Image Results  Bedside PICC Imaging  These images are not reportable by radiology and will not be interpreted   by  Radiologists.      Physical Exam  Constitutional:       General: She is not in acute distress.     Appearance: She is not toxic-appearing.   HENT:      Head: Normocephalic.   Eyes:      General: No scleral icterus.  Cardiovascular:      Rate and Rhythm: Normal rate.   Pulmonary:      Effort: No respiratory distress.      Breath sounds: No wheezing.   Abdominal:      General: There is no distension.      Palpations: Abdomen is soft.   Musculoskeletal:      Right lower leg: No edema.      Left lower leg: No edema.   Skin:     Comments: RLE with dressing in place   Neurological:      Mental Status: She is alert and oriented to person, place, and time.   Psychiatric:         Behavior: Behavior normal.         Relevant Results                    This patient has a central line   Reason for the central line remaining today? Parenteral medication            Assessment & Plan  Wound infection  Diabetic foot infection (right)  Consult podiatry - follows with Dr. Aponte  Consult ID  Antibiotics per ID  Wound culture with MSSA and group B strep   PVRs wnl for the RLE  MRI RLE with acute osteomyelitis   Vascular  surgery consulted by podiatry   Tentative plans for RLE angiogram, timing TBD  Sepsis without acute organ dysfunction (Multi)  Meets sepsis criteria with tachycardia, leukocytosis, and lactic acidosis with source being right foot infection   Management as above  Type 2 diabetes mellitus with hyperglycemia (Multi)  Patient states she was previously on lantus 80 units nightly but has been off insulin for awhile as she has not followed up with her endocrinologist in a few years  Will start lantus units night and high dose SSI, uptitrate as needed  Continue home januvia  Hold home amaryl  Hypoglycemia protocol   Bipolar disorder  Continue home propranolol, abilify, doxepin, and effexor   Tobacco use  Recommend cessation  Patient declined NRT    Plan:  Remain status quo  BMP and CBC reviewed -> wnl  BG controlled, continue insulin and adjust PRN  Planning for RLE angiogram with vascular surgery, timing TBD  Per podiatry, planning for RLE flap procedure after angiogram, timing TBD  Continue antibiotics per ID  Will need PT/OT eval after surgery  Dispo TBD pending therapy recommendations         Laura Correa MD

## 2025-05-11 NOTE — CARE PLAN
Problem: Pain - Adult  Goal: Verbalizes/displays adequate comfort level or baseline comfort level  Outcome: Progressing     Problem: Safety - Adult  Goal: Free from fall injury  Outcome: Progressing     Problem: Discharge Planning  Goal: Discharge to home or other facility with appropriate resources  Outcome: Progressing     Problem: Chronic Conditions and Co-morbidities  Goal: Patient's chronic conditions and co-morbidity symptoms are monitored and maintained or improved  Outcome: Progressing     Problem: Nutrition  Goal: Nutrient intake appropriate for maintaining nutritional needs  Outcome: Progressing     Problem: Fall/Injury  Goal: Not fall by end of shift  Outcome: Progressing  Goal: Be free from injury by end of the shift  Outcome: Progressing  Goal: Verbalize understanding of personal risk factors for fall in the hospital  Outcome: Progressing  Goal: Verbalize understanding of risk factor reduction measures to prevent injury from fall in the home  Outcome: Progressing  Goal: Use assistive devices by end of the shift  Outcome: Progressing  Goal: Pace activities to prevent fatigue by end of the shift  Outcome: Progressing     Problem: Skin  Goal: Decreased wound size/increased tissue granulation at next dressing change  Outcome: Progressing  Goal: Participates in plan/prevention/treatment measures  Outcome: Progressing  Goal: Prevent/manage excess moisture  Outcome: Progressing  Goal: Prevent/minimize sheer/friction injuries  Outcome: Progressing  Goal: Promote/optimize nutrition  Outcome: Progressing  Goal: Promote skin healing  Outcome: Progressing     Problem: Diabetes  Goal: Achieve decreasing blood glucose levels by end of shift  Outcome: Progressing  Goal: Increase stability of blood glucose readings by end of shift  Outcome: Progressing  Goal: Decrease in ketones present in urine by end of shift  Outcome: Progressing  Goal: Maintain electrolyte levels within acceptable range throughout shift  Outcome:  Progressing  Goal: Maintain glucose levels >70mg/dl to <250mg/dl throughout shift  Outcome: Progressing  Goal: No changes in neurological exam by end of shift  Outcome: Progressing  Goal: Learn about and adhere to nutrition recommendations by end of shift  Outcome: Progressing  Goal: Vital signs within normal range for age by end of shift  Outcome: Progressing  Goal: Increase self care and/or family involovement by end of shift  Outcome: Progressing   The patient's goals for the shift include Patient Safety    The clinical goals for the shift include Maintain normal wake/ sleep cycle.

## 2025-05-12 LAB
B-HCG SERPL-ACNC: 2 MIU/ML
GLUCOSE BLD MANUAL STRIP-MCNC: 194 MG/DL (ref 74–99)
GLUCOSE BLD MANUAL STRIP-MCNC: 221 MG/DL (ref 74–99)
GLUCOSE BLD MANUAL STRIP-MCNC: 239 MG/DL (ref 74–99)
GLUCOSE BLD MANUAL STRIP-MCNC: 264 MG/DL (ref 74–99)

## 2025-05-12 PROCEDURE — 99232 SBSQ HOSP IP/OBS MODERATE 35: CPT | Performed by: INTERNAL MEDICINE

## 2025-05-12 PROCEDURE — 2500000001 HC RX 250 WO HCPCS SELF ADMINISTERED DRUGS (ALT 637 FOR MEDICARE OP): Performed by: STUDENT IN AN ORGANIZED HEALTH CARE EDUCATION/TRAINING PROGRAM

## 2025-05-12 PROCEDURE — 2500000004 HC RX 250 GENERAL PHARMACY W/ HCPCS (ALT 636 FOR OP/ED): Mod: JZ | Performed by: STUDENT IN AN ORGANIZED HEALTH CARE EDUCATION/TRAINING PROGRAM

## 2025-05-12 PROCEDURE — 2500000002 HC RX 250 W HCPCS SELF ADMINISTERED DRUGS (ALT 637 FOR MEDICARE OP, ALT 636 FOR OP/ED): Performed by: STUDENT IN AN ORGANIZED HEALTH CARE EDUCATION/TRAINING PROGRAM

## 2025-05-12 PROCEDURE — 82947 ASSAY GLUCOSE BLOOD QUANT: CPT

## 2025-05-12 PROCEDURE — 99232 SBSQ HOSP IP/OBS MODERATE 35: CPT | Performed by: NURSE PRACTITIONER

## 2025-05-12 PROCEDURE — 2500000004 HC RX 250 GENERAL PHARMACY W/ HCPCS (ALT 636 FOR OP/ED): Mod: JZ | Performed by: INTERNAL MEDICINE

## 2025-05-12 PROCEDURE — 1210000001 HC SEMI-PRIVATE ROOM DAILY

## 2025-05-12 RX ADMIN — INSULIN LISPRO 6 UNITS: 100 INJECTION, SOLUTION INTRAVENOUS; SUBCUTANEOUS at 11:38

## 2025-05-12 RX ADMIN — AMPICILLIN SODIUM AND SULBACTAM SODIUM 3 G: 2; 1 INJECTION, POWDER, FOR SOLUTION INTRAMUSCULAR; INTRAVENOUS at 22:05

## 2025-05-12 RX ADMIN — INSULIN LISPRO 6 UNITS: 100 INJECTION, SOLUTION INTRAVENOUS; SUBCUTANEOUS at 11:39

## 2025-05-12 RX ADMIN — VENLAFAXINE HYDROCHLORIDE 150 MG: 150 CAPSULE, EXTENDED RELEASE ORAL at 08:23

## 2025-05-12 RX ADMIN — INSULIN LISPRO 3 UNITS: 100 INJECTION, SOLUTION INTRAVENOUS; SUBCUTANEOUS at 08:21

## 2025-05-12 RX ADMIN — INSULIN GLARGINE 50 UNITS: 100 INJECTION, SOLUTION SUBCUTANEOUS at 20:21

## 2025-05-12 RX ADMIN — INSULIN LISPRO 6 UNITS: 100 INJECTION, SOLUTION INTRAVENOUS; SUBCUTANEOUS at 16:21

## 2025-05-12 RX ADMIN — DOXEPIN HYDROCHLORIDE 100 MG: 50 CAPSULE ORAL at 20:20

## 2025-05-12 RX ADMIN — ARIPIPRAZOLE 10 MG: 10 TABLET ORAL at 20:19

## 2025-05-12 RX ADMIN — INSULIN LISPRO 6 UNITS: 100 INJECTION, SOLUTION INTRAVENOUS; SUBCUTANEOUS at 16:20

## 2025-05-12 RX ADMIN — PROPRANOLOL HYDROCHLORIDE 20 MG: 20 TABLET ORAL at 20:20

## 2025-05-12 RX ADMIN — AMPICILLIN SODIUM AND SULBACTAM SODIUM 3 G: 2; 1 INJECTION, POWDER, FOR SOLUTION INTRAMUSCULAR; INTRAVENOUS at 16:23

## 2025-05-12 RX ADMIN — ENOXAPARIN SODIUM 40 MG: 40 INJECTION SUBCUTANEOUS at 20:19

## 2025-05-12 RX ADMIN — AMPICILLIN SODIUM AND SULBACTAM SODIUM 3 G: 2; 1 INJECTION, POWDER, FOR SOLUTION INTRAMUSCULAR; INTRAVENOUS at 05:50

## 2025-05-12 RX ADMIN — SIMVASTATIN 20 MG: 20 TABLET, FILM COATED ORAL at 20:19

## 2025-05-12 RX ADMIN — PANTOPRAZOLE SODIUM 40 MG: 40 INJECTION, POWDER, FOR SOLUTION INTRAVENOUS at 06:27

## 2025-05-12 RX ADMIN — AMPICILLIN SODIUM AND SULBACTAM SODIUM 3 G: 2; 1 INJECTION, POWDER, FOR SOLUTION INTRAMUSCULAR; INTRAVENOUS at 11:46

## 2025-05-12 ASSESSMENT — COGNITIVE AND FUNCTIONAL STATUS - GENERAL
CLIMB 3 TO 5 STEPS WITH RAILING: A LITTLE
MOBILITY SCORE: 22
WALKING IN HOSPITAL ROOM: A LITTLE
DAILY ACTIVITIY SCORE: 24

## 2025-05-12 ASSESSMENT — PAIN SCALES - GENERAL
PAINLEVEL_OUTOF10: 0 - NO PAIN

## 2025-05-12 ASSESSMENT — PAIN - FUNCTIONAL ASSESSMENT
PAIN_FUNCTIONAL_ASSESSMENT: 0-10
PAIN_FUNCTIONAL_ASSESSMENT: 0-10

## 2025-05-12 NOTE — PROGRESS NOTES
"Mary Pedroza is a 49 y.o. female on day 5 of admission presenting with Wound infection.    Subjective   Patient seen and examined.  She offers no specific complaints.       Objective     Physical Exam    General: Pt is alert and oriented x 3. Pleasant, conversive  HEENT: Head is atraumatic, normocephalic.   Cardiac: Normal S1-S2.  Regular rate and rhythm.  No murmurs.  Respiratory: Lungs clear to auscultation.  No adventitious sounds.  Abdomen: Soft, nondistended, nontender.  Bowel sounds x4 quadrants.  Pulse exam: Palpable brachial and radial pulses bilaterally.  Palpable femoral, popliteal, pedal pulses bilaterally.  Extremities: Left TMA.  Right foot surgical bandage was not removed but was displaced slightly to palpate pulses.  Neuro: Moves all extremities spontaneously.  No focal deficits.  Psych: Appropriate affect.  Answers questions appropriately.    Last Recorded Vitals  Blood pressure 122/65, pulse 78, temperature 37.6 °C (99.7 °F), temperature source Oral, resp. rate 17, height 1.702 m (5' 7\"), weight 92.6 kg (204 lb 2.3 oz), SpO2 98%.  Intake/Output last 3 Shifts:  I/O last 3 completed shifts:  In: 1140 (12.3 mL/kg) [P.O.:740; IV Piggyback:400]  Out: - (0 mL/kg)   Weight: 92.6 kg     Relevant Results  Bedside PICC Imaging  Result Date: 5/9/2025  These images are not reportable by radiology and will not be interpreted by  Radiologists.    MR foot right wo IV contrast  Result Date: 5/7/2025  Interpreted By:  Cecilio Clarke, STUDY: MR FOOT RIGHT WO IV CONTRAST;  5/7/2025 8:58 pm   INDICATION: Signs/Symptoms:Diabetic foot ulcer possible abscess.     COMPARISON: Right ankle radiographs 05/06/2025   ACCESSION NUMBER(S): [id number]   ORDERING CLINICIAN: RENE HERNANDEZ   TECHNIQUE: Multiplanar, multisequence MR imaging of the right forefoot was obtained without contrast.   FINDINGS: OSSEOUS STRUCTURES: On axial T2 fat sat images there is failure of fat saturation of the 5th toe which is better depicted " on sagittal STIR images as being normal signal intensity. There is no evidence of bone marrow replacement or bone destruction of the forefoot is suggest acute osteomyelitis. There is no acute fracture or malalignment.     JOINTS: Normal osteoarticular relationship of the midfoot and forefoot structures. No significant discrete chondral or osteochondral defects.     SOFT TISSUES: There is diffuse edema along the dorsal forefoot that may relate to cellulitis and/or venous/lymphatic insufficiency. No fluid collection identified.     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 There is no evidence of acute tendon rupture within the forefoot. None   LIGAMENTS: Visualized ligaments, including the Lisfranc ligament and collateral ligaments of the MTP and interphalangeal joints, are intact.       (Please see separate report of the hindfoot and ankle for findings regarding these areas.).   No acute osteomyelitis involving the right forefoot.   Diffuse edema in the dorsal soft tissues that could relate to cellulitis and/or venous/lymphatic insufficiency.   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   MACRO: None.   Signed by: Cecilio Clarke 5/7/2025 10:13 PM Dictation workstation:   [id number]    MR ankle right wo IV contrast  Result Date: 5/7/2025  Interpreted By:  Cecilio Clarke, STUDY: MR ANKLE RIGHT WO IV CONTRAST;  5/7/2025 9:15 pm   INDICATION: Signs/Symptoms:Diabetic foot ulcer possible abscess.     COMPARISON: Right ankle radiographs 05/06/2025   ACCESSION NUMBER(S): [id number]   ORDERING CLINICIAN: RENE HERNANDEZ   TECHNIQUE: Multiplanar, multisequence MR imaging of the right ankle and hindfoot was obtained without contrast.   FINDINGS: OSSEOUS STRUCTURES/BONE MARROW: There is bone marrow replacement of the lateral malleolus characterized by increased fluid  sensitive signal involving the distal fibular epiphysis and metaphysis that extends as far proximal as 5 cm from the tip of the lateral malleolus. There is confluent low signal intensity T1 weighted signal within the distal fibula. Findings are consistent with acute osteomyelitis. This is adjacent to a soft tissue ulceration (see below). The remaining bone marrow signal is within normal limits. No acute fractures or malalignment.   JOINTS: Small tibiotalar joint effusion. Small talonavicular joint effusion. No additional findings to suggest septic arthritis. Normal osteoarticular relationship of the ankle and hindfoot structures. Mild chondral thinning of the tibiotalar joint. No full-thickness chondral or osteochondral defects.   TENDONS: Intact with normal caliber and signal intensity.   MUSCULATURE: Nonspecific feathery edema within the musculature of the anterior, peroneal, and posterior compartments could relate to myositis or diabetic neuromyopathy. 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   LIGAMENTS: The tibiofibular syndesmosis is intact. The lateral collateral ligament complex (ATFL, CFL, PTFL) is intact. Evidence of remote deltoid ligament sprain with ossification at the medial malleolar origin and at its talar attachment as well as slight indistinctness of the deep deltoid ligament fibers. The spring ligament complex is intact. The Lisfranc ligament is intact.   PLANTAR FASCIA: Plantar calcaneal spur. The plantar fascia is thickened without edema.   BURSAE: No pathological bursal distension. Trace fluid in the retrocalcaneal bursa noted.   NEUROVASCULAR STRUCTURES: No significant abnormality.   SOFT TISSUES: There is a large soft tissue ulceration overlying the lateral malleolus measuring 3.7 cm x 3.4 cm and extends to the bone surface of the lateral malleolus. Overlying the lateral cortex of the lateral malleolus there is a  0.9 cm x 0.5 cm circumscribed T2 hyperintense area that may relate to an abscess (coronal image 21, series 801; axial image 22, series 501).       Please see separate report of the forefoot for other findings. This report reflects the ankle and hindfoot)   1. Acute osteomyelitis of the lateral malleolus which involves the distal fibula up to 5 cm proximal to the tip of the lateral malleolus. This is due to a deep ulceration (3.7 cm x 3.4 cm) overlying lateral malleolus that extends to the bone surface and may be associated with a small abscess measuring 0.9 cm x 0.5 cm abutting the lateral cortex of the lateral malleolus.   2.Nonspecific feathery edema within the musculature of the anterior, peroneal, and posterior compartments and of the visualized dorsal and plantar foot could relate diabetic neuromyopathy; however, the signal within the peroneal muscles could be also related to myositis given the proximity to the ulcer and osteomyelitis.   3. Fluid distention of the peroneal tendon sheath in the retro malleolar groove and below the level of the lateral malleolus that may be related to infectious tenosynovitis or reactive in nature, the former favored.   4. Nonspecific effusions of the tibiotalar and talonavicular joints. No evidence of bone marrow edema or replacement in the subchondral bone. Findings are favored to be reactive in nature.   MACRO: None.   Signed by: Cecilio Clarke 5/7/2025 10:07 PM Dictation workstation:   [id number]    Vascular US PVR without exercise  Result Date: 5/7/2025           [place] [id number]            Phone [phone]  Vascular Lab Report  VASC US PVR WITHOUT EXERCISE Patient Name:      MONTANA ROCA    Reading Physician:  [id number] Marisela Huddleston MD Study Date:        5/7/2025             Ordering Provider:  [id number]                                                              DAVID MRN/PID:           [id number]             Fellow: Accession#:        [id number]         Technologist:       James Cr RVT Date of Birth/Age: 1975 / 49      Technologist 2:                    years Gender:            F                    Encounter#:         [id number] Admission Status:  Inpatient            Location Performed: [facility]  Diagnosis/ICD: Type 2 diabetes mellitus (DM) with other circulatory                complications-E11.59 CPT Codes:     55967 Peripheral artery PVR (multi segmental pressure  Pertinent History: Diabetic with left foot TMA.  CONCLUSIONS: Right Lower PVR: No evidence of arterial occlusive disease in the right lower extremity at rest. Normal digital perfusion noted. Multiphasic flow is noted in the right common femoral artery, right popliteal artery, right posterior tibial artery and right dorsalis pedis artery. Left Lower PVR: No evidence of arterial occlusive disease in the left lower extremity at rest. Multiphasic flow is noted in the left common femoral artery, left popliteal artery, left posterior tibial artery and left dorsalis pedis artery. Patient had a left TMA. No metatarsel waveforms or TBI's.  Comparison: Compared with study from 3/8/2024, no significant change.  Imaging & Doppler Findings:  RIGHT Lower PVR                Pressures Ratios Right High Thigh               164 mmHg  1.61 Right Low Thigh                147 mmHg  1.44 Right Calf                     128 mmHg  1.25 Right Posterior Tibial (Ankle) 116 mmHg  1.14 Right Dorsalis Pedis (Ankle)   112 mmHg  1.10 Right Digit (Great Toe)        82 mmHg   0.80   LEFT Lower PVR                Pressures Ratios Left High Thigh               168 mmHg  1.65 Left Low Thigh                153 mmHg  1.50 Left Calf                     135 mmHg  1.32 Left Posterior Tibial (Ankle) 114 mmHg  1.12 Left Dorsalis Pedis (Ankle)   107 mmHg  1.05                       Right     Left Brachial Pressure 102 mmHg 100 mmHg   [id number] Marisela Huddleston MD Electronically signed by [id number] Marisela Huddleston MD on 5/7/2025 at 1:27:18 PM  ** Final **     XR ankle right 3+ views  Result Date: 5/6/2025  Interpreted By:  Tanner Diaz, STUDY: XR ANKLE RIGHT 3+ VIEWS; ;  5/6/2025 3:17 pm   INDICATION: Signs/Symptoms:Right lateral malleolus wound, history of diabetes.     COMPARISON: None.   ACCESSION NUMBER(S): [id number]   ORDERING CLINICIAN: ELIAZAR BEE   FINDINGS: Soft tissue ulceration from patient's wound. No radiopaque foreign body.   No stigmata of osteomyelitis. Joint spaces are normal.       No acute osseous involvement. Soft tissue swelling with deformity consistent with the patient's wound.     MACRO: None   Signed by: Tanner Diaz 5/6/2025 3:59 PM Dictation workstation:   [id number]      Visit Vitals  /65 (BP Location: Left arm, Patient Position: Lying)   Pulse 78   Temp 37.6 °C (99.7 °F) (Oral)   Resp 17       Scheduled medications  Scheduled Medications[1]  Continuous medications  Continuous Medications[2]  PRN medications  PRN Medications[3]                  Assessment/Plan   Acute osteomyelitis right lateral malleolus  Type 2 diabetes  Wound infection  Tobacco abuse     Patient with progressively worsening infection of right lower extremity.  MRI revealed acute osteomyelitis of the lateral malleolus on the right.  I reviewed the patient's PVR from March 2024 as well as most recent PVR.  Both show no evidence of peripheral arterial disease.  From a clinical perspective, the patient has easily palpable pedal pulses bilaterally.     Attempting to schedule angiogram tomorrow after Dr. Huddleston's clinic.  Would be diagnostic only in preparation for muscle flap procedure by podiatry.          I spent 35 minutes in the professional and overall care of this patient.      Sourav Stover, APRN-CNP           [1] ampicillin-sulbactam, 3 g, intravenous, q6h  ARIPiprazole, 10  mg, oral, Nightly  doxepin, 100 mg, oral, Nightly  enoxaparin, 40 mg, subcutaneous, q24h  insulin glargine, 50 Units, subcutaneous, Nightly  insulin lispro, 0-15 Units, subcutaneous, TID AC  insulin lispro, 6 Units, subcutaneous, TID  lidocaine, 5 mL, infiltration, Once  pantoprazole, 40 mg, oral, Daily before breakfast   Or  pantoprazole, 40 mg, intravenous, Daily before breakfast  polyethylene glycol, 17 g, oral, Daily  propranolol, 20 mg, oral, BID  simvastatin, 20 mg, oral, Nightly  SITagliptin phosphate, 25 mg, oral, Daily  venlafaxine XR, 150 mg, oral, Daily  [2]    [3] PRN medications: acetaminophen **OR** acetaminophen **OR** acetaminophen, alteplase, dextrose, dextrose, glucagon, glucagon, ondansetron ODT **OR** ondansetron

## 2025-05-12 NOTE — CARE PLAN
The clinical goals for the shift include safety      Problem: Pain - Adult  Goal: Verbalizes/displays adequate comfort level or baseline comfort level  Outcome: Progressing     Problem: Safety - Adult  Goal: Free from fall injury  Outcome: Progressing     Problem: Discharge Planning  Goal: Discharge to home or other facility with appropriate resources  Outcome: Progressing     Problem: Chronic Conditions and Co-morbidities  Goal: Patient's chronic conditions and co-morbidity symptoms are monitored and maintained or improved  Outcome: Progressing     Problem: Fall/Injury  Goal: Not fall by end of shift  Outcome: Progressing  Goal: Be free from injury by end of the shift  Outcome: Progressing  Goal: Verbalize understanding of personal risk factors for fall in the hospital  Outcome: Progressing  Goal: Verbalize understanding of risk factor reduction measures to prevent injury from fall in the home  Outcome: Progressing  Goal: Use assistive devices by end of the shift  Outcome: Progressing  Goal: Pace activities to prevent fatigue by end of the shift  Outcome: Progressing     Problem: Fall/Injury  Goal: Not fall by end of shift  Outcome: Progressing  Goal: Be free from injury by end of the shift  Outcome: Progressing  Goal: Verbalize understanding of personal risk factors for fall in the hospital  Outcome: Progressing  Goal: Verbalize understanding of risk factor reduction measures to prevent injury from fall in the home  Outcome: Progressing  Goal: Use assistive devices by end of the shift  Outcome: Progressing  Goal: Pace activities to prevent fatigue by end of the shift  Outcome: Progressing     Problem: Skin  Goal: Participates in plan/prevention/treatment measures  Outcome: Progressing  Goal: Prevent/manage excess moisture  Outcome: Progressing  Goal: Prevent/minimize sheer/friction injuries  Outcome: Progressing  Goal: Promote/optimize nutrition  Outcome: Progressing  Goal: Promote skin healing  Outcome:  Progressing     Problem: Diabetes  Goal: Achieve decreasing blood glucose levels by end of shift  Outcome: Progressing  Goal: Increase stability of blood glucose readings by end of shift  Outcome: Progressing  Goal: Maintain glucose levels >70mg/dl to <250mg/dl throughout shift  Outcome: Progressing  Goal: No changes in neurological exam by end of shift  Outcome: Progressing  Goal: Vital signs within normal range for age by end of shift  Outcome: Progressing  Goal: Increase self care and/or family involovement by end of shift  Outcome: Progressing

## 2025-05-12 NOTE — PROGRESS NOTES
Mary Pedroza is a 49 y.o. female on day 6 of admission presenting with Wound infection.      Subjective  Patient evaluated at bedside  Discussed with nursing  Angiogram scheduled for tomorrow morning at 8 AM  Patient amenable to plan    Objective        Last Recorded Vitals      5/10/2025     8:21 PM 5/11/2025    12:35 AM 5/11/2025     8:08 AM 5/11/2025     3:00 PM 5/11/2025     4:06 PM 5/11/2025     8:28 PM 5/12/2025     8:17 AM   Vitals   Systolic 109 101 104 111 106 122 96   Diastolic 62 57 69 64 62 65 62   BP Location Left arm Left arm Left arm Left arm Left arm Left arm Left arm   Heart Rate 80 71 81 86 75 78 74   Temp 37 °C (98.6 °F) 37.3 °C (99.1 °F) 36 °C (96.8 °F) 36.5 °C (97.7 °F) 36.8 °C (98.2 °F) 37.6 °C (99.7 °F) 37.3 °C (99.1 °F)   Resp 16 20 18 20 20 17 18       Imaging  Imaging  No results found.    Cardiology, Vascular, and Other Imaging  No other imaging results found for the past 2 days        Relevant Results  Results for orders placed or performed during the hospital encounter of 05/06/25 (from the past 24 hours)   POCT GLUCOSE   Result Value Ref Range    POCT Glucose 219 (H) 74 - 99 mg/dL   POCT GLUCOSE   Result Value Ref Range    POCT Glucose 214 (H) 74 - 99 mg/dL   POCT GLUCOSE   Result Value Ref Range    POCT Glucose 241 (H) 74 - 99 mg/dL   POCT GLUCOSE   Result Value Ref Range    POCT Glucose 194 (H) 74 - 99 mg/dL       Physical Exam     General: Pt is alert and oriented x 3.   HEENT: Head is atraumatic, normocephalic.   Cardiac: Regular rate and rhythm.  No murmurs.  Respiratory: Lungs clear to auscultation.  No adventitious sounds.  Abdomen: Soft, nondistended, nontender.  Bowel sounds x4 quadrants.  Pulse exam: Palpable brachial and radial pulses bilaterally.  Palpable femoral, popliteal, pedal pulses bilaterally.  Extremities: Left TMA.  Right foot dressing intact  Neuro: Moves all extremities spontaneously.  No focal deficits.  Psych: Appropriate affect.  Answers questions  appropriately.       Assessment/Plan  Acute osteomyelitis right lateral malleolus  Type 2 diabetes  Wound infection  Tobacco abuse     Patient with progressively worsening infection of right lower extremity.  MRI revealed acute osteomyelitis of the lateral malleolus on the right.  I reviewed the patient's PVR from March 2024 as well as most recent PVR.  Both show no evidence of peripheral arterial disease.  From a clinical perspective, the patient has easily palpable pedal pulses bilaterally.     Diagnostic angiogram of the right lower extremity ordered for 5/13/2025.  N.p.o. order placed after midnight.  Patient to undergo angiogram at 08:00.  Angiogram is diagnostic only in preparation for muscle flap procedure by podiatry.

## 2025-05-12 NOTE — PROGRESS NOTES
05/12/25 0824   Discharge Planning   Expected Discharge Disposition Othe     Patient awaiting Angio with vascular   TCC will follow for discharge needs.

## 2025-05-12 NOTE — PROGRESS NOTES
Seeing patient for osteomyelitis of the right lateral malleolus due to chronic wound with MSSA and group B strep.    Patient resting comfortably today without complaints.  Plans for angiogram on 5/13/2025 noted.  Pending vascular status, podiatry is planning muscle flap coverage of the wound.    Antibiotic:   Unasyn D4    37.3  Lung: Clear to auscultation bilaterally  Cardio: RRR, S1-S2 normal  Abdomen: Bowel sounds, soft, nontender nondistended.  Extremity: Right ankle dressing C/D/I.    WBC: 10.1  Creatinine: 0.6    Wound culture right ankle (5/6): MSSA/group B strep  Blood culture (5/6): X 2 sets/NGTD    Impression:  1.  Osteomyelitis right lateral malleolus with MSSA and group B strep  2. Diabetes    Plan:  1.  Continue Unasyn for now.  Monitor for adverse antibiotic events such as diarrhea/rash.  2.  Await planned angiogram of the right lower extremity on 5/13/2025 by vascular surgery.  3.  Podiatry plans for muscle flap closure pending vascular workup are noted.  4.  Patient ultimately will require PICC line and 6 weeks total of IV therapy for osteomyelitis of the lateral malleolus.  5.  Following.    Jarret Mcclellan MD  [facility]  [phone]

## 2025-05-12 NOTE — PROGRESS NOTES
Mary Pedroza is a 49 y.o. female on day 6 of admission presenting with Wound infection.      Subjective     Patient denied fever or chills.  She denied chest pain shortness of breath.       Objective     Last Recorded Vitals  BP 96/62 (BP Location: Left arm, Patient Position: Lying)   Pulse 74   Temp 37.3 °C (99.1 °F) (Oral)   Resp 18   Wt 92.6 kg (204 lb 2.3 oz)   SpO2 96%   Intake/Output last 3 Shifts:    Intake/Output Summary (Last 24 hours) at 5/12/2025 0830  Last data filed at 5/12/2025 0550  Gross per 24 hour   Intake 400 ml   Output --   Net 400 ml       Admission Weight  Weight: 90.3 kg (199 lb) (05/06/25 1402)    Daily Weight  05/07/25 : 92.6 kg (204 lb 2.3 oz)    Image Results  Bedside PICC Imaging  These images are not reportable by radiology and will not be interpreted   by  Radiologists.    Physical exam    General:  cooperating during physical exam.  HEENT: Pupils are equal and reactive to light and commendation , oral mucosa moist, no JVD .  Cardiovascular: Normal sinus rhythm, no MRG.  Lungs: Clear to auscultation bilaterally, no wheezing, no crackles, no dullness to percussion.  Abdomen: No hepatosplenomegaly appreciated, soft , not tender, positive bowel sounds, positive bowel movement.  Neuro: Alert and oriented x3, strength in upper and lower extremities , sensation intact.  Psych: Patient had great insight was going on  Musculoskeletal: Left forefoot amputation, right lateral malleolus wound, dressing in place  Vascular: Pulses are intact in upper and lower extremities  Skin: No petechiae, ecchymosis or other stigmata for dermatology disease.     Assessment and plan    Diabetic foot infection  MRI right lower extremity revealed acute osteomyelitis of the lateral malleolus involving the distal fibula.  Continue with IV antibiotics.  Patient is on Unasyn  Wound culture grew MSSA and group B Streptococcus  Dr. Raygoza on the case  Dr. Aponte for podiatry service on the case.  Evaluated by  vascular surgery.  Plan for right lower extremity angiography, timing to be determined  Patient is n.p.o., waiting for vascular surgery to see her today.    Diabetes mellitus type 2  Not well-controlled, hemoglobin A1c 9.5  Continue with Lantus and insulin sliding scale.    Bipolar disorder  Continue with current medication.    Tobacco use.  Advised to quit smoking    Check CBC and BMP in AM.  PICC line in place.  Patient will need long-term antibiotics.  Waiting for vascular surgery to see her today.  Plan for right lower extremity angiography      Nicolasa Garcia MD

## 2025-05-12 NOTE — CARE PLAN
Over the shift, the patient did not make progress toward the following goals. Barriers to progression include . Recommendations to address these barriers include .        Problem: Pain - Adult  Goal: Verbalizes/displays adequate comfort level or baseline comfort level  5/12/2025 0646 by Tera Lopez RN  Outcome: Progressing  5/12/2025 0012 by Tera Lopez RN  Outcome: Progressing     Problem: Safety - Adult  Goal: Free from fall injury  5/12/2025 0646 by Tera Lopez RN  Outcome: Progressing  5/12/2025 0012 by Tera Lopez RN  Outcome: Progressing     Problem: Discharge Planning  Goal: Discharge to home or other facility with appropriate resources  5/12/2025 0646 by Tera Lopez RN  Outcome: Progressing  5/12/2025 0012 by Tera Lopez RN  Outcome: Progressing     Problem: Chronic Conditions and Co-morbidities  Goal: Patient's chronic conditions and co-morbidity symptoms are monitored and maintained or improved  5/12/2025 0646 by Tera Lopez RN  Outcome: Progressing  5/12/2025 0012 by Tera Lopez RN  Outcome: Progressing     Problem: Nutrition  Goal: Nutrient intake appropriate for maintaining nutritional needs  Outcome: Progressing     Problem: Fall/Injury  Goal: Not fall by end of shift  5/12/2025 0646 by Tera Lopez RN  Outcome: Met  5/12/2025 0012 by Tera Lopez RN  Outcome: Progressing  Goal: Be free from injury by end of the shift  5/12/2025 0646 by Tera Lopez RN  Outcome: Met  5/12/2025 0012 by Tera Lopez RN  Outcome: Progressing  Goal: Verbalize understanding of personal risk factors for fall in the hospital  5/12/2025 0646 by Tera Lopez RN  Outcome: Progressing  5/12/2025 0012 by Tera Lopez RN  Outcome: Progressing  Goal: Verbalize understanding of risk factor reduction measures to prevent injury from fall in the home  5/12/2025 0646 by Tera Lopez RN  Outcome: Progressing  5/12/2025 0012 by  Tera Lopez RN  Outcome: Progressing  Goal: Use assistive devices by end of the shift  5/12/2025 0646 by Tera Lopez RN  Outcome: Met  5/12/2025 0012 by Tera Lopez RN  Outcome: Progressing  Goal: Pace activities to prevent fatigue by end of the shift  5/12/2025 0646 by Tera Lopez RN  Outcome: Met  5/12/2025 0012 by Tera Lopez RN  Outcome: Progressing     Problem: Skin  Goal: Decreased wound size/increased tissue granulation at next dressing change  Outcome: Progressing  Goal: Participates in plan/prevention/treatment measures  5/12/2025 0646 by Tera Lopez RN  Outcome: Progressing  5/12/2025 0012 by Tera Lopez RN  Outcome: Progressing  Goal: Prevent/manage excess moisture  5/12/2025 0646 by Tera Lopez RN  Outcome: Progressing  5/12/2025 0012 by Tera Lopez RN  Outcome: Progressing  Goal: Prevent/minimize sheer/friction injuries  5/12/2025 0646 by Tera Lopez RN  Outcome: Progressing  5/12/2025 0012 by Tera Lopez RN  Outcome: Progressing  Goal: Promote/optimize nutrition  5/12/2025 0646 by Tera Lopez RN  Outcome: Progressing  5/12/2025 0012 by Tera Lopez RN  Outcome: Progressing  Goal: Promote skin healing  5/12/2025 0646 by Tera Lopez RN  Outcome: Progressing  5/12/2025 0012 by Tera Lopez RN  Outcome: Progressing     Problem: Diabetes  Goal: Achieve decreasing blood glucose levels by end of shift  5/12/2025 0646 by Tera Lopez RN  Outcome: Met  5/12/2025 0012 by Tera Lopez RN  Outcome: Progressing  Goal: Increase stability of blood glucose readings by end of shift  5/12/2025 0646 by Tera Lopez RN  Outcome: Met  5/12/2025 0012 by Tera Lopez RN  Outcome: Progressing  Goal: Decrease in ketones present in urine by end of shift  Outcome: Progressing  Goal: Maintain electrolyte levels within acceptable range throughout shift  Outcome: Progressing  Goal:  Maintain glucose levels >70mg/dl to <250mg/dl throughout shift  5/12/2025 0646 by Tera Lopez RN  Outcome: Met  5/12/2025 0012 by Tera Lopez RN  Outcome: Progressing  Goal: No changes in neurological exam by end of shift  5/12/2025 0646 by Tera Lopez RN  Outcome: Met  5/12/2025 0012 by Tera Lopez RN  Outcome: Progressing  Goal: Learn about and adhere to nutrition recommendations by end of shift  Outcome: Progressing  Goal: Vital signs within normal range for age by end of shift  5/12/2025 0646 by Tera Lopez RN  Outcome: Met  5/12/2025 0012 by Tera Lopez RN  Outcome: Progressing  Goal: Increase self care and/or family involovement by end of shift  5/12/2025 0646 by Tera Lopez RN  Outcome: Met  5/12/2025 0012 by Tera Lopez RN  Outcome: Progressing

## 2025-05-13 LAB
ANION GAP SERPL CALCULATED.3IONS-SCNC: 11 MMOL/L (ref 10–20)
BUN SERPL-MCNC: 16 MG/DL (ref 6–23)
CALCIUM SERPL-MCNC: 8.8 MG/DL (ref 8.6–10.3)
CHLORIDE SERPL-SCNC: 107 MMOL/L (ref 98–107)
CO2 SERPL-SCNC: 25 MMOL/L (ref 21–32)
CREAT SERPL-MCNC: 0.67 MG/DL (ref 0.5–1.05)
EGFRCR SERPLBLD CKD-EPI 2021: >90 ML/MIN/1.73M*2
ERYTHROCYTE [DISTWIDTH] IN BLOOD BY AUTOMATED COUNT: 12.3 % (ref 11.5–14.5)
GLUCOSE BLD MANUAL STRIP-MCNC: 142 MG/DL (ref 74–99)
GLUCOSE BLD MANUAL STRIP-MCNC: 201 MG/DL (ref 74–99)
GLUCOSE BLD MANUAL STRIP-MCNC: 230 MG/DL (ref 74–99)
GLUCOSE BLD MANUAL STRIP-MCNC: 269 MG/DL (ref 74–99)
GLUCOSE SERPL-MCNC: 184 MG/DL (ref 74–99)
HCT VFR BLD AUTO: 40.4 % (ref 36–46)
HGB BLD-MCNC: 14 G/DL (ref 12–16)
MCH RBC QN AUTO: 29.7 PG (ref 26–34)
MCHC RBC AUTO-ENTMCNC: 34.7 G/DL (ref 32–36)
MCV RBC AUTO: 86 FL (ref 80–100)
NRBC BLD-RTO: 0 /100 WBCS (ref 0–0)
PLATELET # BLD AUTO: 247 X10*3/UL (ref 150–450)
POTASSIUM SERPL-SCNC: 3.7 MMOL/L (ref 3.5–5.3)
RBC # BLD AUTO: 4.71 X10*6/UL (ref 4–5.2)
SODIUM SERPL-SCNC: 139 MMOL/L (ref 136–145)
WBC # BLD AUTO: 11.5 X10*3/UL (ref 4.4–11.3)

## 2025-05-13 PROCEDURE — 99152 MOD SED SAME PHYS/QHP 5/>YRS: CPT | Performed by: SURGERY

## 2025-05-13 PROCEDURE — 2550000001 HC RX 255 CONTRASTS: Performed by: SURGERY

## 2025-05-13 PROCEDURE — 76937 US GUIDE VASCULAR ACCESS: CPT | Performed by: SURGERY

## 2025-05-13 PROCEDURE — 7100000009 HC PHASE TWO TIME - INITIAL BASE CHARGE: Performed by: SURGERY

## 2025-05-13 PROCEDURE — B41F1ZZ FLUOROSCOPY OF RIGHT LOWER EXTREMITY ARTERIES USING LOW OSMOLAR CONTRAST: ICD-10-PCS | Performed by: SURGERY

## 2025-05-13 PROCEDURE — 7100000010 HC PHASE TWO TIME - EACH INCREMENTAL 1 MINUTE: Performed by: SURGERY

## 2025-05-13 PROCEDURE — 36140 INTRO NDL ICATH UPR/LXTR ART: CPT | Mod: LT | Performed by: SURGERY

## 2025-05-13 PROCEDURE — C1887 CATHETER, GUIDING: HCPCS | Performed by: SURGERY

## 2025-05-13 PROCEDURE — C1769 GUIDE WIRE: HCPCS | Performed by: SURGERY

## 2025-05-13 PROCEDURE — 2500000004 HC RX 250 GENERAL PHARMACY W/ HCPCS (ALT 636 FOR OP/ED): Mod: JZ | Performed by: SURGERY

## 2025-05-13 PROCEDURE — 99232 SBSQ HOSP IP/OBS MODERATE 35: CPT | Performed by: INTERNAL MEDICINE

## 2025-05-13 PROCEDURE — 2500000002 HC RX 250 W HCPCS SELF ADMINISTERED DRUGS (ALT 637 FOR MEDICARE OP, ALT 636 FOR OP/ED): Performed by: STUDENT IN AN ORGANIZED HEALTH CARE EDUCATION/TRAINING PROGRAM

## 2025-05-13 PROCEDURE — 75710 ARTERY X-RAYS ARM/LEG: CPT | Performed by: SURGERY

## 2025-05-13 PROCEDURE — 75630 X-RAY AORTA LEG ARTERIES: CPT | Performed by: SURGERY

## 2025-05-13 PROCEDURE — 2500000004 HC RX 250 GENERAL PHARMACY W/ HCPCS (ALT 636 FOR OP/ED): Mod: JZ | Performed by: INTERNAL MEDICINE

## 2025-05-13 PROCEDURE — 2500000005 HC RX 250 GENERAL PHARMACY W/O HCPCS: Performed by: SURGERY

## 2025-05-13 PROCEDURE — C1760 CLOSURE DEV, VASC: HCPCS | Performed by: SURGERY

## 2025-05-13 PROCEDURE — 2060000001 HC INTERMEDIATE ICU ROOM DAILY

## 2025-05-13 PROCEDURE — 2720000007 HC OR 272 NO HCPCS: Performed by: SURGERY

## 2025-05-13 PROCEDURE — 99153 MOD SED SAME PHYS/QHP EA: CPT | Performed by: SURGERY

## 2025-05-13 PROCEDURE — 2500000001 HC RX 250 WO HCPCS SELF ADMINISTERED DRUGS (ALT 637 FOR MEDICARE OP): Performed by: STUDENT IN AN ORGANIZED HEALTH CARE EDUCATION/TRAINING PROGRAM

## 2025-05-13 PROCEDURE — 85027 COMPLETE CBC AUTOMATED: CPT | Performed by: INTERNAL MEDICINE

## 2025-05-13 PROCEDURE — 80048 BASIC METABOLIC PNL TOTAL CA: CPT | Performed by: INTERNAL MEDICINE

## 2025-05-13 PROCEDURE — 36246 INS CATH ABD/L-EXT ART 2ND: CPT | Performed by: SURGERY

## 2025-05-13 PROCEDURE — 2780000003 HC OR 278 NO HCPCS: Performed by: SURGERY

## 2025-05-13 PROCEDURE — 2500000005 HC RX 250 GENERAL PHARMACY W/O HCPCS: Performed by: NURSE PRACTITIONER

## 2025-05-13 PROCEDURE — C1894 INTRO/SHEATH, NON-LASER: HCPCS | Performed by: SURGERY

## 2025-05-13 PROCEDURE — 36200 PLACE CATHETER IN AORTA: CPT | Performed by: SURGERY

## 2025-05-13 PROCEDURE — 82947 ASSAY GLUCOSE BLOOD QUANT: CPT

## 2025-05-13 PROCEDURE — 36247 INS CATH ABD/L-EXT ART 3RD: CPT | Mod: LT | Performed by: SURGERY

## 2025-05-13 PROCEDURE — 2500000004 HC RX 250 GENERAL PHARMACY W/ HCPCS (ALT 636 FOR OP/ED): Mod: JZ | Performed by: STUDENT IN AN ORGANIZED HEALTH CARE EDUCATION/TRAINING PROGRAM

## 2025-05-13 RX ORDER — ACETAMINOPHEN 325 MG/1
650 TABLET ORAL EVERY 6 HOURS PRN
Status: DISCONTINUED | OUTPATIENT
Start: 2025-05-13 | End: 2025-05-13 | Stop reason: SDUPTHER

## 2025-05-13 RX ORDER — LIDOCAINE HYDROCHLORIDE 10 MG/ML
INJECTION, SOLUTION EPIDURAL; INFILTRATION; INTRACAUDAL; PERINEURAL AS NEEDED
Status: DISCONTINUED | OUTPATIENT
Start: 2025-05-13 | End: 2025-05-13 | Stop reason: HOSPADM

## 2025-05-13 RX ORDER — FENTANYL CITRATE 50 UG/ML
INJECTION, SOLUTION INTRAMUSCULAR; INTRAVENOUS AS NEEDED
Status: DISCONTINUED | OUTPATIENT
Start: 2025-05-13 | End: 2025-05-13 | Stop reason: HOSPADM

## 2025-05-13 RX ORDER — IODIXANOL 320 MG/ML
INJECTION, SOLUTION INTRAVASCULAR AS NEEDED
Status: DISCONTINUED | OUTPATIENT
Start: 2025-05-13 | End: 2025-05-13 | Stop reason: HOSPADM

## 2025-05-13 RX ORDER — MIDAZOLAM HYDROCHLORIDE 1 MG/ML
INJECTION, SOLUTION INTRAMUSCULAR; INTRAVENOUS AS NEEDED
Status: DISCONTINUED | OUTPATIENT
Start: 2025-05-13 | End: 2025-05-13 | Stop reason: HOSPADM

## 2025-05-13 RX ORDER — SODIUM CHLORIDE 9 MG/ML
5 INJECTION, SOLUTION INTRAVENOUS CONTINUOUS
Status: DISCONTINUED | OUTPATIENT
Start: 2025-05-13 | End: 2025-05-13

## 2025-05-13 RX ORDER — NITROGLYCERIN 40 MG/100ML
INJECTION INTRAVENOUS AS NEEDED
Status: DISCONTINUED | OUTPATIENT
Start: 2025-05-13 | End: 2025-05-13 | Stop reason: HOSPADM

## 2025-05-13 RX ADMIN — AMPICILLIN SODIUM AND SULBACTAM SODIUM 3 G: 2; 1 INJECTION, POWDER, FOR SOLUTION INTRAMUSCULAR; INTRAVENOUS at 05:13

## 2025-05-13 RX ADMIN — INSULIN LISPRO 6 UNITS: 100 INJECTION, SOLUTION INTRAVENOUS; SUBCUTANEOUS at 17:30

## 2025-05-13 RX ADMIN — AMPICILLIN SODIUM AND SULBACTAM SODIUM 3 G: 2; 1 INJECTION, POWDER, FOR SOLUTION INTRAMUSCULAR; INTRAVENOUS at 20:20

## 2025-05-13 RX ADMIN — DOXEPIN HYDROCHLORIDE 100 MG: 50 CAPSULE ORAL at 20:50

## 2025-05-13 RX ADMIN — INSULIN LISPRO 6 UNITS: 100 INJECTION, SOLUTION INTRAVENOUS; SUBCUTANEOUS at 12:55

## 2025-05-13 RX ADMIN — AMPICILLIN SODIUM AND SULBACTAM SODIUM 3 G: 2; 1 INJECTION, POWDER, FOR SOLUTION INTRAMUSCULAR; INTRAVENOUS at 12:55

## 2025-05-13 RX ADMIN — INSULIN GLARGINE 50 UNITS: 100 INJECTION, SOLUTION SUBCUTANEOUS at 20:20

## 2025-05-13 RX ADMIN — VENLAFAXINE HYDROCHLORIDE 150 MG: 150 CAPSULE, EXTENDED RELEASE ORAL at 12:54

## 2025-05-13 RX ADMIN — PANTOPRAZOLE SODIUM 40 MG: 40 TABLET, DELAYED RELEASE ORAL at 06:11

## 2025-05-13 RX ADMIN — SITAGLIPTIN 25 MG: 25 TABLET, FILM COATED ORAL at 12:54

## 2025-05-13 RX ADMIN — INSULIN LISPRO 9 UNITS: 100 INJECTION, SOLUTION INTRAVENOUS; SUBCUTANEOUS at 12:55

## 2025-05-13 RX ADMIN — ARIPIPRAZOLE 10 MG: 10 TABLET ORAL at 20:19

## 2025-05-13 RX ADMIN — Medication 21 PERCENT: at 13:04

## 2025-05-13 RX ADMIN — ENOXAPARIN SODIUM 40 MG: 40 INJECTION SUBCUTANEOUS at 20:19

## 2025-05-13 RX ADMIN — SIMVASTATIN 20 MG: 20 TABLET, FILM COATED ORAL at 20:19

## 2025-05-13 ASSESSMENT — COLUMBIA-SUICIDE SEVERITY RATING SCALE - C-SSRS
1. IN THE PAST MONTH, HAVE YOU WISHED YOU WERE DEAD OR WISHED YOU COULD GO TO SLEEP AND NOT WAKE UP?: NO
6. HAVE YOU EVER DONE ANYTHING, STARTED TO DO ANYTHING, OR PREPARED TO DO ANYTHING TO END YOUR LIFE?: NO
2. HAVE YOU ACTUALLY HAD ANY THOUGHTS OF KILLING YOURSELF?: NO

## 2025-05-13 ASSESSMENT — PAIN SCALES - GENERAL
PAINLEVEL_OUTOF10: 0 - NO PAIN

## 2025-05-13 ASSESSMENT — PAIN - FUNCTIONAL ASSESSMENT
PAIN_FUNCTIONAL_ASSESSMENT: 0-10

## 2025-05-13 ASSESSMENT — COGNITIVE AND FUNCTIONAL STATUS - GENERAL
DAILY ACTIVITIY SCORE: 24
WALKING IN HOSPITAL ROOM: A LITTLE
CLIMB 3 TO 5 STEPS WITH RAILING: A LITTLE
MOBILITY SCORE: 22
WALKING IN HOSPITAL ROOM: A LITTLE
MOBILITY SCORE: 22
CLIMB 3 TO 5 STEPS WITH RAILING: A LITTLE
DAILY ACTIVITIY SCORE: 24

## 2025-05-13 NOTE — OP NOTE
Lower Extremity Angiogram (R) Operative Note     Date: 2025  OR Location: [facility] Cardiac Cath Lab    Name: Mary Pedroza, : 1975, Age: 49 y.o., MRN: [id number], Sex: female    Diagnosis  Pre-op Diagnosis      * Type 2 diabetes mellitus with other circulatory complications [E11.59] Post-op Diagnosis     * Type 2 diabetes mellitus with other circulatory complications [E11.59]     Procedures  Lower Extremity Angiogram  43958 - CHG ANGIOGRAPHY EXTREMITY UNILATERAL RS&I    Lower Extremity Angiogram  46829 - CHG ANGIOGRAPHY EXTREMITY BILATERAL RS&I      Surgeons      * Marisela Huddleston - Primary    Resident/Fellow/Other Assistant:  Surgeons and Role:  * No surgeons found with a matching role *    Staff:   Scrub Person: April  Circulator: Silvia    Anesthesia Staff: No anesthesia staff entered.    Procedure Summary  Anesthesia: Anesthesia type not filed in the log.  ASA: ASA status not filed in the log.  Estimated Blood Loss: 0mL  Intra-op Medications:   Administrations occurring from 0805 to 0925 on 25:   Medication Name Total Dose   oxygen (O2) therapy 175.65 L   midazolam (Versed) injection 2 mg   fentaNYL PF (Sublimaze) injection 100 mcg   lidocaine PF (Xylocaine) 10 mg/mL (1 %) injection 5 mL   nitroglycerin in 5 % dextrose 10 mL syringe 200 mcg   iodixanol (VISIPaque) 320 mg iodine/mL injection 55 mL         Intraprocedure I/O Totals       None           Specimen: No specimens collected              Drains and/or Catheters: * None in log *    Tourniquet Times:         Implants:     Findings: normal anatomy, patent arteries of right leg    Indications: Mary Pedroza is an 49 y.o. female who is having surgery for osteomyelitis right leg    The patient was seen in the preoperative area. The risks, benefits, complications, treatment options, non-operative alternatives, expected recovery and outcomes were discussed with the patient. The possibilities of reaction to medication, pulmonary aspiration,  injury to surrounding structures, bleeding, recurrent infection, the need for additional procedures, failure to diagnose a condition, and creating a complication requiring transfusion or operation were discussed with the patient. The patient concurred with the proposed plan, giving informed consent.  The site of surgery was properly noted/marked if necessary per policy. The patient has been actively warmed in preoperative area. Preoperative antibiotics are not indicated. Venous thrombosis prophylaxis are not indicated.    Procedure Details:   Supine position, both groins prepped and draped.  Left common femoral artery accessed under ultrasound guidance with lidocaine 1% used as local anesthesia with total volume given was 8 mL.  Micropuncture sheath used and wire position confirmed with fluoroscopy.  Stiff Glidewire placed into infrarenal abdominal aorta and 5 [place] sheath exchanged.  Omni Flush catheter then placed into abdominal aorta.  Abdominal aortogram with bilateral iliofemoral runoff performed.  It showed patency of the infrarenal aorta and both iliofemoral systems.  A low takeoff of the left renal artery was seen, possibly duplicated but the main renal arteries were not visualized on this image.  Up and over access with stiff Glidewire through Omni Flush catheter under fluoroscopy.  Omni Flush catheter pushed into distal external iliac artery and right leg arteriograms started.  The right common femoral and profundofemoral and superficial femoral arteries are widely patent with no visible lesions.  Wire access into superficial femoral artery and placement of a angled tapered glide catheter in the distal thigh.    Contrast hand injections continued with multiple views of the leg taken at different angles.  200 mcg of nitroglycerin was given to dilate the pedal vessels.  The popliteal artery and its branches including the anterior tibial, posterior tibial and peroneal arteries are widely patent.  A patent  loop is visualized in the foot.  The anterior tibial artery supplies a branch to the lateral malleolus where the infection is.  A blush is seen where there is hyperemia.  No stenoses or significant plaque is visualized in the tibial arteries and pedal arteries.    The catheter was removed.  The puncture site was confirmed with a CIERRA left iliofemoral arteriogram.  The puncture was in the common femoral artery.  The access was closed with a Vascade device with hemostasis achieved.      Complications:  None; patient tolerated the procedure well.    Disposition: PACU - hemodynamically stable.  Condition: stable                 Additional Details:     Attending Attestation: I was present and scrubbed for the entire procedure.    [facility]  Phone Number: [phone]

## 2025-05-13 NOTE — CARE PLAN
Over the shift, the patient did not make progress toward the following goals. Barriers to progression include . Recommendations to address these barriers include .      Problem: Pain - Adult  Goal: Verbalizes/displays adequate comfort level or baseline comfort level  5/13/2025 0635 by Tera Lopez RN  Outcome: Progressing  5/12/2025 2005 by Tera Lopez RN  Outcome: Progressing     Problem: Safety - Adult  Goal: Free from fall injury  5/13/2025 0635 by Tera Lopez RN  Outcome: Progressing  5/12/2025 2005 by Tera Lopez RN  Outcome: Progressing     Problem: Discharge Planning  Goal: Discharge to home or other facility with appropriate resources  Outcome: Progressing     Problem: Chronic Conditions and Co-morbidities  Goal: Patient's chronic conditions and co-morbidity symptoms are monitored and maintained or improved  Outcome: Progressing     Problem: Nutrition  Goal: Nutrient intake appropriate for maintaining nutritional needs  5/13/2025 0635 by Tera Lopez RN  Outcome: Progressing  5/12/2025 2005 by Tera Lopez RN  Outcome: Progressing     Problem: Fall/Injury  Goal: Verbalize understanding of personal risk factors for fall in the hospital  Outcome: Progressing  Goal: Verbalize understanding of risk factor reduction measures to prevent injury from fall in the home  Outcome: Progressing     Problem: Skin  Goal: Decreased wound size/increased tissue granulation at next dressing change  5/13/2025 0635 by Tera Lopez RN  Outcome: Progressing  5/12/2025 2005 by Tera Lopez RN  Outcome: Progressing  Goal: Participates in plan/prevention/treatment measures  5/13/2025 0635 by Tera Lopez RN  Outcome: Progressing  5/12/2025 2005 by Tera Lopez RN  Outcome: Progressing  Goal: Prevent/manage excess moisture  5/13/2025 0635 by Tera Lopez RN  Outcome: Progressing  5/12/2025 2005 by Tera Lopez RN  Outcome: Progressing  Goal:  Prevent/minimize sheer/friction injuries  5/13/2025 0635 by Tera Lopez RN  Outcome: Progressing  5/12/2025 2005 by Tera oLpez RN  Outcome: Progressing  Goal: Promote/optimize nutrition  5/13/2025 0635 by Tera Lopez RN  Outcome: Progressing  5/12/2025 2005 by Tera Lopez RN  Outcome: Progressing  Goal: Promote skin healing  5/13/2025 0635 by Tera Lopez RN  Outcome: Progressing  5/12/2025 2005 by Tera Lopez RN  Outcome: Progressing     Problem: Diabetes  Goal: Decrease in ketones present in urine by end of shift  Outcome: Progressing  Goal: Maintain electrolyte levels within acceptable range throughout shift  Outcome: Progressing  Goal: Learn about and adhere to nutrition recommendations by end of shift  Outcome: Met

## 2025-05-13 NOTE — CARE PLAN
The clinical goals for the shift include Keep NPO at Midnight for procedure.  Keep quiet environment for the night.      Problem: Pain - Adult  Goal: Verbalizes/displays adequate comfort level or baseline comfort level  5/12/2025 2005 by Tera Lopez RN  Outcome: Progressing  5/12/2025 0646 by Tera Lopez RN  Outcome: Progressing     Problem: Safety - Adult  Goal: Free from fall injury  5/12/2025 2005 by Tera Lopez RN  Outcome: Progressing  5/12/2025 0646 by Tera Lopez RN  Outcome: Progressing     Problem: Discharge Planning  Goal: Discharge to home or other facility with appropriate resources  Outcome: Progressing     Problem: Chronic Conditions and Co-morbidities  Goal: Patient's chronic conditions and co-morbidity symptoms are monitored and maintained or improved  Outcome: Progressing     Problem: Nutrition  Goal: Nutrient intake appropriate for maintaining nutritional needs  5/12/2025 2005 by Tera Lopez RN  Outcome: Progressing  5/12/2025 0646 by Tera Lopez RN  Outcome: Progressing     Problem: Fall/Injury  Goal: Not fall by end of shift  Outcome: Met  Goal: Be free from injury by end of the shift  Outcome: Met  Goal: Verbalize understanding of personal risk factors for fall in the hospital  Outcome: Progressing  Goal: Verbalize understanding of risk factor reduction measures to prevent injury from fall in the home  Outcome: Progressing  Goal: Use assistive devices by end of the shift  Outcome: Met  Goal: Pace activities to prevent fatigue by end of the shift  Outcome: Met     Problem: Skin  Goal: Decreased wound size/increased tissue granulation at next dressing change  5/12/2025 2005 by Tera Lopez RN  Outcome: Progressing  5/12/2025 0646 by Tera Lopez RN  Outcome: Progressing  Goal: Participates in plan/prevention/treatment measures  5/12/2025 2005 by Tera Lopez RN  Outcome: Progressing  5/12/2025 0646 by Tera Lopez  RN  Outcome: Progressing  Goal: Prevent/manage excess moisture  5/12/2025 2005 by Tera Lopez RN  Outcome: Progressing  5/12/2025 0646 by Tera Lopez RN  Outcome: Progressing  Goal: Prevent/minimize sheer/friction injuries  5/12/2025 2005 by Tera Lopez RN  Outcome: Progressing  5/12/2025 0646 by Tera Lopez RN  Outcome: Progressing  Goal: Promote/optimize nutrition  5/12/2025 2005 by Tera Lopze RN  Outcome: Progressing  5/12/2025 0646 by Tera Lopez RN  Outcome: Progressing  Goal: Promote skin healing  5/12/2025 2005 by Tera Lopez RN  Outcome: Progressing  5/12/2025 0646 by Tera Lopez RN  Outcome: Progressing

## 2025-05-13 NOTE — BRIEF OP NOTE
Date: 2025 - 2025  OR Location: [facility] Cardiac Cath Lab    Name: Mary Pedroza, : 1975, Age: 49 y.o., MRN: [id number], Sex: female    Diagnosis  Pre-op Diagnosis      * Type 2 diabetes mellitus with other circulatory complications [E11.59] Post-op Diagnosis     * Type 2 diabetes mellitus with other circulatory complications [E11.59]     Procedures  Lower Extremity Angiogram  40969 - CHG ANGIOGRAPHY EXTREMITY UNILATERAL RS&I    Lower Extremity Angiogram  45308 - CHG ANGIOGRAPHY EXTREMITY BILATERAL RS&I      Surgeons      * Marisela Huddleston - Primary    Resident/Fellow/Other Assistant:  Surgeons and Role:  * No surgeons found with a matching role *    Staff:   Scrub Person: April  Circulator: Silvia    Anesthesia Staff: No anesthesia staff entered.    Procedure Summary  Anesthesia: Anesthesia type not filed in the log.  ASA: ASA status not filed in the log.  Estimated Blood Loss: 5 mL  Intra-op Medications: * Intraprocedure medication information is unavailable because the case start and end events have not been set *      Intraprocedure I/O Totals       None           Specimen: No specimens collected               Findings: diagnostic images obtained, no intervention    Left CFA micropuncture access upsized to 5 [place] sheath; Vascade closure    Flat until 11pm    Complications:  None; patient tolerated the procedure well.     Disposition: PACU - hemodynamically stable.  Condition: stable  Specimens Collected: No specimens collected

## 2025-05-13 NOTE — INTERVAL H&P NOTE
H&P reviewed. The patient was examined and there are no changes to the H&P.  Pre-procedure pregnancy screen negative.

## 2025-05-13 NOTE — INTERVAL H&P NOTE
H&P reviewed. The patient was examined and there are no changes to the H&P.    For angiography to assess anatomy for preoperative planning for angiography, intervention if necessary to optimize.

## 2025-05-13 NOTE — CARE PLAN
Problem: Pain - Adult  Goal: Verbalizes/displays adequate comfort level or baseline comfort level  5/13/2025 1326 by Cristina Thao RN  Outcome: Progressing  5/13/2025 1324 by Cristina Thao RN  Outcome: Progressing     Problem: Safety - Adult  Goal: Free from fall injury  5/13/2025 1326 by Cristina Thao RN  Outcome: Progressing  5/13/2025 1324 by Cristina Thao RN  Outcome: Progressing     Problem: Discharge Planning  Goal: Discharge to home or other facility with appropriate resources  5/13/2025 1326 by Cristina Thao RN  Outcome: Progressing  5/13/2025 1324 by Cristina Thao RN  Outcome: Progressing     Problem: Chronic Conditions and Co-morbidities  Goal: Patient's chronic conditions and co-morbidity symptoms are monitored and maintained or improved  5/13/2025 1326 by Cristina Thao RN  Outcome: Progressing  5/13/2025 1324 by Cristina Thao RN  Outcome: Progressing     Problem: Nutrition  Goal: Nutrient intake appropriate for maintaining nutritional needs  5/13/2025 1326 by Cristina Thao RN  Outcome: Progressing  5/13/2025 1324 by Cristina Thao RN  Outcome: Progressing     Problem: Fall/Injury  Goal: Verbalize understanding of personal risk factors for fall in the hospital  5/13/2025 1326 by Cristina Thao RN  Outcome: Progressing  5/13/2025 1324 by Cristina Thao RN  Outcome: Progressing  Goal: Verbalize understanding of risk factor reduction measures to prevent injury from fall in the home  5/13/2025 1326 by Cristina Thao RN  Outcome: Progressing  5/13/2025 1324 by Cristina Thao RN  Outcome: Progressing     Problem: Skin  Goal: Decreased wound size/increased tissue granulation at next dressing change  5/13/2025 1326 by Cristina Thao RN  Outcome: Progressing  5/13/2025 1324 by Cristina Thao RN  Outcome: Progressing  Goal: Participates in plan/prevention/treatment measures  5/13/2025 1326 by Cristina Thao RN  Outcome: Progressing  5/13/2025 1324 by  Cristina Thao RN  Outcome: Progressing  Goal: Prevent/manage excess moisture  5/13/2025 1326 by Cristina Thao RN  Outcome: Progressing  5/13/2025 1324 by Cristina Thao RN  Outcome: Progressing  Goal: Prevent/minimize sheer/friction injuries  5/13/2025 1326 by Cristina Thao RN  Outcome: Progressing  5/13/2025 1324 by Cristina Thao RN  Outcome: Progressing  Goal: Promote/optimize nutrition  5/13/2025 1326 by Cristina Thao RN  Outcome: Progressing  5/13/2025 1324 by Cristina Thao RN  Outcome: Progressing  Goal: Promote skin healing  5/13/2025 1326 by Cristina Thao RN  Outcome: Progressing  5/13/2025 1324 by Cristina Thao RN  Outcome: Progressing     Problem: Diabetes  Goal: Decrease in ketones present in urine by end of shift  5/13/2025 1326 by Cristina Thao RN  Outcome: Progressing  5/13/2025 1324 by Cristina Thao RN  Outcome: Progressing  Goal: Maintain electrolyte levels within acceptable range throughout shift  5/13/2025 1326 by Cristina Thao RN  Outcome: Progressing  5/13/2025 1324 by Cristina Thao RN  Outcome: Progressing   The patient's goals for the shift include Patient Safety    The clinical goals for the shift include monitor vs, labs, I&O's; manage pain; promote safe ambulation; monitor cath site for bleeding; rest

## 2025-05-13 NOTE — DISCHARGE INSTRUCTIONS
PERIPHERAL ANGIOGRAPHY DISCHARGE INSTRUCTIONS    FOR SUDDEN AND SEVERE CHEST PAIN, SHORTNESS OF BREATH, EXCESSIVE BLEEDING, SIGNS OF STROKE, OR CHANGES IN MENTAL STATUS YOU SHOULD CALL 911 IMMEDIATELY.     If your provider has prescribed aspirin and/or clopidogrel (Plavix), or prasugrel (Effient), or ticagrelor (Brilinta), DO NOT STOP THESE MEDICATIONS for any reason without talking to your cardiologist first. If any of these were prescribed, you must take them every day without missing a single dose. If you are getting low on these medications, contact your provider immediately for a refill.     You received sedation today, please follow these guidelines:  FOR NEXT 24 HOURS  - Upon discharge, you should return home and rest for the remainder of the day and evening. You do not have to stay on bed rest but should not be very active.  It is recommended a responsible adult be with you for the first 24 hours after the procedure.    - No driving for 24 hours after procedure. Please arrange for someone to drive you home from the hospital today.     - Do not drive, operate machinery, or use power tools for 24 hours after your procedure.     - Do not make any legal decisions for 24 hours after your procedure.     - Do not drink alcoholic beverages for 24 hours after your procedure.    WOUND CARE   *FOR FEMORAL (LEG) ACCESS*  ·      Avoid heavy lifting (over 10 pounds) for 7 days, squatting or excessive bending for 2 days, and strenuous exercise for 7 days.  ·      No submerged bathing, swimming, or hot tubs for the next 7 days, or until fully healed.  ·      Avoid sexual activity for 3-4 days until any groin discomfort has ceased.    - The transparent dressing should be removed from the site 24 hours after the procedure.  Wash the site gently with soap and water. Rinse well and pat dry. Keep the area clean and dry. You may apply a Band-Aid to the site. Avoid lotions, ointments, or powders until fully healed.     - You  may shower the day after your procedure.      - It is normal to notice a small bruise around the puncture site and/or a small grape sized or smaller lump. Any large bruising or large lump warrants a call to the office.     - If bleeding should occur, lay down and apply pressure to the affected area for 10 minutes.  If the bleeding stops notify your physician.  If there is a large amount of bleeding or spurting of blood CALL 911 immediately.  DO NOT drive yourself to the hospital.    - You may experience some tenderness, bruising or minimal inflammation.  If you have any concerns, you may contact the Cath Lab or if any of these symptoms become excessive, contact your cardiologist or go to the emergency room.     OTHER INSTRUCTIONS  - You may take acetaminophen (Tylenol) as directed for discomfort.  If pain is not relieved with acetaminophen (Tylenol), contact your doctor.    - It can be normal to have slight swelling in the leg the procedure was performed on (not the puncture site leg) post-procedure. Elevate the leg as much as possible above the level of your heart. Any excessive swelling, warmth or redness to the leg warrants a call to the office.    - If you notice or experience any of the following, you should notify your doctor or seek medical attention  Chest pain or discomfort  Change in mental status or weakness in extremities.  Dizziness, light headedness, or feeling faint.  Change in the site where the procedure was performed, such as bleeding or an increased area of bruising or swelling.  Tingling, numbness, pain, or coolness in the leg/arm beyond the site where the procedure was performed.  Signs of infection (i.e. shaking chills, temperature > 100 degrees Fahrenheit, warmth, redness) in the leg/arm area where the procedure was performed.  Changes in urination   Bloody or black stools  Vomiting blood  Severe nose bleeds   WOUND Care   Dressing: Right ankle wound - betadine soaked gauze, 4x4 gauze, kerlix  and ACE bandage with light compression. Nursing orders entered for daily dressing .  Weight-bearing: WBAT in surgical shoes

## 2025-05-13 NOTE — PROGRESS NOTES
Mary Pedroza is a 49 y.o. female on day 7 of admission presenting with Wound infection.      Subjective   Patient had angiography today.  Patient was evaluated after angiography.  She denied shortness of breath.  Patient denied to have pain in the left groin.       Objective     Last Recorded Vitals  /58 (BP Location: Left arm, Patient Position: Lying)   Pulse 78   Temp 35.9 °C (96.6 °F) (Temporal)   Resp 26   Wt 92.6 kg (204 lb 2.3 oz)   SpO2 96%   Intake/Output last 3 Shifts:    Intake/Output Summary (Last 24 hours) at 5/13/2025 1538  Last data filed at 5/13/2025 0910  Gross per 24 hour   Intake 640 ml   Output 50 ml   Net 590 ml       Admission Weight  Weight: 90.3 kg (199 lb) (05/06/25 1402)    Daily Weight  05/07/25 : 92.6 kg (204 lb 2.3 oz)    Image Results  Invasive vascular procedure  This study is a surgery completed in an invasive cardiovascular space.   Please see OpNote on Notes tab for findings.    Physical exam    General:  cooperating during physical exam.  HEENT: Pupils are equal and reactive to light and commendation , oral mucosa moist, no JVD .  Cardiovascular: Normal sinus rhythm, no MRG.  Lungs: Clear to auscultation bilaterally, no wheezing, no crackles, no dullness to percussion.  Abdomen: No hepatosplenomegaly appreciated, soft , not tender, positive bowel sounds, positive bowel movement.  Left inguinal dressing in place ,no hematoma  Neuro: Alert and oriented x3, strength in upper and lower extremities , sensation intact.  Psych: Patient had great insight was going on  Musculoskeletal: Left forefoot amputation, right lateral malleolus wound, dressing in place  Vascular: Pulses are intact in upper and lower extremities  Skin: No petechiae, ecchymosis or other stigmata for dermatology disease.     Assessment and plan    Diabetic foot infection  MRI right lower extremity revealed acute osteomyelitis of the lateral malleolus involving the distal fibula.  Continue with IV  antibiotics.  Patient is on Unasyn  Wound culture grew MSSA and group B Streptococcus  ID on the case  Dr. Aponte for podiatry service on the case.  Evaluated by vascular surgery.   status post angiography today    Diabetes mellitus type 2  Not well-controlled, hemoglobin A1c 9.5  Continue with Lantus and insulin sliding scale.    Bipolar disorder  Continue with current medication.    Tobacco use.  Advised to quit smoking    Check CBC and BMP in AM.  Patient will need PICC line for long-term antibiotic     Nicolasa Garcia MD

## 2025-05-14 ENCOUNTER — HOSPITAL ENCOUNTER (INPATIENT)
Facility: HOSPITAL | Age: 50
End: 2025-05-14
Attending: INTERNAL MEDICINE | Admitting: INTERNAL MEDICINE
Payer: MEDICARE

## 2025-05-14 ENCOUNTER — HOME HEALTH ADMISSION (OUTPATIENT)
Dept: HOME HEALTH SERVICES | Facility: HOME HEALTH | Age: 50
End: 2025-05-14
Payer: MEDICARE

## 2025-05-14 VITALS
HEART RATE: 90 BPM | WEIGHT: 196.87 LBS | OXYGEN SATURATION: 100 % | HEIGHT: 67 IN | BODY MASS INDEX: 30.9 KG/M2 | RESPIRATION RATE: 16 BRPM | SYSTOLIC BLOOD PRESSURE: 95 MMHG | TEMPERATURE: 98.7 F | DIASTOLIC BLOOD PRESSURE: 61 MMHG

## 2025-05-14 LAB
ANION GAP SERPL CALCULATED.3IONS-SCNC: 10 MMOL/L (ref 10–20)
BUN SERPL-MCNC: 16 MG/DL (ref 6–23)
CALCIUM SERPL-MCNC: 8.7 MG/DL (ref 8.6–10.3)
CHLORIDE SERPL-SCNC: 108 MMOL/L (ref 98–107)
CO2 SERPL-SCNC: 24 MMOL/L (ref 21–32)
CREAT SERPL-MCNC: 0.56 MG/DL (ref 0.5–1.05)
EGFRCR SERPLBLD CKD-EPI 2021: >90 ML/MIN/1.73M*2
ERYTHROCYTE [DISTWIDTH] IN BLOOD BY AUTOMATED COUNT: 12.6 % (ref 11.5–14.5)
GLUCOSE BLD MANUAL STRIP-MCNC: 119 MG/DL (ref 74–99)
GLUCOSE BLD MANUAL STRIP-MCNC: 352 MG/DL (ref 74–99)
GLUCOSE SERPL-MCNC: 164 MG/DL (ref 74–99)
HCT VFR BLD AUTO: 41.2 % (ref 36–46)
HGB BLD-MCNC: 13.8 G/DL (ref 12–16)
MCH RBC QN AUTO: 29.7 PG (ref 26–34)
MCHC RBC AUTO-ENTMCNC: 33.5 G/DL (ref 32–36)
MCV RBC AUTO: 89 FL (ref 80–100)
NRBC BLD-RTO: 0 /100 WBCS (ref 0–0)
PLATELET # BLD AUTO: 225 X10*3/UL (ref 150–450)
POTASSIUM SERPL-SCNC: 3.8 MMOL/L (ref 3.5–5.3)
RBC # BLD AUTO: 4.64 X10*6/UL (ref 4–5.2)
SODIUM SERPL-SCNC: 138 MMOL/L (ref 136–145)
WBC # BLD AUTO: 10.8 X10*3/UL (ref 4.4–11.3)

## 2025-05-14 PROCEDURE — 2500000002 HC RX 250 W HCPCS SELF ADMINISTERED DRUGS (ALT 637 FOR MEDICARE OP, ALT 636 FOR OP/ED): Performed by: STUDENT IN AN ORGANIZED HEALTH CARE EDUCATION/TRAINING PROGRAM

## 2025-05-14 PROCEDURE — 2500000005 HC RX 250 GENERAL PHARMACY W/O HCPCS: Performed by: NURSE PRACTITIONER

## 2025-05-14 PROCEDURE — 85027 COMPLETE CBC AUTOMATED: CPT | Performed by: INTERNAL MEDICINE

## 2025-05-14 PROCEDURE — 80048 BASIC METABOLIC PNL TOTAL CA: CPT | Performed by: INTERNAL MEDICINE

## 2025-05-14 PROCEDURE — 2500000001 HC RX 250 WO HCPCS SELF ADMINISTERED DRUGS (ALT 637 FOR MEDICARE OP): Performed by: STUDENT IN AN ORGANIZED HEALTH CARE EDUCATION/TRAINING PROGRAM

## 2025-05-14 PROCEDURE — 99239 HOSP IP/OBS DSCHRG MGMT >30: CPT | Performed by: INTERNAL MEDICINE

## 2025-05-14 PROCEDURE — 82947 ASSAY GLUCOSE BLOOD QUANT: CPT

## 2025-05-14 PROCEDURE — 2500000004 HC RX 250 GENERAL PHARMACY W/ HCPCS (ALT 636 FOR OP/ED): Mod: JZ | Performed by: INTERNAL MEDICINE

## 2025-05-14 RX ORDER — ACETAMINOPHEN 325 MG/1
650 TABLET ORAL EVERY 6 HOURS PRN
Qty: 30 TABLET | Refills: 0 | Status: SHIPPED | OUTPATIENT
Start: 2025-05-14 | End: 2025-05-21

## 2025-05-14 RX ORDER — ERTAPENEM 1 G/1
1 INJECTION, POWDER, LYOPHILIZED, FOR SOLUTION INTRAMUSCULAR; INTRAVENOUS EVERY 24 HOURS
Qty: 1750 ML | Refills: 0 | Status: SHIPPED | OUTPATIENT
Start: 2025-05-14 | End: 2025-05-20 | Stop reason: HOSPADM

## 2025-05-14 RX ORDER — INSULIN GLARGINE 100 [IU]/ML
50 INJECTION, SOLUTION SUBCUTANEOUS NIGHTLY
Qty: 15 ML | Refills: 0 | Status: ON HOLD | OUTPATIENT
Start: 2025-05-14 | End: 2025-05-20

## 2025-05-14 RX ORDER — ERTAPENEM 1 G/1
1 INJECTION, POWDER, LYOPHILIZED, FOR SOLUTION INTRAMUSCULAR; INTRAVENOUS EVERY 24 HOURS
Status: DISCONTINUED | OUTPATIENT
Start: 2025-05-14 | End: 2025-05-14 | Stop reason: HOSPADM

## 2025-05-14 RX ADMIN — ERTAPENEM SODIUM 1 G: 1 INJECTION, POWDER, LYOPHILIZED, FOR SOLUTION INTRAMUSCULAR; INTRAVENOUS at 11:54

## 2025-05-14 RX ADMIN — PANTOPRAZOLE SODIUM 40 MG: 40 TABLET, DELAYED RELEASE ORAL at 05:33

## 2025-05-14 RX ADMIN — AMPICILLIN SODIUM AND SULBACTAM SODIUM 3 G: 2; 1 INJECTION, POWDER, FOR SOLUTION INTRAMUSCULAR; INTRAVENOUS at 09:24

## 2025-05-14 RX ADMIN — PROPRANOLOL HYDROCHLORIDE 20 MG: 20 TABLET ORAL at 09:24

## 2025-05-14 RX ADMIN — VENLAFAXINE HYDROCHLORIDE 150 MG: 150 CAPSULE, EXTENDED RELEASE ORAL at 09:24

## 2025-05-14 RX ADMIN — SITAGLIPTIN 25 MG: 25 TABLET, FILM COATED ORAL at 09:30

## 2025-05-14 RX ADMIN — INSULIN LISPRO 15 UNITS: 100 INJECTION, SOLUTION INTRAVENOUS; SUBCUTANEOUS at 12:15

## 2025-05-14 RX ADMIN — AMPICILLIN SODIUM AND SULBACTAM SODIUM 3 G: 2; 1 INJECTION, POWDER, FOR SOLUTION INTRAMUSCULAR; INTRAVENOUS at 01:04

## 2025-05-14 RX ADMIN — Medication 21 PERCENT: at 11:57

## 2025-05-14 ASSESSMENT — COGNITIVE AND FUNCTIONAL STATUS - GENERAL
MOBILITY SCORE: 24
DAILY ACTIVITIY SCORE: 24

## 2025-05-14 ASSESSMENT — PAIN SCALES - GENERAL: PAINLEVEL_OUTOF10: 0 - NO PAIN

## 2025-05-14 NOTE — CARE PLAN
Problem: Pain - Adult  Goal: Verbalizes/displays adequate comfort level or baseline comfort level  Outcome: Progressing     Problem: Safety - Adult  Goal: Free from fall injury  Outcome: Progressing     Problem: Chronic Conditions and Co-morbidities  Goal: Patient's chronic conditions and co-morbidity symptoms are monitored and maintained or improved  Outcome: Progressing   The patient's goals for the shift include Patient Safety    The clinical goals for the shift include remain pain free, remain free from falls    Over the shift, the patient did not make progress toward the following goals. Barriers to progression include limited mobility . Recommendations to address these barriers include falls precautions.

## 2025-05-14 NOTE — DISCHARGE SUMMARY
"Discharge Diagnosis  Wound infection           Issues Requiring Follow-Up  Right foot osteomyelitis  Hypertension  Diabetes mellitus type 2  Peripheral vascular disease      Discharge Meds     Medication List      START taking these medications     ertapenem IV; Commonly known as: INVanz; Infuse 50 mL (1 g) over 30   minutes into a venous catheter once every 24 hours. Stop after dose on   6/18/25.  Weekly labs on Mondays: CBC w diff, Cr, ESR, CRP.  Fax results   to Dr. Raygoza at [phone].     CONTINUE taking these medications     BD Ultra-Fine Short Pen Needle 31 gauge x 5/16\" needle; Generic drug:   pen needle, diabetic; USE 1 PEN NEEDLE WITH LANTUS ONCE DAILY   blood-glucose meter misc; One touch ultra glucose meter, use daily as   directed   FreeStyle Bridget 3 [place] misc; Generic drug:   blood-glucose,,cont; Use as instructed to check blood sugar   * FreeStyle Bridget 3 Sensor device; Generic drug: blood-glucose sensor;   Use daily and change every 14 (fourteen) days.   * FreeStyle Bridget 3 Plus Sensor device; Generic drug: blood-glucose   sensor; Use every 15 days   OneTouch Delica Plus Lancet 33 gauge misc; Generic drug: lancets; USE AS   DIRECTED TO CHECK BLOOD SUGAR ONCE DAILY  * This list has 2 medication(s) that are the same as other medications   prescribed for you. Read the directions carefully, and ask your doctor or   other care provider to review them with you.     ASK your doctor about these medications     ARIPiprazole 10 mg tablet; Commonly known as: Abilify   doxepin 100 mg capsule; Commonly known as: SINEquan   glimepiride 4 mg tablet; Commonly known as: Amaryl; TAKE 1 TABLET BY   MOUTH ONCE DAILY WITH BREAKFAST OR FIRST MAIN MEAL OF THE DAY   ibuprofen 200 mg tablet   Januvia 25 mg tablet; Generic drug: SITagliptin phosphate; TAKE 1 TABLET   BY MOUTH ONCE DAILY AS DIRECTED   Lantus Solostar U-100 Insulin 100 unit/mL (3 mL) pen; Generic drug:   insulin glargine; Inject 75 Units under the " skin once daily at bedtime.   Take as directed per insulin instructions.   medroxyPROGESTERone 150 mg/mL injection; Commonly known as:   Depo-Provera; Inject 1 mL (150 mg) into the muscle every 12 weeks.   * Mounjaro 2.5 mg/0.5 mL pen injector; Generic drug: tirzepatide; Inject   2.5 mg under the skin 1 (one) time per week. For first 4 weeks   * Mounjaro 5 mg/0.5 mL pen injector; Generic drug: tirzepatide; INJECT 5   MG UNDER THE SKIN 1 TIME PER WEEK   OneTouch Ultra Test; Generic drug: blood sugar diagnostic; USE AS   DIRECTED TO CHECK BLOOD SUGAR TWICE A DAY   propranolol 20 mg tablet; Commonly known as: Inderal   simvastatin 20 mg tablet; Commonly known as: Zocor; TAKE 1 TABLET BY   MOUTH EVERY NIGHT AT BEDTIME   venlafaxine  mg 24 hr capsule; Commonly known as: Effexor-XR  * This list has 2 medication(s) that are the same as other medications   prescribed for you. Read the directions carefully, and ask your doctor or   other care provider to review them with you.       Test Results Pending At Discharge  Pending Labs       No current pending labs.            Hospital Course     Patient is a 49 years old female with past medical history of diabetes mellitus type 2, hypertension, tobacco abuse, depression.  Patient presented to [facility] ER complaining of wound infection in right foot.  Patient stated she noticed the wound about 3 weeks prior to admission.  Patient was admitted hospital for further evaluation treatment.  She was started on broad-spectrum antibiotics.  Patient is diabetic she was started on vancomycin and Zosyn.  Wound culture grew Streptococcus agalactia and MSSA.  Antibiotics transition to Unasyn.  MRI ankle revealed acute osteomyelitis of the left malleolus which involves the distal fibula.  Patient was treated for osteomyelitis.  Patient was evaluated by podiatry Dr. Aponte.  Patient was evaluated by Dr. Raygoza from ID service.  She was evaluated by vascular surgery.  Patient had angiography  on 5/13/2025.  Patient was advised to follow-up with Dr. Huddleston as outpatient.  Patient was advised to be compliant with medication.  Discussed with ID on the case.  Patient will be discharged on ertapenem daily until 6/18/2025.  Patient will be discharged home with home health care.  Patient will get CBC with differential CRP and ESR weekly.  Discussed with care coordination on the case.  Patient had PICC line.  I advised her to follow-up with Dr. Aponte, podiatry on the case in 1 week.  Patient will need peroneal flap closure as outpatient.  She is clinically and hemodynamically stable to be discharged today    Pertinent Physical Exam At Time of Discharge  Physical Exam    General: In non acute distress, cooperating during physical exam.  HEENT: Pupils are equal and reactive to light and commendation , oral mucosa moist, no JVD oral mucosa is moist.  Cardiovascular: Normal sinus rhythm, no MRG.  Lungs: Clear to auscultation bilaterally, no wheezing, no crackles, no dullness to percussion.  Abdomen: No hepatosplenomegaly appreciated, soft , not tender, positive bowel sounds, positive bowel movement.  Neuro: Alert and oriented x3, strength in upper and lower extremities , sensation intact.  Musculoskeletal: Right foot dressing in place, left forefoot amputated   vascular: Pulses are intact in upper and lower extremities  Skin: No petechiae, ecchymosis or other stigmata for dermatology disease.     Outpatient Follow-Up  Future Appointments   Date Time Provider Department Center   6/10/2025 11:30 AM Diandra aGndhi MD [facility]     Follow-up with Dr. Aponte in 1 week.  Follow-up with Dr. Raygoza  in 4 weeks  Follow-up PCP in 2 to 4 weeks  Follow-up with vascular surgery as needed    Time spent discharging patient 42 minutes    Addendum  4:42 PM    Patient was discharged home with home health care.  I was notified by care coordination that home health care is not able to deliver IV antibiotics until Saturday.  Patient  is supposed to be on ertapenem 1 g every 24 hours until 6/18/2025.  Discussed with nursing supervisor, care coordination.  I tried to call the patient unsuccessfully.  I talked to the transfer center and I provided information regarding the patient.  Patient will get readmitted as direct admit to [facility] for IV antibiotics for 2 more days.  Patient will be notified to come in hospital.  Care coordination notified me the patient was contacted.      Nicolasa Garcia MD

## 2025-05-14 NOTE — PROGRESS NOTES
"Nutrition Follow up Note    Nutrition Assessment      Angiography 5/13. Active discharge order to home with home IV infusion, PICC line in place. PO intake continues to be very good, no new nutritional concerns.    Nutrition History:  Energy Intake: Good > 75 %     Anthropometrics:  Ht: 170.2 cm (5' 7\"), Wt: 89.3 kg (196 lb 13.9 oz), BMI: 30.83  IBW/kg (Dietitian Calculated): 61.36 kg  Percent of IBW: 151 %  Adjusted Body Weight (kg): 69.09 kg    Weight Change:  Daily Weight  05/14/25 : 89.3 kg (196 lb 13.9 oz)  04/10/25 : 93.3 kg (205 lb 9.6 oz)  01/16/25 : 89.8 kg (198 lb)  10/25/24 : 93.9 kg (207 lb)  08/05/24 : 94 kg (207 lb 3.2 oz)  07/17/24 : 93 kg (205 lb)  05/03/24 : 93.9 kg (207 lb)  05/01/24 : 93 kg (205 lb)  04/24/24 : 93 kg (205 lb)  03/15/24 : 93 kg (205 lb)     Nutrition Focused Physical Exam Findings:      Nutrition Significant Labs:  Lab Results   Component Value Date    WBC 10.8 05/14/2025    HGB 13.8 05/14/2025    HCT 41.2 05/14/2025     05/14/2025    CHOL 314 (H) 08/04/2022    TRIG 1,208 (H) 08/04/2022    HDL 23 (L) 08/04/2022    LDLDIRECT 72 08/04/2022    ALT 13 05/06/2025    AST 12 05/06/2025     05/14/2025    K 3.8 05/14/2025     (H) 05/14/2025    CREATININE 0.56 05/14/2025    BUN 16 05/14/2025    CO2 24 05/14/2025    TSH 1.99 05/24/2021    HGBA1C 9.5 (H) 05/06/2025    ALBUR 17 12/21/2022     Nutrition Specific Medications:  Scheduled Medications[1]  Continuous Medications[2]    Dietary Orders (From admission, onward)       Start     Ordered    05/13/25 0958  Adult diet Cardiac; 70 gm fat; 2 - 3 grams Sodium  Diet effective now        Question Answer Comment   Diet type Cardiac    Fat restriction: 70 gm fat    Sodium restriction: 2 - 3 grams Sodium        05/13/25 0959    05/07/25 1216  Oral nutritional supplements  Until discontinued        Comments: Any flavor   Question Answer Comment   Deliver with Breakfast    Deliver with Dinner    Select supplement: Everardo        " 05/07/25 1215    05/06/25 2126  May Participate in Room Service  ( ROOM SERVICE MAY PARTICIPATE)  Once        Question:  .  Answer:  Yes    05/06/25 2125                   Estimated Needs:   Estimated Energy Needs  Total Energy Estimated Needs in 24 hours (kCal): 2070 kCal  Energy Estimated Needs per kg Body Weight in 24 hours (kCal/kg): 30 kCal/kg  Method for Estimating Needs: Adj Wt    Estimated Protein Needs  Total Protein Estimated Needs in 24 Hours (g): 83 g  Protein Estimated Needs per kg Body Weight in 24 Hours (g/kg): 1.2 g/kg  Method for Estimating 24 Hour Protein Needs: Adj Wt    Estimated Fluid Needs  Total Fluid Estimated Needs in 24 Hours (mL): 2070 mL  Method for Estimating 24 Hour Fluid Needs: 1 mL/kcal      Nutrition Diagnosis   Nutrition Diagnosis:  Malnutrition Diagnosis  Patient has Malnutrition Diagnosis: No    Nutrition Diagnosis  Patient has Nutrition Diagnosis: Yes  Diagnosis Status (1): Active  Nutrition Diagnosis 1: Increased nutrient needs  Related to (1): increased demand for nutrients  As Evidenced by (1): wound     Nutrition Interventions/Recommendations   Nutrition Interventions and Recommendations:  Nutrition Prescription: Nutrition prescription for oral nutrition    Nutrition Recommendations:  Individualized Nutrition Prescription Provided for : continue oral diet, add Everardo for wound healing    Nutrition Interventions/Goals:   Food and/or Nutrient Delivery Interventions  Interventions: Meals and snacks, Medical food supplement  Meals and Snacks: Fat-modified diet, Mineral-modified diet  Goal: provide as ordered  Medical Food Supplement: Commercial beverage medical food supplement therapy  Goal: Everardo BID for wound healing    Education Documentation  No documentation found.         Nutrition Monitoring and Evaluation   Monitoring/Evaluation:   Food/Nutrient Related History Monitoring  Monitoring and Evaluation Plan: Estimated Energy Intake  Estimated Energy Intake: Energy intake  greater or equal to 75% of estimated energy needs    Physical Exam Findings  Monitoring and Evaluation Plan: Skin  Skin Finding: Impaired wound healing - Improved wound healing    Goal Status: Some progress toward goal(s)    Follow Up  Time Spent (min): 30 minutes  Last Date of Nutrition Visit: 05/14/25  Nutrition Follow-Up Needed?: 5-7 days  Follow up Comment: 5/20/25          [1] ARIPiprazole, 10 mg, oral, Nightly  doxepin, 100 mg, oral, Nightly  enoxaparin, 40 mg, subcutaneous, q24h  ertapenem, 1 g, intravenous, q24h  insulin glargine, 50 Units, subcutaneous, Nightly  insulin lispro, 0-15 Units, subcutaneous, TID AC  insulin lispro, 6 Units, subcutaneous, TID  lidocaine, 5 mL, infiltration, Once  oxygen, , inhalation, Continuous - 02/gases  pantoprazole, 40 mg, oral, Daily before breakfast   Or  pantoprazole, 40 mg, intravenous, Daily before breakfast  polyethylene glycol, 17 g, oral, Daily  propranolol, 20 mg, oral, BID  simvastatin, 20 mg, oral, Nightly  SITagliptin phosphate, 25 mg, oral, Daily  venlafaxine XR, 150 mg, oral, Daily    [2]

## 2025-05-14 NOTE — PROGRESS NOTES
05/14/25 0953   Discharge Planning   Expected Discharge Disposition Home H  ([facility])     Patient is agreeable to [facility] home IV infusion.  Will require ertapenem 1 gram IV q 24 hrs through 6/18. Patient is her own teachable caregiver.  Patient has a roommate who can also be taught.  PICC line is in place.  Will need an internal referral.  Will need IV medication cost approved and [facility] SOC confirmed prior to discharge.     DISCHARGE PLAN IS NOT SECURE

## 2025-05-14 NOTE — PROGRESS NOTES
Seeing patient for osteomyelitis of the right lateral malleolus due to chronic wound with MSSA, group B strep, and mixed anaerobes.     Patient resting comfortably today without complaints.  Angiogram on 5/13/2025 showed no significant vascular disease.  Podiatry note reviewed.  Plans for muscle flap closure of chronic wound to be done as outpatient.     Antibiotic:   Unasyn D5     36.3  Lung: Clear to auscultation bilaterally  Cardio: RRR, S1-S2 normal  Abdomen: Bowel sounds, soft, nontender nondistended.  Extremity: Right ankle dressing C/D/I.     WBC: 10.8  Creatinine: 0.56     Wound culture right ankle (5/6): MSSA/group B strep/mixed anaerobes  Blood culture (5/6): X 2 sets/NGTD     Impression:  1.  Osteomyelitis right lateral malleolus with MSSA and group B strep  --Angiogram of the right leg on 5/13/2025 showed no significant vascular disease.  --Plans for muscle flap closure of wound as outpatient noted  2. Diabetes     Plan:  1.  Stop Unasyn.  2.  Start ertapenem 1 g IV every 24 hours through doses on 6/18/2025.  Will give first dose this morning.  Prescription has been left on the chart for case management to make arrangements for home care.    3.  Patient already has PICC line in place.    4.  Patient will follow-up with Dr. Raygoza on 6/18/2025 at 9 AM.  5.  Once home care plans have been finalized patient will be okay for discharge from an ID standpoint.    Jarret Mcclellan MD  [facility]  [phone]

## 2025-05-14 NOTE — PROGRESS NOTES
"PODIATRIC MEDICINE & SURGERY - PROGRESS NOTE    Subjective   Mary Pedroza is a 49 y.o. female who is on day 7 of admission for Wound infection.      Interval HPI: Patient seen at bedside. Patient denies any pain today to right lower extremity today. Patient states she believes swelling, redness, odor have improved since admission. Denies any other pedal complaints at this time. Denies any constitutional symptoms at this time.    Review Of Systems:  A 10+ point ROS was completed and is negative unless as otherwise noted.    Medical History[1]    Social History[2]    Recent Surgeries in Podiatry       Date Procedure Surgeon Laterality Status    04/24/2024 Wound Debridement, Flap Closure Cierra Wang DPM; Bharat Michelle DPM; Suraj Francisco DPM; James Aponte DPM Left Posted    03/11/2024 Debridement of All Nonviable Soft Tissue and Bone;  Excision Bone Foot Suraj Francisco DPM Left; Left Posted    03/07/2024 Foot Transmetatarsal Amputation Suraj Francisco DPM; Sarah Barrera DPM Left Posted    <div class=\"[id number]\"></div>            RX Allergies[3]    Medications  Scheduled medications  Scheduled Medications[4]  Continuous medications   Continuous Medications[5]   PRN Medications[6]    Objective   Visit Vitals  /52 (BP Location: Right arm, Patient Position: Lying)   Pulse 58   Temp 36 °C (96.8 °F) (Temporal)   Resp 18   Ht 1.702 m (5' 7\")   Wt 91.9 kg (202 lb 9.6 oz)   SpO2 100%   BMI 31.73 kg/m²   OB Status Injection   Smoking Status Every Day   BSA 2.08 m²       Physical Exam   Pleasant and cooperative 49 year old female.   - NAD and AAOx3. Ill-appearing.    Vascular:   DP and PT pulses are palpable bilateral. Capillary fill time is brisk to bilateral hallux. Skin temperature is warm to warm proximal to distal bilateral. Focal increase in temperature is noted to the right lateral ankle- improved. 1+ non-pitting edema to the right lower extremity. No pain with calf compression " B/L    Neuro:  No focal deficit. Gross sensation is intact. Protective sensation mildly diminished b/l.    Musculoskeletal   Ambulates unassisted. Muscle strength is 5/5 to the intrinsic and extrinsic muscles of the foot and ankle B/L. Previous left foot transmetatarsal amputation; well-healed. No pain with right ankle joint range of motion    Dermatologic:  Good attention to pedal hygiene. The skin is well-hydrated to the lower extremities. Nails 1-5 right are intact. The pedal interdigital spaces are clean and dry. Right lateral ankle wound as described below:      WOUND #1: Right lateral ankle  Measurements: Wound measures approximately 4.5 cm x  4.5 cm x 0.8 cm. Probes to fibula.   Base: The base is 40% nonviable with fibrotic and necrotic slough- improved. The remaining tissue base is granular.   Rim: The skin edges are intact with no maceration. No rolling of skin edges.   Drainage: Serous drainage and fibrotic/necrotic slough- improved. Mild malodor noted.  Periwound: Mild periwound erythema and edema.  No ascending cellulitis or lymphangitis. No palpable fluctuance.   Pain: No    Lab Results   Component Value Date    WBC 11.5 (H) 05/13/2025    HGB 14.0 05/13/2025    HCT 40.4 05/13/2025     05/13/2025    CHOL 314 (H) 08/04/2022    TRIG 1,208 (H) 08/04/2022    HDL 23 (L) 08/04/2022    LDLDIRECT 72 08/04/2022    ALT 13 05/06/2025    AST 12 05/06/2025     05/13/2025    K 3.7 05/13/2025     05/13/2025    CREATININE 0.67 05/13/2025    BUN 16 05/13/2025    CO2 25 05/13/2025    TSH 1.99 05/24/2021    HGBA1C 9.5 (H) 05/06/2025    ALBUR 17 12/21/2022     par  Lab Results   Component Value Date    SEDRATE 41 (H) 05/06/2025     Lab Results   Component Value Date    CRP 2.29 (H) 05/06/2025        Cultures  Susceptibility data from last 90 days.  Collected Specimen Info Organism Clindamycin Erythromycin Oxacillin Tetracycline Trimethoprim/Sulfamethoxazole Vancomycin   05/07/25 Tissue/Biopsy from  Wound/Tissue Methicillin Susceptible Staphylococcus aureus (MSSA)  R  R  S  R  S  S     Streptococcus agalactiae (Group B Streptococcus)           Mixed Anaerobic Bacteria           Mixed Gram-Positive and Gram-Negative Bacteria         05/06/25 Tissue/Biopsy from Wound/Tissue Methicillin Susceptible Staphylococcus aureus (MSSA)  S  S  S  S  S  S     Streptococcus agalactiae (Group B Streptococcus)           Mixed Anaerobic Bacteria           Mixed Gram-Positive and Gram-Negative Bacteria             Imaging  Imaging  MR foot right wo IV contrast  Result Date: 5/7/2025  (Please see separate report of the hindfoot and ankle for findings regarding these areas.).   No acute osteomyelitis involving the right forefoot.   Diffuse edema in the dorsal soft tissues that could relate to cellulitis and/or venous/lymphatic insufficiency.   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   MACRO: None.   Signed by: Cecilio Clarke 5/7/2025 10:13 PM Dictation workstation:   [id number]    MR ankle right wo IV contrast  Result Date: 5/7/2025  Please see separate report of the forefoot for other findings. This report reflects the ankle and hindfoot)   1. Acute osteomyelitis of the lateral malleolus which involves the distal fibula up to 5 cm proximal to the tip of the lateral malleolus. This is due to a deep ulceration (3.7 cm x 3.4 cm) overlying lateral malleolus that extends to the bone surface and may be associated with a small abscess measuring 0.9 cm x 0.5 cm abutting the lateral cortex of the lateral malleolus.   2.Nonspecific feathery edema within the musculature of the anterior, peroneal, and posterior compartments and of the visualized dorsal and plantar foot could relate diabetic neuromyopathy; however, the signal within the peroneal muscles could be also related to myositis given the proximity to the ulcer and osteomyelitis.   3. Fluid distention of the peroneal  tendon sheath in the retro malleolar groove and below the level of the lateral malleolus that may be related to infectious tenosynovitis or reactive in nature, the former favored.   4. Nonspecific effusions of the tibiotalar and talonavicular joints. No evidence of bone marrow edema or replacement in the subchondral bone. Findings are favored to be reactive in nature.   MACRO: None.   Signed by: Cecilio Clarke 5/7/2025 10:07 PM Dictation workstation:   [id number]      Cardiology, Vascular, and Other Imaging  Bedside PICC Imaging  Result Date: 5/9/2025  These images are not reportable by radiology and will not be interpreted by  Radiologists.    Vascular US PVR without exercise  Result Date: 5/7/2025           [place] [address]            Phone [phone]  [facility] Lab Report  [facility] Patient Name:      MONTANACHRISTY ROCA    Reading Physician:  [id number] Marisela Huddleston MD Study Date:        5/7/2025             Ordering Provider:  [id number] RENE HERNANDEZ MRN/PID:           [id number]             Fellow: Accession#:        [id number]         Technologist:       James Cr RVT Date of Birth/Age: 1975 / 49      Technologist 2:                    years Gender:            F                    Encounter#:         [id number] Admission Status:  Inpatient            Location Performed: [facility]  Diagnosis/ICD: Type 2 diabetes mellitus (DM) with other circulatory                complications-E11.59 CPT Codes:     65618 Peripheral artery PVR (multi segmental pressure  Pertinent History: Diabetic with left foot TMA.  CONCLUSIONS: Right Lower PVR: No evidence of arterial occlusive disease in the right lower extremity at rest. Normal digital perfusion noted.  Multiphasic flow is noted in the right common femoral artery, right popliteal artery, right posterior tibial artery and right dorsalis pedis artery. Left Lower PVR: No evidence of arterial occlusive disease in the left lower extremity at rest. Multiphasic flow is noted in the left common femoral artery, left popliteal artery, left posterior tibial artery and left dorsalis pedis artery. Patient had a left TMA. No metatarsel waveforms or TBI's.  Comparison: Compared with study from 3/8/2024, no significant change.  Imaging & Doppler Findings:  RIGHT Lower PVR                Pressures Ratios Right High Thigh               164 mmHg  1.61 Right Low Thigh                147 mmHg  1.44 Right Calf                     128 mmHg  1.25 Right Posterior Tibial (Ankle) 116 mmHg  1.14 Right Dorsalis Pedis (Ankle)   112 mmHg  1.10 Right Digit (Great Toe)        82 mmHg   0.80   LEFT Lower PVR                Pressures Ratios Left High Thigh               168 mmHg  1.65 Left Low Thigh                153 mmHg  1.50 Left Calf                     135 mmHg  1.32 Left Posterior Tibial (Ankle) 114 mmHg  1.12 Left Dorsalis Pedis (Ankle)   107 mmHg  1.05                      Right     Left Brachial Pressure 102 mmHg 100 mmHg   [id number] Marisela Huddleston MD Electronically signed by [id number] Huddleston MD on 5/7/2025 at 1:27:18 PM  ** Final **         Assessment/Plan   Mary Pedroza is a 49 y.o. female who is on day 7 of admission for Wound infection. Podiatry following for right Wound infection. Patient examined and evaluated at bedside. Reviewed vitals, labs, imaging and notes.      #Chronic non-pressure ulceration of right lateral ankle with osseous involvement  #Cellulitis, right lower extremity  #Leukocytosis - resolved  - Afebrile. Tachycardia has improved since admission.   WBC 11.5, likely reactive to Vascular procedure  -  XR right foot and ankle with no evidence of osseous involvement or soft tissue emphysema.  - MRI read noted above      -  On exam, right lateral ankle wound with notable improvement of necrotic and nonviable tissue.  Patient displays proper blood flow for peroneal flap.  Patient is medically stable from Podiatry standpoint.Plan will be to discharged with antibiotics. Will perform peroneal flap in outpatient setting.    #Peripheral vascular disease  - PVR with right TBI 0.80 and MAREK 1.14, non-compressible vessels. Discussed findings with patient.     #T2DM with peripheral neuropathy, poorly controlled  - A1c 9.5%     #Nicotine use disorder  - Vaping cessation encouraged     Dressing: Right ankle wound - betadine soaked gauze, 4x4 gauze, kerlix and ACE bandage with light compression. Nursing orders entered for daily dressing   Weight-bearing: WBAT in surgical shoe  VTE prophylaxis: SCD left leg, Lovenox subq per primary team  Patient will follow up with Dr. Aponte at the [place] office and will be scheduled for peroneal flap outpatient.        Podiatry team will continue to follow.      Plan discussed with attending physician.    Juan Gomes DPM PGY-2  Podiatric Medicine & Surgery          [1]   Past Medical History:  Diagnosis Date    Anxiety and depression     Bipolar 1 disorder (Multi)     Bursitis of right hip 09/15/2023    Diabetes mellitus, type 2 (Multi)     Hyperlipidemia    [2]   Social History  Tobacco Use    Smoking status: Every Day     Current packs/day: 1.00     Average packs/day: 1 pack/day for 15.0 years (15.0 ttl pk-yrs)     Types: Cigarettes     Passive exposure: Current    Smokeless tobacco: Never   Vaping Use    Vaping status: Some Days    Substances: Nicotine   Substance Use Topics    Alcohol use: Not Currently    Drug use: Never   [3] No Known Allergies  [4] ampicillin-sulbactam, 3 g, intravenous, q6h  ARIPiprazole, 10 mg, oral, Nightly  doxepin, 100 mg, oral, Nightly  enoxaparin, 40 mg, subcutaneous, q24h  insulin glargine, 50 Units, subcutaneous, Nightly  insulin lispro, 0-15 Units, subcutaneous, TID AC  insulin  lispro, 6 Units, subcutaneous, TID  lidocaine, 5 mL, infiltration, Once  oxygen, , inhalation, Continuous - 02/gases  pantoprazole, 40 mg, oral, Daily before breakfast   Or  pantoprazole, 40 mg, intravenous, Daily before breakfast  polyethylene glycol, 17 g, oral, Daily  propranolol, 20 mg, oral, BID  simvastatin, 20 mg, oral, Nightly  SITagliptin phosphate, 25 mg, oral, Daily  venlafaxine XR, 150 mg, oral, Daily     [5]    [6] PRN medications: acetaminophen **OR** acetaminophen **OR** acetaminophen, alteplase, dextrose, dextrose, glucagon, glucagon, ondansetron ODT **OR** ondansetron

## 2025-05-15 ENCOUNTER — PATIENT OUTREACH (OUTPATIENT)
Dept: PRIMARY CARE | Facility: CLINIC | Age: 50
End: 2025-05-15
Payer: MEDICARE

## 2025-05-15 NOTE — SIGNIFICANT EVENT
5/15/25 1457:   Patient was discharged yesterday prior to [facility] arrangements being completed.        Patient was discharged home with PICC line for planned IV infusions.       [facility] has yet to be able to reach patient to discuss benefits and begin processing this referral.   SOC was not available until Saturday, however in order for SOC to be arranged patient will need to speak with [facility] to discuss her benefit and have antibiotics delivered.      Multiple calls have been made to patient by this TCC and by [facility] without success.  No voicemail set up to leave a message.   Text message was sent requesting patient please call [facility] to discuss her benefits, phone number provided.      If approved, patient would not receive infusion until Saturday 5/17.      Update sent to admitting provider, ID provider, and inpatient nursing leadership updating on the above.      Nurse manager to reach out to patient and request she return to the hospital for admission to receive IV ATB on 5/15 and 5/16 and then discharge once arrangements have been made.      Up to this point, there has been no successful communication with [facility] and patient had not returned to the hospital.      Escalated to nursing leadership to follow up.

## 2025-05-15 NOTE — PROGRESS NOTES
Discharge facility:[facility]  Discharge diagnosis:  Wound infection     Issues Requiring Follow-Up  Right foot osteomyelitis  Hypertension  Diabetes mellitus type 2  Peripheral vascular disease    Admission date: 5/6/25  Discharge date: 5/14/25    PCP Appointment Date: Declines to schedule at this time due to feels overwhelmed with scheduling follow up appts.  Specialist Appointment Date: 5/19/25  Hospital Encounter and Summary: Linked    ED to Hosp-Admission (Discharged) with Nicolasa Garcia MD; Andrae Petty DO; Laura Correa MD (05/06/2025)     See Discharge assessment below for further details    Wrap Up  Wrap Up Additional Comments: Spoke with patient. She states she is doing alright. She states her foot is not bothering her at all. She has not been able to obtain insulin yet. States she will get it today. IV antibitotics and home care will not be sent or start until Saturday. She states she received a call to go back to the hospital to be readmitted to get IV antibitotics until then. She states she is not going back as she was just there and she will wait until Saturday for home care and iv medication to come to her home. She states she is comfortable changing her dressing to her ankle. She feels she can manage with no problems until then. [employer] [place] message sent to Dr Raygoza and direct message to Dr Nicolasa Andrews to inquire if patient needs oral antibitotics until then. PCP notified as well. Patient declines to schedule follow up with PCP at this time due to she feels overwhelmed with the amunt of follow up appts she has to do. Message sent to office to assist with scheduling. She states dressing to her foot is currently dry and intact. Discussed s/sx of infection including devloping fever and need to return to ER immedicately. (5/15/2025  1:39 PM)    Medications  Medications reviewed with patient/caregiver?: Yes (Reviewed new, changed and discontinued medications with patient) (5/15/2025   1:39 PM)  Is the patient having any side effects they believe may be caused by any medication additions or changes?: No (5/15/2025  1:39 PM)  Does the patient have all medications ordered at discharge?: No (5/15/2025  1:39 PM)  What is keeping the patient from filling the prescriptions?: -- (Patient reports IV antibiotics will be delivered on Saturday and she thomas obtain her insulin later today) (5/15/2025  1:39 PM)  Care Management Interventions: Provided patient education (5/15/2025  1:39 PM)  Prescription Comments: Prescriptions sent for acetaminophen 325 mg tablet   insulin glargine 100 unit/mL injection (5/15/2025  1:39 PM)  Is the patient taking all medications as directed (includes completed medication regime)?: No (5/15/2025  1:39 PM)  What is preventing the patient from taking all medications as directed?: Other (Comment) (needs to obtain medications) (5/15/2025  1:39 PM)  Care Management Interventions: Provided patient education (5/15/2025  1:39 PM)  Medication Comments: Instructed insulin glargine 100 unit/mL injection  Commonly known as: Lantus  Inject 50 Units under the skin once daily at  bedtime. Take as directed per insulin instructions,acetaminophen 325 mg tablet  Commonly known as: Tylenol  Take 2 tablets (650 mg) by mouth every 6 hours if  needed for fever (temp greater than 38.0 C) or  moderate pain (4 - 6) for up to 7 days.ertapenem IV  Commonly known as: INVanz  Infuse 50 mL (1 g) over 30 minutes into a venous  catheter once every 24 hours. Stop after dose on  6/18/25. Weekly labs on Mondays: CBC w diff, Cr,  ESR, CRP. Fax results to Dr. Raygoza at [phone].STOP taking:  glimepiride 4 mg tablet (Amaryl)   ibuprofen 200 mg tablet   Lantus Solostar U-100 Insulin 100 unit/mL (3 mL)  pen (insulin glargine)   Replaced by a similar medication.  Mounjaro 2.5 mg/0.5 mL pen injector (tirzepatide)   Mounjaro 5 mg/0.5 mL pen injector (tirzepatide) (5/15/2025  1:39 PM)  Follow Up Tasks: Medication  reconciliation issues (Patient taking oral or IV antibitotics? did she obtain insulin?) (5/15/2025  1:39 PM)    Appointments  Does the patient have a primary care provider?: Yes (5/15/2025  1:39 PM)  Care Management Interventions: Educated patient on importance of making appointment (Declines to schedule at this time) (5/15/2025  1:39 PM)  Has the patient kept scheduled appointments due by today?: No (5/15/2025  1:39 PM)  What is preventing the patient from keeping their appointments?: -- (no reason provided, overwhelmed) (5/15/2025  1:39 PM)  Care Management Interventions: Advised patient to keep appointment; Educated on importance of keeping appointment; Advised to schedule with specialist (5/15/2025  1:39 PM)    Self Management  What is the home health agency?:  Home Care on 5-17-25 (5/15/2025  1:39 PM)  Has home health visited the patient within 72 hours of discharge?: Call prior to 72 hours (5/15/2025  1:39 PM)  Home Health Interventions: -- (n/a) (5/15/2025  1:39 PM)  What Durable Medical Equipment (DME) was ordered?: IV medication and supplies to be delivered on Saturday (5/15/2025  1:39 PM)  Has all Durable Medical Equipment (DME) been delivered?: No (5/15/2025  1:39 PM)  DME Interventions: -- (arrangements being made via home care for patient to have iv antibitotics and supplies delivered) (5/15/2025  1:39 PM)  Care Management Interventions: Notified PCP/provider (5/15/2025  1:39 PM)  Follow Up Tasks: Durable Medical Equipment (DME); Home Health (5/15/2025  1:39 PM)    Patient Teaching  Does the patient have access to their discharge instructions?: Yes (5/15/2025  1:39 PM)  Care Management Interventions: Reviewed instructions with patient (5/15/2025  1:39 PM)  What is the patient's perception of their health status since discharge?: Same (5/15/2025  1:39 PM)  Is the patient/caregiver able to teach back the hierarchy of who to call/visit for symptoms/problems? PCP, Specialist, Home Health nurse, Urgent  Care, ED, 911: Yes (5/15/2025  1:39 PM)  Patient/Caregiver Education Comments: Instructed on hospital discharge instructions. Instructed on new and changed medications. Instructed if increased shortness of breath or chest pains to call 911. Provided my contact information and encouraged to call with any questions (5/15/2025  1:39 PM)

## 2025-05-18 ENCOUNTER — HOSPITAL ENCOUNTER (INPATIENT)
Facility: HOSPITAL | Age: 50
DRG: 638 | End: 2025-05-18
Attending: HOSPITALIST | Admitting: HOSPITALIST
Payer: MEDICARE

## 2025-05-18 ENCOUNTER — APPOINTMENT (OUTPATIENT)
Dept: RADIOLOGY | Facility: HOSPITAL | Age: 50
DRG: 638 | End: 2025-05-18
Payer: MEDICARE

## 2025-05-18 VITALS
RESPIRATION RATE: 16 BRPM | OXYGEN SATURATION: 100 % | BODY MASS INDEX: 30.76 KG/M2 | WEIGHT: 196 LBS | TEMPERATURE: 97.3 F | DIASTOLIC BLOOD PRESSURE: 58 MMHG | SYSTOLIC BLOOD PRESSURE: 103 MMHG | HEART RATE: 91 BPM | HEIGHT: 67 IN

## 2025-05-18 DIAGNOSIS — T14.8XXA WOUND INFECTION: ICD-10-CM

## 2025-05-18 DIAGNOSIS — E11.59 TYPE 2 DIABETES MELLITUS WITH OTHER CIRCULATORY COMPLICATIONS: ICD-10-CM

## 2025-05-18 DIAGNOSIS — L08.9 WOUND INFECTION: ICD-10-CM

## 2025-05-18 DIAGNOSIS — L02.619 CELLULITIS AND ABSCESS OF FOOT: ICD-10-CM

## 2025-05-18 DIAGNOSIS — L03.119 CELLULITIS AND ABSCESS OF FOOT: ICD-10-CM

## 2025-05-18 DIAGNOSIS — M86.9 OSTEOMYELITIS OF RIGHT FOOT, UNSPECIFIED TYPE (MULTI): Primary | ICD-10-CM

## 2025-05-18 DIAGNOSIS — M86.071 ACUTE HEMATOGENOUS OSTEOMYELITIS OF RIGHT FOOT (MULTI): ICD-10-CM

## 2025-05-18 LAB
ALBUMIN SERPL BCP-MCNC: 3.7 G/DL (ref 3.4–5)
ALP SERPL-CCNC: 70 U/L (ref 33–110)
ALT SERPL W P-5'-P-CCNC: 26 U/L (ref 7–45)
ANION GAP SERPL CALCULATED.3IONS-SCNC: 14 MMOL/L (ref 10–20)
AST SERPL W P-5'-P-CCNC: 15 U/L (ref 9–39)
BACTERIA BLD CULT: NORMAL
BACTERIA BLD CULT: NORMAL
BASOPHILS # BLD AUTO: 0.04 X10*3/UL (ref 0–0.1)
BASOPHILS NFR BLD AUTO: 0.3 %
BILIRUB SERPL-MCNC: 0.7 MG/DL (ref 0–1.2)
BUN SERPL-MCNC: 12 MG/DL (ref 6–23)
CALCIUM SERPL-MCNC: 9.3 MG/DL (ref 8.6–10.3)
CHLORIDE SERPL-SCNC: 105 MMOL/L (ref 98–107)
CO2 SERPL-SCNC: 21 MMOL/L (ref 21–32)
CREAT SERPL-MCNC: 0.76 MG/DL (ref 0.5–1.05)
CRP SERPL-MCNC: 0.85 MG/DL
EGFRCR SERPLBLD CKD-EPI 2021: >90 ML/MIN/1.73M*2
EOSINOPHIL # BLD AUTO: 0.26 X10*3/UL (ref 0–0.7)
EOSINOPHIL NFR BLD AUTO: 1.8 %
ERYTHROCYTE [DISTWIDTH] IN BLOOD BY AUTOMATED COUNT: 12.7 % (ref 11.5–14.5)
ERYTHROCYTE [SEDIMENTATION RATE] IN BLOOD BY WESTERGREN METHOD: 25 MM/H (ref 0–30)
GLUCOSE BLD MANUAL STRIP-MCNC: 284 MG/DL (ref 74–99)
GLUCOSE BLD MANUAL STRIP-MCNC: 319 MG/DL (ref 74–99)
GLUCOSE BLD MANUAL STRIP-MCNC: 427 MG/DL (ref 74–99)
GLUCOSE SERPL-MCNC: 293 MG/DL (ref 74–99)
GRAM STN SPEC: NORMAL
GRAM STN SPEC: NORMAL
HCT VFR BLD AUTO: 44 % (ref 36–46)
HGB BLD-MCNC: 14.8 G/DL (ref 12–16)
IMM GRANULOCYTES # BLD AUTO: 0.05 X10*3/UL (ref 0–0.7)
IMM GRANULOCYTES NFR BLD AUTO: 0.4 % (ref 0–0.9)
LACTATE SERPL-SCNC: 1.7 MMOL/L (ref 0.4–2)
LYMPHOCYTES # BLD AUTO: 3.71 X10*3/UL (ref 1.2–4.8)
LYMPHOCYTES NFR BLD AUTO: 26.2 %
MCH RBC QN AUTO: 30 PG (ref 26–34)
MCHC RBC AUTO-ENTMCNC: 33.6 G/DL (ref 32–36)
MCV RBC AUTO: 89 FL (ref 80–100)
MONOCYTES # BLD AUTO: 0.69 X10*3/UL (ref 0.1–1)
MONOCYTES NFR BLD AUTO: 4.9 %
NEUTROPHILS # BLD AUTO: 9.39 X10*3/UL (ref 1.2–7.7)
NEUTROPHILS NFR BLD AUTO: 66.4 %
NRBC BLD-RTO: 0 /100 WBCS (ref 0–0)
PLATELET # BLD AUTO: 248 X10*3/UL (ref 150–450)
POTASSIUM SERPL-SCNC: 4 MMOL/L (ref 3.5–5.3)
PROT SERPL-MCNC: 7 G/DL (ref 6.4–8.2)
RBC # BLD AUTO: 4.93 X10*6/UL (ref 4–5.2)
SODIUM SERPL-SCNC: 136 MMOL/L (ref 136–145)
WBC # BLD AUTO: 14.1 X10*3/UL (ref 4.4–11.3)

## 2025-05-18 PROCEDURE — 86140 C-REACTIVE PROTEIN: CPT | Performed by: PHYSICIAN ASSISTANT

## 2025-05-18 PROCEDURE — 87040 BLOOD CULTURE FOR BACTERIA: CPT | Mod: WESLAB | Performed by: PHYSICIAN ASSISTANT

## 2025-05-18 PROCEDURE — 96368 THER/DIAG CONCURRENT INF: CPT

## 2025-05-18 PROCEDURE — 96366 THER/PROPH/DIAG IV INF ADDON: CPT

## 2025-05-18 PROCEDURE — 87070 CULTURE OTHR SPECIMN AEROBIC: CPT | Mod: WESLAB | Performed by: PHYSICIAN ASSISTANT

## 2025-05-18 PROCEDURE — 2500000004 HC RX 250 GENERAL PHARMACY W/ HCPCS (ALT 636 FOR OP/ED): Performed by: PHYSICIAN ASSISTANT

## 2025-05-18 PROCEDURE — 85025 COMPLETE CBC W/AUTO DIFF WBC: CPT | Performed by: PHYSICIAN ASSISTANT

## 2025-05-18 PROCEDURE — 73630 X-RAY EXAM OF FOOT: CPT | Mod: RT

## 2025-05-18 PROCEDURE — 2500000001 HC RX 250 WO HCPCS SELF ADMINISTERED DRUGS (ALT 637 FOR MEDICARE OP): Performed by: NURSE PRACTITIONER

## 2025-05-18 PROCEDURE — 2500000002 HC RX 250 W HCPCS SELF ADMINISTERED DRUGS (ALT 637 FOR MEDICARE OP, ALT 636 FOR OP/ED): Performed by: NURSE PRACTITIONER

## 2025-05-18 PROCEDURE — 2500000004 HC RX 250 GENERAL PHARMACY W/ HCPCS (ALT 636 FOR OP/ED): Mod: JZ | Performed by: NURSE PRACTITIONER

## 2025-05-18 PROCEDURE — 82947 ASSAY GLUCOSE BLOOD QUANT: CPT

## 2025-05-18 PROCEDURE — 80053 COMPREHEN METABOLIC PANEL: CPT | Performed by: PHYSICIAN ASSISTANT

## 2025-05-18 PROCEDURE — 1210000001 HC SEMI-PRIVATE ROOM DAILY

## 2025-05-18 PROCEDURE — 99223 1ST HOSP IP/OBS HIGH 75: CPT | Performed by: NURSE PRACTITIONER

## 2025-05-18 PROCEDURE — 96365 THER/PROPH/DIAG IV INF INIT: CPT

## 2025-05-18 PROCEDURE — 36415 COLL VENOUS BLD VENIPUNCTURE: CPT | Performed by: PHYSICIAN ASSISTANT

## 2025-05-18 PROCEDURE — 99285 EMERGENCY DEPT VISIT HI MDM: CPT

## 2025-05-18 PROCEDURE — 83605 ASSAY OF LACTIC ACID: CPT | Performed by: PHYSICIAN ASSISTANT

## 2025-05-18 PROCEDURE — 73630 X-RAY EXAM OF FOOT: CPT | Mod: RIGHT SIDE | Performed by: RADIOLOGY

## 2025-05-18 PROCEDURE — 85652 RBC SED RATE AUTOMATED: CPT | Performed by: PHYSICIAN ASSISTANT

## 2025-05-18 RX ORDER — BISACODYL 10 MG/1
10 SUPPOSITORY RECTAL DAILY PRN
Status: DISCONTINUED | OUTPATIENT
Start: 2025-05-18 | End: 2025-05-20 | Stop reason: HOSPADM

## 2025-05-18 RX ORDER — BISACODYL 5 MG
10 TABLET, DELAYED RELEASE (ENTERIC COATED) ORAL DAILY PRN
Status: DISCONTINUED | OUTPATIENT
Start: 2025-05-18 | End: 2025-05-20 | Stop reason: HOSPADM

## 2025-05-18 RX ORDER — SIMVASTATIN 20 MG/1
20 TABLET, FILM COATED ORAL NIGHTLY
Status: DISCONTINUED | OUTPATIENT
Start: 2025-05-18 | End: 2025-05-20 | Stop reason: HOSPADM

## 2025-05-18 RX ORDER — ACETAMINOPHEN 325 MG/1
650 TABLET ORAL EVERY 4 HOURS PRN
Status: DISCONTINUED | OUTPATIENT
Start: 2025-05-18 | End: 2025-05-20 | Stop reason: HOSPADM

## 2025-05-18 RX ORDER — VENLAFAXINE HYDROCHLORIDE 150 MG/1
150 CAPSULE, EXTENDED RELEASE ORAL DAILY
Status: DISCONTINUED | OUTPATIENT
Start: 2025-05-18 | End: 2025-05-20 | Stop reason: HOSPADM

## 2025-05-18 RX ORDER — PROCHLORPERAZINE 25 MG/1
25 SUPPOSITORY RECTAL EVERY 12 HOURS PRN
Status: DISCONTINUED | OUTPATIENT
Start: 2025-05-18 | End: 2025-05-20 | Stop reason: HOSPADM

## 2025-05-18 RX ORDER — POLYETHYLENE GLYCOL 3350 17 G/17G
17 POWDER, FOR SOLUTION ORAL DAILY PRN
Status: DISCONTINUED | OUTPATIENT
Start: 2025-05-18 | End: 2025-05-20 | Stop reason: HOSPADM

## 2025-05-18 RX ORDER — ONDANSETRON 4 MG/1
4 TABLET, FILM COATED ORAL EVERY 8 HOURS PRN
Status: DISCONTINUED | OUTPATIENT
Start: 2025-05-18 | End: 2025-05-20 | Stop reason: HOSPADM

## 2025-05-18 RX ORDER — ONDANSETRON HYDROCHLORIDE 2 MG/ML
4 INJECTION, SOLUTION INTRAVENOUS EVERY 8 HOURS PRN
Status: DISCONTINUED | OUTPATIENT
Start: 2025-05-18 | End: 2025-05-20 | Stop reason: HOSPADM

## 2025-05-18 RX ORDER — INSULIN GLARGINE 100 [IU]/ML
75 INJECTION, SOLUTION SUBCUTANEOUS NIGHTLY
Status: DISCONTINUED | OUTPATIENT
Start: 2025-05-18 | End: 2025-05-20 | Stop reason: HOSPADM

## 2025-05-18 RX ORDER — ERTAPENEM 1 G/1
1 INJECTION, POWDER, LYOPHILIZED, FOR SOLUTION INTRAMUSCULAR; INTRAVENOUS EVERY 24 HOURS
Status: DISCONTINUED | OUTPATIENT
Start: 2025-05-18 | End: 2025-05-20 | Stop reason: HOSPADM

## 2025-05-18 RX ORDER — INSULIN LISPRO 100 [IU]/ML
0-10 INJECTION, SOLUTION INTRAVENOUS; SUBCUTANEOUS
Status: DISCONTINUED | OUTPATIENT
Start: 2025-05-18 | End: 2025-05-20 | Stop reason: HOSPADM

## 2025-05-18 RX ORDER — VANCOMYCIN 1.5 G/300ML
1500 INJECTION, SOLUTION INTRAVENOUS ONCE
Status: COMPLETED | OUTPATIENT
Start: 2025-05-18 | End: 2025-05-18

## 2025-05-18 RX ORDER — ACETAMINOPHEN 160 MG/5ML
650 SOLUTION ORAL EVERY 4 HOURS PRN
Status: DISCONTINUED | OUTPATIENT
Start: 2025-05-18 | End: 2025-05-20 | Stop reason: HOSPADM

## 2025-05-18 RX ORDER — DEXTROSE 50 % IN WATER (D50W) INTRAVENOUS SYRINGE
12.5
Status: DISCONTINUED | OUTPATIENT
Start: 2025-05-18 | End: 2025-05-20 | Stop reason: HOSPADM

## 2025-05-18 RX ORDER — PROCHLORPERAZINE EDISYLATE 5 MG/ML
10 INJECTION INTRAMUSCULAR; INTRAVENOUS EVERY 6 HOURS PRN
Status: DISCONTINUED | OUTPATIENT
Start: 2025-05-18 | End: 2025-05-20 | Stop reason: HOSPADM

## 2025-05-18 RX ORDER — DEXTROSE 50 % IN WATER (D50W) INTRAVENOUS SYRINGE
25
Status: DISCONTINUED | OUTPATIENT
Start: 2025-05-18 | End: 2025-05-20 | Stop reason: HOSPADM

## 2025-05-18 RX ORDER — ACETAMINOPHEN 650 MG/1
650 SUPPOSITORY RECTAL EVERY 4 HOURS PRN
Status: DISCONTINUED | OUTPATIENT
Start: 2025-05-18 | End: 2025-05-20 | Stop reason: HOSPADM

## 2025-05-18 RX ORDER — PROPRANOLOL HYDROCHLORIDE 20 MG/1
20 TABLET ORAL 2 TIMES DAILY
Status: DISCONTINUED | OUTPATIENT
Start: 2025-05-18 | End: 2025-05-20 | Stop reason: HOSPADM

## 2025-05-18 RX ORDER — ARIPIPRAZOLE 10 MG/1
10 TABLET ORAL DAILY
Status: DISCONTINUED | OUTPATIENT
Start: 2025-05-18 | End: 2025-05-20 | Stop reason: HOSPADM

## 2025-05-18 RX ORDER — PROCHLORPERAZINE MALEATE 10 MG
10 TABLET ORAL EVERY 6 HOURS PRN
Status: DISCONTINUED | OUTPATIENT
Start: 2025-05-18 | End: 2025-05-20 | Stop reason: HOSPADM

## 2025-05-18 RX ORDER — DOXEPIN HYDROCHLORIDE 50 MG/1
100 CAPSULE ORAL NIGHTLY
Status: DISCONTINUED | OUTPATIENT
Start: 2025-05-18 | End: 2025-05-20 | Stop reason: HOSPADM

## 2025-05-18 RX ORDER — MEDROXYPROGESTERONE ACETATE 150 MG/ML
150 INJECTION, SUSPENSION INTRAMUSCULAR
Status: DISCONTINUED | OUTPATIENT
Start: 2025-05-18 | End: 2025-05-19

## 2025-05-18 RX ADMIN — PROPRANOLOL HYDROCHLORIDE 20 MG: 20 TABLET ORAL at 20:46

## 2025-05-18 RX ADMIN — PIPERACILLIN SODIUM AND TAZOBACTAM SODIUM 3.38 G: 3; .375 INJECTION, SOLUTION INTRAVENOUS at 12:02

## 2025-05-18 RX ADMIN — INSULIN LISPRO 10 UNITS: 100 INJECTION, SOLUTION INTRAVENOUS; SUBCUTANEOUS at 15:59

## 2025-05-18 RX ADMIN — SIMVASTATIN 20 MG: 20 TABLET, FILM COATED ORAL at 20:46

## 2025-05-18 RX ADMIN — SITAGLIPTIN 25 MG: 25 TABLET, FILM COATED ORAL at 17:19

## 2025-05-18 RX ADMIN — ERTAPENEM SODIUM 1 G: 1 INJECTION, POWDER, LYOPHILIZED, FOR SOLUTION INTRAMUSCULAR; INTRAVENOUS at 17:19

## 2025-05-18 RX ADMIN — DOXEPIN HYDROCHLORIDE 100 MG: 50 CAPSULE ORAL at 20:46

## 2025-05-18 RX ADMIN — VANCOMYCIN 1.5 G: 1.5 INJECTION, SOLUTION INTRAVENOUS at 12:02

## 2025-05-18 RX ADMIN — VENLAFAXINE HYDROCHLORIDE 150 MG: 150 CAPSULE, EXTENDED RELEASE ORAL at 16:30

## 2025-05-18 RX ADMIN — ARIPIPRAZOLE 10 MG: 10 TABLET ORAL at 16:30

## 2025-05-18 RX ADMIN — INSULIN GLARGINE 75 UNITS: 100 INJECTION, SOLUTION SUBCUTANEOUS at 20:46

## 2025-05-18 SDOH — SOCIAL STABILITY: SOCIAL INSECURITY: WITHIN THE LAST YEAR, HAVE YOU BEEN AFRAID OF YOUR PARTNER OR EX-PARTNER?: NO

## 2025-05-18 SDOH — SOCIAL STABILITY: SOCIAL INSECURITY: ARE YOU OR HAVE YOU BEEN THREATENED OR ABUSED PHYSICALLY, EMOTIONALLY, OR SEXUALLY BY ANYONE?: NO

## 2025-05-18 SDOH — ECONOMIC STABILITY: FOOD INSECURITY: WITHIN THE PAST 12 MONTHS, YOU WORRIED THAT YOUR FOOD WOULD RUN OUT BEFORE YOU GOT THE MONEY TO BUY MORE.: NEVER TRUE

## 2025-05-18 SDOH — ECONOMIC STABILITY: INCOME INSECURITY: IN THE PAST 12 MONTHS HAS THE ELECTRIC, GAS, OIL, OR WATER COMPANY THREATENED TO SHUT OFF SERVICES IN YOUR HOME?: NO

## 2025-05-18 SDOH — ECONOMIC STABILITY: FOOD INSECURITY: WITHIN THE PAST 12 MONTHS, THE FOOD YOU BOUGHT JUST DIDN'T LAST AND YOU DIDN'T HAVE MONEY TO GET MORE.: NEVER TRUE

## 2025-05-18 SDOH — SOCIAL STABILITY: SOCIAL INSECURITY: DO YOU FEEL ANYONE HAS EXPLOITED OR TAKEN ADVANTAGE OF YOU FINANCIALLY OR OF YOUR PERSONAL PROPERTY?: NO

## 2025-05-18 SDOH — SOCIAL STABILITY: SOCIAL INSECURITY: DO YOU FEEL UNSAFE GOING BACK TO THE PLACE WHERE YOU ARE LIVING?: NO

## 2025-05-18 SDOH — SOCIAL STABILITY: SOCIAL INSECURITY: HAVE YOU HAD THOUGHTS OF HARMING ANYONE ELSE?: NO

## 2025-05-18 SDOH — SOCIAL STABILITY: SOCIAL INSECURITY: WITHIN THE LAST YEAR, HAVE YOU BEEN HUMILIATED OR EMOTIONALLY ABUSED IN OTHER WAYS BY YOUR PARTNER OR EX-PARTNER?: NO

## 2025-05-18 SDOH — SOCIAL STABILITY: SOCIAL INSECURITY: DOES ANYONE TRY TO KEEP YOU FROM HAVING/CONTACTING OTHER FRIENDS OR DOING THINGS OUTSIDE YOUR HOME?: NO

## 2025-05-18 SDOH — SOCIAL STABILITY: SOCIAL INSECURITY: HAS ANYONE EVER THREATENED TO HURT YOUR FAMILY OR YOUR PETS?: NO

## 2025-05-18 SDOH — SOCIAL STABILITY: SOCIAL INSECURITY: WERE YOU ABLE TO COMPLETE ALL THE BEHAVIORAL HEALTH SCREENINGS?: YES

## 2025-05-18 SDOH — SOCIAL STABILITY: SOCIAL INSECURITY: ARE THERE ANY APPARENT SIGNS OF INJURIES/BEHAVIORS THAT COULD BE RELATED TO ABUSE/NEGLECT?: NO

## 2025-05-18 SDOH — SOCIAL STABILITY: SOCIAL INSECURITY: HAVE YOU HAD ANY THOUGHTS OF HARMING ANYONE ELSE?: NO

## 2025-05-18 SDOH — SOCIAL STABILITY: SOCIAL INSECURITY: ABUSE: ADULT

## 2025-05-18 ASSESSMENT — ENCOUNTER SYMPTOMS
JOINT SWELLING: 0
WEAKNESS: 0
SEIZURES: 0
LIGHT-HEADEDNESS: 0
RHINORRHEA: 0
NUMBNESS: 0
DYSURIA: 0
SPEECH DIFFICULTY: 0
ABDOMINAL DISTENTION: 0
DIARRHEA: 0
HEMATURIA: 0
TROUBLE SWALLOWING: 0
NAUSEA: 0
BACK PAIN: 0
PALPITATIONS: 0
BLOOD IN STOOL: 0
DIZZINESS: 0
DIAPHORESIS: 0
FREQUENCY: 0
DIFFICULTY URINATING: 0
PSYCHIATRIC NEGATIVE: 1
APPETITE CHANGE: 0
COUGH: 0
ABDOMINAL PAIN: 0
EYES NEGATIVE: 1
HEADACHES: 0
CONSTITUTIONAL NEGATIVE: 1
CHOKING: 0
SORE THROAT: 0
FACIAL ASYMMETRY: 0
NECK STIFFNESS: 0
MYALGIAS: 0
HEMATOLOGIC/LYMPHATIC NEGATIVE: 1
VOMITING: 0
SHORTNESS OF BREATH: 0
NECK PAIN: 0

## 2025-05-18 ASSESSMENT — LIFESTYLE VARIABLES
HAVE PEOPLE ANNOYED YOU BY CRITICIZING YOUR DRINKING: NO
EVER HAD A DRINK FIRST THING IN THE MORNING TO STEADY YOUR NERVES TO GET RID OF A HANGOVER: NO
TOTAL SCORE: 0
EVER FELT BAD OR GUILTY ABOUT YOUR DRINKING: NO
HOW OFTEN DO YOU HAVE A DRINK CONTAINING ALCOHOL: NEVER
HOW OFTEN DO YOU HAVE 6 OR MORE DRINKS ON ONE OCCASION: NEVER
SKIP TO QUESTIONS 9-10: 1
HOW MANY STANDARD DRINKS CONTAINING ALCOHOL DO YOU HAVE ON A TYPICAL DAY: PATIENT DOES NOT DRINK
HAVE YOU EVER FELT YOU SHOULD CUT DOWN ON YOUR DRINKING: NO
AUDIT-C TOTAL SCORE: 0
AUDIT-C TOTAL SCORE: 0

## 2025-05-18 ASSESSMENT — COGNITIVE AND FUNCTIONAL STATUS - GENERAL
PATIENT BASELINE BEDBOUND: NO
DAILY ACTIVITIY SCORE: 24
MOBILITY SCORE: 24

## 2025-05-18 ASSESSMENT — PATIENT HEALTH QUESTIONNAIRE - PHQ9
SUM OF ALL RESPONSES TO PHQ9 QUESTIONS 1 & 2: 1
2. FEELING DOWN, DEPRESSED OR HOPELESS: SEVERAL DAYS
1. LITTLE INTEREST OR PLEASURE IN DOING THINGS: NOT AT ALL

## 2025-05-18 ASSESSMENT — ACTIVITIES OF DAILY LIVING (ADL)
LACK_OF_TRANSPORTATION: NO
TOILETING: INDEPENDENT
GROOMING: INDEPENDENT
JUDGMENT_ADEQUATE_SAFELY_COMPLETE_DAILY_ACTIVITIES: YES
HEARING - LEFT EAR: FUNCTIONAL
FEEDING YOURSELF: INDEPENDENT
PATIENT'S MEMORY ADEQUATE TO SAFELY COMPLETE DAILY ACTIVITIES?: YES
HEARING - RIGHT EAR: FUNCTIONAL
LACK_OF_TRANSPORTATION: NO
WALKS IN HOME: INDEPENDENT
ADEQUATE_TO_COMPLETE_ADL: YES
BATHING: INDEPENDENT
DRESSING YOURSELF: INDEPENDENT

## 2025-05-18 ASSESSMENT — PAIN SCALES - GENERAL
PAINLEVEL_OUTOF10: 0 - NO PAIN
PAINLEVEL_OUTOF10: 0 - NO PAIN

## 2025-05-18 ASSESSMENT — PAIN - FUNCTIONAL ASSESSMENT: PAIN_FUNCTIONAL_ASSESSMENT: 0-10

## 2025-05-18 NOTE — ED TRIAGE NOTES
Pt was released Wednesday and hasn't had her IV ATB since because they were never delivered. Does have the picc line to get them through. Feels like something is eating her flesh and feels like its getting much deeper

## 2025-05-18 NOTE — CARE PLAN
The patient's goals for the shift include      The clinical goals for the shift include antibiotic therapy

## 2025-05-18 NOTE — HOSPITAL COURSE
Osteomyelitis right lateral malleolus with MSSA and group B strep  --Angiogram of the right leg on 5/13/2025 showed no significant vascular disease.  --Plans for muscle flap closure of wound as outpatient noted  2. Diabetes     Plan:  1.  Stop Unasyn.  2.  Start ertapenem 1 g IV every 24 hours through doses on 6/18/2025.  Will give first dose this morning.  Prescription has been left on the chart for case management to make arrangements for home care.    3.  Patient already has PICC line in place.    4.  Patient will follow-up with Dr. Raygoza on 6/18/2025 at 9 AM.  5.  Once home care plans have been finalized patient will be okay for discharge from an ID standpoint.

## 2025-05-18 NOTE — ED PROVIDER NOTES
HPI   Chief Complaint   Patient presents with    Wound Check       49-year-old female presented emergency department with a chief complaint of infection to her right lateral foot.  She was admitted to the hospital for 8 days at the beginning of the month.  She has been unable to get antibiotics from home health.  She was post come back to the hospital and be admitted several days ago but ultimately did not come until today so has been without antibiotics for the last 4 days.  She states her wound is worsening.  She denies fevers.  Well-appearing nontoxic.  No other complaint.              Patient History   Medical History[1]  Surgical History[2]  Family History[3]  Social History[4]    Physical Exam   ED Triage Vitals [05/18/25 1141]   Temperature Heart Rate Respirations BP   36.3 °C (97.3 °F) 94 18 120/75      Pulse Ox Temp Source Heart Rate Source Patient Position   100 % Temporal Monitor Sitting      BP Location FiO2 (%)     Left arm --       Physical Exam  Vitals and nursing note reviewed.   Constitutional:       Appearance: Normal appearance.   HENT:      Head: Normocephalic.      Nose: Nose normal.      Mouth/Throat:      Mouth: Mucous membranes are moist.   Cardiovascular:      Rate and Rhythm: Normal rate and regular rhythm.      Pulses: Normal pulses.   Pulmonary:      Effort: Pulmonary effort is normal.   Abdominal:      General: Abdomen is flat.   Musculoskeletal:         General: Normal range of motion.      Cervical back: Normal range of motion.   Skin:     General: Skin is warm.   Neurological:      General: No focal deficit present.      Mental Status: She is alert and oriented to person, place, and time.   Psychiatric:         Mood and Affect: Mood normal.           ED Course & MDM   Diagnoses as of 05/18/25 1400   Osteomyelitis of right foot, unspecified type (Multi)                 No data recorded     Lucinda Coma Scale Score: 15 (05/18/25 1146 : Patricia Kennedy RN)                            Medical Decision Making  I have seen and evaluated this patient.  Physician available for consultation.  Vital signs have been reviewed.  All laboratory and diagnostic imaging is reviewed by myself and interpreted by myself unless otherwise stated.  Additionally imaging is interpreted by radiologist.    CBC with 14,000 leukocytosis no significant anemia metabolic panel without elvia renal impairment electrolyte normality.  Blood cultures were sent.  Lactic acid within normal range.  No significant elevation of sedimentation rate or CRP.  Unable to get patient IV antibiotics at home at this point.  Will admit for further treatment management IV antibiotic therapy.    Labs Reviewed  CBC WITH AUTO DIFFERENTIAL - Abnormal     WBC                           14.1 (*)               nRBC                          0.0                    RBC                           4.93                   Hemoglobin                    14.8                   Hematocrit                    44.0                   MCV                           89                     MCH                           30.0                   MCHC                          33.6                   RDW                           12.7                   Platelets                     248                    Neutrophils %                 66.4                   Immature Granulocytes %, Automated   0.4                    Lymphocytes %                 26.2                   Monocytes %                   4.9                    Eosinophils %                 1.8                    Basophils %                   0.3                    Neutrophils Absolute          9.39 (*)               Immature Granulocytes Absolute, Au*   0.05                   Lymphocytes Absolute          3.71                   Monocytes Absolute            0.69                   Eosinophils Absolute          0.26                   Basophils Absolute            0.04                COMPREHENSIVE METABOLIC  PANEL - Abnormal     Glucose                       293 (*)                Sodium                        136                    Potassium                     4.0                    Chloride                      105                    Bicarbonate                   21                     Anion Gap                     14                     Urea Nitrogen                 12                     Creatinine                    0.76                   eGFR                          >90                    Calcium                       9.3                    Albumin                       3.7                    Alkaline Phosphatase          70                     Total Protein                 7.0                    AST                           15                     Bilirubin, Total              0.7                    ALT                           26                  LACTATE - Normal     Lactate                       1.7                      Narrative: Venipuncture immediately after or during the administration of Metamizole may lead to falsely low results. Testing should be performed immediately prior to Metamizole dosing.  SEDIMENTATION RATE, AUTOMATED - Normal     Sedimentation Rate            25                  C-REACTIVE PROTEIN - Normal     C-Reactive Protein            0.85                TISSUE/WOUND CULTURE/SMEAR  BLOOD CULTURE  BLOOD CULTURE  XR foot right 3+ views   Final Result    No acute fracture or dislocation.  No definite plain radiographic    evidence of osteomyelitis.  If of concern, an MRI may be helpful for    further evaluation. .    Signed by Gonzalo Cavazos MD     Medications  vancomycin (Xellia) 1.5 g in diluent combination  mL (1.5 g intravenous New Bag 5/18/25 1202)  piperacillin-tazobactam (Zosyn) 3.375 g in dextrose (iso) IV 50 mL (0 g intravenous Stopped 5/18/25 1236)  New Prescriptions  No medications on file            Procedure  Procedures       [1]   Past Medical History:  Diagnosis Date     Anxiety and depression     Bipolar 1 disorder (Multi)     Bursitis of right hip 09/15/2023    Diabetes mellitus, type 2 (Multi)     Hyperlipidemia    [2]   Past Surgical History:  Procedure Laterality Date    AMPUTATION FOOT / TOE Left      SECTION, LOW TRANSVERSE       SECTION, LOW TRANSVERSE       SECTION, LOW TRANSVERSE      CHOLECYSTECTOMY  2012    INVASIVE VASCULAR PROCEDURE Right 2025    Procedure: Lower Extremity Angiogram;  Surgeon: Marisela Huddleston MD;  Location: [facility] Cardiac [facility] Lab;  Service: Vascular Surgery;  Laterality: Right;  Diagnostic angiogram RLE in preparation for musculcutaneous flap by podiatry   [3]   Family History  Problem Relation Name Age of Onset    Ovarian cancer Mother      Hypertension Father      Diabetes Father      Other (cholesterol issues) Father      No Known Problems Sister      No Known Problems Daughter      No Known Problems Son      No Known Problems Son      Ovarian cancer Maternal Grandmother      Breast cancer Father's Sister     [4]   Social History  Tobacco Use    Smoking status: Every Day     Current packs/day: 1.00     Average packs/day: 1 pack/day for 15.0 years (15.0 ttl pk-yrs)     Types: Cigarettes     Passive exposure: Current    Smokeless tobacco: Never   Vaping Use    Vaping status: Some Days    Substances: Nicotine   Substance Use Topics    Alcohol use: Not Currently    Drug use: Never        Suraj Poole PA-C  [phone]

## 2025-05-18 NOTE — ASSESSMENT & PLAN NOTE
A1c 9.5 on 5/6/2025  Lantus was increased from home dose last admission on 75 units daily   ACHS with sliding scale  Will continue with glimepiride Januvia

## 2025-05-18 NOTE — ASSESSMENT & PLAN NOTE
Patient was not taking her home meds because she ran out from her psych meds and she did not see her psych provider.  She was taking her psych meds while she was in the hospital.    Restart  Abilify and  SINEquan  psych was consulted to verify her home meds

## 2025-05-18 NOTE — ASSESSMENT & PLAN NOTE
Right lateral foot infection with open wound. MRI ankle revealed acute osteomyelitis   Patient recently discharged on 5/14/2025 home  healthcare did not come to her place to give her an antibiotic.  Patient missed her antibiotic 3 days with has a PICC line.  Consult podiatry doctor Fadi possible peroneal flap closure of the lateral foot open wound  Wound culture grew Streptococcus agalactia and MSSA.   Consult ID, Dr. Raygoza , He recommended ertapenem daily until 6/18/2025

## 2025-05-18 NOTE — ED NOTES
Medically Supervised Weight Loss Program   Transition to Maintenance      Patients Name: Thao Ambriz      :  1958          Patient has been in the medically supervised program for 16  weeks. Height: 5 f 5      Starting weight: 270 Current Weight: 214   Goal Weight:            Patient has lost  56 pounds. Current BMI:  35.7    Starting BMI:     Reason for Visit: Patient is on medical hold. She has an an appointment with the cardiologist on  and will possibly be cleared. Summary:   Patient is currently on 0 [employer] products per day. Food outside of meal replacement includes:  She is off all products because of medical reasons. Breakfast:  Egg beater omelet, 1 slice of low carbohydrate bread. Lunch:  [id number] with some almonds with sugar free jello. Sometimes she will have a half cup of black beans. Dinner:  Salad, fish, or vegetables. She may do a snack of protein, cheese, and snacks. Drinking plenty of fluid. Patient is not doing any protein shake. Patient's current activity level is: Limited    Patient is encouraged to keep their daily protein intake around 80 grams per day and keep their daily carbohydrate intake under  grams per day. Patient had been educated on the exchange list at least one time during the reducing phase. I spent some time reviewing the exchange list during today's visit. Patient is on medical hold. We reviewed a meal plan and an appropriate place to keep her carbohydrates. She has been keeping her carbohydrates low and focusing on meat and vegetables. Patient was provided my E-mail address and phone number and may contact me with any nutrition questions.     Amna Mclaughlin, MS RD  18 Ordered food for pt      Shayy Hercules RN  05/18/25 1908

## 2025-05-18 NOTE — H&P
History Of Present Illness  Mary Pedroza is a 49 y.o. female with multiple cor morbidities, diabetes type 2 tobacco use bipolar depression hypertension   Patient presented with complaint of right lateral foot infection.  Patient was recently discharged on 5/14/25 with IV antibiotic per ID but was not able to get her antibiotic from home health care. Patient missed 3 days of antibiotic . She states that the wounds are getting worst.  Patient complains of chills no fever denies any chest pain or shortness of breath.  Complain of discomfort on her right lateral ankle.  Said that she did not take her psych meds since she left the hospital because did not have her meds and did not see her psych provider. Denies any chest pain or shortness of breath in room air.  Denies any nausea, vomiting, abdominal pain, constipation or diarrhea.  No swallowing issues. No dysuria .  No neurological deficit.      Past Medical History  Medical History[1]    Surgical History  Surgical History[2]     Social History  She reports that she has been smoking cigarettes. She has a 15 pack-year smoking history. She has been exposed to tobacco smoke. She has never used smokeless tobacco. She reports that she does not currently use alcohol. She reports that she does not use drugs.    Family History  Family History[3]     Allergies  Patient has no known allergies.    Review of Systems   Constitutional: Negative.  Negative for appetite change and diaphoresis.   HENT:  Negative for congestion, rhinorrhea, sore throat and trouble swallowing.    Eyes: Negative.    Respiratory:  Negative for cough, choking and shortness of breath.    Cardiovascular:  Negative for chest pain, palpitations and leg swelling.   Gastrointestinal:  Negative for abdominal distention, abdominal pain, blood in stool, diarrhea, nausea and vomiting.   Genitourinary:  Negative for decreased urine volume, difficulty urinating, dysuria, frequency and hematuria.   Musculoskeletal:  " Negative for back pain, gait problem, joint swelling, myalgias, neck pain and neck stiffness.   Neurological:  Negative for dizziness, seizures, syncope, facial asymmetry, speech difficulty, weakness, light-headedness, numbness and headaches.   Hematological: Negative.    Psychiatric/Behavioral: Negative.          Physical Exam  Vitals and nursing note reviewed.   Constitutional:       Appearance: Normal appearance.   HENT:      Head: Normocephalic and atraumatic.      Nose: Nose normal. No congestion or rhinorrhea.   Eyes:      Extraocular Movements: Extraocular movements intact.      Pupils: Pupils are equal, round, and reactive to light.   Cardiovascular:      Rate and Rhythm: Normal rate and regular rhythm.   Pulmonary:      Effort: Pulmonary effort is normal.      Breath sounds: Normal breath sounds.   Abdominal:      General: Bowel sounds are normal. There is no distension.      Palpations: Abdomen is soft.      Tenderness: There is no abdominal tenderness.   Musculoskeletal:         General: Normal range of motion.      Cervical back: Normal range of motion and neck supple.      Right lower leg: No edema.      Left lower leg: No edema.   Skin:     General: Skin is warm.   Neurological:      General: No focal deficit present.      Mental Status: She is alert and oriented to person, place, and time.   Psychiatric:         Mood and Affect: Mood normal.          Last Recorded Vitals  Blood pressure 120/75, pulse 94, temperature 36.3 °C (97.3 °F), temperature source Temporal, resp. rate 18, height 1.702 m (5' 7\"), weight 88.9 kg (196 lb), SpO2 100%.    I reviewed relevant Results meds, labs and imaging  Medications Ordered Prior to Encounter[4]  Results for orders placed or performed during the hospital encounter of 05/18/25 (from the past 24 hours)   CBC and Auto Differential   Result Value Ref Range    WBC 14.1 (H) 4.4 - 11.3 x10*3/uL    nRBC 0.0 0.0 - 0.0 /100 WBCs    RBC 4.93 4.00 - 5.20 x10*6/uL    " Hemoglobin 14.8 12.0 - 16.0 g/dL    Hematocrit 44.0 36.0 - 46.0 %    MCV 89 80 - 100 fL    MCH 30.0 26.0 - 34.0 pg    MCHC 33.6 32.0 - 36.0 g/dL    RDW 12.7 11.5 - 14.5 %    Platelets 248 150 - 450 x10*3/uL    Neutrophils % 66.4 40.0 - 80.0 %    Immature Granulocytes %, Automated 0.4 0.0 - 0.9 %    Lymphocytes % 26.2 13.0 - 44.0 %    Monocytes % 4.9 2.0 - 10.0 %    Eosinophils % 1.8 0.0 - 6.0 %    Basophils % 0.3 0.0 - 2.0 %    Neutrophils Absolute 9.39 (H) 1.20 - 7.70 x10*3/uL    Immature Granulocytes Absolute, Automated 0.05 0.00 - 0.70 x10*3/uL    Lymphocytes Absolute 3.71 1.20 - 4.80 x10*3/uL    Monocytes Absolute 0.69 0.10 - 1.00 x10*3/uL    Eosinophils Absolute 0.26 0.00 - 0.70 x10*3/uL    Basophils Absolute 0.04 0.00 - 0.10 x10*3/uL   Comprehensive metabolic panel   Result Value Ref Range    Glucose 293 (H) 74 - 99 mg/dL    Sodium 136 136 - 145 mmol/L    Potassium 4.0 3.5 - 5.3 mmol/L    Chloride 105 98 - 107 mmol/L    Bicarbonate 21 21 - 32 mmol/L    Anion Gap 14 10 - 20 mmol/L    Urea Nitrogen 12 6 - 23 mg/dL    Creatinine 0.76 0.50 - 1.05 mg/dL    eGFR >90 >60 mL/min/1.73m*2    Calcium 9.3 8.6 - 10.3 mg/dL    Albumin 3.7 3.4 - 5.0 g/dL    Alkaline Phosphatase 70 33 - 110 U/L    Total Protein 7.0 6.4 - 8.2 g/dL    AST 15 9 - 39 U/L    Bilirubin, Total 0.7 0.0 - 1.2 mg/dL    ALT 26 7 - 45 U/L   Lactate   Result Value Ref Range    Lactate 1.7 0.4 - 2.0 mmol/L   Sedimentation rate, automated   Result Value Ref Range    Sedimentation Rate 25 0 - 30 mm/h   C-reactive protein   Result Value Ref Range    C-Reactive Protein 0.85 <1.00 mg/dL   POCT GLUCOSE   Result Value Ref Range    POCT Glucose 427 (H) 74 - 99 mg/dL     XR foot right 3+ views  Result Date: 5/18/2025  STUDY: Foot Radiographs; 5/18/2025 12:00 PM INDICATION: Infection. COMPARISON: None available. ACCESSION NUMBER(S): [id number] ORDERING CLINICIAN: ESTELA VEGA TECHNIQUE:  Three view(s) of the right foot. FINDINGS:  There is no displaced fracture.   The alignment is anatomic.  No soft tissue abnormality is seen.    No acute fracture or dislocation.  No definite plain radiographic evidence of osteomyelitis.  If of concern, an MRI may be helpful for further evaluation. . Signed by Gonzalo Cavazos MD           Assessment & Plan  Acute hematogenous osteomyelitis of right foot (Multi)  Osteomyelitis of right foot, unspecified type (Multi)  Wound infection  Right lateral foot infection with open wound. MRI ankle revealed acute osteomyelitis   Patient recently discharged on 5/14/2025 home  healthcare did not come to her place to give her an antibiotic.  Patient missed her antibiotic 3 days with has a PICC line.  Consult podiatry doctor Fadi possible peroneal flap closure of the lateral foot open wound  Wound culture grew Streptococcus agalactia and MSSA.   Consult ID, Dr. Raygoza , He recommended ertapenem daily until 6/18/2025  Bipolar disorder  Patient was not taking her home meds because she ran out from her psych meds and she did not see her psych provider.  She was taking her psych meds while she was in the hospital.    Restart  Abilify and  SINEquan  psych was consulted to verify her home meds  Type 2 diabetes mellitus with other circulatory complications  A1c 9.5 on 5/6/2025  Lantus was increased from home dose last admission on 75 units daily   ACHS with sliding scale  Will continue with glimepiride Januvia     Discharge plan:  Patient discharged on ertapenem daily until 6/18/2025 per Dr Raygoza .   Patient will be discharged home with home health care.  PICC line in place. Patient will get CBC with differential CRP and ESR weekly  Follow-up with podiatry for possible peroneal flap closure right lateral foot  Follow-up with vascular surgeon Dr. Huddleston as outpatient.             SUDHIR Mcneil-CNP         [1]   Past Medical History:  Diagnosis Date    Anxiety and depression     Bipolar 1 disorder (Multi)     Bursitis of right hip 09/15/2023    Diabetes mellitus,  type 2 (Multi)     Hyperlipidemia    [2]   Past Surgical History:  Procedure Laterality Date    AMPUTATION FOOT / TOE Left      SECTION, LOW TRANSVERSE       SECTION, LOW TRANSVERSE       SECTION, LOW TRANSVERSE      CHOLECYSTECTOMY  2012    INVASIVE VASCULAR PROCEDURE Right 2025    Procedure: Lower Extremity Angiogram;  Surgeon: Marisela Huddleston MD;  Location: [facility] Cardiac [facility];  Service: Vascular Surgery;  Laterality: Right;  Diagnostic angiogram RLE in preparation for musculcutaneous flap by podiatry   [3]   Family History  Problem Relation Name Age of Onset    Ovarian cancer Mother      Hypertension Father      Diabetes Father      Other (cholesterol issues) Father      No Known Problems Sister      No Known Problems Daughter      No Known Problems Son      No Known Problems Son      Ovarian cancer Maternal Grandmother      Breast cancer Father's Sister     [4]   No current facility-administered medications on file prior to encounter.     Current Outpatient Medications on File Prior to Encounter   Medication Sig Dispense Refill    acetaminophen (Tylenol) 325 mg tablet Take 2 tablets (650 mg) by mouth every 6 hours if needed for fever (temp greater than 38.0 C) or moderate pain (4 - 6) for up to 7 days. 30 tablet 0    ARIPiprazole (Abilify) 10 mg tablet Take 1 tablet (10 mg) by mouth once daily.      blood-glucose meter misc One touch ultra glucose meter, use daily as directed 1 each 0    blood-glucose sensor (FreeStyle Bridget 3 Plus Sensor) device Use every 15 days 2 each 11    doxepin (SINEquan) 100 mg capsule 1 capsule (100 mg) once daily at bedtime.      ertapenem (INVanz) IV Infuse 50 mL (1 g) over 30 minutes into a venous catheter once every 24 hours. Stop after dose on 25.  Weekly labs on : CBC w diff, Cr, ESR, CRP.  Fax results to Dr. Raygoza at [phone]. 1750 mL 0    FreeStyle Bridget 3 Branch misc Use as instructed to check blood sugar 1 each 0     "FreeStyle Bridget 3 Sensor device Use daily and change every 14 (fourteen) days. 2 each 5    insulin glargine (Lantus) 100 unit/mL injection Inject 50 Units under the skin once daily at bedtime. Take as directed per insulin instructions. 15 mL 0    medroxyPROGESTERone (Depo-Provera) 150 mg/mL injection Inject 1 mL (150 mg) into the muscle every 12 weeks. 1 mL 3    OneTouch Delica Plus Lancet 33 gauge misc USE AS DIRECTED TO CHECK BLOOD SUGAR ONCE DAILY 100 each 1    OneTouch Ultra Test strip USE AS DIRECTED TO CHECK BLOOD SUGAR TWICE A  each 11    pen needle, diabetic (BD Ultra-Fine Short Pen Needle) 31 gauge x 5/16\" needle USE 1 PEN NEEDLE WITH LANTUS ONCE DAILY 100 each 3    propranolol (Inderal) 20 mg tablet 1 tablet (20 mg) 2 times a day.      simvastatin (Zocor) 20 mg tablet TAKE 1 TABLET BY MOUTH EVERY NIGHT AT BEDTIME (Patient not taking: Reported on 5/6/2025) 90 tablet 0    SITagliptin phosphate (Januvia) 25 mg tablet TAKE 1 TABLET BY MOUTH ONCE DAILY AS DIRECTED 90 tablet 3    venlafaxine XR (Effexor-XR) 150 mg 24 hr capsule Take 1 capsule (150 mg) by mouth once daily.      [DISCONTINUED] glimepiride (Amaryl) 4 mg tablet TAKE 1 TABLET BY MOUTH ONCE DAILY WITH BREAKFAST OR FIRST MAIN MEAL OF THE DAY 30 tablet 6    [DISCONTINUED] ibuprofen 200 mg tablet Take 3 tablets (600 mg) by mouth every 6 hours if needed.      [DISCONTINUED] insulin glargine (Lantus Solostar U-100 Insulin) 100 unit/mL (3 mL) pen Inject 75 Units under the skin once daily at bedtime. Take as directed per insulin instructions. (Patient not taking: Reported on 5/6/2025) 80 mL 3    [DISCONTINUED] Mounjaro 2.5 mg/0.5 mL pen injector Inject 2.5 mg under the skin 1 (one) time per week. For first 4 weeks (Patient not taking: Reported on 5/6/2025) 2 mL 0    [DISCONTINUED] Mounjaro 5 mg/0.5 mL pen injector INJECT 5 MG UNDER THE SKIN 1 TIME PER WEEK (Patient not taking: Reported on 5/6/2025) 2 mL 5     "

## 2025-05-19 ENCOUNTER — APPOINTMENT (OUTPATIENT)
Dept: CARDIOLOGY | Facility: HOSPITAL | Age: 50
DRG: 638 | End: 2025-05-19
Payer: MEDICARE

## 2025-05-19 DIAGNOSIS — M86.272 SUBACUTE OSTEOMYELITIS OF LEFT FOOT (MULTI): ICD-10-CM

## 2025-05-19 DIAGNOSIS — E11.59 TYPE 2 DIABETES MELLITUS WITH OTHER CIRCULATORY COMPLICATIONS: ICD-10-CM

## 2025-05-19 LAB
ANION GAP SERPL CALCULATED.3IONS-SCNC: 10 MMOL/L (ref 10–20)
BUN SERPL-MCNC: 12 MG/DL (ref 6–23)
CALCIUM SERPL-MCNC: 8.6 MG/DL (ref 8.6–10.3)
CHLORIDE SERPL-SCNC: 105 MMOL/L (ref 98–107)
CO2 SERPL-SCNC: 24 MMOL/L (ref 21–32)
CREAT SERPL-MCNC: 0.64 MG/DL (ref 0.5–1.05)
EGFRCR SERPLBLD CKD-EPI 2021: >90 ML/MIN/1.73M*2
ERYTHROCYTE [DISTWIDTH] IN BLOOD BY AUTOMATED COUNT: 12.5 % (ref 11.5–14.5)
GLUCOSE BLD MANUAL STRIP-MCNC: 183 MG/DL (ref 74–99)
GLUCOSE BLD MANUAL STRIP-MCNC: 235 MG/DL (ref 74–99)
GLUCOSE BLD MANUAL STRIP-MCNC: 241 MG/DL (ref 74–99)
GLUCOSE BLD MANUAL STRIP-MCNC: 344 MG/DL (ref 74–99)
GLUCOSE SERPL-MCNC: 256 MG/DL (ref 74–99)
HCT VFR BLD AUTO: 39.6 % (ref 36–46)
HGB BLD-MCNC: 13.4 G/DL (ref 12–16)
MCH RBC QN AUTO: 29.3 PG (ref 26–34)
MCHC RBC AUTO-ENTMCNC: 33.8 G/DL (ref 32–36)
MCV RBC AUTO: 87 FL (ref 80–100)
NRBC BLD-RTO: 0 /100 WBCS (ref 0–0)
PLATELET # BLD AUTO: 232 X10*3/UL (ref 150–450)
POTASSIUM SERPL-SCNC: 3.9 MMOL/L (ref 3.5–5.3)
RBC # BLD AUTO: 4.58 X10*6/UL (ref 4–5.2)
SODIUM SERPL-SCNC: 135 MMOL/L (ref 136–145)
WBC # BLD AUTO: 10.6 X10*3/UL (ref 4.4–11.3)

## 2025-05-19 PROCEDURE — 85027 COMPLETE CBC AUTOMATED: CPT | Performed by: NURSE PRACTITIONER

## 2025-05-19 PROCEDURE — 93005 ELECTROCARDIOGRAM TRACING: CPT

## 2025-05-19 PROCEDURE — 2500000002 HC RX 250 W HCPCS SELF ADMINISTERED DRUGS (ALT 637 FOR MEDICARE OP, ALT 636 FOR OP/ED): Performed by: NURSE PRACTITIONER

## 2025-05-19 PROCEDURE — 97161 PT EVAL LOW COMPLEX 20 MIN: CPT | Mod: GP

## 2025-05-19 PROCEDURE — 2500000004 HC RX 250 GENERAL PHARMACY W/ HCPCS (ALT 636 FOR OP/ED): Mod: JZ | Performed by: NURSE PRACTITIONER

## 2025-05-19 PROCEDURE — 2500000001 HC RX 250 WO HCPCS SELF ADMINISTERED DRUGS (ALT 637 FOR MEDICARE OP): Performed by: NURSE PRACTITIONER

## 2025-05-19 PROCEDURE — 1210000001 HC SEMI-PRIVATE ROOM DAILY

## 2025-05-19 PROCEDURE — 97165 OT EVAL LOW COMPLEX 30 MIN: CPT | Mod: GO

## 2025-05-19 PROCEDURE — 82947 ASSAY GLUCOSE BLOOD QUANT: CPT

## 2025-05-19 PROCEDURE — 99232 SBSQ HOSP IP/OBS MODERATE 35: CPT | Performed by: NURSE PRACTITIONER

## 2025-05-19 PROCEDURE — 82374 ASSAY BLOOD CARBON DIOXIDE: CPT | Performed by: NURSE PRACTITIONER

## 2025-05-19 RX ORDER — SODIUM CHLORIDE 0.9 % (FLUSH) 0.9 %
10 SYRINGE (ML) INJECTION AS NEEDED
Status: DISCONTINUED | OUTPATIENT
Start: 2025-05-19 | End: 2025-05-20 | Stop reason: HOSPADM

## 2025-05-19 RX ORDER — ERTAPENEM 1 G/1
1 INJECTION, POWDER, LYOPHILIZED, FOR SOLUTION INTRAMUSCULAR; INTRAVENOUS EVERY 24 HOURS
Qty: 1500 ML | Refills: 0 | Status: SHIPPED | OUTPATIENT
Start: 2025-05-19 | End: 2025-06-18

## 2025-05-19 RX ADMIN — SITAGLIPTIN 25 MG: 25 TABLET, FILM COATED ORAL at 08:09

## 2025-05-19 RX ADMIN — ERTAPENEM SODIUM 1 G: 1 INJECTION, POWDER, LYOPHILIZED, FOR SOLUTION INTRAMUSCULAR; INTRAVENOUS at 14:44

## 2025-05-19 RX ADMIN — SIMVASTATIN 20 MG: 20 TABLET, FILM COATED ORAL at 21:05

## 2025-05-19 RX ADMIN — INSULIN LISPRO 8 UNITS: 100 INJECTION, SOLUTION INTRAVENOUS; SUBCUTANEOUS at 16:30

## 2025-05-19 RX ADMIN — PROPRANOLOL HYDROCHLORIDE 20 MG: 20 TABLET ORAL at 21:06

## 2025-05-19 RX ADMIN — INSULIN LISPRO 8 UNITS: 100 INJECTION, SOLUTION INTRAVENOUS; SUBCUTANEOUS at 12:26

## 2025-05-19 RX ADMIN — ARIPIPRAZOLE 10 MG: 10 TABLET ORAL at 08:09

## 2025-05-19 RX ADMIN — INSULIN LISPRO 4 UNITS: 100 INJECTION, SOLUTION INTRAVENOUS; SUBCUTANEOUS at 08:01

## 2025-05-19 RX ADMIN — INSULIN GLARGINE 75 UNITS: 100 INJECTION, SOLUTION SUBCUTANEOUS at 21:05

## 2025-05-19 RX ADMIN — VENLAFAXINE HYDROCHLORIDE 150 MG: 150 CAPSULE, EXTENDED RELEASE ORAL at 08:08

## 2025-05-19 RX ADMIN — DOXEPIN HYDROCHLORIDE 100 MG: 50 CAPSULE ORAL at 21:05

## 2025-05-19 ASSESSMENT — COGNITIVE AND FUNCTIONAL STATUS - GENERAL
DAILY ACTIVITIY SCORE: 24
WALKING IN HOSPITAL ROOM: A LITTLE
CLIMB 3 TO 5 STEPS WITH RAILING: A LITTLE
MOBILITY SCORE: 20
STANDING UP FROM CHAIR USING ARMS: A LITTLE
MOVING TO AND FROM BED TO CHAIR: A LITTLE

## 2025-05-19 ASSESSMENT — PAIN SCALES - GENERAL
PAINLEVEL_OUTOF10: 0 - NO PAIN

## 2025-05-19 ASSESSMENT — PAIN - FUNCTIONAL ASSESSMENT
PAIN_FUNCTIONAL_ASSESSMENT: 0-10

## 2025-05-19 ASSESSMENT — ACTIVITIES OF DAILY LIVING (ADL)
BATHING_ASSISTANCE: INDEPENDENT
ADL_ASSISTANCE: INDEPENDENT
ADL_ASSISTANCE: INDEPENDENT

## 2025-05-19 NOTE — CARE PLAN
The patient's goals for the shift include      The clinical goals for the shift include remain comfortable      Problem: Skin  Goal: Decreased wound size/increased tissue granulation at next dressing change  Outcome: Progressing  Goal: Participates in plan/prevention/treatment measures  Outcome: Progressing  Goal: Prevent/manage excess moisture  Outcome: Progressing  Goal: Prevent/minimize sheer/friction injuries  Outcome: Progressing  Goal: Promote/optimize nutrition  Outcome: Progressing  Goal: Promote skin healing  Outcome: Progressing     Problem: Pain - Adult  Goal: Verbalizes/displays adequate comfort level or baseline comfort level  Outcome: Progressing     Problem: Safety - Adult  Goal: Free from fall injury  Outcome: Progressing     Problem: Discharge Planning  Goal: Discharge to home or other facility with appropriate resources  Outcome: Progressing     Problem: Chronic Conditions and Co-morbidities  Goal: Patient's chronic conditions and co-morbidity symptoms are monitored and maintained or improved  Outcome: Progressing     Problem: Nutrition  Goal: Nutrient intake appropriate for maintaining nutritional needs  Outcome: Progressing     Problem: Diabetes  Goal: Achieve decreasing blood glucose levels by end of shift  Outcome: Progressing  Goal: Increase stability of blood glucose readings by end of shift  Outcome: Progressing  Goal: Decrease in ketones present in urine by end of shift  Outcome: Progressing  Goal: Maintain electrolyte levels within acceptable range throughout shift  Outcome: Progressing  Goal: Maintain glucose levels >70mg/dl to <250mg/dl throughout shift  Outcome: Progressing  Goal: No changes in neurological exam by end of shift  Outcome: Progressing  Goal: Learn about and adhere to nutrition recommendations by end of shift  Outcome: Progressing  Goal: Vital signs within normal range for age by end of shift  Outcome: Progressing  Goal: Increase self care and/or family involovement by end  of shift  Outcome: Progressing  Goal: Receive DSME education by end of shift  Outcome: Progressing

## 2025-05-19 NOTE — CONSULTS
"PODIATRIC MEDICINE & SURGERY - INITIAL CONSULT NOTE    Subjective   Mary Pedroza is a 49 y.o. female who is on day 1 of admission for Acute hematogenous osteomyelitis of right foot (Multi).     HPI: Patient resting comfortably in bed. Denies pain to the right ankle wound. Denies constitutional sx. Patient reports that her IV abx were not delivered to her home after discharge from recent admission. She presented to the ED yesterday as she was concerned her infection would worsen without them. In addition, patient did not have access to her psychiatric medications.     REVIEW OF SYSTEMS  Constitutional: Negative.    HENT: Negative.     Eyes: Negative.    Respiratory: Negative.     Cardiovascular: Negative.    Gastrointestinal: Negative.    Endocrine: Negative.    Genitourinary: Negative.    Musculoskeletal: +previous left TMA  Skin: +right ankle wound    Medical History[1]  Social History[2]  Surgical History[3]    Recent Surgeries in Podiatry       Date Procedure Surgeon Laterality Status    04/24/2024 Wound Debridement, Flap Closure Cierra Wang DPM; Bharat Michelle DPM; Suraj Francisco DPM; James Aponte DPM Left Posted    03/11/2024 Debridement of All Nonviable Soft Tissue and Bone;  Excision Bone Foot Suraj Francisco DPM Left; Left Posted    03/07/2024 Foot Transmetatarsal Amputation Suraj Francisco DPM; Sarah Barrera DPM Left Posted     RX Allergies[4]    Medications  Scheduled Medications[5]  Continuous Medications[6]   PRN Medications[7]    Objective   Visit Vitals  /51 (BP Location: Left arm, Patient Position: Lying)   Pulse 79   Temp 36.3 °C (97.3 °F) (Oral)   Resp 17   Ht 1.702 m (5' 7\")   Wt 88.9 kg (196 lb)   SpO2 98%   BMI 30.70 kg/m²   OB Status Injection   Smoking Status Every Day   BSA 2.05 m²     Physical Exam  Pleasant and cooperative 49 year old female.   - NAD and AAOx3     Vascular:   DP and PT pulses are palpable bilateral. Capillary fill time is brisk to bilateral " hallux. Skin temperature is warm to warm proximal to distal bilateral. Focal increase in temperature is noted to the right lateral ankle. 1+ non-pitting edema to the right lower extremity. No pain with calf compression B/L     Neuro:  No focal deficit. Gross sensation is intact. Protective sensation mildly diminished b/l.     Musculoskeletal:   Ambulates unassisted. Muscle strength is 5/5 to the intrinsic and extrinsic muscles of the foot and ankle B/L. Previous left foot transmetatarsal amputation; well-healed. No pain with right ankle joint range of motion     Dermatologic:  Good attention to pedal hygiene. The skin is well-hydrated to the lower extremities. Nails 1-5 right are intact. The pedal interdigital spaces are clean and dry. Right lateral ankle wound as described below:      WOUND #1: Right lateral ankle  Measurements: Wound measures approximately 4.5 cm x  4.5 cm x 0.8 cm. Probes to fibula.   Base: The base is 40% nonviable with fibrotic and necrotic slough. The remaining tissue base is granular.   Rim: The skin edges are intact with no maceration. No rolling of skin edges.   Drainage: Serous drainage and fibrotic/necrotic slough. Mild malodor noted.  Periwound: Mild periwound erythema and edema.  No ascending cellulitis or lymphangitis. No palpable fluctuance.   Pain: No    Lab Results   Component Value Date    WBC 10.6 05/19/2025    HGB 13.4 05/19/2025    HCT 39.6 05/19/2025    MCV 87 05/19/2025     05/19/2025     Lab Results   Component Value Date    GLUCOSE 256 (H) 05/19/2025    CALCIUM 8.6 05/19/2025     (L) 05/19/2025    K 3.9 05/19/2025    CO2 24 05/19/2025     05/19/2025    BUN 12 05/19/2025    CREATININE 0.64 05/19/2025     Lab Results   Component Value Date    HGBA1C 9.5 (H) 05/06/2025     Lab Results   Component Value Date    SEDRATE 25 05/18/2025     Lab Results   Component Value Date    CRP 0.85 05/18/2025      Cultures  Susceptibility data from last 90 days.  Collected  Specimen Info Organism Clindamycin Erythromycin Oxacillin Tetracycline Trimethoprim/Sulfamethoxazole Vancomycin   05/07/25 Tissue/Biopsy from Wound/Tissue Methicillin Susceptible Staphylococcus aureus (MSSA)  R  R  S  R  S  S     Streptococcus agalactiae (Group B Streptococcus)           Mixed Anaerobic Bacteria           Mixed Gram-Positive and Gram-Negative Bacteria         05/06/25 Tissue/Biopsy from Wound/Tissue Methicillin Susceptible Staphylococcus aureus (MSSA)  S  S  S  S  S  S     Streptococcus agalactiae (Group B Streptococcus)           Mixed Anaerobic Bacteria           Mixed Gram-Positive and Gram-Negative Bacteria           Imaging  XR foot right 3+ views  Result Date: 5/18/2025  No acute fracture or dislocation.  No definite plain radiographic evidence of osteomyelitis.  If of concern, an MRI may be helpful for further evaluation. . Signed by Gonzalo Cavazos MD    Cardiology, Vascular, and Other Imaging  No other imaging results found for the past 7 days    Assessment/Plan   Mary Pedroza is a 49 y.o. female who is on day 1 of admission for Acute hematogenous osteomyelitis of right foot (Multi).     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 Findings discussed with patient in detail. Patient was recently discharged on 5/14/25 with plan for outpatient IV abx but was not able to get her antibiotic from home health care. Patient missed 3 days of antibiotics. On exam, right lateral ankle wound with notable improvement of necrotic and nonviable tissue compared to previous admission.     No plan for podiatric surgical intervention during this admission.  Plan for outpatient surgery as previously discussed with patient.  Outpatient follow up with Dr. Aponte 5-7 days after discharge  for surgical consultation.     Thank you for the consult.   Podiatry will follow peripherally while in house.    Plan discussed with attending physician.     Florencia Zarate DPM PGY-2  Podiatric Medicine & Surgery         [1]   Past Medical History:  Diagnosis Date    Anxiety and depression     Bipolar 1 disorder (Multi)     Bursitis of right hip 09/15/2023    Diabetes mellitus, type 2 (Multi)     Hyperlipidemia    [2]   Social History  Tobacco Use    Smoking status: Every Day     Current packs/day: 1.00     Average packs/day: 1 pack/day for 15.0 years (15.0 ttl pk-yrs)     Types: Cigarettes     Passive exposure: Current    Smokeless tobacco: Never   Vaping Use    Vaping status: Some Days    Substances: Nicotine   Substance Use Topics    Alcohol use: Not Currently    Drug use: Never   [3]   Past Surgical History:  Procedure Laterality Date    AMPUTATION FOOT / TOE Left      SECTION, LOW TRANSVERSE       SECTION, LOW TRANSVERSE       SECTION, LOW TRANSVERSE      CHOLECYSTECTOMY  2012    INVASIVE VASCULAR PROCEDURE Right 2025    Procedure: Lower Extremity Angiogram;  Surgeon: Marisela Huddleston MD;  Location: [facility];  Service: Vascular Surgery;  Laterality: Right;  Diagnostic angiogram RLE in preparation for musculcutaneous flap by podiatry   [4] No Known Allergies  [5] ARIPiprazole, 10 mg, oral, Daily  doxepin, 100 mg, oral, Nightly  ertapenem, 1 g, intravenous, q24h  insulin glargine, 75 Units, subcutaneous, Nightly  insulin lispro, 0-10 Units, subcutaneous, TID AC  [Held by provider] medroxyPROGESTERone, 150 mg, intramuscular, q12 weeks  propranolol, 20 mg, oral, BID  simvastatin, 20 mg, oral, Nightly  SITagliptin phosphate, 25 mg, oral, Daily  venlafaxine XR, 150 mg, oral, Daily  [6]    [7] PRN medications: acetaminophen **OR** acetaminophen **OR** acetaminophen, alteplase, benzocaine-menthol, bisacodyl, bisacodyl, dextrose, dextrose, glucagon, glucagon,  ondansetron **OR** ondansetron, polyethylene glycol, prochlorperazine **OR** prochlorperazine **OR** prochlorperazine, sodium chloride 0.9%, sodium chloride 0.9%

## 2025-05-19 NOTE — PROGRESS NOTES
Occupational Therapy    Evaluation    Patient Name: Mary Pedroza  MRN: [id number]  Department: [address]  Room: [address] [place] [address]  Today's Date: 5/19/2025  Time Calculation  Start Time: 0819  Stop Time: 0829  Time Calculation (min): 10 min        Assessment:  OT Assessment: Pt appears to be close to her functional baseline overall with self-care and functional mobility. No further acute skilled OT needs.  Prognosis: Good  Barriers to Discharge Home: No anticipated barriers  Evaluation/Treatment Tolerance: Patient tolerated treatment well  End of Session Communication: Bedside nurse  End of Session Patient Position: Bed, 2 rail up, Alarm off, not on at start of session (Needs in reach and nurse aware pt is up ad tristan to the bathroom independently)  Strengths: Premorbid level of function  Barriers to Participation: Comorbidities    Plan:  No Skilled OT: Independent with ADLs  OT Frequency: OT eval only  OT Discharge Recommendations: No further acute OT  Equipment Recommended upon Discharge: Other (comment) (none)  OT Recommended Transfer Status: Independent  OT - OK to Discharge: Yes       Subjective     OT Visit Info:  OT Received On: 05/19/25    General:  General  Reason for Referral: impaired ADL's/mobility  Referred By: THEODORE Mcneil  Past Medical History Relevant to Rehab: DM, bipolar disorder, HTN, tob use, TMA LLE  Family/Caregiver Present: No  Co-Treatment: PT  Co-Treatment Reason: to optimize pt outcomes  Prior to Session Communication: Bedside nurse  Patient Position Received: Bed, 2 rail up, Alarm off, not on at start of session  General Comment: The patient is a 49 y.o. female admitted to the hospital for OM of Rt foot. Pt recently DC'd from [facility] on IV antibiotics.    Precautions:  Hearing/Visual Limitations: blind Rt eye; h/o cataracts; glasses  LE Weight Bearing Status: Weight Bearing as Tolerated (RLE with surgical shoe)  Medical Precautions: Fall precautions  Precautions Comment: pt denies use of  surgical shoe; per DPM during last admit pt to be WBAT RLE with surgical shoe    Pain:  Pain Assessment  Pain Assessment: 0-10  0-10 (Numeric) Pain Score: 0 - No pain    Objective     Cognition:  Overall Cognitive Status: Within Functional Limits  Orientation Level: Oriented X4    Home Living:  Type of Home: House  Lives With: Friends  Home Adaptive Equipment: None  Home Layout: One level  Home Access: Stairs to enter with rails  Entrance Stairs-Rails:  (unilateral)  Entrance Stairs-Number of Steps: 3  Bathroom Shower/Tub: Tub/shower unit  Bathroom Toilet: Standard  Bathroom Equipment: None    Prior Function:  Level of Christine: Independent with ADLs and functional transfers, Independent with homemaking with ambulation  ADL Assistance: Independent  Homemaking Assistance: Independent  Ambulatory Assistance: Independent  Vocational: On disability  Hand Dominance: Right  Prior Function Comments: pt denies h/o recent falls    ADL:  Eating Assistance: Independent  Grooming Assistance: Independent  Bathing Assistance: Independent  UE Dressing Assistance: Independent  LE Dressing Assistance: Independent  Toileting Assistance with Device: Independent  Toileting Deficit:  (pt has been up ad tristan to the bathroom)    Activity Tolerance:  Activity Tolerance Comments: WFL    Bed Mobility/Transfers: Bed Mobility  Bed Mobility:  (Independent)    Transfers  Transfer:  (independent)    Functional Mobility:  Functional Mobility  Functional Mobility Performed:  (Distant S to indep)    Sensation:  Sensation Comment: h/o neuropathy BLE    Strength:  Strength Comments: BUE's WFL    Coordination:  Coordination Comment: MARGARET's WFL grossly     Hand Function:  Gross Grasp: Functional  Coordination: Functional      Outcome Measures:[facility] Daily Activity  Putting on and taking off regular lower body clothing: None  Bathing (including washing, rinsing, drying): None  Putting on and taking off regular upper body clothing: None  Toileting, which  includes using toilet, bedpan or urinal: None  Taking care of personal grooming such as brushing teeth: None  Eating Meals: None  Daily Activity - Total Score: 24    Education Documentation  No documentation found.  Education Comments  No comments found.

## 2025-05-19 NOTE — PROGRESS NOTES
"Mary Pedroza is a 49 y.o. female on day 1 of admission presenting with Acute hematogenous osteomyelitis of right foot (Multi).    Subjective   Patient resting in bed. Denies chest pain, shortness of breath, abdominal pain, fevers, chills. Denies foot pain        Objective     Physical Exam  Constitutional:       Appearance: Normal appearance.   HENT:      Head: Normocephalic and atraumatic.      Mouth/Throat:      Mouth: Mucous membranes are moist.      Pharynx: Oropharynx is clear.   Eyes:      Extraocular Movements: Extraocular movements intact.      Conjunctiva/sclera: Conjunctivae normal.      Pupils: Pupils are equal, round, and reactive to light.   Cardiovascular:      Rate and Rhythm: Normal rate and regular rhythm.      Pulses: Normal pulses.      Heart sounds: Normal heart sounds.   Pulmonary:      Effort: Pulmonary effort is normal.      Breath sounds: Normal breath sounds.   Abdominal:      General: Bowel sounds are normal.      Palpations: Abdomen is soft.      Tenderness: There is no abdominal tenderness.   Musculoskeletal:         General: Normal range of motion.      Cervical back: Normal range of motion and neck supple.      Comments: Right foot covered with dry, intact dressing    Skin:     General: Skin is warm and dry.      Capillary Refill: Capillary refill takes less than 2 seconds.   Neurological:      General: No focal deficit present.      Mental Status: She is alert and oriented to person, place, and time.   Psychiatric:         Mood and Affect: Mood normal.         Behavior: Behavior normal.         Last Recorded Vitals  Blood pressure 108/51, pulse 79, temperature 36.3 °C (97.3 °F), temperature source Oral, resp. rate 17, height 1.702 m (5' 7\"), weight 88.9 kg (196 lb), SpO2 98%.  Intake/Output last 3 Shifts:  I/O last 3 completed shifts:  In: 290 (3.3 mL/kg) [P.O.:240; IV Piggyback:50]  Out: - (0 mL/kg)   Weight: 88.9 kg     Relevant Results  Results for orders placed or performed " during the hospital encounter of 05/18/25 (from the past 24 hours)   POCT GLUCOSE   Result Value Ref Range    POCT Glucose 427 (H) 74 - 99 mg/dL   POCT GLUCOSE   Result Value Ref Range    POCT Glucose 319 (H) 74 - 99 mg/dL   POCT GLUCOSE   Result Value Ref Range    POCT Glucose 284 (H) 74 - 99 mg/dL   CBC   Result Value Ref Range    WBC 10.6 4.4 - 11.3 x10*3/uL    nRBC 0.0 0.0 - 0.0 /100 WBCs    RBC 4.58 4.00 - 5.20 x10*6/uL    Hemoglobin 13.4 12.0 - 16.0 g/dL    Hematocrit 39.6 36.0 - 46.0 %    MCV 87 80 - 100 fL    MCH 29.3 26.0 - 34.0 pg    MCHC 33.8 32.0 - 36.0 g/dL    RDW 12.5 11.5 - 14.5 %    Platelets 232 150 - 450 x10*3/uL   Basic metabolic panel   Result Value Ref Range    Glucose 256 (H) 74 - 99 mg/dL    Sodium 135 (L) 136 - 145 mmol/L    Potassium 3.9 3.5 - 5.3 mmol/L    Chloride 105 98 - 107 mmol/L    Bicarbonate 24 21 - 32 mmol/L    Anion Gap 10 10 - 20 mmol/L    Urea Nitrogen 12 6 - 23 mg/dL    Creatinine 0.64 0.50 - 1.05 mg/dL    eGFR >90 >60 mL/min/1.73m*2    Calcium 8.6 8.6 - 10.3 mg/dL   POCT GLUCOSE   Result Value Ref Range    POCT Glucose 235 (H) 74 - 99 mg/dL   POCT GLUCOSE   Result Value Ref Range    POCT Glucose 344 (H) 74 - 99 mg/dL     XR foot right 3+ views  Result Date: 5/18/2025  STUDY: Foot Radiographs; 5/18/2025 12:00 PM INDICATION: Infection. COMPARISON: None available. ACCESSION NUMBER(S): [id number] ORDERING CLINICIAN: ESTELA VEGA TECHNIQUE:  Three view(s) of the right foot. FINDINGS:  There is no displaced fracture.  The alignment is anatomic.  No soft tissue abnormality is seen.    No acute fracture or dislocation.  No definite plain radiographic evidence of osteomyelitis.  If of concern, an MRI may be helpful for further evaluation. . Signed by Gonzalo Cavazos MD         This patient has a central line   Reason for the central line remaining today? Parenteral medication      Assessment & Plan  Acute hematogenous osteomyelitis of right foot (Multi)  Wound infection  Right lateral  foot infection with open wound. MRI ankle revealed acute osteomyelitis   Consult podiatry doctor Fadi possible peroneal flap closure of the lateral foot open wound  Consult ID, Dr. Raygoza , He recommended ertapenem daily until 6/18/2025  Right lateral foot infection with open wound. MRI ankle revealed acute osteomyelitis   Patient recently discharged on 5/14/2025 home  healthcare did not come to her place to give her an antibiotic.  Patient missed her antibiotic 3 days with has a PICC line.  Consult podiatry doctor Fadi possible peroneal flap closure of the lateral foot open wound  Wound culture grew Streptococcus agalactia and MSSA.   Consult ID, Dr. Raygoza , He recommended ertapenem daily until 6/18/2025  Bipolar disorder  Patient was not taking her home meds because she ran out from her psych meds and she did not see her psych provider.  She was taking her psych meds while she was in the hospital.    Restart  Abilify and  SINEquan  psych was consulted to verify her home meds  Type 2 diabetes mellitus with other circulatory complications  A1c 9.5 on 5/6/2025  Lantus was increased from home dose last admission on 75 units daily   Glucose AC/HS with sliding scale  Will continue with glimepiride Januvia   Hypoglycemia protocol     DVT prophylaxis   Low-risk   SCDs     Ophelia Hitchcock, APRN-CNP

## 2025-05-19 NOTE — CARE PLAN
The patient's goals for the shift include      The clinical goals for the shift include remain free from falls      Problem: Skin  Goal: Decreased wound size/increased tissue granulation at next dressing change  Outcome: Progressing  Goal: Participates in plan/prevention/treatment measures  Outcome: Progressing  Goal: Prevent/manage excess moisture  Outcome: Progressing  Goal: Prevent/minimize sheer/friction injuries  Outcome: Progressing  Goal: Promote/optimize nutrition  Outcome: Progressing  Goal: Promote skin healing  Outcome: Progressing     Problem: Pain - Adult  Goal: Verbalizes/displays adequate comfort level or baseline comfort level  Outcome: Progressing     Problem: Safety - Adult  Goal: Free from fall injury  Outcome: Progressing     Problem: Discharge Planning  Goal: Discharge to home or other facility with appropriate resources  Outcome: Progressing     Problem: Chronic Conditions and Co-morbidities  Goal: Patient's chronic conditions and co-morbidity symptoms are monitored and maintained or improved  Outcome: Progressing     Problem: Nutrition  Goal: Nutrient intake appropriate for maintaining nutritional needs  Outcome: Progressing     Problem: Diabetes  Goal: Achieve decreasing blood glucose levels by end of shift  Outcome: Progressing  Goal: Increase stability of blood glucose readings by end of shift  Outcome: Progressing  Goal: Decrease in ketones present in urine by end of shift  Outcome: Progressing  Goal: Maintain electrolyte levels within acceptable range throughout shift  Outcome: Progressing  Goal: Maintain glucose levels >70mg/dl to <250mg/dl throughout shift  Outcome: Progressing  Goal: No changes in neurological exam by end of shift  Outcome: Progressing  Goal: Learn about and adhere to nutrition recommendations by end of shift  Outcome: Progressing  Goal: Vital signs within normal range for age by end of shift  Outcome: Progressing  Goal: Increase self care and/or family involovement by  end of shift  Outcome: Progressing  Goal: Receive DSME education by end of shift  Outcome: Progressing

## 2025-05-19 NOTE — PROGRESS NOTES
INFECTIOUS DISEASES PROGRESS NOTE    Consulted / following patient for:  Right lateral malleolus osteomyelitis    Subjective   Interval History:   My colleagues and I were closely involved in this patient's care during her admission here earlier this month, with polymicrobial osteomyelitis of the right lateral malleolus including anaerobes, MSSA, and Group B Streptococcus.  The podiatry service plans to do a flap as an outpatient in the next several weeks.  A PICC line was inserted in the right arm and orders were placed in the chart for the patient to receive ertapenem once daily for 6 weeks and to follow-up with me in the office on 6/18 at 9 AM.    It appears that no detailed arrangements were made for home IV antibiotic therapy, and yet the patient was discharged.  She was advised to return to the hospital and initially refused to do so, but came back to the hospital yesterday morning and has been readmitted and placed on ertapenem     Objective   PHYSICAL EXAMINATION  Vital signs:  Visit Vitals  /51 (BP Location: Left arm, Patient Position: Lying)   Pulse 79   Temp 36.3 °C (97.3 °F) (Oral)   Resp 17      General: Sitting up at the bedside, in no acute distress  Extremities: PICC line, right arm.  Dressing on the right lower extremity not removed, no proximal erythema    Relevant Results  WBC: 14,100 --> 10,600    Results from last 72 hours   Lab Units 05/19/25  0505   CREATININE mg/dL 0.64   ANION GAP mmol/L 10   EGFR mL/min/1.73m*2 >90     Results from last 72 hours   Lab Units 05/18/25  1152   AST U/L 15   ALT U/L 26   ALK PHOS U/L 70   BILIRUBIN TOTAL mg/dL 0.7     Microbiology:  Blood (5/18): Negative X2  Wound (5/18): Pending  Wound (5/7): MSSA, Group B Streptococcus, anaerobes.      ASSESSMENT:  Right lateral malleolus osteomyelitis  Polymicrobial osteomyelitis, on ertapenem and all orders for home IV antibiotic therapy until follow-up with me on 6/18 are in place.  A new hard copy of the orders is  placed in the chart.  Defer to the podiatry service regarding timing of the planned flap      PLANS:  -   Ertapenem 1 g every 24 hours via PICC line until follow-up with me on 6/18  -   All orders are in the medical record, with a hard copy on the patient's chart  -   Defer to Podiatry regarding timing of flap procedure      Gonzalo Raygoza MD  [facility]  Office:  [phone]

## 2025-05-19 NOTE — PROGRESS NOTES
05/19/25 1555   Discharge Planning   Expected Discharge Disposition SNF   Does the patient need discharge transport arranged? Yes   RoundTrip coordination needed? Yes     TCC contacted by podiatry stating pt will have wound vac and they think pt may benefit from SNF   TCC Spoke to patient who is agreeable, TCC reviewed list with pt   Received choices for   [facility]   Referral sent at this time     1559: pt accepted by [facility]     Precert needed prior to discharge   ** do not discharge without speaking to care coordination**

## 2025-05-19 NOTE — ASSESSMENT & PLAN NOTE
Right lateral foot infection with open wound. MRI ankle revealed acute osteomyelitis   Consult podiatry doctor Fadi possible peroneal flap closure of the lateral foot open wound  Consult ID, Dr. Raygoza , He recommended ertapenem daily until 6/18/2025

## 2025-05-19 NOTE — PROGRESS NOTES
05/19/25 1034   Discharge Planning   Expected Discharge Disposition Home H  ([facility])     Patient is readmission, recently discharged from [facility] on 5/14   At that time pt was discharged before IV ATB were set up care coordination   Patient was being set up by [facility] for infusions. Patient was not seen in the home, was urged to return to hospital to get PICC line checked prior to start of care   Patient will need new internal referral for home infusions     ID and podiatry consulted at this time   TCC will follow for discharge needs.

## 2025-05-19 NOTE — ASSESSMENT & PLAN NOTE
A1c 9.5 on 5/6/2025  Lantus was increased from home dose last admission on 75 units daily   Glucose AC/HS with sliding scale  Will continue with glimepiride Januvia   Hypoglycemia protocol

## 2025-05-19 NOTE — ASSESSMENT & PLAN NOTE
Right lateral foot infection with open wound.   MRI ankle on 5/7 revealed acute osteomyelitis   Patient recently discharged on 5/14/2025 to home    - Home healthcare did not come to her place to give her an antibiotic.  No antibiotic or PICC care for 3 days   - recommended to come to hospital for PICC and wound assessment   Consult podiatry doctor Fadi possible peroneal flap closure of the lateral foot open wound  Wound culture grew Streptococcus agalactia and MSSA.   Consult ID, Dr. Raygoza , He recommended ertapenem daily until 6/18/2025

## 2025-05-19 NOTE — PROGRESS NOTES
Physical Therapy    Physical Therapy Evaluation    Patient Name: Mary Pedroza  MRN: [id number]  Department: [address]  Room: [address] [place] [address]  Today's Date: 5/19/2025   Time Calculation  Start Time: 0818  Stop Time: 0830  Time Calculation (min): 12 min    Assessment/Plan   PT Assessment  PT Assessment Results: Decreased strength, Decreased range of motion, Decreased endurance, Impaired balance, Decreased mobility, Decreased coordination, Impaired judgement, Decreased safety awareness, Impaired sensation, Decreased skin integrity, Pain, Orthopedic restrictions  Rehab Prognosis: Good  Barriers to Discharge Home: No anticipated barriers  Evaluation/Treatment Tolerance: Patient tolerated treatment well  Medical Staff Made Aware: Yes  Strengths: Ability to acquire knowledge  Barriers to Participation: Comorbidities  End of Session Communication: Bedside nurse  Assessment Comment: The patient is a 49 y.o. female admitted to the hospital for wound to Rt foot. The patient currently requires supervision for ambulation with no AD; pt would benefit from skilled therapy services to address functional deficits.  End of Session Patient Position: Bed, 2 rail up, Alarm off, not on at start of session  IP OR SWING BED PT PLAN  Inpatient or Swing Bed: Inpatient  PT Plan  Treatment/Interventions: Gait training, Balance training, Endurance training, Therapeutic activity, Orthotic fitting/training  PT Plan: Ongoing PT  PT Frequency: 2 times per week  PT Discharge Recommendations: Low intensity level of continued care  PT Recommended Transfer Status: Stand by assist  PT - OK to Discharge: Yes    Subjective     PT Visit Info:  PT Received On: 05/19/25  General Visit Information:  General  Reason for Referral: mobility impairment due to OM of Rt foot  Referred By: THEODORE Mcneil  Past Medical History Relevant to Rehab: DM, bipolar disorder, HTN, tob use, TMA LLE  Family/Caregiver Present: No  Co-Treatment: OT  Co-Treatment Reason: to  optimize pt outcomes  Prior to Session Communication: Bedside nurse  Patient Position Received: Bed, 2 rail up, Alarm off, not on at start of session  Preferred Learning Style: verbal, visual  General Comment: The patient is a 49 y.o. female admitted to the hospital for OM of Rt foot. Pt recently DC'd from Health system on IV antibiotics.  Home Living:  Home Living  Type of Home: House  Lives With: Friends  Home Adaptive Equipment: None  Home Layout: One level  Home Access: Stairs to enter with rails  Entrance Stairs-Rails:  (unilateral)  Entrance Stairs-Number of Steps: 3  Bathroom Shower/Tub: Tub/shower unit  Bathroom Toilet: Standard  Bathroom Equipment: None  Prior Level of Function:  Prior Function Per Pt/Caregiver Report  Level of [place]: Independent with ADLs and functional transfers, Independent with homemaking with ambulation  ADL Assistance: Independent  Homemaking Assistance: Independent  Ambulatory Assistance: Independent  Vocational: On disability  Prior Function Comments: pt denies h/o recent falls  Precautions:  Precautions  Hearing/Visual Limitations: blind Rt eye; h/o cataracts; glasses  LE Weight Bearing Status: Weight Bearing as Tolerated (WBAT RLE with surgical shoe)  Medical Precautions: Fall precautions  Precautions Comment: pt denies use of surgical shoe; per DPM during last admit pt to be WBAT RLE with surgical shoe       Vital Signs Comment: pt on room air     Objective   Pain:  Pain Assessment  Pain Assessment: 0-10  0-10 (Numeric) Pain Score: 0 - No pain  Cognition:  Cognition  Overall Cognitive Status: Within Functional Limits  Orientation Level: Oriented X4  Safety Judgment: Decreased awareness of need for safety precautions  Insight: Mild  Impulsive: Mildly    General Assessments:  General Observation  General Observation: impaired safety awareness; impulsive     Activity Tolerance  Endurance: Tolerates 10 - 20 min exercise with multiple rests  Activity Tolerance Comments: mild fatigue  with mobility  Rate of Perceived Exertion (RPE): 3/10    Sensation  Sensation Comment: h/o neuropathy BLE    Strength  Strength Comments: BLE grossly >3+/5  Coordination  Coordination Comment: mildly increased time for completion    Postural Control  Posture Comment: mild forward head    Dynamic Standing Balance  Dynamic Standing-Balance Support: No upper extremity supported  Dynamic Standing-Level of Assistance: Close supervision  Dynamic Standing-Comments: antalgic gait pattern  Functional Assessments:  Bed Mobility  Bed Mobility: Yes  Bed Mobility 1  Bed Mobility 1: Supine to sitting, Sitting to supine  Level of Assistance 1: Independent  Bed Mobility Comments 1: good completion    Transfers  Transfer: Yes  Transfer 1  Transfer From 1: Bed to, Stand to  Transfer to 1: Stand, Bed  Technique 1: Sit to stand, Stand to sit  Transfer Level of Assistance 1: Independent  Trials/Comments 1: VCs for safety awareness; mild impulsiveness    Ambulation/Gait Training  Ambulation/Gait Training Performed: Yes  Ambulation/Gait Training 1  Surface 1: Level tile  Device 1: No device  Assistance 1: Close supervision  Quality of Gait 1: Narrow base of support, Antalgic  Comments/Distance (ft) 1: pt declining use of AD; declining receiving post-op shoe during last admit; declining use of post-op shoe for eval; 12ftx2 with no AD; antalgic gait  Extremity/Trunk Assessments:  RLE   RLE :  (grossly 3+/5; ankle NT)  LLE   LLE :  (grossly >3+/5; h/o TMA)  Outcome Measures:  [facility] Basic Mobility  Turning from your back to your side while in a flat bed without using bedrails: None  Moving from lying on your back to sitting on the side of a flat bed without using bedrails: None  Moving to and from bed to chair (including a wheelchair): A little  Standing up from a chair using your arms (e.g. wheelchair or bedside chair): A little  To walk in hospital room: A little  Climbing 3-5 steps with railing: A little  Basic Mobility - Total Score:  20    Encounter Problems       Encounter Problems (Active)       PT Problem       Ambulation (Progressing)       Start:  05/19/25    Expected End:  06/19/25       The patient will be able to ambulate at a mod indep level for >50ftx1 with LRAD.          Balance (Progressing)       Start:  05/19/25    Expected End:  06/19/25       The patient will demonstrate good+ dynamic standing balance during activity with LRAD.             Pain - Adult              Education Documentation  Mobility Training, taught by Sabi Bowser PT at 5/19/2025 11:54 AM.  Learner: Patient  Readiness: Acceptance  Method: Explanation  Response: Verbalizes Understanding  Comment: education provided on PT POC    Education Comments  No comments found.

## 2025-05-19 NOTE — CARE PLAN
The patient's goals for the shift include      The clinical goals for the shift include remain comfortable

## 2025-05-19 NOTE — ASSESSMENT & PLAN NOTE
Right lateral foot infection with open wound. MRI ankle revealed acute osteomyelitis   Consult podiatry doctor Fadi possible peroneal flap closure of the lateral foot open wound  Consult ID, Dr. Raygoza , He recommended ertapenem daily until 6/18/2025  Right lateral foot infection with open wound. MRI ankle revealed acute osteomyelitis   Patient recently discharged on 5/14/2025 home  healthcare did not come to her place to give her an antibiotic.  Patient missed her antibiotic 3 days with has a PICC line.  Consult podiatry doctor Fadi possible peroneal flap closure of the lateral foot open wound  Wound culture grew Streptococcus agalactia and MSSA.   Consult ID, Dr. Raygoza , He recommended ertapenem daily until 6/18/2025

## 2025-05-20 ENCOUNTER — HOME INFUSION (OUTPATIENT)
Dept: INFUSION THERAPY | Age: 50
End: 2025-05-20
Payer: MEDICARE

## 2025-05-20 ENCOUNTER — SPECIALTY PHARMACY (OUTPATIENT)
Dept: PHARMACY | Facility: CLINIC | Age: 50
End: 2025-05-20

## 2025-05-20 ENCOUNTER — DOCUMENTATION (OUTPATIENT)
Dept: HOME HEALTH SERVICES | Facility: HOME HEALTH | Age: 50
End: 2025-05-20
Payer: MEDICARE

## 2025-05-20 ENCOUNTER — HOME HEALTH ADMISSION (OUTPATIENT)
Dept: HOME HEALTH SERVICES | Facility: HOME HEALTH | Age: 50
End: 2025-05-20
Payer: MEDICARE

## 2025-05-20 VITALS
DIASTOLIC BLOOD PRESSURE: 56 MMHG | RESPIRATION RATE: 17 BRPM | OXYGEN SATURATION: 99 % | TEMPERATURE: 97.7 F | WEIGHT: 196 LBS | SYSTOLIC BLOOD PRESSURE: 86 MMHG | HEIGHT: 67 IN | BODY MASS INDEX: 30.76 KG/M2 | HEART RATE: 90 BPM

## 2025-05-20 DIAGNOSIS — T14.8XXA WOUND INFECTION: ICD-10-CM

## 2025-05-20 DIAGNOSIS — L08.9 WOUND INFECTION: ICD-10-CM

## 2025-05-20 LAB
ANION GAP SERPL CALCULATED.3IONS-SCNC: 10 MMOL/L (ref 10–20)
BACTERIA SPEC CULT: NORMAL
BUN SERPL-MCNC: 10 MG/DL (ref 6–23)
CALCIUM SERPL-MCNC: 9 MG/DL (ref 8.6–10.3)
CHLORIDE SERPL-SCNC: 106 MMOL/L (ref 98–107)
CO2 SERPL-SCNC: 24 MMOL/L (ref 21–32)
CREAT SERPL-MCNC: 0.61 MG/DL (ref 0.5–1.05)
EGFRCR SERPLBLD CKD-EPI 2021: >90 ML/MIN/1.73M*2
ERYTHROCYTE [DISTWIDTH] IN BLOOD BY AUTOMATED COUNT: 12.4 % (ref 11.5–14.5)
GLUCOSE BLD MANUAL STRIP-MCNC: 154 MG/DL (ref 74–99)
GLUCOSE BLD MANUAL STRIP-MCNC: 281 MG/DL (ref 74–99)
GLUCOSE SERPL-MCNC: 154 MG/DL (ref 74–99)
GRAM STN SPEC: NORMAL
GRAM STN SPEC: NORMAL
HCT VFR BLD AUTO: 42.3 % (ref 36–46)
HGB BLD-MCNC: 14.2 G/DL (ref 12–16)
MCH RBC QN AUTO: 29.6 PG (ref 26–34)
MCHC RBC AUTO-ENTMCNC: 33.6 G/DL (ref 32–36)
MCV RBC AUTO: 88 FL (ref 80–100)
NRBC BLD-RTO: 0 /100 WBCS (ref 0–0)
PLATELET # BLD AUTO: 239 X10*3/UL (ref 150–450)
POTASSIUM SERPL-SCNC: 3.9 MMOL/L (ref 3.5–5.3)
RBC # BLD AUTO: 4.8 X10*6/UL (ref 4–5.2)
SODIUM SERPL-SCNC: 136 MMOL/L (ref 136–145)
WBC # BLD AUTO: 10.2 X10*3/UL (ref 4.4–11.3)

## 2025-05-20 PROCEDURE — 2500000002 HC RX 250 W HCPCS SELF ADMINISTERED DRUGS (ALT 637 FOR MEDICARE OP, ALT 636 FOR OP/ED): Performed by: NURSE PRACTITIONER

## 2025-05-20 PROCEDURE — 2500000001 HC RX 250 WO HCPCS SELF ADMINISTERED DRUGS (ALT 637 FOR MEDICARE OP): Performed by: NURSE PRACTITIONER

## 2025-05-20 PROCEDURE — 99239 HOSP IP/OBS DSCHRG MGMT >30: CPT | Performed by: NURSE PRACTITIONER

## 2025-05-20 PROCEDURE — 85027 COMPLETE CBC AUTOMATED: CPT | Performed by: NURSE PRACTITIONER

## 2025-05-20 PROCEDURE — 82947 ASSAY GLUCOSE BLOOD QUANT: CPT

## 2025-05-20 PROCEDURE — 80048 BASIC METABOLIC PNL TOTAL CA: CPT | Performed by: NURSE PRACTITIONER

## 2025-05-20 RX ORDER — INSULIN GLARGINE 100 [IU]/ML
75 INJECTION, SOLUTION SUBCUTANEOUS NIGHTLY
Qty: 22.5 ML | Refills: 1 | Status: SHIPPED | OUTPATIENT
Start: 2025-05-20 | End: 2025-05-23 | Stop reason: SDUPTHER

## 2025-05-20 RX ADMIN — ARIPIPRAZOLE 10 MG: 10 TABLET ORAL at 08:34

## 2025-05-20 RX ADMIN — INSULIN LISPRO 2 UNITS: 100 INJECTION, SOLUTION INTRAVENOUS; SUBCUTANEOUS at 08:34

## 2025-05-20 RX ADMIN — VENLAFAXINE HYDROCHLORIDE 150 MG: 150 CAPSULE, EXTENDED RELEASE ORAL at 08:34

## 2025-05-20 RX ADMIN — SITAGLIPTIN 25 MG: 25 TABLET, FILM COATED ORAL at 08:34

## 2025-05-20 RX ADMIN — INSULIN LISPRO 6 UNITS: 100 INJECTION, SOLUTION INTRAVENOUS; SUBCUTANEOUS at 12:27

## 2025-05-20 ASSESSMENT — PAIN SCALES - GENERAL: PAINLEVEL_OUTOF10: 0 - NO PAIN

## 2025-05-20 ASSESSMENT — COGNITIVE AND FUNCTIONAL STATUS - GENERAL
DAILY ACTIVITIY SCORE: 24
MOBILITY SCORE: 23
CLIMB 3 TO 5 STEPS WITH RAILING: A LITTLE

## 2025-05-20 ASSESSMENT — PAIN - FUNCTIONAL ASSESSMENT: PAIN_FUNCTIONAL_ASSESSMENT: FLACC (FACE, LEGS, ACTIVITY, CRY, CONSOLABILITY)

## 2025-05-20 NOTE — DISCHARGE SUMMARY
"Discharge Diagnosis  Acute hematogenous osteomyelitis of right foot (Multi)       Issues Requiring Follow-Up  Follow up with Dr. Aponte in 5-7 days as discussed     Discharge Meds     Medication List      CHANGE how you take these medications     ertapenem IV; Commonly known as: INVanz; Infuse 50 mL (1 g) over 30   minutes into a venous catheter once every 24 hours for 30 doses. Routine   PICC line care Every Monday CBC with differential, ESR, CRP, creatinine   Fax all laboratory results to Dr. Raygoza, [phone] Appointment with Dr. aRygoza, 6/18, at 9 AM; What changed: additional instructions   insulin glargine 100 unit/mL injection; Commonly known as: Lantus;   Inject 75 Units under the skin once daily at bedtime. Take as directed per   insulin instructions.; What changed: how much to take     CONTINUE taking these medications     acetaminophen 325 mg tablet; Commonly known as: Tylenol; Take 2 tablets   (650 mg) by mouth every 6 hours if needed for fever (temp greater than   38.0 C) or moderate pain (4 - 6) for up to 7 days.   ARIPiprazole 10 mg tablet; Commonly known as: Abilify   BD Ultra-Fine Short Pen Needle 31 gauge x 5/16\" needle; Generic drug:   pen needle, diabetic; USE 1 PEN NEEDLE WITH LANTUS ONCE DAILY   blood-glucose meter misc; One touch ultra glucose meter, use daily as   directed   doxepin 100 mg capsule; Commonly known as: SINEquan   FreeStyle Bridget 3 [place] misc; Generic drug:   blood-glucose,,cont; Use as instructed to check blood sugar   * FreeStyle Bridget 3 Sensor device; Generic drug: blood-glucose sensor;   Use daily and change every 14 (fourteen) days.   * FreeStyle Bridget 3 Plus Sensor device; Generic drug: blood-glucose   sensor; Use every 15 days   Januvia 25 mg tablet; Generic drug: SITagliptin phosphate; TAKE 1 TABLET   BY MOUTH ONCE DAILY AS DIRECTED   medroxyPROGESTERone 150 mg/mL injection; Commonly known as:   Depo-Provera; Inject 1 mL (150 mg) into the muscle every 12 " weeks.   OneTouch Delica Plus Lancet 33 gauge misc; Generic drug: lancets; USE AS   DIRECTED TO CHECK BLOOD SUGAR ONCE DAILY   OneTouch Ultra Test; Generic drug: blood sugar diagnostic; USE AS   DIRECTED TO CHECK BLOOD SUGAR TWICE A DAY   propranolol 20 mg tablet; Commonly known as: Inderal   simvastatin 20 mg tablet; Commonly known as: Zocor; TAKE 1 TABLET BY   MOUTH EVERY NIGHT AT BEDTIME   venlafaxine  mg 24 hr capsule; Commonly known as: Effexor-XR  * This list has 2 medication(s) that are the same as other medications   prescribed for you. Read the directions carefully, and ask your doctor or   other care provider to review them with you.       Test Results Pending At Discharge  Pending Labs       Order Current Status    Blood Culture Preliminary result    Blood Culture Preliminary result            Hospital Course  Presented to ER 5/18 with right foot wound. Patient had been discharged 3 days prior, but had no home care visit to give IV antibiotics, PICC care or wound care. ID consulted and new orders for Invanz given. Podiatry consulted, continue to plan for outpatient evaluation and scheduling of wound closure. Patient declines wound VAC and removed prior to discharge. Declines dressing to wound. Insisting on leaving today regardless of arrangements, but home care states they are able to start care tomorrow and patient discharged with orders for antibiotics and follow up with podiatry rather than leaving AMA.     Pertinent Physical Exam At Time of Discharge  Physical Exam  Constitutional:       Appearance: Normal appearance.   HENT:      Head: Normocephalic and atraumatic.      Mouth/Throat:      Mouth: Mucous membranes are moist.      Pharynx: Oropharynx is clear.   Eyes:      Extraocular Movements: Extraocular movements intact.      Conjunctiva/sclera: Conjunctivae normal.      Pupils: Pupils are equal, round, and reactive to light.   Cardiovascular:      Rate and Rhythm: Normal rate and regular  rhythm.      Pulses: Normal pulses.      Heart sounds: Normal heart sounds.   Pulmonary:      Effort: Pulmonary effort is normal.      Breath sounds: Normal breath sounds.   Abdominal:      General: Bowel sounds are normal.      Palpations: Abdomen is soft.      Tenderness: There is no abdominal tenderness.   Musculoskeletal:      Cervical back: Normal range of motion and neck supple.      Comments: Right foot wound    Skin:     General: Skin is warm and dry.      Capillary Refill: Capillary refill takes less than 2 seconds.   Neurological:      General: No focal deficit present.      Mental Status: She is alert and oriented to person, place, and time.   Psychiatric:         Mood and Affect: Affect is angry.         Behavior: Behavior is agitated.         Outpatient Follow-Up  Future Appointments   Date Time Provider Department Center   6/10/2025 11:30 AM Diandra Gandhi MD [facility]         SUDHIR Cid-CNP

## 2025-05-20 NOTE — HH CARE COORDINATION
Home Care received a Referral for Infusion and Nursing. We have processed the referral for a Start of Care on 5/21/25.     If you have any questions or concerns, please feel free to contact us at [phone]. Follow the prompts, enter your five digit zip code, and you will be directed to your care team on [facility] 1.

## 2025-05-20 NOTE — CONSULTS
Wound Care Consult     Visit Date: 5/20/2025      Patient Name: Mary Pedroza         MRN: [id number]             Reason for Consult: Wound of Right Anterior ankle        Wound History: Defer to the patient's chart     Pertinent Labs: Defer to the patient's chart      Assessment:   Wound vac in place                      Wound Plan: Patient is currently followed by podiatry and has a wound vac in place and dressing orders. Wound and Ostomy care Services are signing off.      Neeraj Desir RN  5/20/2025  8:05 AM

## 2025-05-20 NOTE — CARE PLAN
The patient's goals for the shift include  go home    The clinical goals for the shift include remain free from falls

## 2025-05-20 NOTE — PROGRESS NOTES
05/20/25 1021   Discharge Planning   Expected Discharge Disposition Home H  ([facility])     TCC Spoke to patient, pt no longer wants to go to SNF would like to return home with [facility] for infusion and vac chances     ** do not discharge without speaking to care coordination**

## 2025-05-20 NOTE — PROGRESS NOTES
05/20/25  HOME INFUSION ASSESSMENT NOTE    Reviewed Patient info as correct.   DX... OM of the right lateral malleolus, MSSA  Reviewed allergies… RX Allergies[1]     PMH:  has a past medical history of Anxiety and depression, Bipolar 1 disorder (Multi), Bursitis of right hip (09/15/2023), Diabetes mellitus, type 2 (Multi), and Hyperlipidemia.    TOB:  reports that she has been smoking cigarettes. She has a 15 pack-year smoking history. She has been exposed to tobacco smoke. She has never used smokeless tobacco.    Weight….  88.9 KG Height…. 170.2 CM       No medication Interactions.  Reviewed relevant Baseline Labs.  Labs for Home Infusion are: CBC w/Diff, ESR, CRP creatinine weekly on Mondays  Patient has PICC 1L Line placed 05/08/25 with  39CM length, Flush per [facility] Protocol.  Continue med through tentative stop date: 06/18/25  MD Following: Dr Raygoza  Medication placed in: Elastomeric Pump  Care Plan Done Today.      Mary Pedroza   is a  49 y.o. female  that is being discharged from the hospital with a diagnosis of OM of the ankle…Patient is a returning [facility] client...Allergies and PMH listed above ...Patient is ordered ertapenem 1Gm IV q24h..Start of care is 05/21/25 and continues through 06/18/25 ....Orders reviewed and appropriate for patient and indication...Weekly labs ordered and Dr Raygoza is following.    Medication profile reviewed and appropriate.    MD appointment is scheduled for 06/18/25.      Confirmed address and delivery time for patient …address updated to [address] [place] [address]...requests elastomeric balls. OK with delivery to home by 9pm.  Counseled patient on medication and all questions asked.      Processed fill of 7 HP ertapenem for 05/20/25 mix and delivery by 9 pm ...Fill is a 7 day supply and covers 05/21 through  05/27/25.      Follow up 05/27 with patient progress and labs if available... Refill ertapenem for Straight delivery.         [1] No Known Allergies

## 2025-05-20 NOTE — NURSING NOTE
Pt A&O x4 currently resting in bed. No complaints or concerns. Call light within reach.  Pt continues on atb per order.   Wound vac in place to rt ankle.

## 2025-05-20 NOTE — PROGRESS NOTES
"PODIATRIC MEDICINE & SURGERY - PROGRESS NOTE    Subjective   Mary Pedroza is a 49 y.o. female who is on day 2 of admission for Acute hematogenous osteomyelitis of right foot (Multi).      Interval HPI: Patient resting comfortably in bed. Reports pain is minimal to right ankle wound. Denies current constitutional sx. No further complaints.     12:41 PM: Pt was initially agreeable to SNF. Per nursing and case management, now declining SNF and requesting to leave AMA    REVIEW OF SYSTEMS  A 10+ point ROS was completed and is negative unless as otherwise noted.    Past Medical History  Medical History[1]  Allergies  RX Allergies[2]  Medications  Scheduled Medications[3]  Continuous Medications[4]  PRN Medications[5]    Objective   Visit Vitals  BP 86/56 (BP Location: Left arm, Patient Position: Sitting)   Pulse 90   Temp 36.5 °C (97.7 °F) (Oral)   Resp 17   Ht 1.702 m (5' 7\")   Wt 88.9 kg (196 lb)   SpO2 99%   BMI 30.70 kg/m²   OB Status Injection   Smoking Status Every Day   BSA 2.05 m²     PHYSICAL EXAM  Lower Extremity Focused Exam  Vascular:   DP and PT pulses are palpable bilateral. Capillary fill time is brisk to bilateral hallux. Skin temperature is warm to warm proximal to distal bilateral. Focal increase in temperature is noted to the right lateral ankle. 1+ non-pitting edema to the right lower extremity. No pain with calf compression B/L     Neuro:  No focal deficit. Gross sensation is intact. Protective sensation mildly diminished b/l.     Musculoskeletal:   Ambulates unassisted. Muscle strength is 5/5 to the intrinsic and extrinsic muscles of the foot and ankle B/L. Previous left foot transmetatarsal amputation; well-healed. No pain with right ankle joint range of motion     Dermatologic:  Good attention to pedal hygiene. The skin is well-hydrated to the lower extremities. Nails 1-5 right are intact. The pedal interdigital spaces are clean and dry. Right lateral ankle wound as described below:    "   WOUND #1: Right lateral ankle  Measurements: Wound measures approximately 4.5 cm x  4.5 cm x 0.8 cm. Probes to fibula.   Base: The base is 40% nonviable with fibrotic and necrotic slough. The remaining tissue base is granular.   Rim: The skin edges are intact with no maceration. No rolling of skin edges.   Drainage: Serous drainage and fibrotic/necrotic slough. Mild malodor noted.  Periwound: Mild periwound erythema and edema.  No ascending cellulitis or lymphangitis. No palpable fluctuance.   Pain: No    Lab Results   Component Value Date    WBC 10.2 05/20/2025    HGB 14.2 05/20/2025    HCT 42.3 05/20/2025    MCV 88 05/20/2025     05/20/2025     Lab Results   Component Value Date    GLUCOSE 154 (H) 05/20/2025    CALCIUM 9.0 05/20/2025     05/20/2025    K 3.9 05/20/2025    CO2 24 05/20/2025     05/20/2025    BUN 10 05/20/2025    CREATININE 0.61 05/20/2025     Lab Results   Component Value Date    HGBA1C 9.5 (H) 05/06/2025     Susceptibility data from last 90 days.  Collected Specimen Info Organism Clindamycin Erythromycin Oxacillin Tetracycline Trimethoprim/Sulfamethoxazole Vancomycin   05/18/25 Tissue/Biopsy from Wound/Tissue Mixed Skin Microorganisms          05/07/25 Tissue/Biopsy from Wound/Tissue Methicillin Susceptible Staphylococcus aureus (MSSA)  R  R  S  R  S  S     Streptococcus agalactiae (Group B Streptococcus)           Mixed Anaerobic Bacteria           Mixed Gram-Positive and Gram-Negative Bacteria         05/06/25 Tissue/Biopsy from Wound/Tissue Methicillin Susceptible Staphylococcus aureus (MSSA)  S  S  S  S  S  S     Streptococcus agalactiae (Group B Streptococcus)           Mixed Anaerobic Bacteria           Mixed Gram-Positive and Gram-Negative Bacteria           Imaging  XR foot right 3+ views  Result Date: 5/18/2025  No acute fracture or dislocation.  No definite plain radiographic evidence of osteomyelitis.  If of concern, an MRI may be helpful for further evaluation. .  Signed by Gonzalo Cavazos MD    Cardiology, Vascular, and Other Imaging  No other imaging results found for the past 7 days    Assessment/Plan   Mary Pedroza is a 49 y.o. female who is on day 2 of admission for Acute hematogenous osteomyelitis of right foot (Multi).     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 Findings discussed with patient in detail. Patient was recently discharged on 5/14/25 with plan for outpatient IV abx but was not able to get her antibiotic from home health care. Patient missed 3 days of antibiotics. On exam, right lateral ankle wound with notable improvement of necrotic and nonviable tissue compared to previous admission.    - No plan for podiatric surgical intervention during this admission.  - Plan for outpatient surgery as previously discussed with patient.  - Outpatient follow up with Dr. Aponte 5-7 days after discharge for surgical consultation.     5/20/2025  - 7:35 AM - Applied wound vac to right lateral ankle wound. Pressure (-)125 mmHg.   - 12:41 PM - Pt was initially agreeable to SNF. Per nursing and case management, now declining SNF and requesting to leave AMA. Order placed for Peace wound vac. Discussed with case management and nurse.     Plan discussed with attending physician.     Florencia Zarate DPM PGY-2  Chief Resident  Podiatric Medicine & Surgery         [1]   Past Medical History:  Diagnosis Date    Anxiety and depression     Bipolar 1 disorder (Multi)     Bursitis of right hip 09/15/2023    Diabetes mellitus, type 2 (Multi)     Hyperlipidemia    [2] No Known Allergies  [3] ARIPiprazole, 10 mg, oral, Daily  doxepin, 100 mg, oral, Nightly  ertapenem, 1 g, intravenous, q24h  insulin glargine, 75 Units, subcutaneous,  Nightly  insulin lispro, 0-10 Units, subcutaneous, TID AC  propranolol, 20 mg, oral, BID  simvastatin, 20 mg, oral, Nightly  SITagliptin phosphate, 25 mg, oral, Daily  venlafaxine XR, 150 mg, oral, Daily  [4]    [5] PRN medications: acetaminophen **OR** acetaminophen **OR** acetaminophen, alteplase, benzocaine-menthol, bisacodyl, bisacodyl, dextrose, dextrose, glucagon, glucagon, ondansetron **OR** ondansetron, polyethylene glycol, prochlorperazine **OR** prochlorperazine **OR** prochlorperazine, sodium chloride 0.9%, sodium chloride 0.9%

## 2025-05-20 NOTE — PROGRESS NOTES
05/20/25 1305   Discharge Planning   Expected Discharge Disposition Home H  ([facility] 5/21)     Patient to have preveena wound vac placed that will not require any changes   Then pt ok to discharge     NO BARRIERS TO DISCHARGE FROM CARE TRANSITIONS

## 2025-05-21 ENCOUNTER — HOME CARE VISIT (OUTPATIENT)
Dept: HOME HEALTH SERVICES | Facility: HOME HEALTH | Age: 50
End: 2025-05-21
Payer: MEDICARE

## 2025-05-21 ENCOUNTER — PATIENT OUTREACH (OUTPATIENT)
Dept: PRIMARY CARE | Facility: CLINIC | Age: 50
End: 2025-05-21
Payer: MEDICARE

## 2025-05-21 VITALS
OXYGEN SATURATION: 95 % | HEIGHT: 67 IN | SYSTOLIC BLOOD PRESSURE: 88 MMHG | RESPIRATION RATE: 16 BRPM | HEART RATE: 92 BPM | DIASTOLIC BLOOD PRESSURE: 54 MMHG | TEMPERATURE: 97.8 F | WEIGHT: 196 LBS | BODY MASS INDEX: 30.76 KG/M2

## 2025-05-21 DIAGNOSIS — E11.65 TYPE 2 DIABETES MELLITUS WITH HYPERGLYCEMIA, WITH LONG-TERM CURRENT USE OF INSULIN: ICD-10-CM

## 2025-05-21 DIAGNOSIS — E11.9 TYPE 2 DIABETES MELLITUS WITHOUT COMPLICATION, WITHOUT LONG-TERM CURRENT USE OF INSULIN: ICD-10-CM

## 2025-05-21 DIAGNOSIS — Z79.4 TYPE 2 DIABETES MELLITUS WITH HYPERGLYCEMIA, WITH LONG-TERM CURRENT USE OF INSULIN: ICD-10-CM

## 2025-05-21 PROCEDURE — 169592 NO-PAY CLAIM PROCEDURE

## 2025-05-21 PROCEDURE — G0299 HHS/HOSPICE OF RN EA 15 MIN: HCPCS | Mod: HHH

## 2025-05-21 RX ORDER — BLOOD SUGAR DIAGNOSTIC
STRIP MISCELLANEOUS
Qty: 200 EACH | Refills: 1 | Status: SHIPPED | OUTPATIENT
Start: 2025-05-21

## 2025-05-21 RX ORDER — DEXTROSE 4 G
TABLET,CHEWABLE ORAL
Qty: 1 EACH | Refills: 0 | Status: SHIPPED | OUTPATIENT
Start: 2025-05-21

## 2025-05-21 RX ORDER — LANCETS 33 GAUGE
EACH MISCELLANEOUS
Qty: 100 EACH | Refills: 1 | Status: SHIPPED | OUTPATIENT
Start: 2025-05-21

## 2025-05-21 RX ADMIN — ERTAPENEM 1 G: 1 INJECTION, POWDER, LYOPHILIZED, FOR SOLUTION INTRAMUSCULAR; INTRAVENOUS at 12:02

## 2025-05-21 RX ADMIN — Medication 10 ML: at 12:33

## 2025-05-21 RX ADMIN — Medication 10 ML: at 12:00

## 2025-05-21 NOTE — PROGRESS NOTES
Discharge facility: [facility]  Discharge diagnosis:  Acute hematogenous osteomyelitis of right foot     Issues Requiring Follow-Up  Follow up with Dr. Aponte in 5-7 days as discussed       Admission date: 5/18/25  Discharge date: 5/20/25    PCP Appointment Date: Unsuccessful attempts x2 to reach patient for PCP Follow-up, message sent to office  Specialist Appointment Date: 6/10/25 OB/GYN. 6/18/25 ID  Hospital Encounter and Summary: Linked     ED to Hosp-Admission (Discharged) with Dirk Watson MD (05/18/2025)     Two attempts were made to reach patient within two business days after discharge.  Unable to reach patient. No voicemail set up to leave message

## 2025-05-21 NOTE — PROGRESS NOTES
Patient discharged home without having Prevena wound vac in place.       Dr. Aponte signed orders for [facility] wound vac, order approved.   Patient left prior to having vac applied and prior to it being delivered.      [facility] updated and will have home wound vac delivered to patients address.  [facility] updated that wound vac will need placed by home health nurse once delivered.

## 2025-05-22 ENCOUNTER — APPOINTMENT (OUTPATIENT)
Facility: CLINIC | Age: 50
End: 2025-05-22
Payer: MEDICARE

## 2025-05-22 DIAGNOSIS — Z79.4 TYPE 2 DIABETES MELLITUS WITH HYPERGLYCEMIA, WITH LONG-TERM CURRENT USE OF INSULIN: Primary | ICD-10-CM

## 2025-05-22 DIAGNOSIS — E11.65 TYPE 2 DIABETES MELLITUS WITH HYPERGLYCEMIA, WITH LONG-TERM CURRENT USE OF INSULIN: Primary | ICD-10-CM

## 2025-05-22 DIAGNOSIS — E78.2 MIXED HYPERLIPIDEMIA: ICD-10-CM

## 2025-05-22 DIAGNOSIS — I10 ESSENTIAL HYPERTENSION, BENIGN: ICD-10-CM

## 2025-05-22 LAB
BACTERIA BLD CULT: NORMAL
BACTERIA BLD CULT: NORMAL

## 2025-05-22 NOTE — PROGRESS NOTES
HPI   47 yo  with dm2 (dx 2012), depression, anxiety, dyslipidemia. A1c-10.7% June 2023, March 2024 9.4%, today 11%.       Testing sugars 4X/day with bridget 2 -hasn't been using d/t issues with adhesion. No low sugars. Am sugars mid 100's, similar at lunch, not testing at dinner, low 200's at bedtime. Pt is trying to watch carbs in the diet. Not physically active. Meds being covered.           Taking Lantus 75 units pen, januvia 25mg, glimepiride 4mg daily. Not taking Ozempic added last visit, per pt caused numb sensation 1st week taking so she stopped.   -did not tolerate metformin  -no tzd, no sglt2-i      Taking simvastatin 20mg for lipids and tolerating.     -had amputation on foot on L side             Current Outpatient Medications:     ARIPiprazole (Abilify) 10 mg tablet, Take 1 tablet (10 mg) by mouth once daily., Disp: , Rfl:     blood sugar diagnostic (OneTouch Ultra Test), USE AS DIRECTED TO CHECK BLOOD SUGAR TWICE A DAY, Disp: 200 each, Rfl: 1    blood-glucose meter misc, One touch ultra glucose meter, use daily as directed, Disp: 1 each, Rfl: 0    blood-glucose meter misc, Use every day, Disp: 1 each, Rfl: 0    blood-glucose sensor (FreeStyle Bridget 3 Plus Sensor) device, Use every 15 days, Disp: 2 each, Rfl: 11    doxepin (SINEquan) 100 mg capsule, 1 capsule (100 mg) once daily at bedtime., Disp: , Rfl:     ertapenem (INVanz) IV, Infuse 50 mL (1 g) over 30 minutes into a venous catheter once every 24 hours for 30 doses. Routine PICC line care Every Monday CBC with differential, ESR, CRP, creatinine Fax all laboratory results to Dr. Raygoza, [phone] Appointment with Dr. Raygoza, 6/18, at 9 AM, Disp: 1500 mL, Rfl: 0    FreeStyle Bridget 3 [place] misc, Use as instructed to check blood sugar, Disp: 1 each, Rfl: 0    FreeStyle Bridget 3 Sensor device, Use daily and change every 14 (fourteen) days., Disp: 2 each, Rfl: 5    insulin glargine (Lantus) 100 unit/mL injection, Inject 75 Units under the skin once daily at  "bedtime. Take as directed per insulin instructions., Disp: 22.5 mL, Rfl: 1    lancets (OneTouch Delica Plus Lancet) 33 gauge misc, USE AS DIRECTED TO CHECK BLOOD SUGAR ONCE DAILY, Disp: 100 each, Rfl: 1    medroxyPROGESTERone (Depo-Provera) 150 mg/mL injection, Inject 1 mL (150 mg) into the muscle every 12 weeks., Disp: 1 mL, Rfl: 3    pen needle, diabetic (BD Ultra-Fine Short Pen Needle) 31 gauge x 5/16\" needle, USE 1 PEN NEEDLE WITH LANTUS ONCE DAILY, Disp: 100 each, Rfl: 3    propranolol (Inderal) 20 mg tablet, 1 tablet (20 mg) 2 times a day., Disp: , Rfl:     simvastatin (Zocor) 20 mg tablet, TAKE 1 TABLET BY MOUTH EVERY NIGHT AT BEDTIME, Disp: 90 tablet, Rfl: 0    SITagliptin phosphate (Januvia) 25 mg tablet, Take 1 tablet (25 mg) by mouth once daily. as directed, Disp: 30 tablet, Rfl: 1    venlafaxine XR (Effexor-XR) 150 mg 24 hr capsule, Take 1 capsule (150 mg) by mouth once daily., Disp: , Rfl:       Allergies as of 05/22/2025    (No Known Allergies)         Review of Systems   Cardiology: Lightheadedness-denies.  Chest pain-denies.  Leg edema-denies.  Palpitations-denies.  Respiratory: Cough-denies. Shortness of breath-denies.  Wheezing-denies.  Gastroenterology: Constipation-denies.  Diarrhea-denies.  Heartburn-denies.  Endocrinology: Cold intolerance-denies.  Heat intolerance-denies.  Sweats-denies.  Neurology: Headache-denies.  Tremor-denies.  Neuropathy in extremities-denies.  Psychology: Low energy-denies.  Irritability-denies.  Sleep disturbances-denies.      There were no vitals taken for this visit.      Labs:  Lab Results   Component Value Date    WBC 10.2 05/20/2025    NRBC 0.0 05/20/2025    RBC 4.80 05/20/2025    HGB 14.2 05/20/2025    HCT 42.3 05/20/2025     05/20/2025     Lab Results   Component Value Date    CALCIUM 9.0 05/20/2025    AST 15 05/18/2025    ALKPHOS 70 05/18/2025    BILITOT 0.7 05/18/2025    PROT 7.0 05/18/2025    ALBUMIN 3.7 05/18/2025    GLOB 3.7 07/28/2023    AGR 1.0 (L) " 07/28/2023     05/20/2025    K 3.9 05/20/2025     05/20/2025    CO2 24 05/20/2025    ANIONGAP 10 05/20/2025    BUN 10 05/20/2025    CREATININE 0.61 05/20/2025    UREACREAUR 10.0 07/28/2023    GLUCOSE 154 (H) 05/20/2025    ALT 26 05/18/2025    EGFR >90 05/20/2025     Lab Results   Component Value Date    CHOL 314 (H) 08/04/2022    TRIG 1,208 (H) 08/04/2022    HDL 23 (L) 08/04/2022    LDLCALC  08/04/2022     Unable to report calculated LDL due to increased triglycerides. Direct     Lab Results   Component Value Date    MICROALBCREA 21.6 12/21/2022     Lab Results   Component Value Date    TSH 1.99 05/24/2021     Lab Results   Component Value Date    [id number] 424 05/24/2021     Lab Results   Component Value Date    HGBA1C 9.5 (H) 05/06/2025         Physical Exam   General Appearance: pleasant, cooperative, no acute distress  HEENT: no chemosis, no proptosis, no lid lag, no lid retraction  Neck: no lymphadenopathy, no thyromegaly, no dominant thyroid nodules  Heart: no murmurs, regular rate and rhythm, S1 and S2  Lungs: no wheezes, no rhonci, no rales  Extremities: no lower extremity swelling      Assessment/Plan   1. Type 2 diabetes mellitus with hyperglycemia, with long-term current use of insulin (Primary)      2. Mixed hyperlipidemia      3. Essential hypertension, benign           Follow Up:      -labs/tests/notes reviewed  -reviewed and counseled patient on medication monitoring and side effects

## 2025-05-23 ENCOUNTER — TELEPHONE (OUTPATIENT)
Facility: CLINIC | Age: 50
End: 2025-05-23

## 2025-05-23 ENCOUNTER — OFFICE VISIT (OUTPATIENT)
Facility: CLINIC | Age: 50
End: 2025-05-23
Payer: MEDICARE

## 2025-05-23 ENCOUNTER — HOME INFUSION (OUTPATIENT)
Dept: INFUSION THERAPY | Age: 50
End: 2025-05-23

## 2025-05-23 VITALS
SYSTOLIC BLOOD PRESSURE: 94 MMHG | WEIGHT: 197.6 LBS | BODY MASS INDEX: 30.94 KG/M2 | HEART RATE: 91 BPM | DIASTOLIC BLOOD PRESSURE: 57 MMHG

## 2025-05-23 DIAGNOSIS — E78.2 MIXED HYPERLIPIDEMIA: ICD-10-CM

## 2025-05-23 DIAGNOSIS — T14.8XXA WOUND INFECTION: ICD-10-CM

## 2025-05-23 DIAGNOSIS — E11.65 TYPE 2 DIABETES MELLITUS WITH HYPERGLYCEMIA, WITH LONG-TERM CURRENT USE OF INSULIN: ICD-10-CM

## 2025-05-23 DIAGNOSIS — E11.59 TYPE 2 DIABETES MELLITUS WITH OTHER CIRCULATORY COMPLICATIONS: ICD-10-CM

## 2025-05-23 DIAGNOSIS — I10 ESSENTIAL HYPERTENSION, BENIGN: ICD-10-CM

## 2025-05-23 DIAGNOSIS — L08.9 WOUND INFECTION: ICD-10-CM

## 2025-05-23 DIAGNOSIS — Z79.4 TYPE 2 DIABETES MELLITUS WITH HYPERGLYCEMIA, WITH LONG-TERM CURRENT USE OF INSULIN: ICD-10-CM

## 2025-05-23 PROCEDURE — 3046F HEMOGLOBIN A1C LEVEL >9.0%: CPT | Performed by: INTERNAL MEDICINE

## 2025-05-23 PROCEDURE — 4004F PT TOBACCO SCREEN RCVD TLK: CPT | Performed by: INTERNAL MEDICINE

## 2025-05-23 PROCEDURE — 3078F DIAST BP <80 MM HG: CPT | Performed by: INTERNAL MEDICINE

## 2025-05-23 PROCEDURE — 99214 OFFICE O/P EST MOD 30 MIN: CPT | Performed by: INTERNAL MEDICINE

## 2025-05-23 PROCEDURE — 3074F SYST BP LT 130 MM HG: CPT | Performed by: INTERNAL MEDICINE

## 2025-05-23 RX ORDER — INSULIN GLARGINE 100 [IU]/ML
75 INJECTION, SOLUTION SUBCUTANEOUS NIGHTLY
Qty: 30 ML | Refills: 5 | Status: SHIPPED | OUTPATIENT
Start: 2025-05-23

## 2025-05-23 RX ORDER — INSULIN ASPART INJECTION 100 [IU]/ML
INJECTION, SOLUTION SUBCUTANEOUS
Qty: 30 ML | Refills: 5 | Status: SHIPPED | OUTPATIENT
Start: 2025-05-23

## 2025-05-23 RX ORDER — BLOOD-GLUCOSE METER
1 EACH MISCELLANEOUS 3 TIMES DAILY
Qty: 300 STRIP | Refills: 1 | Status: SHIPPED | OUTPATIENT
Start: 2025-05-23

## 2025-05-23 RX ORDER — INSULIN PUMP SYRINGE, 3 ML
EACH MISCELLANEOUS
Qty: 1 EACH | Refills: 0 | Status: SHIPPED | OUTPATIENT
Start: 2025-05-23 | End: 2026-05-23

## 2025-05-23 RX ORDER — PEN NEEDLE, DIABETIC 30 GX3/16"
NEEDLE, DISPOSABLE MISCELLANEOUS
Qty: 150 EACH | Refills: 6 | Status: SHIPPED | OUTPATIENT
Start: 2025-05-23

## 2025-05-23 RX ORDER — LANCETS 33 GAUGE
1 EACH MISCELLANEOUS 3 TIMES DAILY
Qty: 100 EACH | Refills: 6 | Status: SHIPPED | OUTPATIENT
Start: 2025-05-23 | End: 2026-05-23

## 2025-05-23 ASSESSMENT — PATIENT HEALTH QUESTIONNAIRE - PHQ9
1. LITTLE INTEREST OR PLEASURE IN DOING THINGS: SEVERAL DAYS
2. FEELING DOWN, DEPRESSED OR HOPELESS: SEVERAL DAYS
10. IF YOU CHECKED OFF ANY PROBLEMS, HOW DIFFICULT HAVE THESE PROBLEMS MADE IT FOR YOU TO DO YOUR WORK, TAKE CARE OF THINGS AT HOME, OR GET ALONG WITH OTHER PEOPLE: SOMEWHAT DIFFICULT
SUM OF ALL RESPONSES TO PHQ9 QUESTIONS 1 AND 2: 2

## 2025-05-23 ASSESSMENT — ENCOUNTER SYMPTOMS: DEPRESSION: 1

## 2025-05-23 ASSESSMENT — PAIN SCALES - GENERAL: PAINLEVEL_OUTOF10: 0-NO PAIN

## 2025-05-23 NOTE — PROGRESS NOTES
Chart Review: Patient ordered Ertapenem 1g IV q254 through 6/18 for OM of right ankle    Labs and follow up with Dr Raygoza    Telephone call with patient - independent with infusions - aware of ID follow up appt  Agreed to delivery on 5/27 of a week of IV antibiotics and supplies     Dispense x7 doses of Erta in HP for cmpd and delivery on 5/27  Infusions 5/28 through 6/3    NF 6/3 straight - lab check

## 2025-05-23 NOTE — TELEPHONE ENCOUNTER
Cancelled Mary's 10/06/2025 appt with  on 05/23/2025 . He wants her to see Hiral in 3 months. This was scheduled.

## 2025-05-23 NOTE — PROGRESS NOTES
HPI   48 yo  with dm2 (dx 2012, L toe amputations, R foot infection), depression, anxiety, dyslipidemia. A1c-11% last fall, this month 9.5% (called pt to have add on visit/overdue for visit/recent hospitalization).       Testing sugars 4X/day with bridget 2/recently lost meter.  Fingersticks in 200-300 range. Pt is trying to watch carbs in the diet. Not physically active. Meds being covered.           Taking Lantus 75 units pen, januvia 25mg, glimepiride 4mg daily.   -Ozempic caused numb sensation 1st week taking so she stopped  -did not tolerate metformin  -no tzd, no sglt2-i   -mounjaro not covered    -pt's roommate accidentally threw out all her insulin this week, no fsbs meter, no bridget meter (can't use with phone currently either)     Taking simvastatin 20mg for lipids and tolerating.      -recently hospitalized for R foot infection/has picc line/will follow with ID    Current Outpatient Medications:     ARIPiprazole (Abilify) 10 mg tablet, Take 1 tablet (10 mg) by mouth once daily., Disp: , Rfl:     blood sugar diagnostic (OneTouch Ultra Test), USE AS DIRECTED TO CHECK BLOOD SUGAR TWICE A DAY, Disp: 200 each, Rfl: 1    blood-glucose meter misc, One touch ultra glucose meter, use daily as directed, Disp: 1 each, Rfl: 0    blood-glucose meter misc, Use every day, Disp: 1 each, Rfl: 0    blood-glucose sensor (FreeStyle Bridget 3 Plus Sensor) device, Use every 15 days, Disp: 2 each, Rfl: 11    doxepin (SINEquan) 100 mg capsule, 1 capsule (100 mg) once daily at bedtime., Disp: , Rfl:     ertapenem (INVanz) IV, Infuse 50 mL (1 g) over 30 minutes into a venous catheter once every 24 hours for 30 doses. Routine PICC line care Every Monday CBC with differential, ESR, CRP, creatinine Fax all laboratory results to Dr. Raygoza, [phone] Appointment with Dr. Raygoza, 6/18, at 9 AM, Disp: 1500 mL, Rfl: 0    FreeStyle Bridget 3 [place] misc, Use as instructed to check blood sugar, Disp: 1 each, Rfl: 0    FreeStyle Bridget 3 Sensor  "device, Use daily and change every 14 (fourteen) days., Disp: 2 each, Rfl: 5    insulin glargine (Lantus) 100 unit/mL injection, Inject 75 Units under the skin once daily at bedtime. Take as directed per insulin instructions., Disp: 22.5 mL, Rfl: 1    lancets (OneTouch Delica Plus Lancet) 33 gauge misc, USE AS DIRECTED TO CHECK BLOOD SUGAR ONCE DAILY, Disp: 100 each, Rfl: 1    medroxyPROGESTERone (Depo-Provera) 150 mg/mL injection, Inject 1 mL (150 mg) into the muscle every 12 weeks., Disp: 1 mL, Rfl: 3    pen needle, diabetic (BD Ultra-Fine Short Pen Needle) 31 gauge x 5/16\" needle, USE 1 PEN NEEDLE WITH LANTUS ONCE DAILY, Disp: 100 each, Rfl: 3    propranolol (Inderal) 20 mg tablet, 1 tablet (20 mg) 2 times a day., Disp: , Rfl:     simvastatin (Zocor) 20 mg tablet, TAKE 1 TABLET BY MOUTH EVERY NIGHT AT BEDTIME, Disp: 90 tablet, Rfl: 0    SITagliptin phosphate (Januvia) 25 mg tablet, Take 1 tablet (25 mg) by mouth once daily. as directed, Disp: 30 tablet, Rfl: 1    venlafaxine XR (Effexor-XR) 150 mg 24 hr capsule, Take 1 capsule (150 mg) by mouth once daily., Disp: , Rfl:       Allergies as of 05/23/2025    (No Known Allergies)         Review of Systems   Cardiology: Lightheadedness-denies.  Chest pain-denies.  Leg edema-denies.  Palpitations-denies.  Respiratory: Cough-denies. Shortness of breath-denies.  Wheezing-denies.  Gastroenterology: Constipation-denies.  Diarrhea-denies.  Heartburn-denies.  Endocrinology: Cold intolerance-denies.  Heat intolerance-denies.  Sweats-denies.  Neurology: Headache-denies.  Tremor-denies.  Neuropathy in extremities-denies.  Psychology: Low energy-denies.  Irritability-denies.  Sleep disturbances-denies.      BP 94/57 (BP Location: Left arm, Patient Position: Sitting, BP Cuff Size: Adult)   Pulse 91   Wt 89.6 kg (197 lb 9.6 oz)   LMP  (LMP Unknown)   BMI 30.94 kg/m²       Labs:  Lab Results   Component Value Date    WBC 10.2 05/20/2025    NRBC 0.0 05/20/2025    RBC 4.80 " 05/20/2025    HGB 14.2 05/20/2025    HCT 42.3 05/20/2025     05/20/2025     Lab Results   Component Value Date    CALCIUM 9.0 05/20/2025    AST 15 05/18/2025    ALKPHOS 70 05/18/2025    BILITOT 0.7 05/18/2025    PROT 7.0 05/18/2025    ALBUMIN 3.7 05/18/2025    GLOB 3.7 07/28/2023    AGR 1.0 (L) 07/28/2023     05/20/2025    K 3.9 05/20/2025     05/20/2025    CO2 24 05/20/2025    ANIONGAP 10 05/20/2025    BUN 10 05/20/2025    CREATININE 0.61 05/20/2025    UREACREAUR 10.0 07/28/2023    GLUCOSE 154 (H) 05/20/2025    ALT 26 05/18/2025    EGFR >90 05/20/2025     Lab Results   Component Value Date    CHOL 314 (H) 08/04/2022    TRIG 1,208 (H) 08/04/2022    HDL 23 (L) 08/04/2022    LDLCALC  08/04/2022     Unable to report calculated LDL due to increased triglycerides. Direct     Lab Results   Component Value Date    MICROALBCREA 21.6 12/21/2022     Lab Results   Component Value Date    TSH 1.99 05/24/2021     Lab Results   Component Value Date    [id number] 424 05/24/2021     Lab Results   Component Value Date    HGBA1C 9.5 (H) 05/06/2025         Physical Exam   General Appearance: pleasant, cooperative, no acute distress  HEENT: no chemosis, no proptosis, no lid lag, no lid retraction  Neck: no lymphadenopathy, no thyromegaly, no dominant thyroid nodules  Heart: no murmurs, regular rate and rhythm, S1 and S2  Lungs: no wheezes, no rhonci, no rales  Extremities: no lower extremity swelling      Assessment/Plan   1. Type 2 diabetes mellitus with hyperglycemia, with long-term current use of insulin  -A1c ordered and reviewed  -glycemic values reviewed  -labs reviewed  -family member assisting today    -pt lost meter/testing supplies/insulin  -resend rx  -use 75 lantus every day, and fiasp 6 units at meals with isf 2 for 50 over 150, one touch verio    2. Mixed hyperlipidemia  -on statin, will need repeat labs  -levels should improve with better glycemic control    3. Essential hypertension, benign  -low  normal, will follow         Follow Up:  Cde 3 months    Medical Decision Making  Complexity of problem: Chronic illness of diabetes mellitus uncontrolled, progressing  Data analyzed and reviewed: Reviewed prior notes, blood glucose data, labs including HgbA1c, lipids, serum chemistries.  Ordered tests.   Risk of complications and morbidities: Is definite because of use of insulin and risk of hypoglycemia.  Prescription medications reviewed and modifications made.  Compliance assessed.  Addressed social determinants of health including food insecurity.

## 2025-05-25 ASSESSMENT — ENCOUNTER SYMPTOMS
MUSCLE WEAKNESS: 1
CHANGE IN APPETITE: UNCHANGED
APPETITE LEVEL: FAIR
PAIN: 1
PAIN LOCATION - PAIN SEVERITY: 5/10
FATIGUES EASILY: 1
PAIN LOCATION: GENERALIZED
PERSON REPORTING PAIN: PATIENT

## 2025-05-25 ASSESSMENT — ACTIVITIES OF DAILY LIVING (ADL)
ENTERING_EXITING_HOME: ONE PERSON
OASIS_M1830: 05

## 2025-05-26 ENCOUNTER — HOME CARE VISIT (OUTPATIENT)
Dept: HOME HEALTH SERVICES | Facility: HOME HEALTH | Age: 50
End: 2025-05-26
Payer: MEDICARE

## 2025-05-26 VITALS
HEART RATE: 90 BPM | RESPIRATION RATE: 16 BRPM | OXYGEN SATURATION: 96 % | SYSTOLIC BLOOD PRESSURE: 100 MMHG | DIASTOLIC BLOOD PRESSURE: 58 MMHG | TEMPERATURE: 98.2 F

## 2025-05-26 PROCEDURE — G0299 HHS/HOSPICE OF RN EA 15 MIN: HCPCS | Mod: HHH

## 2025-05-26 RX ADMIN — Medication 10 ML: at 11:15

## 2025-05-26 RX ADMIN — Medication 20 ML: at 11:20

## 2025-05-27 ENCOUNTER — TELEPHONE (OUTPATIENT)
Dept: PRIMARY CARE | Facility: CLINIC | Age: 50
End: 2025-05-27

## 2025-05-28 ASSESSMENT — ENCOUNTER SYMPTOMS
PAIN LOCATION: GENERALIZED
HYPOTENSION: 1
PERSON REPORTING PAIN: PATIENT
CHANGE IN APPETITE: UNCHANGED
FATIGUES EASILY: 1
PAIN: 1
PAIN LOCATION - PAIN SEVERITY: 4/10
MUSCLE WEAKNESS: 1
APPETITE LEVEL: FAIR

## 2025-05-29 DIAGNOSIS — E78.2 MIXED HYPERLIPIDEMIA: ICD-10-CM

## 2025-05-30 DIAGNOSIS — T14.8XXA WOUND INFECTION: ICD-10-CM

## 2025-05-30 DIAGNOSIS — L08.9 WOUND INFECTION: ICD-10-CM

## 2025-05-30 RX ORDER — SIMVASTATIN 20 MG/1
20 TABLET, FILM COATED ORAL NIGHTLY
Qty: 28 TABLET | Refills: 0 | Status: SHIPPED | OUTPATIENT
Start: 2025-05-30

## 2025-06-02 ENCOUNTER — HOME CARE VISIT (OUTPATIENT)
Dept: HOME HEALTH SERVICES | Facility: HOME HEALTH | Age: 50
End: 2025-06-02
Payer: MEDICARE

## 2025-06-02 VITALS
HEART RATE: 94 BPM | DIASTOLIC BLOOD PRESSURE: 50 MMHG | OXYGEN SATURATION: 95 % | RESPIRATION RATE: 16 BRPM | SYSTOLIC BLOOD PRESSURE: 92 MMHG | TEMPERATURE: 97.7 F

## 2025-06-02 PROCEDURE — G0299 HHS/HOSPICE OF RN EA 15 MIN: HCPCS | Mod: HHH

## 2025-06-02 RX ADMIN — Medication 20 ML: at 11:15

## 2025-06-02 RX ADMIN — Medication 10 ML: at 11:10

## 2025-06-03 ENCOUNTER — HOME INFUSION (OUTPATIENT)
Dept: INFUSION THERAPY | Age: 50
End: 2025-06-03
Payer: MEDICARE

## 2025-06-03 ASSESSMENT — ENCOUNTER SYMPTOMS
MUSCLE WEAKNESS: 1
PAIN: 1
PERSON REPORTING PAIN: PATIENT
PAIN LOCATION - PAIN SEVERITY: 4/10
APPETITE LEVEL: FAIR
CHANGE IN APPETITE: UNCHANGED
FATIGUES EASILY: 1
PAIN LOCATION: GENERALIZED

## 2025-06-03 NOTE — PROGRESS NOTES
Chart Review: Patient ordered Ertapenem 1g IV q254 through 6/18 for OM of right ankle     Labs and follow up with Dr Raygoza    5/26 labs reviewed - unremarkable. ESR unable to be run as sample was too old     [facility] tried to call pt - no answer and unable to leave . Pt not admitted so pharmacy will send another week of meds and supplies to match Tues 6/3.     Dispense x7 doses of Erta in  for cmpd and delivery on 6/3  Infusions 6/4 through 6/10     NF 6/10 straight - lab check

## 2025-06-04 ENCOUNTER — PATIENT OUTREACH (OUTPATIENT)
Dept: PRIMARY CARE | Facility: CLINIC | Age: 50
End: 2025-06-04
Payer: MEDICARE

## 2025-06-04 NOTE — PROGRESS NOTES
Unable to reach patient for follow up call after recent hospitalization.      If no voicemail available call attempts x 2 were made to contact the patient to assist with any questions or concerns patient may have.

## 2025-06-06 DIAGNOSIS — T14.8XXA WOUND INFECTION: ICD-10-CM

## 2025-06-06 DIAGNOSIS — L08.9 WOUND INFECTION: ICD-10-CM

## 2025-06-09 ENCOUNTER — HOME INFUSION (OUTPATIENT)
Dept: INFUSION THERAPY | Age: 50
End: 2025-06-09
Payer: MEDICARE

## 2025-06-09 NOTE — PROGRESS NOTES
Review of chart. Patient with order for ertapenem thru 6/18/25. Weekly labs are ordered with results to dr Spann. Patient has appt with Dr Spann on 6/18 9a.    Review of labs from 6/2. WBC and ANC high 15.8 and 11,218. Results from last were unable to be resulted because too old. ESR wtl, CRP only slightly elevated.    Review of rn visit notes. No problems with infusions or line noted No fevers noted (elevated WBC)    Tel call to patient. No answer and unable to leave a vm. Sent message to patient via [employer] letting her know that another delivery is being sent tomorrow 6/10 and again on 6/13 d/t need for 8 more doses of ertpenem and 7 day stability of med. Provided call back number for questions or concerns.    Processed fill for the following for mix and straight delivery:  Tues 6/10: 3 x ertapenem HP for 6/11 thru 6/13/25  Fri 6/13/25: 5 x ertapenem HP for 6/14 thru 6/18/25.    Follow up 6/18/25 after 9a appt with dr spann for further orders. Patient has a picc.

## 2025-06-11 ENCOUNTER — HOME CARE VISIT (OUTPATIENT)
Dept: HOME HEALTH SERVICES | Facility: HOME HEALTH | Age: 50
End: 2025-06-11
Payer: MEDICARE

## 2025-06-11 PROCEDURE — G0299 HHS/HOSPICE OF RN EA 15 MIN: HCPCS | Mod: HHH

## 2025-06-11 RX ADMIN — Medication 10 ML: at 11:15

## 2025-06-11 RX ADMIN — Medication 20 ML: at 11:20

## 2025-06-12 LAB
BASOPHILS # BLD AUTO: 65 CELLS/UL (ref 0–200)
BASOPHILS NFR BLD AUTO: 0.6 %
CREAT SERPL-MCNC: 0.65 MG/DL (ref 0.5–1.03)
CRP SERPL-MCNC: 12.5 MG/L
EGFRCR SERPLBLD CKD-EPI 2021: 107 ML/MIN/1.73M2
EOSINOPHIL # BLD AUTO: 335 CELLS/UL (ref 15–500)
EOSINOPHIL NFR BLD AUTO: 3.1 %
ERYTHROCYTE [DISTWIDTH] IN BLOOD BY AUTOMATED COUNT: 11.9 % (ref 11–15)
ERYTHROCYTE [SEDIMENTATION RATE] IN BLOOD BY WESTERGREN METHOD: 25 MM/H
HCT VFR BLD AUTO: 44.3 % (ref 35–45)
HGB BLD-MCNC: 14.3 G/DL (ref 11.7–15.5)
LYMPHOCYTES # BLD AUTO: 2819 CELLS/UL (ref 850–3900)
LYMPHOCYTES NFR BLD AUTO: 26.1 %
MCH RBC QN AUTO: 29.3 PG (ref 27–33)
MCHC RBC AUTO-ENTMCNC: 32.3 G/DL (ref 32–36)
MCV RBC AUTO: 90.8 FL (ref 80–100)
MONOCYTES # BLD AUTO: 691 CELLS/UL (ref 200–950)
MONOCYTES NFR BLD AUTO: 6.4 %
NEUTROPHILS # BLD AUTO: 6890 CELLS/UL (ref 1500–7800)
NEUTROPHILS NFR BLD AUTO: 63.8 %
PLATELET # BLD AUTO: 243 THOUSAND/UL (ref 140–400)
PMV BLD REES-ECKER: 11.5 FL (ref 7.5–12.5)
RBC # BLD AUTO: 4.88 MILLION/UL (ref 3.8–5.1)
WBC # BLD AUTO: 10.8 THOUSAND/UL (ref 3.8–10.8)

## 2025-06-13 DIAGNOSIS — L08.9 WOUND INFECTION: ICD-10-CM

## 2025-06-13 DIAGNOSIS — T14.8XXA WOUND INFECTION: ICD-10-CM

## 2025-06-15 ASSESSMENT — ENCOUNTER SYMPTOMS
MUSCLE WEAKNESS: 1
CHANGE IN APPETITE: UNCHANGED
PAIN LOCATION - PAIN SEVERITY: 4/10
HYPOTENSION: 1
PAIN: 1
FATIGUES EASILY: 1
PERSON REPORTING PAIN: PATIENT
APPETITE LEVEL: FAIR
PAIN LOCATION: GENERALIZED

## 2025-06-16 ENCOUNTER — HOME CARE VISIT (OUTPATIENT)
Dept: HOME HEALTH SERVICES | Facility: HOME HEALTH | Age: 50
End: 2025-06-16
Payer: MEDICARE

## 2025-06-16 PROCEDURE — G0299 HHS/HOSPICE OF RN EA 15 MIN: HCPCS | Mod: HHH

## 2025-06-16 RX ADMIN — Medication 10 ML: at 10:10

## 2025-06-16 RX ADMIN — Medication 20 ML: at 10:15

## 2025-06-17 DIAGNOSIS — E78.2 MIXED HYPERLIPIDEMIA: ICD-10-CM

## 2025-06-17 LAB
BASOPHILS # BLD AUTO: 41 CELLS/UL (ref 0–200)
BASOPHILS NFR BLD AUTO: 0.4 %
CREAT SERPL-MCNC: 0.65 MG/DL (ref 0.5–1.03)
CRP SERPL-MCNC: 3.8 MG/L
EGFRCR SERPLBLD CKD-EPI 2021: 107 ML/MIN/1.73M2
EOSINOPHIL # BLD AUTO: 371 CELLS/UL (ref 15–500)
EOSINOPHIL NFR BLD AUTO: 3.6 %
ERYTHROCYTE [DISTWIDTH] IN BLOOD BY AUTOMATED COUNT: 12.2 % (ref 11–15)
ERYTHROCYTE [SEDIMENTATION RATE] IN BLOOD BY WESTERGREN METHOD: 25 MM/H
HCT VFR BLD AUTO: 44.6 % (ref 35–45)
HGB BLD-MCNC: 14.5 G/DL (ref 11.7–15.5)
LYMPHOCYTES # BLD AUTO: 3667 CELLS/UL (ref 850–3900)
LYMPHOCYTES NFR BLD AUTO: 35.6 %
MCH RBC QN AUTO: 29.3 PG (ref 27–33)
MCHC RBC AUTO-ENTMCNC: 32.5 G/DL (ref 32–36)
MCV RBC AUTO: 90.1 FL (ref 80–100)
MONOCYTES # BLD AUTO: 680 CELLS/UL (ref 200–950)
MONOCYTES NFR BLD AUTO: 6.6 %
NEUTROPHILS # BLD AUTO: 5541 CELLS/UL (ref 1500–7800)
NEUTROPHILS NFR BLD AUTO: 53.8 %
PLATELET # BLD AUTO: 284 THOUSAND/UL (ref 140–400)
PMV BLD REES-ECKER: 11.1 FL (ref 7.5–12.5)
RBC # BLD AUTO: 4.95 MILLION/UL (ref 3.8–5.1)
WBC # BLD AUTO: 10.3 THOUSAND/UL (ref 3.8–10.8)

## 2025-06-18 ENCOUNTER — HOME INFUSION (OUTPATIENT)
Dept: INFUSION THERAPY | Age: 50
End: 2025-06-18
Payer: MEDICARE

## 2025-06-18 DIAGNOSIS — M86.071 ACUTE HEMATOGENOUS OSTEOMYELITIS OF RIGHT FOOT (MULTI): ICD-10-CM

## 2025-06-18 DIAGNOSIS — L02.619 CELLULITIS AND ABSCESS OF FOOT: ICD-10-CM

## 2025-06-18 DIAGNOSIS — L03.119 CELLULITIS AND ABSCESS OF FOOT: ICD-10-CM

## 2025-06-18 RX ORDER — SIMVASTATIN 20 MG/1
20 TABLET, FILM COATED ORAL NIGHTLY
Qty: 28 TABLET | Refills: 0 | Status: SHIPPED | OUTPATIENT
Start: 2025-06-18

## 2025-06-18 RX ORDER — ERTAPENEM 1 G/1
1 INJECTION, POWDER, LYOPHILIZED, FOR SOLUTION INTRAMUSCULAR; INTRAVENOUS EVERY 24 HOURS
Qty: 350 ML | Refills: 0 | Status: SHIPPED
Start: 2025-06-18 | End: 2025-06-25

## 2025-06-18 NOTE — PROGRESS NOTES
Review of chart. Patient with order for ertapenem. Weekly labs are ordered with results to dr Raygoza.      Labs from 6/16 reviewed - CRP 3.8 mg/L - all results WNLd.     Review of rn visit notes. No problems with infusions or line noted. Pt noncompliant with wearing dressing to ulcer, does not check blood glucose and not taking insulin    Patient no-showed her appointment with Dr. Raygoza. Received orders from [facility] per Dr. Raygoza to continue x 1 week. Another appt scheduled for 6/25 at 11am. Office unable to reach pt and unable to leave .    Mychart refill msg from 6/9 remains unread.     Processed fill for the following for mix and straight delivery:  6/18/25: 7 x ertapenem HP for 6/19 thru 6/25/25.  Please write on delivery ticket - [facility]     Follow up 6/25 POC Dr. Raygoza

## 2025-06-19 ASSESSMENT — ENCOUNTER SYMPTOMS
DENIES PAIN: 1
CHANGE IN APPETITE: UNCHANGED
PERSON REPORTING PAIN: PATIENT
APPETITE LEVEL: GOOD
MUSCLE WEAKNESS: 1
FATIGUES EASILY: 1

## 2025-06-20 DIAGNOSIS — L08.9 WOUND INFECTION: ICD-10-CM

## 2025-06-20 DIAGNOSIS — T14.8XXA WOUND INFECTION: ICD-10-CM

## 2025-06-24 ENCOUNTER — HOME CARE VISIT (OUTPATIENT)
Dept: HOME HEALTH SERVICES | Facility: HOME HEALTH | Age: 50
End: 2025-06-24
Payer: MEDICARE

## 2025-06-24 VITALS
DIASTOLIC BLOOD PRESSURE: 48 MMHG | SYSTOLIC BLOOD PRESSURE: 88 MMHG | RESPIRATION RATE: 16 BRPM | TEMPERATURE: 97.9 F | OXYGEN SATURATION: 96 % | HEART RATE: 96 BPM

## 2025-06-24 PROCEDURE — G0299 HHS/HOSPICE OF RN EA 15 MIN: HCPCS | Mod: HHH

## 2025-06-24 RX ADMIN — Medication 10 ML: at 11:00

## 2025-06-24 RX ADMIN — Medication 20 ML: at 11:05

## 2025-06-25 ENCOUNTER — HOME INFUSION (OUTPATIENT)
Dept: INFUSION THERAPY | Age: 50
End: 2025-06-25
Payer: MEDICARE

## 2025-06-25 DIAGNOSIS — M86.071 ACUTE HEMATOGENOUS OSTEOMYELITIS OF RIGHT FOOT (MULTI): Primary | ICD-10-CM

## 2025-06-25 LAB
ALBUMIN SERPL-MCNC: 3.5 G/DL (ref 3.6–5.1)
ALBUMIN/GLOB SERPL: 1.3 (CALC) (ref 1–2.5)
ALP SERPL-CCNC: 64 U/L (ref 37–153)
ALT SERPL-CCNC: 11 U/L (ref 6–29)
AST SERPL-CCNC: 11 U/L (ref 10–35)
BASOPHILS # BLD AUTO: 46 CELLS/UL (ref 0–200)
BASOPHILS NFR BLD AUTO: 0.4 %
BILIRUB SERPL-MCNC: 0.3 MG/DL (ref 0.2–1.2)
BUN SERPL-MCNC: 14 MG/DL (ref 7–25)
BUN/CREAT SERPL: ABNORMAL (CALC) (ref 6–22)
CALCIUM SERPL-MCNC: 9.3 MG/DL (ref 8.6–10.4)
CHLORIDE SERPL-SCNC: 106 MMOL/L (ref 98–110)
CO2 SERPL-SCNC: 23 MMOL/L (ref 20–32)
CREAT SERPL-MCNC: 0.69 MG/DL (ref 0.5–1.03)
CRP SERPL-MCNC: 6.8 MG/L
EGFRCR SERPLBLD CKD-EPI 2021: 105 ML/MIN/1.73M2
EOSINOPHIL # BLD AUTO: 262 CELLS/UL (ref 15–500)
EOSINOPHIL NFR BLD AUTO: 2.3 %
ERYTHROCYTE [DISTWIDTH] IN BLOOD BY AUTOMATED COUNT: 12.4 % (ref 11–15)
ERYTHROCYTE [SEDIMENTATION RATE] IN BLOOD BY WESTERGREN METHOD: 19 MM/H
GLOBULIN SER CALC-MCNC: 2.7 G/DL (CALC) (ref 1.9–3.7)
GLUCOSE SERPL-MCNC: 211 MG/DL (ref 65–99)
HCT VFR BLD AUTO: 43.7 % (ref 35–45)
HGB BLD-MCNC: 14.3 G/DL (ref 11.7–15.5)
LYMPHOCYTES # BLD AUTO: 3260 CELLS/UL (ref 850–3900)
LYMPHOCYTES NFR BLD AUTO: 28.6 %
MCH RBC QN AUTO: 30 PG (ref 27–33)
MCHC RBC AUTO-ENTMCNC: 32.7 G/DL (ref 32–36)
MCV RBC AUTO: 91.6 FL (ref 80–100)
MONOCYTES # BLD AUTO: 616 CELLS/UL (ref 200–950)
MONOCYTES NFR BLD AUTO: 5.4 %
NEUTROPHILS # BLD AUTO: 7216 CELLS/UL (ref 1500–7800)
NEUTROPHILS NFR BLD AUTO: 63.3 %
PLATELET # BLD AUTO: 251 THOUSAND/UL (ref 140–400)
PMV BLD REES-ECKER: 11.3 FL (ref 7.5–12.5)
POTASSIUM SERPL-SCNC: 4 MMOL/L (ref 3.5–5.3)
PROT SERPL-MCNC: 6.2 G/DL (ref 6.1–8.1)
RBC # BLD AUTO: 4.77 MILLION/UL (ref 3.8–5.1)
SODIUM SERPL-SCNC: 140 MMOL/L (ref 135–146)
WBC # BLD AUTO: 11.4 THOUSAND/UL (ref 3.8–10.8)

## 2025-06-25 NOTE — PROGRESS NOTES
[facility] rec'd orders from Sarah cruz/ Dr. Raygoza's office - IV therapy complete after dose today and PICC can be removed by HC RN.    Order entered in epic, gold copy to intake    Chart forwarded to patient care rep to discharge from pharmacy service

## 2025-06-26 ASSESSMENT — ENCOUNTER SYMPTOMS
PERSON REPORTING PAIN: PATIENT
MUSCLE WEAKNESS: 1
CHANGE IN APPETITE: UNCHANGED
APPETITE LEVEL: GOOD
DENIES PAIN: 1
FATIGUES EASILY: 1

## 2025-06-27 DIAGNOSIS — L08.9 WOUND INFECTION: ICD-10-CM

## 2025-06-27 DIAGNOSIS — T14.8XXA WOUND INFECTION: ICD-10-CM

## 2025-07-01 ENCOUNTER — HOME CARE VISIT (OUTPATIENT)
Dept: HOME HEALTH SERVICES | Facility: HOME HEALTH | Age: 50
End: 2025-07-01
Payer: MEDICARE

## 2025-07-02 ENCOUNTER — APPOINTMENT (OUTPATIENT)
Dept: OBSTETRICS AND GYNECOLOGY | Facility: CLINIC | Age: 50
End: 2025-07-02
Payer: MEDICARE

## 2025-07-04 DIAGNOSIS — T14.8XXA WOUND INFECTION: ICD-10-CM

## 2025-07-04 DIAGNOSIS — L08.9 WOUND INFECTION: ICD-10-CM

## 2025-07-06 ASSESSMENT — ENCOUNTER SYMPTOMS
DENIES PAIN: 1
PERSON REPORTING PAIN: PATIENT

## 2025-07-06 ASSESSMENT — ACTIVITIES OF DAILY LIVING (ADL)
OASIS_M1830: 00
HOME_HEALTH_OASIS: 00

## 2025-07-09 PROBLEM — A49.1 GROUP B STREPTOCOCCAL INFECTION: Status: RESOLVED | Noted: 2025-06-25 | Resolved: 2025-07-09

## 2025-07-09 PROBLEM — A49.01 MSSA (METHICILLIN SUSCEPTIBLE STAPHYLOCOCCUS AUREUS) INFECTION: Status: RESOLVED | Noted: 2025-06-25 | Resolved: 2025-07-09

## 2025-07-09 PROBLEM — M86.171 OSTEOMYELITIS OF ANKLE OR FOOT, ACUTE, RIGHT (MULTI): Status: RESOLVED | Noted: 2025-06-25 | Resolved: 2025-07-09

## 2025-07-11 DIAGNOSIS — T14.8XXA WOUND INFECTION: ICD-10-CM

## 2025-07-11 DIAGNOSIS — L08.9 WOUND INFECTION: ICD-10-CM

## 2025-07-18 DIAGNOSIS — L08.9 WOUND INFECTION: ICD-10-CM

## 2025-07-18 DIAGNOSIS — T14.8XXA WOUND INFECTION: ICD-10-CM

## 2025-07-25 DIAGNOSIS — L08.9 WOUND INFECTION: ICD-10-CM

## 2025-07-25 DIAGNOSIS — T14.8XXA WOUND INFECTION: ICD-10-CM

## 2025-07-28 DIAGNOSIS — E78.2 MIXED HYPERLIPIDEMIA: ICD-10-CM

## 2025-07-31 RX ORDER — SIMVASTATIN 20 MG/1
20 TABLET, FILM COATED ORAL NIGHTLY
Qty: 28 TABLET | Refills: 0 | Status: SHIPPED | OUTPATIENT
Start: 2025-07-31

## 2025-08-01 DIAGNOSIS — L08.9 WOUND INFECTION: ICD-10-CM

## 2025-08-01 DIAGNOSIS — T14.8XXA WOUND INFECTION: ICD-10-CM

## 2025-08-06 ENCOUNTER — TELEPHONE (OUTPATIENT)
Facility: CLINIC | Age: 50
End: 2025-08-06
Payer: MEDICARE

## 2025-08-07 ENCOUNTER — TELEPHONE (OUTPATIENT)
Facility: CLINIC | Age: 50
End: 2025-08-07
Payer: MEDICARE

## 2025-08-07 NOTE — TELEPHONE ENCOUNTER
I attempted to call and confirm Ridge's appointment with Hiral Peoples on Monday 08/11/2025 ; however, her phone keeps disconnecting or hanging up on me.

## 2025-08-08 DIAGNOSIS — L08.9 WOUND INFECTION: ICD-10-CM

## 2025-08-08 DIAGNOSIS — T14.8XXA WOUND INFECTION: ICD-10-CM

## 2025-08-15 DIAGNOSIS — L08.9 WOUND INFECTION: ICD-10-CM

## 2025-08-15 DIAGNOSIS — T14.8XXA WOUND INFECTION: ICD-10-CM

## 2025-08-22 DIAGNOSIS — T14.8XXA WOUND INFECTION: ICD-10-CM

## 2025-08-22 DIAGNOSIS — L08.9 WOUND INFECTION: ICD-10-CM

## 2025-08-25 DIAGNOSIS — E78.2 MIXED HYPERLIPIDEMIA: ICD-10-CM

## 2025-08-25 RX ORDER — SIMVASTATIN 20 MG/1
20 TABLET, FILM COATED ORAL NIGHTLY
Qty: 28 TABLET | Refills: 0 | OUTPATIENT
Start: 2025-08-25

## 2025-08-29 DIAGNOSIS — L08.9 WOUND INFECTION: ICD-10-CM

## 2025-08-29 DIAGNOSIS — T14.8XXA WOUND INFECTION: ICD-10-CM

## 2025-10-06 ENCOUNTER — APPOINTMENT (OUTPATIENT)
Facility: CLINIC | Age: 50
End: 2025-10-06
Payer: MEDICARE

## (undated) DEVICE — HANDPIECE, INTERPULSE, W/RETRACTABLE COAX FAN, STERILE

## (undated) DEVICE — PACK, ANGIO P2, CUSTOM, LAKE

## (undated) DEVICE — BANDAGE, GAUZE, COTTON, STERILE, BULKEE II, 4.5IN X 4.1YD

## (undated) DEVICE — CATHETER, 5 FR. 100CM, ANGLE TAPER AT TIP

## (undated) DEVICE — SUTURE, PROLENE, 0, 30 IN, CT1, BLUE

## (undated) DEVICE — CATHETER, SOFT VU, NON-BRAID FLUSH, .035, 5FR, 65CM, 6-SIDEHOLE

## (undated) DEVICE — TUBING, SUCTION, 6MM X 10, CLEAN N-COND

## (undated) DEVICE — SOLUTION, IRRIGATION, X RX SODIUM CHL 0.9%, 1000ML BTL

## (undated) DEVICE — SLEEVE, VASO PRESS, CALF GARMENT, MEDIUM, GREEN

## (undated) DEVICE — GLOVE, SURGICAL, PROTEXIS PI , 7.5, PF, LF

## (undated) DEVICE — SOLUTION, INJECTION, SODIUM CHLORIDE 9%, 3000ML

## (undated) DEVICE — CLOSURE SYSTEM, VASCULAR, VASCADE, 5 F

## (undated) DEVICE — INFLATION KIT, ADVANTAGE, ENCORE 26 (1/BOX)

## (undated) DEVICE — BLADE, SAW, SAGITTAL, MICRO, 9.4 X 25.4 X 0.38 MM, STAINLESS STEEL, STERILE

## (undated) DEVICE — COVER, XRAY, CASSETTE, 21 X 40 IN, STERILE

## (undated) DEVICE — MICROINTRODUCER KIT, VSI, 4FR X 40CM, STIFFEN

## (undated) DEVICE — SHEATH, PINNACLE, W/.038 GW 10CM, 5FR INTRODUCER, 2.5 CM DIALATOR

## (undated) DEVICE — SUTURE, ETHILON, 4-0, 18, PS2, BLACK

## (undated) DEVICE — GLOVE, SURGICAL, PROTEXIS PI ORTHO, 7.5, PF, LF

## (undated) DEVICE — GUIDEWIRE, STIFF SHAFT, ANGLE TIP, .035 DIA, 260 CM,  5 CM TIP"

## (undated) DEVICE — CAUTERY, PENCIL, PUSH BUTTON, SMOKE EVAC, 70MM

## (undated) DEVICE — SOLUTION, IRRIGATION, USP, SODIUM CHLORIDE 0.9%, 3000 ML, BAG

## (undated) DEVICE — Device

## (undated) DEVICE — SPONGE, GAUZE, AVANT, STERILE, NONWOVEN, 4PLY, 4 X 4, STANDARD

## (undated) DEVICE — APPLICATOR, CHLORAPREP, W/ORANGE TINT, 26ML